# Patient Record
Sex: MALE | Race: WHITE | NOT HISPANIC OR LATINO | Employment: OTHER | URBAN - METROPOLITAN AREA
[De-identification: names, ages, dates, MRNs, and addresses within clinical notes are randomized per-mention and may not be internally consistent; named-entity substitution may affect disease eponyms.]

---

## 2019-07-23 ENCOUNTER — OFFICE VISIT (OUTPATIENT)
Dept: CARDIOLOGY | Facility: CLINIC | Age: 79
End: 2019-07-23

## 2019-07-23 VITALS
DIASTOLIC BLOOD PRESSURE: 76 MMHG | HEIGHT: 68 IN | WEIGHT: 164 LBS | HEART RATE: 64 BPM | SYSTOLIC BLOOD PRESSURE: 122 MMHG | BODY MASS INDEX: 24.86 KG/M2

## 2019-07-23 DIAGNOSIS — E11.9 TYPE 2 DIABETES MELLITUS WITHOUT COMPLICATION, WITHOUT LONG-TERM CURRENT USE OF INSULIN (HCC): ICD-10-CM

## 2019-07-23 DIAGNOSIS — R01.1 HEART MURMUR: Primary | ICD-10-CM

## 2019-07-23 DIAGNOSIS — I10 ESSENTIAL HYPERTENSION: ICD-10-CM

## 2019-07-23 PROCEDURE — 93000 ELECTROCARDIOGRAM COMPLETE: CPT | Performed by: INTERNAL MEDICINE

## 2019-07-23 PROCEDURE — 99204 OFFICE O/P NEW MOD 45 MIN: CPT | Performed by: INTERNAL MEDICINE

## 2019-07-23 RX ORDER — TAMSULOSIN HYDROCHLORIDE 0.4 MG/1
CAPSULE ORAL DAILY
COMMUNITY
Start: 2019-07-23 | End: 2019-12-23 | Stop reason: SDUPTHER

## 2019-07-23 RX ORDER — LOSARTAN POTASSIUM 100 MG/1
TABLET ORAL DAILY
COMMUNITY
Start: 2019-07-23 | End: 2019-12-23 | Stop reason: SDUPTHER

## 2019-07-23 RX ORDER — METFORMIN HYDROCHLORIDE 750 MG/1
TABLET, EXTENDED RELEASE ORAL DAILY
COMMUNITY
Start: 2019-06-11 | End: 2019-12-23 | Stop reason: SDUPTHER

## 2019-07-23 NOTE — PROGRESS NOTES
Date of Office Visit: 2019  Encounter Provider: Khurram Ball MD  Place of Service: [facility] CARDIOLOGY  Patient Name: Woo Dobbs  :1940    Chief Complaint   Patient presents with   • Heart Murmur   :     HPI: Woo Dobbs is a 78 y.o. male who presents today to at the request of [facility] regarding a murmur.  He reports being told he had a murmur sometime in the last 10-15 years but was told not to worry about it.  He doesn't have a history of rheumatic fever.  He denies chest pain, dyspnea, palpitations, syncope, orthopnea, or edema.  He has had a lot of tick bites and a rash and is very fatigued.  He has a cough and was given a cephalosporin for bronchitis.    Past Medical History:   Diagnosis Date   • Hypertension    • Type 2 diabetes mellitus (CMS/HCC)        Past Surgical History:   Procedure Laterality Date   • VASECTOMY         Social History     Socioeconomic History   • Marital status:      Spouse name: Not on file   • Number of children: 3   • Years of education: Not on file   • Highest education level: Not on file   Occupational History   • Occupation: retired      Comment:      Tobacco Use   • Smoking status: Never Smoker   • Smokeless tobacco: Never Used   • Tobacco comment: caffeine use: 1 -2 cups daily.    Substance and Sexual Activity   • Alcohol use: No     Frequency: Never   • Drug use: No       History reviewed. No pertinent family history.    ROS    Allergies   Allergen Reactions   • Loratadine Other (See Comments)     Emotional side effects.          Current Outpatient Medications:   •  losartan (COZAAR) 100 MG tablet, Daily., Disp: , Rfl:   •  metFORMIN ER (GLUCOPHAGE-XR) 750 MG 24 hr tablet, Daily., Disp: , Rfl:   •  tamsulosin (FLOMAX) 0.4 MG capsule 24 hr capsule, Daily., Disp: , Rfl:       Objective:     Vitals:    19 1201   BP: 122/76   BP Location: Left arm   Pulse: 64   Weight: 74.4 kg (164 lb)   Height: 172.7 cm  "(68\")     Body mass index is 24.94 kg/m².    Physical Exam   Constitutional: He is oriented to person, place, and time. He appears well-developed and well-nourished.   HENT:   Head: Normocephalic.   Nose: Nose normal.   Mouth/Throat: Oropharynx is clear and moist.   Eyes: Conjunctivae and EOM are normal. Pupils are equal, round, and reactive to light.   Neck: Normal range of motion. No JVD present.   Cardiovascular: Normal rate, regular rhythm and intact distal pulses.   Murmur heard.   Harsh midsystolic murmur is present with a grade of 2/6 at the upper right sternal border radiating to the neck.  Pulmonary/Chest: Effort normal and breath sounds normal.   Abdominal: Soft. There is no tenderness.   Musculoskeletal: Normal range of motion. He exhibits no edema.   Lymphadenopathy:     He has no cervical adenopathy.   Neurological: He is alert and oriented to person, place, and time. No cranial nerve deficit.   Skin: Skin is warm and dry. No rash noted.   Psychiatric: He has a normal mood and affect. His behavior is normal. Judgment and thought content normal.   Vitals reviewed.        ECG 12 Lead  Date/Time: 7/23/2019 12:09 PM  Performed by: Khurram Ball MD  Authorized by: Khurram Ball MD   Comparison: compared with previous ECG   Similar to previous ECG  Rhythm: sinus rhythm  Conduction: conduction normal  ST Segments: ST segments normal  T Waves: T waves normal  QRS axis: normal  Other: no other findings    Clinical impression: normal ECG              Assessment:       Diagnosis Plan   1. Heart murmur     2. Essential hypertension     3. Type 2 diabetes mellitus without complication, without long-term current use of insulin (CMS/[facility])            Plan:       His murmur is suggestive of mild aortic stenosis.  He has no cardiac symptoms.  His EKG is normal. I will check an echocardiogram.    His BP was 105/70 yesterday, so he held his losartan last night.  Today his BP is well controlled.  He likely only needs 50mg " daily.  I'll follow up with this when I get the echo back.    Sincerely,       Khurram Ball MD

## 2019-08-07 ENCOUNTER — HOSPITAL ENCOUNTER (OUTPATIENT)
Dept: CARDIOLOGY | Facility: HOSPITAL | Age: 79
Discharge: HOME | End: 2019-08-07
Admitting: INTERNAL MEDICINE

## 2019-08-07 VITALS
DIASTOLIC BLOOD PRESSURE: 73 MMHG | SYSTOLIC BLOOD PRESSURE: 134 MMHG | HEIGHT: 68 IN | WEIGHT: 164 LBS | BODY MASS INDEX: 24.86 KG/M2

## 2019-08-07 DIAGNOSIS — R01.1 HEART MURMUR: ICD-10-CM

## 2019-08-07 LAB
BH CV ECHO MEAS - ACS: 1.2 CM
BH CV ECHO MEAS - AO MAX PG (FULL): 18 MMHG
BH CV ECHO MEAS - AO MAX PG: 24.6 MMHG
BH CV ECHO MEAS - AO MEAN PG (FULL): 9.5 MMHG
BH CV ECHO MEAS - AO MEAN PG: 12.5 MMHG
BH CV ECHO MEAS - AO ROOT AREA (BSA CORRECTED): 1.5
BH CV ECHO MEAS - AO ROOT AREA: 6.6 CM^2
BH CV ECHO MEAS - AO ROOT DIAM: 2.9 CM
BH CV ECHO MEAS - AO V2 MAX: 248 CM/SEC
BH CV ECHO MEAS - AO V2 MEAN: 163 CM/SEC
BH CV ECHO MEAS - AO V2 VTI: 50.4 CM
BH CV ECHO MEAS - ASC AORTA: 3.4 CM
BH CV ECHO MEAS - AVA(I,A): 1.8 CM^2
BH CV ECHO MEAS - AVA(I,D): 1.8 CM^2
BH CV ECHO MEAS - AVA(V,A): 1.8 CM^2
BH CV ECHO MEAS - AVA(V,D): 1.8 CM^2
BH CV ECHO MEAS - BSA(HAYCOCK): 1.9 M^2
BH CV ECHO MEAS - BSA: 1.9 M^2
BH CV ECHO MEAS - BZI_BMI: 24.9 KILOGRAMS/M^2
BH CV ECHO MEAS - BZI_METRIC_HEIGHT: 172.7 CM
BH CV ECHO MEAS - BZI_METRIC_WEIGHT: 74.4 KG
BH CV ECHO MEAS - EDV(CUBED): 57.1 ML
BH CV ECHO MEAS - EDV(MOD-SP2): 63.4 ML
BH CV ECHO MEAS - EDV(MOD-SP4): 58.5 ML
BH CV ECHO MEAS - EDV(TEICH): 63.9 ML
BH CV ECHO MEAS - EF(CUBED): 92.1 %
BH CV ECHO MEAS - EF(MOD-BP): 62 %
BH CV ECHO MEAS - EF(MOD-SP2): 65.5 %
BH CV ECHO MEAS - EF(MOD-SP4): 65.3 %
BH CV ECHO MEAS - EF(TEICH): 87.9 %
BH CV ECHO MEAS - ESV(CUBED): 4.5 ML
BH CV ECHO MEAS - ESV(MOD-SP2): 21.9 ML
BH CV ECHO MEAS - ESV(MOD-SP4): 20.3 ML
BH CV ECHO MEAS - ESV(TEICH): 7.8 ML
BH CV ECHO MEAS - FS: 57.1 %
BH CV ECHO MEAS - IVS/LVPW: 1
BH CV ECHO MEAS - IVSD: 1.3 CM
BH CV ECHO MEAS - LAT PEAK E' VEL: 8 CM/SEC
BH CV ECHO MEAS - LV DIASTOLIC VOL/BSA (35-75): 31.1 ML/M^2
BH CV ECHO MEAS - LV MASS(C)D: 173.3 GRAMS
BH CV ECHO MEAS - LV MASS(C)DI: 92.2 GRAMS/M^2
BH CV ECHO MEAS - LV MAX PG: 6.6 MMHG
BH CV ECHO MEAS - LV MEAN PG: 3 MMHG
BH CV ECHO MEAS - LV SYSTOLIC VOL/BSA (12-30): 10.8 ML/M^2
BH CV ECHO MEAS - LV V1 MAX: 128.5 CM/SEC
BH CV ECHO MEAS - LV V1 MEAN: 84 CM/SEC
BH CV ECHO MEAS - LV V1 VTI: 26 CM
BH CV ECHO MEAS - LVIDD: 3.9 CM
BH CV ECHO MEAS - LVIDS: 1.7 CM
BH CV ECHO MEAS - LVLD AP2: 7.3 CM
BH CV ECHO MEAS - LVLD AP4: 7.3 CM
BH CV ECHO MEAS - LVLS AP2: 6.5 CM
BH CV ECHO MEAS - LVLS AP4: 5.4 CM
BH CV ECHO MEAS - LVOT AREA (M): 3.5 CM^2
BH CV ECHO MEAS - LVOT AREA: 3.5 CM^2
BH CV ECHO MEAS - LVOT DIAM: 2.1 CM
BH CV ECHO MEAS - LVPWD: 1.3 CM
BH CV ECHO MEAS - MED PEAK E' VEL: 5 CM/SEC
BH CV ECHO MEAS - MV A DUR: 0.14 SEC
BH CV ECHO MEAS - MV A MAX VEL: 98 CM/SEC
BH CV ECHO MEAS - MV DEC SLOPE: 409 CM/SEC^2
BH CV ECHO MEAS - MV DEC TIME: 259 SEC
BH CV ECHO MEAS - MV E MAX VEL: 71.8 CM/SEC
BH CV ECHO MEAS - MV E/A: 0.73
BH CV ECHO MEAS - MV MAX PG: 4.9 MMHG
BH CV ECHO MEAS - MV MEAN PG: 2 MMHG
BH CV ECHO MEAS - MV P1/2T MAX VEL: 97.7 CM/SEC
BH CV ECHO MEAS - MV P1/2T: 70 MSEC
BH CV ECHO MEAS - MV V2 MAX: 111 CM/SEC
BH CV ECHO MEAS - MV V2 MEAN: 55.4 CM/SEC
BH CV ECHO MEAS - MV V2 VTI: 29.6 CM
BH CV ECHO MEAS - MVA P1/2T LCG: 2.3 CM^2
BH CV ECHO MEAS - MVA(P1/2T): 3.1 CM^2
BH CV ECHO MEAS - MVA(VTI): 3 CM^2
BH CV ECHO MEAS - PA ACC TIME: 0.12 SEC
BH CV ECHO MEAS - PA MAX PG (FULL): 1 MMHG
BH CV ECHO MEAS - PA MAX PG: 3.1 MMHG
BH CV ECHO MEAS - PA PR(ACCEL): 26.8 MMHG
BH CV ECHO MEAS - PA V2 MAX: 88.6 CM/SEC
BH CV ECHO MEAS - PULM A REVS DUR: 0.13 SEC
BH CV ECHO MEAS - PULM A REVS VEL: 36.9 CM/SEC
BH CV ECHO MEAS - PULM DIAS VEL: 40.3 CM/SEC
BH CV ECHO MEAS - PULM S/D: 1.8
BH CV ECHO MEAS - PULM SYS VEL: 73.7 CM/SEC
BH CV ECHO MEAS - PVA(V,A): 2.3 CM^2
BH CV ECHO MEAS - PVA(V,D): 2.3 CM^2
BH CV ECHO MEAS - QP/QS: 0.44
BH CV ECHO MEAS - RAP SYSTOLE: 3 MMHG
BH CV ECHO MEAS - RV MAX PG: 2.1 MMHG
BH CV ECHO MEAS - RV MEAN PG: 1 MMHG
BH CV ECHO MEAS - RV V1 MAX: 72.4 CM/SEC
BH CV ECHO MEAS - RV V1 MEAN: 49.6 CM/SEC
BH CV ECHO MEAS - RV V1 VTI: 13.8 CM
BH CV ECHO MEAS - RVOT AREA: 2.8 CM^2
BH CV ECHO MEAS - RVOT DIAM: 1.9 CM
BH CV ECHO MEAS - RVSP: 23.6 MMHG
BH CV ECHO MEAS - SI(AO): 177 ML/M^2
BH CV ECHO MEAS - SI(CUBED): 28 ML/M^2
BH CV ECHO MEAS - SI(LVOT): 47.8 ML/M^2
BH CV ECHO MEAS - SI(MOD-SP2): 22.1 ML/M^2
BH CV ECHO MEAS - SI(MOD-SP4): 20.3 ML/M^2
BH CV ECHO MEAS - SI(TEICH): 29.9 ML/M^2
BH CV ECHO MEAS - SV(AO): 332.6 ML
BH CV ECHO MEAS - SV(CUBED): 52.6 ML
BH CV ECHO MEAS - SV(LVOT): 89.9 ML
BH CV ECHO MEAS - SV(MOD-SP2): 41.5 ML
BH CV ECHO MEAS - SV(MOD-SP4): 38.2 ML
BH CV ECHO MEAS - SV(RVOT): 39.1 ML
BH CV ECHO MEAS - SV(TEICH): 56.2 ML
BH CV ECHO MEAS - TAPSE (>1.6): 2 CM2
BH CV ECHO MEAS - TR MAX VEL: 227 CM/SEC
BH CV ECHO MEASUREMENTS AVERAGE E/E' RATIO: 11.05
BH CV VAS BP RIGHT ARM: NORMAL MMHG
BH CV XLRA - RV BASE: 2.9 CM
BH CV XLRA - TDI S': 11 CM/SEC
LEFT ATRIUM VOLUME INDEX: 16 ML/M2
MAXIMAL PREDICTED HEART RATE: 142 BPM
STRESS TARGET HR: 121 BPM

## 2019-08-07 PROCEDURE — 93306 TTE W/DOPPLER COMPLETE: CPT | Performed by: INTERNAL MEDICINE

## 2019-08-07 PROCEDURE — 93306 TTE W/DOPPLER COMPLETE: CPT

## 2019-12-23 ENCOUNTER — OFFICE VISIT (OUTPATIENT)
Dept: FAMILY MEDICINE CLINIC | Facility: CLINIC | Age: 79
End: 2019-12-23

## 2019-12-23 VITALS
TEMPERATURE: 98.3 F | WEIGHT: 160 LBS | DIASTOLIC BLOOD PRESSURE: 62 MMHG | OXYGEN SATURATION: 98 % | HEART RATE: 65 BPM | RESPIRATION RATE: 14 BRPM | HEIGHT: 68 IN | BODY MASS INDEX: 24.25 KG/M2 | SYSTOLIC BLOOD PRESSURE: 112 MMHG

## 2019-12-23 DIAGNOSIS — I10 ESSENTIAL HYPERTENSION: Primary | ICD-10-CM

## 2019-12-23 DIAGNOSIS — E11.59 TYPE 2 DIABETES MELLITUS WITH OTHER CIRCULATORY COMPLICATION, WITHOUT LONG-TERM CURRENT USE OF INSULIN (HCC): ICD-10-CM

## 2019-12-23 DIAGNOSIS — Z23 NEED FOR VACCINATION: ICD-10-CM

## 2019-12-23 DIAGNOSIS — Z12.5 SCREENING PSA (PROSTATE SPECIFIC ANTIGEN): ICD-10-CM

## 2019-12-23 DIAGNOSIS — N40.0 BENIGN PROSTATIC HYPERPLASIA WITHOUT LOWER URINARY TRACT SYMPTOMS: ICD-10-CM

## 2019-12-23 DIAGNOSIS — I51.89 GRADE I DIASTOLIC DYSFUNCTION: ICD-10-CM

## 2019-12-23 PROCEDURE — G0008 ADMIN INFLUENZA VIRUS VAC: HCPCS | Performed by: FAMILY MEDICINE

## 2019-12-23 PROCEDURE — 90653 IIV ADJUVANT VACCINE IM: CPT | Performed by: FAMILY MEDICINE

## 2019-12-23 PROCEDURE — 99202 OFFICE O/P NEW SF 15 MIN: CPT | Performed by: FAMILY MEDICINE

## 2019-12-23 RX ORDER — METFORMIN HYDROCHLORIDE 750 MG/1
750 TABLET, EXTENDED RELEASE ORAL
Qty: 90 TABLET | Refills: 1 | Status: SHIPPED | OUTPATIENT
Start: 2019-12-23 | End: 2020-10-15 | Stop reason: SDUPTHER

## 2019-12-23 RX ORDER — CHOLECALCIFEROL (VITAMIN D3) 50 MCG
2000 TABLET ORAL DAILY
Status: CANCELLED | OUTPATIENT
Start: 2019-12-23

## 2019-12-23 RX ORDER — TAMSULOSIN HYDROCHLORIDE 0.4 MG/1
1 CAPSULE ORAL DAILY
Qty: 90 CAPSULE | Refills: 1 | Status: SHIPPED | OUTPATIENT
Start: 2019-12-23 | End: 2020-10-15 | Stop reason: SDUPTHER

## 2019-12-23 RX ORDER — LOSARTAN POTASSIUM 100 MG/1
100 TABLET ORAL DAILY
Qty: 90 TABLET | Refills: 1 | Status: SHIPPED | OUTPATIENT
Start: 2019-12-23 | End: 2020-10-15 | Stop reason: SDUPTHER

## 2019-12-23 RX ORDER — CHOLECALCIFEROL (VITAMIN D3) 50 MCG
2000 TABLET ORAL DAILY
COMMUNITY
End: 2020-10-15

## 2019-12-23 NOTE — ASSESSMENT & PLAN NOTE
Controlled with losartan 100 mg daily.  No medication side effects.  Continue current treatment.

## 2019-12-23 NOTE — PROGRESS NOTES
Subjective   Woo Dobbs is a 79 y.o. male is here for   Chief Complaint   Patient presents with   • Establish Care   • Hypertension   • Diabetes       History of Present Illness     Mr. Dobbs has hypertension that is controlled losartan 100 mg.  No side effects.    He has diabetes. He takes ASA daily. He takes fish oil. He takes garlic.    He has a heart murmur. He states he had an echo that he states did not reveal any serious abnormalities.    The following portions of the patient's history were reviewed and updated as appropriate: allergies, current medications, past family history, past medical history, past social history, past surgical history and problem list.     reports that he has never smoked. He has never used smokeless tobacco. He reports that he does not drink alcohol or use drugs.    Review of Systems   Constitutional: Negative for activity change and unexpected weight change.   HENT: Negative for congestion.    Respiratory: Negative for shortness of breath and wheezing.    Cardiovascular: Negative for chest pain and palpitations.   Gastrointestinal: Negative for abdominal pain, blood in stool and constipation.   Genitourinary: Negative for difficulty urinating and hematuria.   Musculoskeletal: Negative for gait problem.   Skin: Negative for color change and rash.        PHQ-9 Depression Screening  Little interest or pleasure in doing things? 0   Feeling down, depressed, or hopeless? 0   Trouble falling or staying asleep, or sleeping too much?     Feeling tired or having little energy?     Poor appetite or overeating?     Feeling bad about yourself - or that you are a failure or have let yourself or your family down?     Trouble concentrating on things, such as reading the newspaper or watching television?     Moving or speaking so slowly that other people could have noticed? Or the opposite - being so fidgety or restless that you have been moving around a lot more than usual?     Thoughts that you  "would be better off dead, or of hurting yourself in some way?     PHQ-9 Total Score 0   If you checked off any problems, how difficult have these problems made it for you to do your work, take care of things at home, or get along with other people?           Objective   /62 (BP Location: Left arm, Patient Position: Sitting, Cuff Size: Adult)   Pulse 65   Temp 98.3 °F (36.8 °C) (Oral)   Resp 14   Ht 172.7 cm (68\")   Wt 72.6 kg (160 lb)   SpO2 98%   BMI 24.33 kg/m²   Physical Exam   Constitutional: He is oriented to person, place, and time. He appears well-developed and well-nourished. No distress.   HENT:   Head: Normocephalic and atraumatic.   Right Ear: External ear normal.   Left Ear: External ear normal.   Nose: Nose normal.   Mouth/Throat: Oropharynx is clear and moist. No oropharyngeal exudate.   Eyes: Lids are normal. Right eye exhibits no discharge. Left eye exhibits no discharge. No scleral icterus.   Neck: Trachea normal, normal range of motion and full passive range of motion without pain. Neck supple. No tracheal deviation and no edema present. No thyromegaly present.   Cardiovascular: Normal rate, regular rhythm and intact distal pulses. Exam reveals no gallop and no friction rub.   Murmur heard.  Pulmonary/Chest: Effort normal and breath sounds normal. No stridor. No tachypnea and no bradypnea. No respiratory distress. He has no decreased breath sounds. He has no wheezes. He has no rales. He exhibits no tenderness.   Abdominal: Normal appearance. There is no hepatosplenomegaly.   Musculoskeletal: He exhibits no edema.   Lymphadenopathy:        Head (right side): No submental, no submandibular, no tonsillar, no preauricular, no posterior auricular and no occipital adenopathy present.        Head (left side): No submental, no submandibular, no tonsillar, no preauricular, no posterior auricular and no occipital adenopathy present.     He has no cervical adenopathy.        Right cervical: No " superficial cervical, no deep cervical and no posterior cervical adenopathy present.       Left cervical: No superficial cervical, no deep cervical and no posterior cervical adenopathy present.   Neurological: He is alert and oriented to person, place, and time. He has normal strength and normal reflexes. He is not disoriented.   Skin: Skin is warm, dry and intact. Capillary refill takes less than 2 seconds. No rash noted. He is not diaphoretic. No cyanosis or erythema. No pallor. Nails show no clubbing.   Psychiatric: He has a normal mood and affect. His behavior is normal. Cognition and memory are normal.   Nursing note and vitals reviewed.      Procedures    Assessment/Plan   Diagnoses and all orders for this visit:    1. Essential hypertension (Primary)  Assessment & Plan:  Controlled with losartan 100 mg daily.  No medication side effects.  Continue current treatment.      Orders:  -     Comprehensive metabolic panel  -     Hemoglobin A1c  -     Lipid Panel w/ Chol/HDL Ratio  -     TSH  -     CBC w AUTO Differential  -     Microalbumin / Creatinine Urine Ratio - Urine, Clean Catch  -     losartan (COZAAR) 100 MG tablet; Take 1 tablet by mouth Daily.  Dispense: 90 tablet; Refill: 1    2. Type 2 diabetes mellitus with other circulatory complication, without long-term current use of insulin (CMS/[facility])  Assessment & Plan:  Controlled with metformin.  He takes ASA daily. He takes fish oil. He takes garlic.      Orders:  -     Comprehensive metabolic panel  -     Hemoglobin A1c  -     Lipid Panel w/ Chol/HDL Ratio  -     TSH  -     CBC w AUTO Differential  -     Microalbumin / Creatinine Urine Ratio - Urine, Clean Catch  -     metFORMIN ER (GLUCOPHAGE-XR) 750 MG 24 hr tablet; Take 1 tablet by mouth Daily With Breakfast.  Dispense: 90 tablet; Refill: 1    3. Grade I diastolic dysfunction    4. Benign prostatic hyperplasia without lower urinary tract symptoms  Assessment & Plan:  Controlled with tamsulosin.  No  medication side effects.  Continue current treatment.    Orders:  -     tamsulosin (FLOMAX) 0.4 MG capsule 24 hr capsule; Take 1 capsule by mouth Daily.  Dispense: 90 capsule; Refill: 1    Other orders  -     Cancel: Vitamin D 25 hydroxy  -     Cancel: Cholecalciferol (VITAMIN D) 50 MCG (2000 UT) tablet; Take 2,000 Units by mouth Daily.

## 2019-12-24 LAB
ALBUMIN SERPL-MCNC: 4.4 G/DL (ref 3.5–4.8)
ALBUMIN/CREAT UR: 9.9 MG/G CREAT (ref 0–30)
ALBUMIN/GLOB SERPL: 1.7 {RATIO} (ref 1.2–2.2)
ALP SERPL-CCNC: 70 IU/L (ref 39–117)
ALT SERPL-CCNC: 14 IU/L (ref 0–44)
AST SERPL-CCNC: 17 IU/L (ref 0–40)
BASOPHILS # BLD AUTO: 0.1 X10E3/UL (ref 0–0.2)
BASOPHILS NFR BLD AUTO: 1 %
BILIRUB SERPL-MCNC: 0.7 MG/DL (ref 0–1.2)
BUN SERPL-MCNC: 17 MG/DL (ref 8–27)
BUN/CREAT SERPL: 16 (ref 10–24)
CALCIUM SERPL-MCNC: 9.6 MG/DL (ref 8.6–10.2)
CHLORIDE SERPL-SCNC: 101 MMOL/L (ref 96–106)
CHOLEST SERPL-MCNC: 190 MG/DL (ref 100–199)
CHOLEST/HDLC SERPL: 5.3 RATIO (ref 0–5)
CO2 SERPL-SCNC: 24 MMOL/L (ref 20–29)
CREAT SERPL-MCNC: 1.05 MG/DL (ref 0.76–1.27)
CREAT UR-MCNC: 166.5 MG/DL
EOSINOPHIL # BLD AUTO: 0.1 X10E3/UL (ref 0–0.4)
EOSINOPHIL NFR BLD AUTO: 1 %
ERYTHROCYTE [DISTWIDTH] IN BLOOD BY AUTOMATED COUNT: 13.8 % (ref 12.3–15.4)
GLOBULIN SER CALC-MCNC: 2.6 G/DL (ref 1.5–4.5)
GLUCOSE SERPL-MCNC: 134 MG/DL (ref 65–99)
HBA1C MFR BLD: 6.6 % (ref 4.8–5.6)
HCT VFR BLD AUTO: 43.9 % (ref 37.5–51)
HDLC SERPL-MCNC: 36 MG/DL
HGB BLD-MCNC: 14.9 G/DL (ref 13–17.7)
IMM GRANULOCYTES # BLD AUTO: 0 X10E3/UL (ref 0–0.1)
IMM GRANULOCYTES NFR BLD AUTO: 0 %
LDLC SERPL CALC-MCNC: 121 MG/DL (ref 0–99)
LYMPHOCYTES # BLD AUTO: 2 X10E3/UL (ref 0.7–3.1)
LYMPHOCYTES NFR BLD AUTO: 19 %
MCH RBC QN AUTO: 31.3 PG (ref 26.6–33)
MCHC RBC AUTO-ENTMCNC: 33.9 G/DL (ref 31.5–35.7)
MCV RBC AUTO: 92 FL (ref 79–97)
MICROALBUMIN UR-MCNC: 16.5 UG/ML
MONOCYTES # BLD AUTO: 0.8 X10E3/UL (ref 0.1–0.9)
MONOCYTES NFR BLD AUTO: 7 %
NEUTROPHILS # BLD AUTO: 8 X10E3/UL (ref 1.4–7)
NEUTROPHILS NFR BLD AUTO: 72 %
PLATELET # BLD AUTO: 282 X10E3/UL (ref 150–450)
POTASSIUM SERPL-SCNC: 4.5 MMOL/L (ref 3.5–5.2)
PROT SERPL-MCNC: 7 G/DL (ref 6–8.5)
RBC # BLD AUTO: 4.76 X10E6/UL (ref 4.14–5.8)
SODIUM SERPL-SCNC: 137 MMOL/L (ref 134–144)
TRIGL SERPL-MCNC: 165 MG/DL (ref 0–149)
TSH SERPL DL<=0.005 MIU/L-ACNC: 3.03 UIU/ML (ref 0.45–4.5)
VLDLC SERPL CALC-MCNC: 33 MG/DL (ref 5–40)
WBC # BLD AUTO: 11 X10E3/UL (ref 3.4–10.8)

## 2019-12-24 NOTE — PROGRESS NOTES
Please call the patient regarding his result(s).  Please let him know that his hemoglobin A1c is 6.6 and his diabetes is well controlled.  His liver and kidney function are normal.

## 2020-03-22 ENCOUNTER — RESULTS ENCOUNTER (OUTPATIENT)
Dept: FAMILY MEDICINE CLINIC | Facility: CLINIC | Age: 80
End: 2020-03-22

## 2020-03-22 DIAGNOSIS — I10 ESSENTIAL HYPERTENSION: ICD-10-CM

## 2020-03-22 DIAGNOSIS — E11.59 TYPE 2 DIABETES MELLITUS WITH OTHER CIRCULATORY COMPLICATION, WITHOUT LONG-TERM CURRENT USE OF INSULIN (HCC): ICD-10-CM

## 2020-04-23 ENCOUNTER — TELEPHONE (OUTPATIENT)
Dept: FAMILY MEDICINE CLINIC | Facility: CLINIC | Age: 80
End: 2020-04-23

## 2020-04-23 NOTE — TELEPHONE ENCOUNTER
Please call this patient help him get established with [employer] for a video visit for his appointment with me this Wednesday, April 29th, and change the visit type to [employer] video visit.  He may be able to use his wife's email address if he does not have one of his own.  IT is okay for two people to use the samer email.  They will need different/seperate [employer] usernames.  Thank you.

## 2020-04-24 NOTE — TELEPHONE ENCOUNTER
I called patient and spoke with him and his wife.  They both said they could not do Video visits, only telephone visit. SHOSHANA Rodriguez CMA

## 2020-04-29 PROBLEM — R13.10 ODYNOPHAGIA: Status: ACTIVE | Noted: 2020-04-29

## 2020-05-05 DIAGNOSIS — E11.59 TYPE 2 DIABETES MELLITUS WITH OTHER CIRCULATORY COMPLICATION, WITHOUT LONG-TERM CURRENT USE OF INSULIN (HCC): Primary | ICD-10-CM

## 2020-05-05 RX ORDER — BLOOD-GLUCOSE METER
1 KIT MISCELLANEOUS AS NEEDED
Qty: 1 EACH | Refills: 0 | Status: SHIPPED | OUTPATIENT
Start: 2020-05-05 | End: 2020-10-15

## 2020-05-05 RX ORDER — LANCETS 30 GAUGE
1 EACH MISCELLANEOUS
Qty: 200 EACH | Refills: 5 | Status: SHIPPED | OUTPATIENT
Start: 2020-05-05

## 2020-05-06 ENCOUNTER — TELEMEDICINE (OUTPATIENT)
Dept: GASTROENTEROLOGY | Facility: CLINIC | Age: 80
End: 2020-05-06

## 2020-05-06 DIAGNOSIS — R10.13 DYSPEPSIA: Primary | ICD-10-CM

## 2020-05-06 DIAGNOSIS — K21.9 GASTROESOPHAGEAL REFLUX DISEASE, ESOPHAGITIS PRESENCE NOT SPECIFIED: ICD-10-CM

## 2020-05-06 PROCEDURE — 99203 OFFICE O/P NEW LOW 30 MIN: CPT | Performed by: INTERNAL MEDICINE

## 2020-05-06 RX ORDER — OMEPRAZOLE 40 MG/1
40 CAPSULE, DELAYED RELEASE ORAL DAILY
Qty: 90 CAPSULE | Refills: 3 | Status: SHIPPED | OUTPATIENT
Start: 2020-05-06 | End: 2020-10-15

## 2020-05-06 NOTE — PROGRESS NOTES
Video visit:  You have chosen to receive care through a telehealth visit.  Do you consent to use a video/audio connection for your medical care today? Yes    PATIENT INFORMATION  Woo Dobbs       - 1940    CHIEF COMPLAINT  Chief Complaint   Patient presents with   • Heartburn       HISTORY OF PRESENT ILLNESS  Sees [facility] and brought up retrosternal fullness but after the meal not during and worse with chili or spicy foods, montrell at night.     Has used OTC prilosec and doesn't use steroids recently but has had lung infections in the past for which he took it , no meat impaction nor early satiety nor meat impactions.                  REVIEW OF SYSTEMS  Review of Systems      ACTIVE PROBLEMS  Patient Active Problem List    Diagnosis   • Odynophagia [R13.10]   • Grade I diastolic dysfunction [I51.9]   • Benign prostatic hyperplasia without lower urinary tract symptoms [N40.0]   • Diabetes mellitus (CMS/HCC) [E11.9]   • Hypertension [I10]         PAST MEDICAL HISTORY  Past Medical History:   Diagnosis Date   • Colon polyp    • Hypertension    • Type 2 diabetes mellitus (CMS/HCC)          SURGICAL HISTORY  Past Surgical History:   Procedure Laterality Date   • COLONOSCOPY     • VASECTOMY           FAMILY HISTORY  Family History   Problem Relation Age of Onset   • Colon cancer Neg Hx    • Colon polyps Neg Hx          SOCIAL HISTORY  Social History     Occupational History   • Occupation: retired      Comment:      Tobacco Use   • Smoking status: Never Smoker   • Smokeless tobacco: Never Used   • Tobacco comment: caffeine use: 1 -2 cups daily.    Substance and Sexual Activity   • Alcohol use: No     Frequency: Never   • Drug use: No   • Sexual activity: Defer         CURRENT MEDICATIONS    Current Outpatient Medications:   •  Cholecalciferol (VITAMIN D) 50 MCG (2000 UT) tablet, Take 2,000 Units by mouth Daily., Disp: , Rfl:   •  glucose blood test strip, Use as instructed, Disp: 200 each, Rfl:  12  •  glucose blood test strip, Three times daily before meals, Disp: 200 each, Rfl: 12  •  glucose monitor monitoring kit, 1 each As Needed (Diabetes 2)., Disp: 1 each, Rfl: 0  •  glucose monitor monitoring kit, 1 each As Needed (Diabetes 2). Check blood sugar three times daily with meals, Disp: 1 each, Rfl: 0  •  Lancets misc, 1 each 2 (Two) Times a Day., Disp: 200 each, Rfl: 5  •  Lancets misc, 1 each 3 (Three) Times a Day Before Meals., Disp: 200 each, Rfl: 5  •  losartan (COZAAR) 100 MG tablet, Take 1 tablet by mouth Daily., Disp: 90 tablet, Rfl: 1  •  metFORMIN ER (GLUCOPHAGE-XR) 750 MG 24 hr tablet, Take 1 tablet by mouth Daily With Breakfast., Disp: 90 tablet, Rfl: 1  •  omeprazole (priLOSEC) 40 MG capsule, Take 1 capsule by mouth Daily., Disp: 90 capsule, Rfl: 3  •  tamsulosin (FLOMAX) 0.4 MG capsule 24 hr capsule, Take 1 capsule by mouth Daily., Disp: 90 capsule, Rfl: 1    ALLERGIES  Loratadine    VITALS  There were no vitals filed for this visit.    LAST RESULTS   Office Visit on 12/23/2019   Component Date Value Ref Range Status   • Glucose 12/23/2019 134* 65 - 99 mg/dL Final   • BUN 12/23/2019 17  8 - 27 mg/dL Final   • Creatinine 12/23/2019 1.05  0.76 - 1.27 mg/dL Final   • eGFR Non  Am 12/23/2019 67  >59 mL/min/1.73 Final   • eGFR African Am 12/23/2019 78  >59 mL/min/1.73 Final   • BUN/Creatinine Ratio 12/23/2019 16  10 - 24 Final   • Sodium 12/23/2019 137  134 - 144 mmol/L Final   • Potassium 12/23/2019 4.5  3.5 - 5.2 mmol/L Final   • Chloride 12/23/2019 101  96 - 106 mmol/L Final   • Total CO2 12/23/2019 24  20 - 29 mmol/L Final   • Calcium 12/23/2019 9.6  8.6 - 10.2 mg/dL Final   • Total Protein 12/23/2019 7.0  6.0 - 8.5 g/dL Final   • Albumin 12/23/2019 4.4  3.5 - 4.8 g/dL Final   • Globulin 12/23/2019 2.6  1.5 - 4.5 g/dL Final   • A/G Ratio 12/23/2019 1.7  1.2 - 2.2 Final   • Total Bilirubin 12/23/2019 0.7  0.0 - 1.2 mg/dL Final   • Alkaline Phosphatase 12/23/2019 70  39 - 117 IU/L Final    • AST (SGOT) 12/23/2019 17  0 - 40 IU/L Final   • ALT (SGPT) 12/23/2019 14  0 - 44 IU/L Final   • Hemoglobin A1C 12/23/2019 6.6* 4.8 - 5.6 % Final    Comment:          Prediabetes: 5.7 - 6.4           Diabetes: >6.4           Glycemic control for adults with diabetes: <7.0     • Total Cholesterol 12/23/2019 190  100 - 199 mg/dL Final   • Triglycerides 12/23/2019 165* 0 - 149 mg/dL Final   • HDL Cholesterol 12/23/2019 36* >39 mg/dL Final   • VLDL Cholesterol 12/23/2019 33  5 - 40 mg/dL Final   • LDL Cholesterol  12/23/2019 121* 0 - 99 mg/dL Final   • Chol/HDL Ratio 12/23/2019 5.3* 0.0 - 5.0 ratio Final    Comment:                                   T. Chol/HDL Ratio                                              Men  Women                                1/2 Avg.Risk  3.4    3.3                                    Avg.Risk  5.0    4.4                                 2X Avg.Risk  9.6    7.1                                 3X Avg.Risk 23.4   11.0     • TSH 12/23/2019 3.030  0.450 - 4.500 uIU/mL Final   • WBC 12/23/2019 11.0* 3.4 - 10.8 x10E3/uL Final   • RBC 12/23/2019 4.76  4.14 - 5.80 x10E6/uL Final   • Hemoglobin 12/23/2019 14.9  13.0 - 17.7 g/dL Final   • Hematocrit 12/23/2019 43.9  37.5 - 51.0 % Final   • MCV 12/23/2019 92  79 - 97 fL Final   • MCH 12/23/2019 31.3  26.6 - 33.0 pg Final   • MCHC 12/23/2019 33.9  31.5 - 35.7 g/dL Final   • RDW 12/23/2019 13.8  12.3 - 15.4 % Final    Comment: **Effective January 6, 2020, the RDW pediatric reference**    interval will be removed and the adult reference interval    will be changing to:                             Female 11.7 - 15.4                                                       Male 11.6 - 15.4     • Platelets 12/23/2019 282  150 - 450 x10E3/uL Final   • Neutrophil Rel % 12/23/2019 72  Not Estab. % Final   • Lymphocyte Rel % 12/23/2019 19  Not Estab. % Final   • Monocyte Rel % 12/23/2019 7  Not Estab. % Final   • Eosinophil Rel % 12/23/2019 1  Not Estab. % Final    • Basophil Rel % 12/23/2019 1  Not Estab. % Final   • Neutrophils Absolute 12/23/2019 8.0* 1.4 - 7.0 x10E3/uL Final   • Lymphocytes Absolute 12/23/2019 2.0  0.7 - 3.1 x10E3/uL Final   • Monocytes Absolute 12/23/2019 0.8  0.1 - 0.9 x10E3/uL Final   • Eosinophils Absolute 12/23/2019 0.1  0.0 - 0.4 x10E3/uL Final   • Basophils Absolute 12/23/2019 0.1  0.0 - 0.2 x10E3/uL Final   • Immature Granulocyte Rel % 12/23/2019 0  Not Estab. % Final   • Immature Grans Absolute 12/23/2019 0.0  0.0 - 0.1 x10E3/uL Final   • Creatinine, Urine 12/23/2019 166.5  Not Estab. mg/dL Final   • Microalbumin, Urine 12/23/2019 16.5  Not Estab. ug/mL Final   • Microalbumin/Creatinine Ratio 12/23/2019 9.9  0.0 - 30.0 mg/g creat Final    Comment:                      Normal:                0.0 -  30.0                       Albuminuria:          31.0 - 300.0                       Clinical albuminuria:       >300.0       No results found.    PHYSICAL EXAM  Debilities/Disabilities Identified: None  Emotional Behavior: Appropriate  Physical Exam    ASSESSMENT  Diagnoses and all orders for this visit:    Dyspepsia    Gastroesophageal reflux disease, esophagitis presence not specified    Other orders  -     omeprazole (priLOSEC) 40 MG capsule; Take 1 capsule by mouth Daily.          PLAN    Return in about 6 weeks (around 6/17/2020).    I have discussed the above plan with the patient.  They verbalize understanding and are in agreement with the plan.  They have been advised to contact the office for any questions, concerns, or changes related to their health.

## 2020-05-29 ENCOUNTER — RESULTS ENCOUNTER (OUTPATIENT)
Dept: FAMILY MEDICINE CLINIC | Facility: CLINIC | Age: 80
End: 2020-05-29

## 2020-05-29 DIAGNOSIS — E11.59 TYPE 2 DIABETES MELLITUS WITH OTHER CIRCULATORY COMPLICATION, WITHOUT LONG-TERM CURRENT USE OF INSULIN (HCC): ICD-10-CM

## 2020-05-29 DIAGNOSIS — Z79.899 HIGH RISK MEDICATION USE: ICD-10-CM

## 2020-05-29 DIAGNOSIS — Z12.5 SCREENING PSA (PROSTATE SPECIFIC ANTIGEN): ICD-10-CM

## 2020-10-09 DIAGNOSIS — E11.59 TYPE 2 DIABETES MELLITUS WITH OTHER CIRCULATORY COMPLICATION, WITHOUT LONG-TERM CURRENT USE OF INSULIN (HCC): ICD-10-CM

## 2020-10-09 DIAGNOSIS — I10 ESSENTIAL HYPERTENSION: ICD-10-CM

## 2020-10-09 RX ORDER — LOSARTAN POTASSIUM 100 MG/1
TABLET ORAL
Qty: 90 TABLET | Refills: 1 | OUTPATIENT
Start: 2020-10-09

## 2020-10-09 RX ORDER — METFORMIN HYDROCHLORIDE 750 MG/1
TABLET, EXTENDED RELEASE ORAL
Qty: 90 TABLET | Refills: 1 | OUTPATIENT
Start: 2020-10-09

## 2020-10-15 ENCOUNTER — OFFICE VISIT (OUTPATIENT)
Dept: FAMILY MEDICINE CLINIC | Facility: CLINIC | Age: 80
End: 2020-10-15

## 2020-10-15 VITALS
HEART RATE: 61 BPM | TEMPERATURE: 97.1 F | DIASTOLIC BLOOD PRESSURE: 66 MMHG | WEIGHT: 160 LBS | BODY MASS INDEX: 24.25 KG/M2 | HEIGHT: 68 IN | SYSTOLIC BLOOD PRESSURE: 103 MMHG | OXYGEN SATURATION: 98 %

## 2020-10-15 DIAGNOSIS — Z79.899 HIGH RISK MEDICATION USE: ICD-10-CM

## 2020-10-15 DIAGNOSIS — I10 ESSENTIAL HYPERTENSION: ICD-10-CM

## 2020-10-15 DIAGNOSIS — Z23 NEED FOR VACCINATION: Primary | ICD-10-CM

## 2020-10-15 DIAGNOSIS — S60.511A ABRASION OF RIGHT HAND, INITIAL ENCOUNTER: ICD-10-CM

## 2020-10-15 DIAGNOSIS — Z12.5 SCREENING PSA (PROSTATE SPECIFIC ANTIGEN): ICD-10-CM

## 2020-10-15 DIAGNOSIS — N40.0 BENIGN PROSTATIC HYPERPLASIA WITHOUT LOWER URINARY TRACT SYMPTOMS: ICD-10-CM

## 2020-10-15 DIAGNOSIS — E11.59 TYPE 2 DIABETES MELLITUS WITH OTHER CIRCULATORY COMPLICATION, WITHOUT LONG-TERM CURRENT USE OF INSULIN (HCC): ICD-10-CM

## 2020-10-15 DIAGNOSIS — R12 HEARTBURN: ICD-10-CM

## 2020-10-15 PROCEDURE — G0439 PPPS, SUBSEQ VISIT: HCPCS | Performed by: FAMILY MEDICINE

## 2020-10-15 PROCEDURE — 90715 TDAP VACCINE 7 YRS/> IM: CPT | Performed by: FAMILY MEDICINE

## 2020-10-15 PROCEDURE — 99214 OFFICE O/P EST MOD 30 MIN: CPT | Performed by: FAMILY MEDICINE

## 2020-10-15 PROCEDURE — 90694 VACC AIIV4 NO PRSRV 0.5ML IM: CPT | Performed by: FAMILY MEDICINE

## 2020-10-15 PROCEDURE — G0009 ADMIN PNEUMOCOCCAL VACCINE: HCPCS | Performed by: FAMILY MEDICINE

## 2020-10-15 PROCEDURE — 90472 IMMUNIZATION ADMIN EACH ADD: CPT | Performed by: FAMILY MEDICINE

## 2020-10-15 PROCEDURE — 90732 PPSV23 VACC 2 YRS+ SUBQ/IM: CPT | Performed by: FAMILY MEDICINE

## 2020-10-15 PROCEDURE — G0008 ADMIN INFLUENZA VIRUS VAC: HCPCS | Performed by: FAMILY MEDICINE

## 2020-10-15 RX ORDER — CHLORAL HYDRATE 500 MG
2000 CAPSULE ORAL
Status: ON HOLD | COMMUNITY
End: 2022-08-15 | Stop reason: SDUPTHER

## 2020-10-15 RX ORDER — OMEPRAZOLE 40 MG/1
40 CAPSULE, DELAYED RELEASE ORAL
Qty: 90 CAPSULE | Refills: 3 | Status: SHIPPED | OUTPATIENT
Start: 2020-10-15 | End: 2020-10-23 | Stop reason: SDUPTHER

## 2020-10-15 RX ORDER — TAMSULOSIN HYDROCHLORIDE 0.4 MG/1
1 CAPSULE ORAL DAILY
Qty: 90 CAPSULE | Refills: 1 | Status: SHIPPED | OUTPATIENT
Start: 2020-10-15 | End: 2021-03-03

## 2020-10-15 RX ORDER — OMEPRAZOLE 40 MG/1
40 CAPSULE, DELAYED RELEASE ORAL DAILY
Qty: 90 CAPSULE | Refills: 0 | Status: CANCELLED | OUTPATIENT
Start: 2020-10-15

## 2020-10-15 RX ORDER — LOSARTAN POTASSIUM 100 MG/1
100 TABLET ORAL DAILY
Qty: 90 TABLET | Refills: 1 | Status: SHIPPED | OUTPATIENT
Start: 2020-10-15 | End: 2021-03-17

## 2020-10-15 RX ORDER — LANCETS 30 GAUGE
1 EACH MISCELLANEOUS 2 TIMES DAILY
Qty: 200 EACH | Refills: 5 | Status: SHIPPED | OUTPATIENT
Start: 2020-10-15 | End: 2020-10-26 | Stop reason: SDUPTHER

## 2020-10-15 RX ORDER — METFORMIN HYDROCHLORIDE 750 MG/1
750 TABLET, EXTENDED RELEASE ORAL
Qty: 90 TABLET | Refills: 1 | Status: SHIPPED | OUTPATIENT
Start: 2020-10-15 | End: 2021-03-17

## 2020-10-15 NOTE — PROGRESS NOTES
Subsequent Medicare Wellness Visit   The ABC's of the Annual Wellness Visit    Chief Complaint   Patient presents with   • Medicare Wellness-subsequent   • Diabetes   • Hypertension   • Puncture Wound     reusty wire went into right hand on Tuesday       HPI:  Woo Dobbs YOB: 1940, is a 79 y.o. male who presents for a Subsequent Medicare Wellness Visit.    Recent Hospitalizations:  No hospitalization(s) within the last year..    Current Medical Providers:  Patient Care Team:  Abram Lindquist Jr., DO as PCP - General (Family Medicine)  Laura Rollins, DO as PCP - Claims Attributed    Health Habits and Functional and Cognitive Screening and Depression Screening:  Functional & Cognitive Status 2019   Do you have difficulty preparing food and eating? No   Do you have difficulty bathing yourself, getting dressed or grooming yourself? No   Do you have difficulty using the toilet? No   Do you have difficulty moving around from place to place? No   Do you have trouble with steps or getting out of a bed or a chair? No   Current Diet Well Balanced Diet   Dental Exam Not up to date   Eye Exam Up to date   Exercise (times per week) 7 times per week   Current Exercise Activities Include Yard Work   Do you need help using the phone?  No   Are you deaf or do you have serious difficulty hearing?  No   Do you need help with transportation? No   Do you need help shopping? No   Do you need help preparing meals?  No   Do you need help with housework?  No   Do you need help with laundry? No   Do you need help taking your medications? No   Do you need help managing money? No   Do you ever drive or ride in a car without wearing a seat belt? No   Have you felt unusual stress, anger or loneliness in the last month? No   Who do you live with? Spouse   If you need help, do you have trouble finding someone available to you? No   Have you been bothered in the last four weeks by sexual problems? No   Do you have difficulty  concentrating, remembering or making decisions? No       Compared to one year ago, the patient feels his physical health is the same and his mental health is the same.    Depression Screen:  PHQ-2/PHQ-9 Depression Screening 12/23/2019   Little interest or pleasure in doing things 0   Feeling down, depressed, or hopeless 0   Total Score 0         Past Medical/Family/Social History:  The following portions of the patient's history were reviewed and updated as appropriate: allergies, current medications, past family history, past medical history, past social history, past surgical history and problem list.    Allergies   Allergen Reactions   • Loratadine Other (See Comments)     Emotional side effects.          Current Outpatient Medications:   •  ASPIRIN 81 PO, Take  by mouth., Disp: , Rfl:   •  Garlic 1000 MG capsule, Take 6,000 mg by mouth Daily., Disp: , Rfl:   •  glucose blood test strip, Use as instructed, Disp: 200 each, Rfl: 12  •  glucose monitor monitoring kit, 1 each As Needed (Diabetes 2)., Disp: 1 each, Rfl: 0  •  Lancets misc, 1 each 2 (Two) Times a Day., Disp: 200 each, Rfl: 5  •  losartan (COZAAR) 100 MG tablet, Take 1 tablet by mouth Daily., Disp: 90 tablet, Rfl: 1  •  metFORMIN ER (GLUCOPHAGE-XR) 750 MG 24 hr tablet, Take 1 tablet by mouth Daily With Breakfast., Disp: 90 tablet, Rfl: 1  •  Omega-3 Fatty Acids (fish oil) 1000 MG capsule capsule, Take  by mouth Daily With Breakfast., Disp: , Rfl:   •  omeprazole (priLOSEC) 40 MG capsule, Take 1 capsule by mouth Daily., Disp: 90 capsule, Rfl: 3  •  tamsulosin (FLOMAX) 0.4 MG capsule 24 hr capsule, Take 1 capsule by mouth Daily., Disp: 90 capsule, Rfl: 1  •  Cholecalciferol (VITAMIN D) 50 MCG (2000 UT) tablet, Take 2,000 Units by mouth Daily., Disp: , Rfl:   •  glucose blood test strip, Three times daily before meals, Disp: 200 each, Rfl: 12  •  glucose monitor monitoring kit, 1 each As Needed (Diabetes 2). Check blood sugar three times daily with meals,  "Disp: 1 each, Rfl: 0  •  Lancets misc, 1 each 3 (Three) Times a Day Before Meals., Disp: 200 each, Rfl: 5    Aspirin use counseling: Does not need AS but is currently taking (discussed benefits vs risks and patient elects to stay on ASA)    Current medication list contains no high risk medications.  No harmful drug interactions have been identified.     Family History   Problem Relation Age of Onset   • Colon cancer Neg Hx    • Colon polyps Neg Hx        Social History     Tobacco Use   • Smoking status: Never Smoker   • Smokeless tobacco: Never Used   • Tobacco comment: caffeine use: 1 -2 cups daily.    Substance Use Topics   • Alcohol use: No     Frequency: Never       Past Surgical History:   Procedure Laterality Date   • COLONOSCOPY     • VASECTOMY         Patient Active Problem List   Diagnosis   • Diabetes mellitus (CMS/HCC)   • Hypertension   • Grade I diastolic dysfunction   • Benign prostatic hyperplasia without lower urinary tract symptoms   • Odynophagia       Review of Systems    Objective     Vitals:    10/15/20 0759   BP: 103/66   BP Location: Left arm   Patient Position: Sitting   Cuff Size: Adult   Pulse: 61   Temp: 97.1 °F (36.2 °C)   TempSrc: Tympanic   SpO2: 98%   Weight: 72.6 kg (160 lb)   Height: 172.7 cm (68\")   PainSc:   2       Patient's Body mass index is 24.33 kg/m². BMI is within normal parameters. No follow-up required..      No exam data present    The patient has no evidence of cognitve impairment.     Physical Exam    Recent Lab Results:  Lab Results   Component Value Date     (H) 12/23/2019     Lab Results   Component Value Date    TRIG 165 (H) 12/23/2019    HDL 36 (L) 12/23/2019    VLDL 33 12/23/2019       Assessment/Plan   Age-appropriate Screening Schedule:  Refer to the list below for future screening recommendations based on patient's age, sex and/or medical conditions.      Health Maintenance   Topic Date Due   • COLONOSCOPY  1940   • TDAP/TD VACCINES (1 - Tdap) " 11/10/1959   • ZOSTER VACCINE (1 of 2) 11/10/1990   • HEMOGLOBIN A1C  06/23/2020   • INFLUENZA VACCINE  08/01/2020   • DIABETIC EYE EXAM  12/03/2020   • URINE MICROALBUMIN  12/23/2020       Medicare Risks and Personalized Health Plan:  Hearing Problem      CMS-Preventive Services Quick Reference  Medicare Preventive Services Addressed:  Annual Wellness Visit (AWV)    Advance Care Planning:  ACP discussion was held with the patient during this visit. Patient does not have an advance directive, information provided.    Diagnoses and all orders for this visit:    1. Benign prostatic hyperplasia without lower urinary tract symptoms  -     tamsulosin (FLOMAX) 0.4 MG capsule 24 hr capsule; Take 1 capsule by mouth Daily.  Dispense: 90 capsule; Refill: 0    2. Type 2 diabetes mellitus with other circulatory complication, without long-term current use of insulin (CMS/[facility])  -     metFORMIN ER (GLUCOPHAGE-XR) 750 MG 24 hr tablet; Take 1 tablet by mouth Daily With Breakfast.  Dispense: 90 tablet; Refill: 0  -     glucose blood test strip; Use as instructed  Dispense: 200 each; Refill: 12  -     Lancets misc; 1 each 2 (Two) Times a Day.  Dispense: 200 each; Refill: 5    3. Essential hypertension  -     losartan (COZAAR) 100 MG tablet; Take 1 tablet by mouth Daily.  Dispense: 90 tablet; Refill: 0    4. High risk medication use  -     glucose blood test strip; Use as instructed  Dispense: 200 each; Refill: 12  -     Lancets misc; 1 each 2 (Two) Times a Day.  Dispense: 200 each; Refill: 5    Other orders  -     omeprazole (priLOSEC) 40 MG capsule; Take 1 capsule by mouth Daily.  Dispense: 90 capsule; Refill: 0  -     Fluad Quad 65+ yrs (0355-3076)        An After Visit Summary and PPPS with all of these plans were given to the patient.      Follow Up:  No follow-ups on file.

## 2020-10-15 NOTE — PATIENT INSTRUCTIONS
Medicare Wellness  Personal Prevention Plan of Service     Date of Office Visit:  10/15/2020  Encounter Provider:  Abram Lindquist Jr.,   Place of Service:  [facility]  Patient Name: Woo Dobbs  :  1940    As part of the Medicare Wellness portion of your visit today, we are providing you with this personalized preventive plan of services (PPPS). This plan is based upon recommendations of the [place] Preventive Services Task Force (USPSTF) and the Advisory Committee on Immunization Practices (ACIP).    This lists the preventive care services that should be considered, and provides dates of when you are due. Items listed as completed are up-to-date and do not require any further intervention.    Health Maintenance   Topic Date Due   • COLONOSCOPY  1940   • TDAP/TD VACCINES (1 - Tdap) 11/10/1959   • ZOSTER VACCINE (1 of 2) 11/10/1990   • Pneumococcal Vaccine 65+ (1 of 1 - PPSV23) 11/10/2005   • HEPATITIS C SCREENING  2019   • ANNUAL WELLNESS VISIT  2019   • HEMOGLOBIN A1C  2020   • INFLUENZA VACCINE  2020   • DIABETIC EYE EXAM  2020   • URINE MICROALBUMIN  2020       No orders of the defined types were placed in this encounter.      No follow-ups on file.          Advance Directive    Advance directives are legal documents that let you make choices ahead of time about your health care and medical treatment in case you become unable to communicate for yourself. Advance directives are a way for you to make known your wishes to family, friends, and health care providers. This can let others know about your end-of-life care if you become unable to communicate.  Discussing and writing advance directives should happen over time rather than all at once. Advance directives can be changed depending on your situation and what you want, even after you have signed the advance directives.  There are different types of advance  directives, such as:  · Medical power of .  · Living will.  · Do not resuscitate (DNR) or do not attempt resuscitation (DNAR) order.  Health care proxy and medical power of   A health care proxy is also called a health care agent. This is a person who is appointed to make medical decisions for you in cases where you are unable to make the decisions yourself. Generally, people choose someone they know well and trust to represent their preferences. Make sure to ask this person for an agreement to act as your proxy. A proxy may have to exercise judgment in the event of a medical decision for which your wishes are not known.  A medical power of  is a legal document that names your health care proxy. Depending on the laws in your state, after the document is written, it may also need to be:  · Signed.  · Notarized.  · Dated.  · Copied.  · Witnessed.  · Incorporated into your medical record.  You may also want to appoint someone to manage your money in a situation in which you are unable to do so. This is called a durable power of  for finances. It is a separate legal document from the durable power of  for health care. You may choose the same person or someone different from your health care proxy to act as your agent in money matters.  If you do not appoint a proxy, or if there is a concern that the proxy is not acting in your best interests, a court may appoint a guardian to act on your behalf.  Living will  A living will is a set of instructions that state your wishes about medical care when you cannot express them yourself. Health care providers should keep a copy of your living will in your medical record. You may want to give a copy to family members or friends. To alert caregivers in case of an emergency, you can place a card in your wallet to let them know that you have a living will and where they can find it. A living will is used if you become:  · Terminally  ill.  · Disabled.  · Unable to communicate or make decisions.  Items to consider in your living will include:  · To use or not to use life-support equipment, such as dialysis machines and breathing machines (ventilators).  · A DNR or DNAR order. This tells health care providers not to use cardiopulmonary resuscitation (CPR) if breathing or heartbeat stops.  · To use or not to use tube feeding.  · To be given or not to be given food and fluids.  · Comfort (palliative) care when the goal becomes comfort rather than a cure.  · Donation of organs and tissues.  A living will does not give instructions for distributing your money and property if you should pass away.  DNR or DNAR  A DNR or DNAR order is a request not to have CPR in the event that your heart stops beating or you stop breathing. If a DNR or DNAR order has not been made and shared, a health care provider will try to help any patient whose heart has stopped or who has stopped breathing. If you plan to have surgery, talk with your health care provider about how your DNR or DNAR order will be followed if problems occur.  What if I do not have an advance directive?  If you do not have an advance directive, some states assign family decision makers to act on your behalf based on how closely you are related to them. Each state has its own laws about advance directives. You may want to check with your health care provider, , or state representative about the laws in your state.  Summary  · Advance directives are the legal documents that allow you to make choices ahead of time about your health care and medical treatment in case you become unable to tell others about your care.  · The process of discussing and writing advance directives should happen over time. You can change the advance directives, even after you have signed them.  · Advance directives include DNR or DNAR orders, living lopez, and designating an agent as your medical power of  .  This information is not intended to replace advice given to you by your health care provider. Make sure you discuss any questions you have with your health care provider.  Document Released: 03/26/2009 Document Revised: 07/16/2020 Document Reviewed: 07/16/2020  Elsevier Patient Education © 2020 [employer]

## 2020-10-15 NOTE — PROGRESS NOTES
Subjective   Woo Dobbs is a 79 y.o. male is here for   Chief Complaint   Patient presents with   • Medicare Wellness-subsequent   • Diabetes   • Hypertension   • Puncture Wound     reusty wire went into right hand on Tuesday       History of Present Illness     Patient states that 2 days ago he cut his right hand between his thumb and index finger on a barbed wire fence.  His last tetanus vaccine was in 1996.    He states he had a pneumonia vaccine somewhere between 7 to 15 years ago.    He has not had his flu shot for this year yet.    He states he has heartburn and has been using omeprazole every day.  He states he does not need to take it every day.    Pt has DM that is stable.    He states he had a colonoscopy 5 years ago.  He states he has diverticulosis.  He did not have any polyps.  No family history of colon cancer.  He states he had his colonoscopy at [facility].    Health Maintenance Due   Topic Date Due   • COLONOSCOPY  1940   • TDAP/TD VACCINES (1 - Tdap) 11/10/1959   • ZOSTER VACCINE (1 of 2) 11/10/1990   • Pneumococcal Vaccine 65+ (1 of 1 - PPSV23) 11/10/2005   • HEPATITIS C SCREENING  07/23/2019   • ANNUAL WELLNESS VISIT  07/23/2019   • HEMOGLOBIN A1C  06/23/2020   • INFLUENZA VACCINE  08/01/2020        reports that he has never smoked. He has never used smokeless tobacco. He reports that he does not drink alcohol or use drugs.    Review of Systems   Constitutional: Negative for activity change and unexpected weight change.   HENT: Negative for congestion.    Respiratory: Negative for shortness of breath and wheezing.    Cardiovascular: Negative for palpitations.   Gastrointestinal: Negative for abdominal pain, blood in stool and constipation.   Genitourinary: Positive for difficulty urinating. Negative for hematuria.   Musculoskeletal: Negative for gait problem.   Skin: Negative for color change and rash.        PHQ-9 Depression Screening  Little interest or pleasure in  "doing things? 0   Feeling down, depressed, or hopeless? 0   Trouble falling or staying asleep, or sleeping too much? 0   Feeling tired or having little energy? 0   Poor appetite or overeating? 0   Feeling bad about yourself - or that you are a failure or have let yourself or your family down? 0   Trouble concentrating on things, such as reading the newspaper or watching television? 0   Moving or speaking so slowly that other people could have noticed? Or the opposite - being so fidgety or restless that you have been moving around a lot more than usual? 0   Thoughts that you would be better off dead, or of hurting yourself in some way? 0   PHQ-9 Total Score 0   If you checked off any problems, how difficult have these problems made it for you to do your work, take care of things at home, or get along with other people? Not difficult at all     BP Readings from Last 12 Encounters:   10/15/20 103/66   12/23/19 112/62   08/07/19 134/73   07/23/19 122/76       Wt Readings from Last 12 Encounters:   10/15/20 72.6 kg (160 lb)   12/23/19 72.6 kg (160 lb)   08/07/19 74.4 kg (164 lb)   07/23/19 74.4 kg (164 lb)        Objective   /66 (BP Location: Left arm, Patient Position: Sitting, Cuff Size: Adult)   Pulse 61   Temp 97.1 °F (36.2 °C) (Tympanic)   Ht 172.7 cm (68\")   Wt 72.6 kg (160 lb)   SpO2 98%   BMI 24.33 kg/m²   Physical Exam  Vitals signs and nursing note reviewed.   Constitutional:       General: He is not in acute distress.     Appearance: Normal appearance. He is well-developed. He is not diaphoretic.   HENT:      Head: Normocephalic and atraumatic.      Right Ear: External ear normal.      Left Ear: External ear normal.      Nose: Nose normal.      Mouth/Throat:      Pharynx: No oropharyngeal exudate.   Eyes:      General: Lids are normal. No scleral icterus.        Right eye: No discharge.         Left eye: No discharge.   Neck:      Musculoskeletal: Full passive range of motion without pain, normal " range of motion and neck supple. No edema.      Thyroid: No thyromegaly.      Trachea: Trachea normal. No tracheal deviation.   Cardiovascular:      Rate and Rhythm: Normal rate and regular rhythm.      Heart sounds: Normal heart sounds. No murmur. No friction rub. No gallop.    Pulmonary:      Effort: Pulmonary effort is normal. No tachypnea, bradypnea or respiratory distress.      Breath sounds: Normal breath sounds. No stridor. No decreased breath sounds, wheezing or rales.   Chest:      Chest wall: No tenderness.   Lymphadenopathy:      Head:      Right side of head: No submental, submandibular, tonsillar, preauricular, posterior auricular or occipital adenopathy.      Left side of head: No submental, submandibular, tonsillar, preauricular, posterior auricular or occipital adenopathy.      Cervical: No cervical adenopathy.      Right cervical: No superficial, deep or posterior cervical adenopathy.     Left cervical: No superficial, deep or posterior cervical adenopathy.   Skin:     General: Skin is warm and dry.      Capillary Refill: Capillary refill takes less than 2 seconds.      Coloration: Skin is not pale.      Findings: No erythema or rash.      Nails: There is no clubbing.        Comments: Open wound in web of right hand between index finger and thumb   Neurological:      Mental Status: He is alert and oriented to person, place, and time. He is not disoriented.      Deep Tendon Reflexes: Reflexes are normal and symmetric.   Psychiatric:         Behavior: Behavior normal.         Procedures    Assessment/Plan   Diagnoses and all orders for this visit:    1. Need for vaccination (Primary)  -     Fluad Quad 65+ yrs (3794-3599)    2. Benign prostatic hyperplasia without lower urinary tract symptoms  -     tamsulosin (FLOMAX) 0.4 MG capsule 24 hr capsule; Take 1 capsule by mouth Daily.  Dispense: 90 capsule; Refill: 0    3. Type 2 diabetes mellitus with other circulatory complication, without long-term  current use of insulin (CMS/[facility])  -     metFORMIN ER (GLUCOPHAGE-XR) 750 MG 24 hr tablet; Take 1 tablet by mouth Daily With Breakfast.  Dispense: 90 tablet; Refill: 0  -     glucose blood test strip; Use as instructed  Dispense: 200 each; Refill: 12  -     Lancets misc; 1 each 2 (Two) Times a Day.  Dispense: 200 each; Refill: 5    4. Essential hypertension  -     losartan (COZAAR) 100 MG tablet; Take 1 tablet by mouth Daily.  Dispense: 90 tablet; Refill: 0    5. High risk medication use  -     glucose blood test strip; Use as instructed  Dispense: 200 each; Refill: 12  -     Lancets misc; 1 each 2 (Two) Times a Day.  Dispense: 200 each; Refill: 5    Other orders  -     omeprazole (priLOSEC) 40 MG capsule; Take 1 capsule by mouth Daily.  Dispense: 90 capsule; Refill: 0             Return for Schedule Medicare wellness visit in 1 year.

## 2020-10-15 NOTE — ASSESSMENT & PLAN NOTE
Controlled with omeprazole 40 mg twice weekly as needed.  No medication side effects.  Continue current treatment.

## 2020-10-16 PROBLEM — E78.2 MIXED HYPERLIPIDEMIA: Status: ACTIVE | Noted: 2020-10-16

## 2020-10-16 LAB
ALBUMIN SERPL-MCNC: 4.2 G/DL (ref 3.5–5.2)
ALBUMIN/CREAT UR: 6 MG/G CREAT (ref 0–29)
ALBUMIN/GLOB SERPL: 1.7 G/DL
ALP SERPL-CCNC: 67 U/L (ref 39–117)
ALT SERPL-CCNC: 17 U/L (ref 1–41)
AST SERPL-CCNC: 19 U/L (ref 1–40)
BASOPHILS # BLD AUTO: 0.08 10*3/MM3 (ref 0–0.2)
BASOPHILS NFR BLD AUTO: 1.2 % (ref 0–1.5)
BILIRUB SERPL-MCNC: 0.9 MG/DL (ref 0–1.2)
BUN SERPL-MCNC: 16 MG/DL (ref 8–23)
BUN/CREAT SERPL: 15.4 (ref 7–25)
CALCIUM SERPL-MCNC: 9.1 MG/DL (ref 8.6–10.5)
CHLORIDE SERPL-SCNC: 102 MMOL/L (ref 98–107)
CHOLEST SERPL-MCNC: 200 MG/DL (ref 0–200)
CHOLEST/HDLC SERPL: 4.76 {RATIO}
CO2 SERPL-SCNC: 28.3 MMOL/L (ref 22–29)
CREAT SERPL-MCNC: 1.04 MG/DL (ref 0.76–1.27)
CREAT UR-MCNC: 110.3 MG/DL
EOSINOPHIL # BLD AUTO: 0.19 10*3/MM3 (ref 0–0.4)
EOSINOPHIL NFR BLD AUTO: 3 % (ref 0.3–6.2)
ERYTHROCYTE [DISTWIDTH] IN BLOOD BY AUTOMATED COUNT: 12.9 % (ref 12.3–15.4)
GLOBULIN SER CALC-MCNC: 2.5 GM/DL
GLUCOSE SERPL-MCNC: 150 MG/DL (ref 65–99)
HBA1C MFR BLD: 6.6 % (ref 4.8–5.6)
HCT VFR BLD AUTO: 41.7 % (ref 37.5–51)
HDLC SERPL-MCNC: 42 MG/DL (ref 40–60)
HGB BLD-MCNC: 14.5 G/DL (ref 13–17.7)
IMM GRANULOCYTES # BLD AUTO: 0.03 10*3/MM3 (ref 0–0.05)
IMM GRANULOCYTES NFR BLD AUTO: 0.5 % (ref 0–0.5)
LDLC SERPL CALC-MCNC: 141 MG/DL (ref 0–100)
LYMPHOCYTES # BLD AUTO: 1.66 10*3/MM3 (ref 0.7–3.1)
LYMPHOCYTES NFR BLD AUTO: 25.9 % (ref 19.6–45.3)
MCH RBC QN AUTO: 31.7 PG (ref 26.6–33)
MCHC RBC AUTO-ENTMCNC: 34.8 G/DL (ref 31.5–35.7)
MCV RBC AUTO: 91.2 FL (ref 79–97)
MICROALBUMIN UR-MCNC: 7.1 UG/ML
MONOCYTES # BLD AUTO: 0.62 10*3/MM3 (ref 0.1–0.9)
MONOCYTES NFR BLD AUTO: 9.7 % (ref 5–12)
NEUTROPHILS # BLD AUTO: 3.84 10*3/MM3 (ref 1.7–7)
NEUTROPHILS NFR BLD AUTO: 59.7 % (ref 42.7–76)
NRBC BLD AUTO-RTO: 0 /100 WBC (ref 0–0.2)
PLATELET # BLD AUTO: 244 10*3/MM3 (ref 140–450)
POTASSIUM SERPL-SCNC: 5.2 MMOL/L (ref 3.5–5.2)
PROT SERPL-MCNC: 6.7 G/DL (ref 6–8.5)
PSA SERPL-MCNC: 1.19 NG/ML (ref 0–4)
RBC # BLD AUTO: 4.57 10*6/MM3 (ref 4.14–5.8)
SODIUM SERPL-SCNC: 139 MMOL/L (ref 136–145)
TRIGL SERPL-MCNC: 96 MG/DL (ref 0–150)
VLDLC SERPL CALC-MCNC: 17 MG/DL (ref 5–40)
WBC # BLD AUTO: 6.42 10*3/MM3 (ref 3.4–10.8)

## 2020-10-16 NOTE — PROGRESS NOTES
Please call the patient regarding his result(s).  Please let him know that his blood count was normal, his hemoglobin A1c was close to goal at 6.6, and his cholesterol is high.  Please schedule an appointment within the next 2 weeks for uncontrolled hyperlipidemia.

## 2020-10-23 DIAGNOSIS — R12 HEARTBURN: ICD-10-CM

## 2020-10-23 RX ORDER — OMEPRAZOLE 40 MG/1
40 CAPSULE, DELAYED RELEASE ORAL
Qty: 90 CAPSULE | Refills: 3 | Status: SHIPPED | OUTPATIENT
Start: 2020-10-23

## 2020-10-26 DIAGNOSIS — Z79.899 HIGH RISK MEDICATION USE: ICD-10-CM

## 2020-10-26 DIAGNOSIS — E11.59 TYPE 2 DIABETES MELLITUS WITH OTHER CIRCULATORY COMPLICATION, WITHOUT LONG-TERM CURRENT USE OF INSULIN (HCC): ICD-10-CM

## 2020-10-26 RX ORDER — LANCETS 30 GAUGE
EACH MISCELLANEOUS
Qty: 200 EACH | Refills: 12 | Status: SHIPPED | OUTPATIENT
Start: 2020-10-26 | End: 2022-08-05

## 2021-03-03 DIAGNOSIS — N40.0 BENIGN PROSTATIC HYPERPLASIA WITHOUT LOWER URINARY TRACT SYMPTOMS: ICD-10-CM

## 2021-03-03 RX ORDER — TAMSULOSIN HYDROCHLORIDE 0.4 MG/1
CAPSULE ORAL
Qty: 90 CAPSULE | Refills: 0 | Status: SHIPPED | OUTPATIENT
Start: 2021-03-03 | End: 2021-11-09 | Stop reason: SDUPTHER

## 2021-03-17 DIAGNOSIS — I10 ESSENTIAL HYPERTENSION: ICD-10-CM

## 2021-03-17 DIAGNOSIS — E11.59 TYPE 2 DIABETES MELLITUS WITH OTHER CIRCULATORY COMPLICATION, WITHOUT LONG-TERM CURRENT USE OF INSULIN (HCC): ICD-10-CM

## 2021-03-17 RX ORDER — LOSARTAN POTASSIUM 100 MG/1
TABLET ORAL
Qty: 30 TABLET | Refills: 1 | Status: SHIPPED | OUTPATIENT
Start: 2021-03-17 | End: 2021-05-07

## 2021-03-17 RX ORDER — METFORMIN HYDROCHLORIDE 750 MG/1
TABLET, EXTENDED RELEASE ORAL
Qty: 30 TABLET | Refills: 1 | Status: SHIPPED | OUTPATIENT
Start: 2021-03-17 | End: 2021-05-07

## 2021-04-09 DIAGNOSIS — E11.59 TYPE 2 DIABETES MELLITUS WITH OTHER CIRCULATORY COMPLICATION, WITHOUT LONG-TERM CURRENT USE OF INSULIN (HCC): ICD-10-CM

## 2021-04-09 DIAGNOSIS — Z79.899 HIGH RISK MEDICATION USE: ICD-10-CM

## 2021-04-19 ENCOUNTER — TELEPHONE (OUTPATIENT)
Dept: FAMILY MEDICINE CLINIC | Facility: CLINIC | Age: 81
End: 2021-04-19

## 2021-04-19 NOTE — TELEPHONE ENCOUNTER
Patient did not get labs in April and cancelled OV.  Attempted to call patient to get these appts rescheduled.  He did not answer and I could not leave a VM

## 2021-05-06 DIAGNOSIS — I10 ESSENTIAL HYPERTENSION: ICD-10-CM

## 2021-05-06 DIAGNOSIS — E11.59 TYPE 2 DIABETES MELLITUS WITH OTHER CIRCULATORY COMPLICATION, WITHOUT LONG-TERM CURRENT USE OF INSULIN (HCC): ICD-10-CM

## 2021-05-07 RX ORDER — LOSARTAN POTASSIUM 100 MG/1
TABLET ORAL
Qty: 60 TABLET | Refills: 5 | Status: SHIPPED | OUTPATIENT
Start: 2021-05-07 | End: 2022-08-15 | Stop reason: HOSPADM

## 2021-05-07 RX ORDER — METFORMIN HYDROCHLORIDE 750 MG/1
TABLET, EXTENDED RELEASE ORAL
Qty: 60 TABLET | Refills: 5 | Status: SHIPPED | OUTPATIENT
Start: 2021-05-07 | End: 2022-06-27 | Stop reason: ALTCHOICE

## 2021-09-13 ENCOUNTER — OFFICE VISIT (OUTPATIENT)
Dept: FAMILY MEDICINE CLINIC | Facility: CLINIC | Age: 81
End: 2021-09-13

## 2021-09-13 VITALS
SYSTOLIC BLOOD PRESSURE: 128 MMHG | HEART RATE: 74 BPM | DIASTOLIC BLOOD PRESSURE: 68 MMHG | TEMPERATURE: 96.9 F | WEIGHT: 159.4 LBS | OXYGEN SATURATION: 95 % | BODY MASS INDEX: 24.16 KG/M2 | HEIGHT: 68 IN

## 2021-09-13 DIAGNOSIS — S86.111A RUPTURE OF RIGHT GASTROCNEMIUS TENDON, INITIAL ENCOUNTER: Primary | ICD-10-CM

## 2021-09-13 PROCEDURE — 99214 OFFICE O/P EST MOD 30 MIN: CPT | Performed by: FAMILY MEDICINE

## 2021-09-13 NOTE — PROGRESS NOTES
Chief Complaint  Leg Pain and Foot Swelling    Subjective          Woo Dobbs presents to [facility] for     History of Present Illness    Patient states that on September 2, 2021 he was pushing a car and felt a pop in his right calf muscle.  He had pain and swelling of followed.  His pain is swelling improved a little bit but he does still have pain with walking.     Health Maintenance Due   Topic Date Due   • ZOSTER VACCINE (1 of 2) Never done   • HEMOGLOBIN A1C  04/15/2021        reports that he has never smoked. He has never used smokeless tobacco. He reports that he does not drink alcohol and does not use drugs.    PHQ-9 Depression Screening  Little interest or pleasure in doing things? 0   Feeling down, depressed, or hopeless? 0   Trouble falling or staying asleep, or sleeping too much?     Feeling tired or having little energy?     Poor appetite or overeating?     Feeling bad about yourself - or that you are a failure or have let yourself or your family down?     Trouble concentrating on things, such as reading the newspaper or watching television?     Moving or speaking so slowly that other people could have noticed? Or the opposite - being so fidgety or restless that you have been moving around a lot more than usual?     Thoughts that you would be better off dead, or of hurting yourself in some way?     PHQ-9 Total Score 0   If you checked off any problems, how difficult have these problems made it for you to do your work, take care of things at home, or get along with other people?       Lab Results   Component Value Date    WBC 6.42 10/15/2020    HGB 14.5 10/15/2020    HCT 41.7 10/15/2020    MCV 91.2 10/15/2020     10/15/2020     Lab Results   Component Value Date    BUN 16 10/15/2020    CREATININE 1.04 10/15/2020    EGFRIFNONA 69 10/15/2020    EGFRIFAFRI 83 10/15/2020    BCR 15.4 10/15/2020    K 5.2 10/15/2020    CO2 28.3 10/15/2020    CALCIUM 9.1 10/15/2020     "PROTENTOTREF 6.7 10/15/2020    ALBUMIN 4.20 10/15/2020    LABIL2 1.7 10/15/2020    AST 19 10/15/2020    ALT 17 10/15/2020     Lab Results   Component Value Date    TSH 3.030 12/23/2019     Lab Results   Component Value Date    HGBA1C 6.60 (H) 10/15/2020     Brief Urine Lab Results  (Last result in the past 365 days)      Color   Clarity   Blood   Leuk Est   Nitrite   Protein   CREAT   Urine HCG        10/15/20 0931             110.3             BP Readings from Last 12 Encounters:   09/13/21 128/68   10/15/20 103/66   12/23/19 112/62   08/07/19 134/73   07/23/19 122/76       Wt Readings from Last 12 Encounters:   09/13/21 72.3 kg (159 lb 6.4 oz)   10/15/20 72.6 kg (160 lb)   12/23/19 72.6 kg (160 lb)   08/07/19 74.4 kg (164 lb)   07/23/19 74.4 kg (164 lb)       Objective   Vital Signs:   /68 (BP Location: Left arm, Patient Position: Sitting, Cuff Size: Adult)   Pulse 74   Temp 96.9 °F (36.1 °C) (Temporal)   Ht 172.7 cm (68\")   Wt 72.3 kg (159 lb 6.4 oz)   SpO2 95%   BMI 24.24 kg/m²     Physical Exam  Vitals and nursing note reviewed.   Constitutional:       General: He is not in acute distress.     Appearance: He is well-developed. He is not diaphoretic.   HENT:      Head: Normocephalic and atraumatic.   Pulmonary:      Effort: Pulmonary effort is normal.   Musculoskeletal:      Comments: Pain in the right calf muscle with 50% flexion and 70% extension of the right foot. Pain with palpation of the right calf muscle.   Skin:     General: Skin is warm and dry.   Neurological:      Mental Status: He is alert and oriented to person, place, and time.   Psychiatric:         Behavior: Behavior normal.         Thought Content: Thought content normal.         Judgment: Judgment normal.        Result Review :                 Assessment and Plan    Diagnoses and all orders for this visit:    1. Rupture of right gastrocnemius tendon, initial encounter (Primary)  -     MRI Tibia Fibula Right Without Contrast     "     Follow Up   No follow-ups on file.  Patient was given instructions and counseling regarding his condition or for health maintenance advice. Please see specific information pulled into the AVS if appropriate.

## 2021-09-14 ENCOUNTER — HOSPITAL ENCOUNTER (OUTPATIENT)
Dept: MRI IMAGING | Facility: HOSPITAL | Age: 81
Discharge: HOME | End: 2021-09-14
Admitting: FAMILY MEDICINE

## 2021-09-14 PROCEDURE — 73718 MRI LOWER EXTREMITY W/O DYE: CPT

## 2021-09-15 DIAGNOSIS — S86.111A RUPTURE OF RIGHT GASTROCNEMIUS TENDON, INITIAL ENCOUNTER: Primary | ICD-10-CM

## 2021-09-15 NOTE — PROGRESS NOTES
I called the patient and reviewed his MRI results with him and informed him that he has a torn calf muscle.  I let him know that I placed a referral to orthopedics and he should be getting a call to schedule an appointment.  He understands and agrees.

## 2021-09-22 ENCOUNTER — TELEMEDICINE (OUTPATIENT)
Dept: ORTHOPEDIC SURGERY | Facility: CLINIC | Age: 81
End: 2021-09-22

## 2021-09-23 ENCOUNTER — OFFICE VISIT (OUTPATIENT)
Dept: ORTHOPEDIC SURGERY | Facility: CLINIC | Age: 81
End: 2021-09-23

## 2021-09-23 VITALS
HEART RATE: 68 BPM | WEIGHT: 158 LBS | HEIGHT: 68 IN | BODY MASS INDEX: 23.95 KG/M2 | DIASTOLIC BLOOD PRESSURE: 74 MMHG | SYSTOLIC BLOOD PRESSURE: 154 MMHG

## 2021-09-23 DIAGNOSIS — S86.111A GASTROCNEMIUS MUSCLE STRAIN, RIGHT, INITIAL ENCOUNTER: ICD-10-CM

## 2021-09-23 DIAGNOSIS — M94.261 CHONDROMALACIA OF KNEE, RIGHT: ICD-10-CM

## 2021-09-23 DIAGNOSIS — R52 PAIN: Primary | ICD-10-CM

## 2021-09-23 PROCEDURE — 73562 X-RAY EXAM OF KNEE 3: CPT | Performed by: ORTHOPAEDIC SURGERY

## 2021-09-23 PROCEDURE — 73590 X-RAY EXAM OF LOWER LEG: CPT | Performed by: ORTHOPAEDIC SURGERY

## 2021-09-23 PROCEDURE — 99203 OFFICE O/P NEW LOW 30 MIN: CPT | Performed by: ORTHOPAEDIC SURGERY

## 2021-09-23 RX ORDER — METHYLPREDNISOLONE 4 MG/1
TABLET ORAL
Qty: 1 EACH | Refills: 0 | Status: SHIPPED | OUTPATIENT
Start: 2021-09-23 | End: 2022-06-27 | Stop reason: ALTCHOICE

## 2021-09-23 NOTE — PROGRESS NOTES
"Subjective:     Patient ID: Woo Dobbs is a 80 y.o. male.    Chief Complaint:  Right leg pain, new patient  History of Present Illness  Woo Dobbs presents to clinic today for evaluation of right lower leg pain.    The patient rented a truck and trailer on 09/04/2021 to help move his daughter, and he had to push the trailer and felt something tear in his calf. He denies any previous pain or injuries to his calf. He rates his pain as a 1 out of 10 currently. His pain was much more intense at the time of the injury and he could hardly walk on it. The patient is still experiencing increased pain with pressure. He denies any numbness or tingling down into his leg. He has taken ibuprofen and Tylenol but saw no pain relief. He does not currently have pain with walking.     The patient is pre-diabetic. He is not on any blood thinners.     Social History     Occupational History   • Occupation: retired      Comment:      Tobacco Use   • Smoking status: Never Smoker   • Smokeless tobacco: Never Used   • Tobacco comment: caffeine use: 1 -2 cups daily.    Vaping Use   • Vaping Use: Never used   Substance and Sexual Activity   • Alcohol use: No   • Drug use: No   • Sexual activity: Defer      Past Medical History:   Diagnosis Date   • Colon polyp    • Hypertension    • Type 2 diabetes mellitus (CMS/HCC)      Past Surgical History:   Procedure Laterality Date   • COLONOSCOPY     • VASECTOMY         Family History   Problem Relation Age of Onset   • Colon cancer Neg Hx    • Colon polyps Neg Hx          Review of Systems        Objective:  Vitals:    09/23/21 0819   BP: 154/74   BP Location: Left arm   Pulse: 68   Weight: 71.7 kg (158 lb)   Height: 172.7 cm (68\")         09/23/21  0819   Weight: 71.7 kg (158 lb)     Body mass index is 24.02 kg/m².  Physical Exam    Vital signs reviewed.   General: No acute distress, alert and oriented  Eyes: conjunctiva clear; pupils equally round and reactive  ENT: external ears " and nose atraumatic; oropharynx clear  CV: no peripheral edema  Resp: normal respiratory effort  Skin: no rashes or wounds; normal turgor  Psych: mood and affect appropriate; recent and remote memory intact        Ortho Exam       Right Leg    Maximum tenderness to palpation is in the medial proximal head of the gastrocnemius with moderate soft tissue swelling noted in the region.  No overlying ecchymosis noted.    Right Knee-     ROM 0 to 135 degrees  4+/5 on flexion  4+/5 on extension    RIGHT Ankle-  Dorsiflexion is neutral  Plantarflexion 40 degrees, 4+/5 strength  Flex and extend toes with 4+/5 strength  Mild increased pain on maximal dorsiflexion stretch.    Achilles tendon palpated to be in continuity  Positive sensation light touch all distributions symmetric to contralateral side  Brisk cap refill all digits  1+ dorsalis pedis pulse    Imaging:  XR Knee 3 View Right    Result Date: 9/23/2021  Impression: Ordering physician's impression is located in the Encounter Note dated 09/23/21.     XR Tibia Fibula 2 View Right    Result Date: 9/23/2021  Impression: Ordering physician's impression is located in the Encounter Note dated 09/23/21.     MRI Tibia Fibula Right Without Contrast    Result Date: 9/14/2021  Impression: 1. Moderate to high-grade strain/partial tear along the musculotendinous complex of the medial head gastrocnemius. This is associated with an intramuscular hematoma measuring 3 x 1.5 x 9 cm. 2. No acute osseous injury. 3. Suspected tear involving the posterior horn and body segment of the medial meniscus. This can be further characterized with a noncontrast MRI of the right knee is clinically warranted. Signer Name: Nicola Sanchez MD  Signed: 9/14/2021 1:58 PM  Workstation Name: [id number]  [facility]    Reviewed outside MRI right hip to include review images as well as radiology report indicates muscular tenderness strain of the medial head of the gastroc with intramuscular  hematoma associated.  There is concern for possible tear the posterior horn posterior body medial meniscus though not well characterized on the tib-fib imaging sequence.    Right Tib-Fib X-Ray  Indication: Pain  AP and Lateral views    Findings:  No fracture  No bony lesion  Normal soft tissues  Normal joint spaces    No prior studies were available for comparison.    Right Knee X-Ray  Indication: Pain    AP, Lateral, and [place] views    Findings:  No fracture  No bony lesion  Normal soft tissues  Moderate medial compartment joint space narrowing with no osteophyte formation    No prior studies were available for comparison.      Assessment:        1. Pain    2. Chondromalacia of knee, right    3. Gastrocnemius muscle strain, right, initial encounter           Plan:          1. Discussed treatment options at length with patient at today's visit.  2. I do not believe that the patient need surgery at this time as this should heal on its own.   3. If he was having trouble walking, I was going to recommend a boot but he does not seem to be having issues ambulating.   4. I will send in a prescription for oral steroid to decrease his inflammation.   5. Follow up: 6 weeks.      Woo Dobbs was in agreement with plan and had all questions answered.     Orders:  Orders Placed This Encounter   Procedures   • XR Knee 3 View Right   • XR Tibia Fibula 2 View Right       Medications:  New Medications Ordered This Visit   Medications   • methylPREDNISolone (MEDROL) 4 MG dose pack     Sig: Take as directed on package instructions.     Dispense:  1 each     Refill:  0       Followup:  Return in about 6 weeks (around 11/4/2021).    Diagnoses and all orders for this visit:    1. Pain (Primary)  -     XR Knee 3 View Right  -     XR Tibia Fibula 2 View Right    2. Chondromalacia of knee, right    3. Gastrocnemius muscle strain, right, initial encounter    Other orders  -     methylPREDNISolone (MEDROL) 4 MG dose pack; Take as directed  on package instructions.  Dispense: 1 each; Refill: 0          Dictated utilizing Dragon dictation     Transcribed from ambient dictation for Radames Gunter MD by Calvin Gaitan.  09/23/21   11:37 EDT    I have personally performed the services described in this document as transcribed by the above individual, and it is both accurate and complete.  Radames Gunter MD  9/28/2021  13:04 EDT

## 2021-10-12 ENCOUNTER — CLINICAL SUPPORT (OUTPATIENT)
Dept: FAMILY MEDICINE CLINIC | Facility: CLINIC | Age: 81
End: 2021-10-12

## 2021-10-12 DIAGNOSIS — Z23 NEED FOR INFLUENZA VACCINATION: Primary | ICD-10-CM

## 2021-10-12 PROCEDURE — 90662 IIV NO PRSV INCREASED AG IM: CPT | Performed by: FAMILY MEDICINE

## 2021-10-12 PROCEDURE — G0008 ADMIN INFLUENZA VIRUS VAC: HCPCS | Performed by: FAMILY MEDICINE

## 2021-10-21 DIAGNOSIS — N40.0 BENIGN PROSTATIC HYPERPLASIA WITHOUT LOWER URINARY TRACT SYMPTOMS: ICD-10-CM

## 2021-10-21 RX ORDER — TAMSULOSIN HYDROCHLORIDE 0.4 MG/1
CAPSULE ORAL
Qty: 90 CAPSULE | Refills: 0 | OUTPATIENT
Start: 2021-10-21

## 2021-10-28 ENCOUNTER — HOSPITAL ENCOUNTER (EMERGENCY)
Facility: HOSPITAL | Age: 81
Discharge: HOME | End: 2021-10-29
Attending: EMERGENCY MEDICINE

## 2021-10-28 DIAGNOSIS — S61.411A SKIN TEAR OF RIGHT HAND WITHOUT COMPLICATION, INITIAL ENCOUNTER: ICD-10-CM
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
## 2021-10-28 DIAGNOSIS — S01.01XA LACERATION OF SCALP, INITIAL ENCOUNTER: ICD-10-CM

## 2021-10-28 DIAGNOSIS — S32.10XA CLOSED FRACTURE OF SACRUM, UNSPECIFIED FRACTURE MORPHOLOGY, INITIAL ENCOUNTER (HCC): Primary | ICD-10-CM

## 2021-10-28 DIAGNOSIS — S50.02XA CONTUSION OF LEFT ELBOW, INITIAL ENCOUNTER: ICD-10-CM

## 2021-10-28 PROCEDURE — 99283 EMERGENCY DEPT VISIT LOW MDM: CPT | Performed by: EMERGENCY MEDICINE

## 2021-10-28 PROCEDURE — 99283 EMERGENCY DEPT VISIT LOW MDM: CPT

## 2021-10-28 PROCEDURE — 12002 RPR S/N/AX/GEN/TRNK2.6-7.5CM: CPT | Performed by: EMERGENCY MEDICINE

## 2021-10-28 RX ORDER — ZINC SULFATE 50(220)MG
220 CAPSULE ORAL DAILY
Status: ON HOLD | COMMUNITY
End: 2022-07-14

## 2021-10-29 ENCOUNTER — APPOINTMENT (OUTPATIENT)
Dept: GENERAL RADIOLOGY | Facility: HOSPITAL | Age: 81
End: 2021-10-29

## 2021-10-29 ENCOUNTER — APPOINTMENT (OUTPATIENT)
Dept: CT IMAGING | Facility: HOSPITAL | Age: 81
End: 2021-10-29

## 2021-10-29 ENCOUNTER — TELEPHONE (OUTPATIENT)
Dept: FAMILY MEDICINE CLINIC | Facility: CLINIC | Age: 81
End: 2021-10-29

## 2021-10-29 VITALS
SYSTOLIC BLOOD PRESSURE: 147 MMHG | TEMPERATURE: 98.4 F | WEIGHT: 158 LBS | BODY MASS INDEX: 23.95 KG/M2 | DIASTOLIC BLOOD PRESSURE: 82 MMHG | RESPIRATION RATE: 18 BRPM | HEIGHT: 68 IN | OXYGEN SATURATION: 94 % | HEART RATE: 74 BPM

## 2021-10-29 PROCEDURE — 70450 CT HEAD/BRAIN W/O DYE: CPT

## 2021-10-29 PROCEDURE — 73502 X-RAY EXAM HIP UNI 2-3 VIEWS: CPT

## 2021-10-29 PROCEDURE — 73130 X-RAY EXAM OF HAND: CPT

## 2021-10-29 PROCEDURE — 73080 X-RAY EXAM OF ELBOW: CPT

## 2021-10-29 PROCEDURE — 72220 X-RAY EXAM SACRUM TAILBONE: CPT

## 2021-10-29 PROCEDURE — 72100 X-RAY EXAM L-S SPINE 2/3 VWS: CPT

## 2021-10-29 RX ORDER — CEPHALEXIN 500 MG/1
500 CAPSULE ORAL ONCE
Status: COMPLETED | OUTPATIENT
Start: 2021-10-29 | End: 2021-10-29

## 2021-10-29 RX ORDER — CEPHALEXIN 500 MG/1
500 CAPSULE ORAL 3 TIMES DAILY
Qty: 15 CAPSULE | Refills: 0 | Status: SHIPPED | OUTPATIENT
Start: 2021-10-29 | End: 2022-06-27 | Stop reason: ALTCHOICE

## 2021-10-29 RX ORDER — LIDOCAINE HYDROCHLORIDE AND EPINEPHRINE BITARTRATE 20; .01 MG/ML; MG/ML
10 INJECTION, SOLUTION SUBCUTANEOUS ONCE
Status: COMPLETED | OUTPATIENT
Start: 2021-10-29 | End: 2021-10-29

## 2021-10-29 RX ORDER — HYDROCODONE BITARTRATE AND ACETAMINOPHEN 5; 325 MG/1; MG/1
1 TABLET ORAL EVERY 6 HOURS PRN
Qty: 30 TABLET | Refills: 0 | Status: SHIPPED | OUTPATIENT
Start: 2021-10-29 | End: 2022-06-27 | Stop reason: ALTCHOICE

## 2021-10-29 RX ORDER — BUPIVACAINE HYDROCHLORIDE AND EPINEPHRINE 5; 5 MG/ML; UG/ML
10 INJECTION, SOLUTION EPIDURAL; INTRACAUDAL; PERINEURAL ONCE
Status: DISCONTINUED | OUTPATIENT
Start: 2021-10-29 | End: 2021-10-29

## 2021-10-29 RX ORDER — HYDROCODONE BITARTRATE AND ACETAMINOPHEN 5; 325 MG/1; MG/1
1 TABLET ORAL ONCE
Status: COMPLETED | OUTPATIENT
Start: 2021-10-29 | End: 2021-10-29

## 2021-10-29 RX ADMIN — LIDOCAINE HYDROCHLORIDE,EPINEPHRINE BITARTRATE 10 ML: 20; .01 INJECTION, SOLUTION INFILTRATION; PERINEURAL at 01:11

## 2021-10-29 RX ADMIN — BUPIVACAINE HYDROCHLORIDE AND EPINEPHRINE BITARTRATE 10 ML: 5; .005 INJECTION, SOLUTION EPIDURAL; INTRACAUDAL; PERINEURAL at 01:44

## 2021-10-29 RX ADMIN — HYDROCODONE BITARTRATE AND ACETAMINOPHEN 1 TABLET: 5; 325 TABLET ORAL at 01:44

## 2021-10-29 RX ADMIN — CEPHALEXIN 500 MG: 500 CAPSULE ORAL at 01:58

## 2021-10-29 NOTE — TELEPHONE ENCOUNTER
Spoke with both patient and pharmacy. Pharmacy stated they were able to get it approved by insurance by having the ER doctor re-write the script. Due to the supply that was sent in, insurance wouldn't cover bc pt had never been prescribed a controlled and it was for 30 days vs 6 days.

## 2021-10-29 NOTE — TELEPHONE ENCOUNTER
Caller: JENNA AMES    Relationship: Emergency Contact    Best call back number: [phone]    Which medication are you concerned about: HYDROcodone-acetaminophen (NORCO) 5-325 MG per tablet    Who prescribed you this medication: [facility] EMERGENCY ROOM    What are your concerns: PATIENT'S WIFE STATED THAT SHE WAS TOLD THE PATIENT'S PRIMARY CARE DOCTOR NEEDS TO CALL AND APPROVE THIS PRESCRIPTION FOR PAIN MEDICATION.     PLEASE CALL AND ADVISE

## 2021-11-09 DIAGNOSIS — N40.0 BENIGN PROSTATIC HYPERPLASIA WITHOUT LOWER URINARY TRACT SYMPTOMS: ICD-10-CM

## 2021-11-09 NOTE — TELEPHONE ENCOUNTER
Caller: [facility] - [place], [place] - [address] - [phone] [facility] [phone] FX    Relationship: Pharmacy    Best call back number: [phone]    Requested Prescriptions: tamsulosin (FLOMAX) 0.4 MG capsule 24 hr capsule       Pharmacy where request should be sent:  [facility] - [place], [place] - [address] - [phone] [facility] [address] [phone] FX        Additional details provided by patient: CALLED TO REQUEST A MEDICATION REFILL ON PATIENT'S MEDICATION. PATIENT HAS BEEN OUT OF MEDICATION FOR 2 WEEKS NOW.    Does the patient have less than a 3 day supply:  [x] Yes  [] No    Jarek Gramajo Rep   11/09/21 13:35 EST         THANKS

## 2021-11-11 RX ORDER — TAMSULOSIN HYDROCHLORIDE 0.4 MG/1
1 CAPSULE ORAL DAILY
Qty: 30 CAPSULE | Refills: 0 | Status: SHIPPED | OUTPATIENT
Start: 2021-11-11

## 2021-12-31 NOTE — TELEPHONE ENCOUNTER
Please call patient and schedule an appointment within the next two weeks. Once he schedules, we can call in a 30 day supply of his medicine to last until his appointment.   Admission Reconciliation is Completed  Discharge Reconciliation is Completed

## 2022-06-27 ENCOUNTER — OFFICE VISIT (OUTPATIENT)
Dept: CARDIOLOGY | Facility: CLINIC | Age: 82
End: 2022-06-27

## 2022-06-27 VITALS
OXYGEN SATURATION: 97 % | HEIGHT: 68 IN | RESPIRATION RATE: 16 BRPM | HEART RATE: 66 BPM | SYSTOLIC BLOOD PRESSURE: 160 MMHG | DIASTOLIC BLOOD PRESSURE: 80 MMHG | BODY MASS INDEX: 23.79 KG/M2 | WEIGHT: 157 LBS

## 2022-06-27 DIAGNOSIS — I51.89 GRADE I DIASTOLIC DYSFUNCTION: Primary | ICD-10-CM

## 2022-06-27 DIAGNOSIS — E78.2 MIXED HYPERLIPIDEMIA: ICD-10-CM

## 2022-06-27 DIAGNOSIS — I10 PRIMARY HYPERTENSION: ICD-10-CM

## 2022-06-27 DIAGNOSIS — R07.2 PRECORDIAL PAIN: ICD-10-CM

## 2022-06-27 PROCEDURE — 93000 ELECTROCARDIOGRAM COMPLETE: CPT | Performed by: NURSE PRACTITIONER

## 2022-06-27 PROCEDURE — 99214 OFFICE O/P EST MOD 30 MIN: CPT | Performed by: NURSE PRACTITIONER

## 2022-06-27 RX ORDER — ATORVASTATIN CALCIUM 10 MG/1
10 TABLET, FILM COATED ORAL DAILY
COMMUNITY
Start: 2022-05-28 | End: 2022-08-15 | Stop reason: HOSPADM

## 2022-06-27 NOTE — PROGRESS NOTES
Date of Office Visit: 2022  Encounter Provider: DEV Mars  Place of Service: [facility]ARD[facility]  Patient Name: Woo Dobbs  :1940  Primary Cardiologist: Dr. Ball    Annual cardiac evaluation    Dear Dr. Ivan    HPI: Woo Dobbs is a pleasant 81 y.o. male who presents 2022 for cardiac follow up.  He is a new patient to me and I reviewed his past medical records.  He was referred to Dr. Ball on 2019 at the request of [facility] regarding a murmur.  He has not been seen in our office since.    When he saw Dr. Ball in 2019, he reported that he had been told that he had a murmur somewhere in the last 10 to 15 years but was told not to worry about it.  He does not have a history of rheumatic fever.  He was not having any symptoms of chest pain dyspnea or palpitations.  He did report having had a lot of tick bites and a rash and was very fatigued.  He had had a cough and was given a cephalosporin for bronchitis during that same time period.  Dr. Ball ordered an echo as follows:     2019 Interpretation Summary  · Left ventricular systolic function is normal. Calculated EF = 62.0%. Estimated EF was in agreement with the calculated EF. Normal left ventricular cavity size noted. Left ventricular wall thickness is consistent with moderate concentric hypertrophy. Left ventricular diastolic dysfunction is noted (grade I) consistent with impaired relaxation.  · Systolic anterior motion of the chordal apparatus is present. A gradient is not present.  · The aortic valve is abnormal in structure. There is moderate calcification of the aortic valve.No significant aortic valve regurgitation is present. Mild aortic valve stenosis is present.     Dr. Ball spoke to the patient with the results of his echo.  No changes were made.  He was to follow-up in 1 year.     Patient returns today at your request as he stated you saw something different on his EKG.  He does now have  borderline right axis deviation.  He has some concerning complaints.  He states he has a chest discomfort with exertion.  For instance he states if he had to pull or lift something he gets this discomfort in his chest that does cause shortness of breath.  He states if he sits down and rests it is better.  He denies any palpitations, shortness of breath at rest.  He has not had any edema.  He states that he had some dizziness and had a fall in the shower last fall.  He does have some fatigue.  He states his blood pressures at home are normally 130s/70s.  On recheck I did get 130/90.  He is taking his losartan 100 mg daily.  He does state that his mother passed away at 104-1/2 in April 2022.        Past Medical History:   Diagnosis Date   • Colon polyp    • Hypertension    • Type 2 diabetes mellitus (HCC)        Past Surgical History:   Procedure Laterality Date   • COLONOSCOPY     • VASECTOMY         Social History     Socioeconomic History   • Marital status:    • Number of children: 3   Tobacco Use   • Smoking status: Never Smoker   • Smokeless tobacco: Never Used   • Tobacco comment: caffeine use: 1 -2 cups daily.    Vaping Use   • Vaping Use: Never used   Substance and Sexual Activity   • Alcohol use: No   • Drug use: No   • Sexual activity: Defer       Family History   Problem Relation Age of Onset   • Colon cancer Neg Hx    • Colon polyps Neg Hx        The following portion of the patient's history were reviewed and updated as appropriate: past medical history, past surgical history, past social history, past family history, allergies, current medications, and problem list.    Review of Systems   Constitutional: Positive for malaise/fatigue. Negative for diaphoresis and fever.   HENT: Negative for congestion, hearing loss, hoarse voice, nosebleeds and sore throat.    Eyes: Negative for photophobia, vision loss in left eye, vision loss in right eye and visual disturbance.   Cardiovascular: Positive for  chest pain (with exertion). Negative for dyspnea on exertion, irregular heartbeat, leg swelling, near-syncope, orthopnea, palpitations, paroxysmal nocturnal dyspnea and syncope.   Respiratory: Positive for shortness of breath (with exertion). Negative for cough, hemoptysis, sleep disturbances due to breathing, snoring, sputum production and wheezing.    Endocrine: Negative for cold intolerance, heat intolerance, polydipsia, polyphagia and polyuria.   Hematologic/Lymphatic: Negative for bleeding problem. Does not bruise/bleed easily.   Skin: Negative for color change, dry skin, poor wound healing, rash and suspicious lesions.   Musculoskeletal: Positive for falls. Negative for arthritis, back pain, gout, joint pain, joint swelling, muscle cramps, muscle weakness and myalgias.   Gastrointestinal: Negative for bloating, abdominal pain, constipation, diarrhea, dysphagia, melena, nausea and vomiting.   Neurological: Positive for dizziness. Negative for excessive daytime sleepiness, headaches, light-headedness, loss of balance, numbness, paresthesias, seizures, vertigo and weakness.   Psychiatric/Behavioral: Negative for depression, memory loss and substance abuse. The patient is not nervous/anxious.        Allergies   Allergen Reactions   • Loratadine Other (See Comments)     Emotional side effects.          Current Outpatient Medications:   •  atorvastatin (LIPITOR) 10 MG tablet, , Disp: , Rfl:   •  Garlic 1000 MG capsule, Take 6,000 mg by mouth Daily., Disp: , Rfl:   •  glucose blood test strip, Test blood sugar Twice daily, Disp: 200 each, Rfl: 12  •  glucose monitor monitoring kit, 1 each As Needed (Diabetes 2)., Disp: 1 each, Rfl: 0  •  Lancets misc, 1 each 3 (Three) Times a Day Before Meals., Disp: 200 each, Rfl: 5  •  Lancets misc, Test blood sugar Twice daily, Disp: 200 each, Rfl: 12  •  losartan (COZAAR) 100 MG tablet, TAKE 1 TABLET EVERY DAY, Disp: 60 tablet, Rfl: 5  •  metFORMIN (GLUCOPHAGE) 1000 MG tablet, ,  "Disp: , Rfl:   •  Omega-3 Fatty Acids (fish oil) 1000 MG capsule capsule, Take  by mouth Daily With Breakfast., Disp: , Rfl:   •  omeprazole (priLOSEC) 40 MG capsule, Take 1 capsule by mouth 3 (Three) Times a Week if Needed (Heartburn)., Disp: 90 capsule, Rfl: 3  •  tamsulosin (FLOMAX) 0.4 MG capsule 24 hr capsule, Take 1 capsule by mouth Daily., Disp: 30 capsule, Rfl: 0  •  zinc sulfate (ZINCATE) 220 (50 Zn) MG capsule, Take 220 mg by mouth Daily., Disp: , Rfl:         Objective:     Vitals:    06/27/22 1408   BP: 160/80   Pulse: 66   Resp: 16   SpO2: 97%   Weight: 71.2 kg (157 lb)   Height: 172.7 cm (68\")     Body mass index is 23.87 kg/m².      Vitals reviewed.   Constitutional:       General: Not in acute distress.     Appearance: Normal and healthy appearance. Well-developed.   Eyes:      General:         Right eye: No discharge.         Left eye: No discharge.      Conjunctiva/sclera: Conjunctivae normal.   HENT:      Head: Normocephalic and atraumatic.      Right Ear: External ear normal.      Left Ear: External ear normal.      Nose: Nose normal.   Neck:      Thyroid: No thyromegaly.      Vascular: No JVD.      Trachea: No tracheal deviation.      Lymphadenopathy: No cervical adenopathy.   Pulmonary:      Effort: Pulmonary effort is normal. No respiratory distress.      Breath sounds: Normal breath sounds. No wheezing. No rales.   Chest:      Chest wall: Not tender to palpatation.   Cardiovascular:      Normal rate. Regular rhythm.      No gallop.   Pulses:     Intact distal pulses.   Edema:     Peripheral edema absent.   Abdominal:      General: There is no distension.      Palpations: Abdomen is soft.      Tenderness: There is no abdominal tenderness.   Musculoskeletal: Normal range of motion.         General: No tenderness or deformity.      Cervical back: Normal range of motion and neck supple. Skin:     General: Skin is warm and dry.      Findings: No erythema or rash.   Neurological:      Mental " Status: Alert and oriented to person, place, and time.      Coordination: Coordination normal.   Psychiatric:         Attention and Perception: Attention and perception normal.         Mood and Affect: Mood normal.         Speech: Speech normal.         Behavior: Behavior normal. Behavior is cooperative.         Thought Content: Thought content normal.         Cognition and Memory: Cognition normal.         Judgment: Judgment normal.               ECG 12 Lead    Date/Time: 6/27/2022 2:18 PM  Performed by: Avis Metz APRN  Authorized by: Avis Metz APRN   Comparison: compared with previous ECG from 7/23/2019  Similar to previous ECG  Rhythm: sinus rhythm  Rate: normal  Conduction: conduction normal  ST Segments: ST segments normal  T Waves: T waves normal  QRS axis: right    Clinical impression: non-specific ECG              Assessment:       Diagnosis Plan   1. Grade I diastolic dysfunction  Adult Transthoracic Echo Complete W/ Cont if Necessary Per Protocol    Stress Test With Myocardial Perfusion One Day   2. Primary hypertension     3. Mixed hyperlipidemia     4. Precordial pain  Adult Transthoracic Echo Complete W/ Cont if Necessary Per Protocol    Stress Test With Myocardial Perfusion One Day          Plan:       1. HTN - elevated at visit.  Recheck 130/90.  He states at home it usually runs 130/70    2. Hyperlipidemia -continue on lipid-lowering therapy.  He is currently on atorvastatin 10 mg.    3.  Chest pain- concerning.  Check a stress and an echo.    Check stress and echo      As always, it has been a pleasure to participate in your patient's care. Thank you.       Sincerely,       DEV Mars      Current Outpatient Medications:   •  atorvastatin (LIPITOR) 10 MG tablet, , Disp: , Rfl:   •  Garlic 1000 MG capsule, Take 6,000 mg by mouth Daily., Disp: , Rfl:   •  glucose blood test strip, Test blood sugar Twice daily, Disp: 200 each, Rfl: 12  •  glucose monitor monitoring kit, 1 each As Needed  (Diabetes 2)., Disp: 1 each, Rfl: 0  •  Lancets misc, 1 each 3 (Three) Times a Day Before Meals., Disp: 200 each, Rfl: 5  •  Lancets misc, Test blood sugar Twice daily, Disp: 200 each, Rfl: 12  •  losartan (COZAAR) 100 MG tablet, TAKE 1 TABLET EVERY DAY, Disp: 60 tablet, Rfl: 5  •  metFORMIN (GLUCOPHAGE) 1000 MG tablet, , Disp: , Rfl:   •  Omega-3 Fatty Acids (fish oil) 1000 MG capsule capsule, Take  by mouth Daily With Breakfast., Disp: , Rfl:   •  omeprazole (priLOSEC) 40 MG capsule, Take 1 capsule by mouth 3 (Three) Times a Week if Needed (Heartburn)., Disp: 90 capsule, Rfl: 3  •  tamsulosin (FLOMAX) 0.4 MG capsule 24 hr capsule, Take 1 capsule by mouth Daily., Disp: 30 capsule, Rfl: 0  •  zinc sulfate (ZINCATE) 220 (50 Zn) MG capsule, Take 220 mg by mouth Daily., Disp: , Rfl:       Dictated utilizing Dragon dictation

## 2022-06-30 ENCOUNTER — TRANSCRIBE ORDERS (OUTPATIENT)
Dept: ADMINISTRATIVE | Facility: HOSPITAL | Age: 82
End: 2022-06-30

## 2022-06-30 DIAGNOSIS — R31.9 HEMATURIA, UNSPECIFIED TYPE: Primary | ICD-10-CM

## 2022-07-07 ENCOUNTER — HOSPITAL ENCOUNTER (OUTPATIENT)
Dept: CT IMAGING | Facility: HOSPITAL | Age: 82
Discharge: HOME | End: 2022-07-07
Admitting: UROLOGY

## 2022-07-07 DIAGNOSIS — R31.9 HEMATURIA, UNSPECIFIED TYPE: ICD-10-CM

## 2022-07-07 LAB — CREAT BLDA-MCNC: 1.1 MG/DL (ref 0.6–1.3)

## 2022-07-07 PROCEDURE — 82565 ASSAY OF CREATININE: CPT

## 2022-07-07 PROCEDURE — 0 IOPAMIDOL PER 1 ML: Performed by: UROLOGY

## 2022-07-07 PROCEDURE — 74178 CT ABD&PLV WO CNTR FLWD CNTR: CPT

## 2022-07-07 RX ADMIN — IOPAMIDOL 100 ML: 755 INJECTION, SOLUTION INTRAVENOUS at 08:14

## 2022-07-12 ENCOUNTER — TRANSCRIBE ORDERS (OUTPATIENT)
Dept: CARDIOLOGY | Facility: CLINIC | Age: 82
End: 2022-07-12

## 2022-07-12 ENCOUNTER — HOSPITAL ENCOUNTER (OUTPATIENT)
Dept: NUCLEAR MEDICINE | Facility: HOSPITAL | Age: 82
Discharge: HOME | End: 2022-07-12

## 2022-07-12 ENCOUNTER — HOSPITAL ENCOUNTER (OUTPATIENT)
Dept: CARDIOLOGY | Facility: HOSPITAL | Age: 82
Discharge: HOME | End: 2022-07-12

## 2022-07-12 ENCOUNTER — OFFICE VISIT (OUTPATIENT)
Dept: CARDIOLOGY | Facility: CLINIC | Age: 82
End: 2022-07-12

## 2022-07-12 ENCOUNTER — LAB (OUTPATIENT)
Dept: LAB | Facility: HOSPITAL | Age: 82
End: 2022-07-12

## 2022-07-12 VITALS — HEART RATE: 68 BPM | DIASTOLIC BLOOD PRESSURE: 78 MMHG | SYSTOLIC BLOOD PRESSURE: 139 MMHG

## 2022-07-12 VITALS
DIASTOLIC BLOOD PRESSURE: 65 MMHG | WEIGHT: 157 LBS | SYSTOLIC BLOOD PRESSURE: 149 MMHG | BODY MASS INDEX: 23.79 KG/M2 | HEIGHT: 68 IN

## 2022-07-12 DIAGNOSIS — I20.8 STABLE ANGINA: ICD-10-CM

## 2022-07-12 DIAGNOSIS — I20.8 STABLE ANGINA: Primary | ICD-10-CM

## 2022-07-12 DIAGNOSIS — R94.39 ABNORMAL STRESS TEST: ICD-10-CM

## 2022-07-12 DIAGNOSIS — R31.29 MICROSCOPIC HEMATURIA: ICD-10-CM

## 2022-07-12 DIAGNOSIS — I10 PRIMARY HYPERTENSION: ICD-10-CM

## 2022-07-12 DIAGNOSIS — I51.89 GRADE I DIASTOLIC DYSFUNCTION: ICD-10-CM

## 2022-07-12 DIAGNOSIS — R07.2 PRECORDIAL PAIN: ICD-10-CM

## 2022-07-12 DIAGNOSIS — E11.59 TYPE 2 DIABETES MELLITUS WITH OTHER CIRCULATORY COMPLICATION, WITHOUT LONG-TERM CURRENT USE OF INSULIN: ICD-10-CM

## 2022-07-12 DIAGNOSIS — E78.2 MIXED HYPERLIPIDEMIA: ICD-10-CM

## 2022-07-12 DIAGNOSIS — Z01.818 OTHER SPECIFIED PRE-OPERATIVE EXAMINATION: Primary | ICD-10-CM

## 2022-07-12 DIAGNOSIS — I35.0 NONRHEUMATIC AORTIC VALVE STENOSIS: ICD-10-CM

## 2022-07-12 PROBLEM — I20.89 STABLE ANGINA: Status: ACTIVE | Noted: 2022-07-12

## 2022-07-12 LAB
ALBUMIN SERPL-MCNC: 4.4 G/DL (ref 3.5–5.2)
ALBUMIN/GLOB SERPL: 1.6 G/DL
ALP SERPL-CCNC: 59 U/L (ref 39–117)
ALT SERPL W P-5'-P-CCNC: 15 U/L (ref 1–41)
ANION GAP SERPL CALCULATED.3IONS-SCNC: 12.3 MMOL/L (ref 5–15)
AORTIC DIMENSIONLESS INDEX: 0.4 (DI)
AST SERPL-CCNC: 16 U/L (ref 1–40)
BASOPHILS # BLD AUTO: 0.07 10*3/MM3 (ref 0–0.2)
BASOPHILS NFR BLD AUTO: 0.9 % (ref 0–1.5)
BH CV ECHO MEAS - ACS: 1.13 CM
BH CV ECHO MEAS - AI P1/2T: 492.3 MSEC
BH CV ECHO MEAS - AO MAX PG: 34 MMHG
BH CV ECHO MEAS - AO MEAN PG: 19.7 MMHG
BH CV ECHO MEAS - AO ROOT DIAM: 3.3 CM
BH CV ECHO MEAS - AO V2 MAX: 291.4 CM/SEC
BH CV ECHO MEAS - AO V2 VTI: 69.5 CM
BH CV ECHO MEAS - EDV(CUBED): 97.3 ML
BH CV ECHO MEAS - EDV(MOD-SP2): 72 ML
BH CV ECHO MEAS - EDV(MOD-SP4): 95 ML
BH CV ECHO MEAS - EF(MOD-BP): 60.7 %
BH CV ECHO MEAS - EF(MOD-SP2): 48.6 %
BH CV ECHO MEAS - EF(MOD-SP4): 68.4 %
BH CV ECHO MEAS - ESV(CUBED): 27.9 ML
BH CV ECHO MEAS - ESV(MOD-SP2): 37 ML
BH CV ECHO MEAS - ESV(MOD-SP4): 30 ML
BH CV ECHO MEAS - FS: 34.1 %
BH CV ECHO MEAS - IVS/LVPW: 1.18 CM
BH CV ECHO MEAS - IVSD: 1.3 CM
BH CV ECHO MEAS - LAT PEAK E' VEL: 7.1 CM/SEC
BH CV ECHO MEAS - LV MASS(C)D: 205 GRAMS
BH CV ECHO MEAS - LV MAX PG: 6.6 MMHG
BH CV ECHO MEAS - LV MEAN PG: 3 MMHG
BH CV ECHO MEAS - LV V1 MAX: 128 CM/SEC
BH CV ECHO MEAS - LV V1 VTI: 29.3 CM
BH CV ECHO MEAS - LVIDD: 4.6 CM
BH CV ECHO MEAS - LVIDS: 3 CM
BH CV ECHO MEAS - LVOT AREA: 3.1 CM2
BH CV ECHO MEAS - LVOT DIAM: 1.97 CM
BH CV ECHO MEAS - LVPWD: 1.1 CM
BH CV ECHO MEAS - MED PEAK E' VEL: 5.8 CM/SEC
BH CV ECHO MEAS - MV DEC SLOPE: 390.1 CM/SEC2
BH CV ECHO MEAS - MV MAX PG: 6.9 MMHG
BH CV ECHO MEAS - MV MEAN PG: 2.7 MMHG
BH CV ECHO MEAS - MV P1/2T: 83.1 MSEC
BH CV ECHO MEAS - MV V2 VTI: 31.5 CM
BH CV ECHO MEAS - MVA(P1/2T): 2.6 CM2
BH CV ECHO MEAS - MVA(VTI): 2.8 CM2
BH CV ECHO MEAS - QP/QS: 0.55
BH CV ECHO MEAS - RAP SYSTOLE: 3 MMHG
BH CV ECHO MEAS - RV MAX PG: 6 MMHG
BH CV ECHO MEAS - RV V1 MAX: 122.1 CM/SEC
BH CV ECHO MEAS - RV V1 VTI: 20.3 CM
BH CV ECHO MEAS - RVOT DIAM: 1.75 CM
BH CV ECHO MEAS - RVSP: 21 MMHG
BH CV ECHO MEAS - SV(LVOT): 89.5 ML
BH CV ECHO MEAS - SV(MOD-SP2): 35 ML
BH CV ECHO MEAS - SV(MOD-SP4): 65 ML
BH CV ECHO MEAS - SV(RVOT): 49 ML
BH CV ECHO MEAS - TR MAX PG: 18.2 MMHG
BH CV ECHO MEAS - TR MAX VEL: 213.4 CM/SEC
BH CV REST NUCLEAR ISOTOPE DOSE: 11.5 MCI
BH CV STRESS BP STAGE 1: NORMAL
BH CV STRESS BP STAGE 2: NORMAL
BH CV STRESS DURATION MIN STAGE 1: 3
BH CV STRESS DURATION MIN STAGE 2: 1
BH CV STRESS DURATION SEC STAGE 1: 0
BH CV STRESS DURATION SEC STAGE 2: 21
BH CV STRESS GRADE STAGE 1: 10
BH CV STRESS GRADE STAGE 2: 12
BH CV STRESS HR STAGE 1: 114
BH CV STRESS HR STAGE 2: 125
BH CV STRESS METS STAGE 1: 4.6
BH CV STRESS METS STAGE 2: 6.3
BH CV STRESS NUCLEAR ISOTOPE DOSE: 35.2 MCI
BH CV STRESS PROTOCOL 1: NORMAL
BH CV STRESS RECOVERY BP: NORMAL MMHG
BH CV STRESS RECOVERY HR: 75 BPM
BH CV STRESS SPEED STAGE 1: 1.7
BH CV STRESS SPEED STAGE 2: 2.5
BH CV STRESS STAGE 1: 1
BH CV STRESS STAGE 2: 2
BH CV XLRA - RV BASE: 3.3 CM
BH CV XLRA - RV MID: 2.9 CM
BH CV XLRA - TDI S': 12.6 CM/SEC
BILIRUB SERPL-MCNC: 0.8 MG/DL (ref 0–1.2)
BUN SERPL-MCNC: 20 MG/DL (ref 8–23)
BUN/CREAT SERPL: 20.2 (ref 7–25)
CALCIUM SPEC-SCNC: 9.3 MG/DL (ref 8.6–10.5)
CHLORIDE SERPL-SCNC: 102 MMOL/L (ref 98–107)
CHOLEST SERPL-MCNC: 117 MG/DL (ref 0–200)
CO2 SERPL-SCNC: 24.7 MMOL/L (ref 22–29)
CREAT SERPL-MCNC: 0.99 MG/DL (ref 0.76–1.27)
DEPRECATED RDW RBC AUTO: 42.8 FL (ref 37–54)
EGFRCR SERPLBLD CKD-EPI 2021: 76.5 ML/MIN/1.73
EOSINOPHIL # BLD AUTO: 0.31 10*3/MM3 (ref 0–0.4)
EOSINOPHIL NFR BLD AUTO: 4 % (ref 0.3–6.2)
ERYTHROCYTE [DISTWIDTH] IN BLOOD BY AUTOMATED COUNT: 12.8 % (ref 12.3–15.4)
GLOBULIN UR ELPH-MCNC: 2.7 GM/DL
GLUCOSE SERPL-MCNC: 143 MG/DL (ref 65–99)
HBA1C MFR BLD: 6.6 % (ref 4.8–5.6)
HCT VFR BLD AUTO: 42.1 % (ref 37.5–51)
HDLC SERPL-MCNC: 40 MG/DL (ref 40–60)
HGB BLD-MCNC: 14.3 G/DL (ref 13–17.7)
IMM GRANULOCYTES # BLD AUTO: 0.03 10*3/MM3 (ref 0–0.05)
IMM GRANULOCYTES NFR BLD AUTO: 0.4 % (ref 0–0.5)
LDLC SERPL CALC-MCNC: 62 MG/DL (ref 0–100)
LDLC/HDLC SERPL: 1.55 {RATIO}
LEFT ATRIUM VOLUME INDEX: 22.4 ML/M2
LV EF NUC BP: 59 %
LYMPHOCYTES # BLD AUTO: 1.64 10*3/MM3 (ref 0.7–3.1)
LYMPHOCYTES NFR BLD AUTO: 21 % (ref 19.6–45.3)
MAXIMAL PREDICTED HEART RATE: 139 BPM
MAXIMAL PREDICTED HEART RATE: 139 BPM
MCH RBC QN AUTO: 31.5 PG (ref 26.6–33)
MCHC RBC AUTO-ENTMCNC: 34 G/DL (ref 31.5–35.7)
MCV RBC AUTO: 92.7 FL (ref 79–97)
MONOCYTES # BLD AUTO: 0.58 10*3/MM3 (ref 0.1–0.9)
MONOCYTES NFR BLD AUTO: 7.4 % (ref 5–12)
NEUTROPHILS NFR BLD AUTO: 5.17 10*3/MM3 (ref 1.7–7)
NEUTROPHILS NFR BLD AUTO: 66.3 % (ref 42.7–76)
NRBC BLD AUTO-RTO: 0 /100 WBC (ref 0–0.2)
PERCENT MAX PREDICTED HR: 104.32 %
PLATELET # BLD AUTO: 229 10*3/MM3 (ref 140–450)
PMV BLD AUTO: 10.2 FL (ref 6–12)
POTASSIUM SERPL-SCNC: 4.7 MMOL/L (ref 3.5–5.2)
PROT SERPL-MCNC: 7.1 G/DL (ref 6–8.5)
RBC # BLD AUTO: 4.54 10*6/MM3 (ref 4.14–5.8)
SINUS: 3.5 CM
SODIUM SERPL-SCNC: 139 MMOL/L (ref 136–145)
STJ: 2.7 CM
STRESS BASELINE BP: NORMAL MMHG
STRESS BASELINE HR: 68 BPM
STRESS O2 SAT REST: 96 %
STRESS PERCENT HR: 123 %
STRESS POST ESTIMATED WORKLOAD: 6.3 METS
STRESS POST EXERCISE DUR MIN: 4 MIN
STRESS POST EXERCISE DUR SEC: 21 SEC
STRESS POST PEAK BP: NORMAL MMHG
STRESS POST PEAK HR: 145 BPM
STRESS TARGET HR: 118 BPM
STRESS TARGET HR: 118 BPM
TRIGL SERPL-MCNC: 76 MG/DL (ref 0–150)
VLDLC SERPL-MCNC: 15 MG/DL (ref 5–40)
WBC NRBC COR # BLD: 7.8 10*3/MM3 (ref 3.4–10.8)

## 2022-07-12 PROCEDURE — 93018 CV STRESS TEST I&R ONLY: CPT | Performed by: INTERNAL MEDICINE

## 2022-07-12 PROCEDURE — 85025 COMPLETE CBC W/AUTO DIFF WBC: CPT

## 2022-07-12 PROCEDURE — 0 TECHNETIUM SESTAMIBI: Performed by: NURSE PRACTITIONER

## 2022-07-12 PROCEDURE — 93306 TTE W/DOPPLER COMPLETE: CPT | Performed by: INTERNAL MEDICINE

## 2022-07-12 PROCEDURE — 80053 COMPREHEN METABOLIC PANEL: CPT

## 2022-07-12 PROCEDURE — 99214 OFFICE O/P EST MOD 30 MIN: CPT | Performed by: INTERNAL MEDICINE

## 2022-07-12 PROCEDURE — 80061 LIPID PANEL: CPT | Performed by: INTERNAL MEDICINE

## 2022-07-12 PROCEDURE — 93017 CV STRESS TEST TRACING ONLY: CPT

## 2022-07-12 PROCEDURE — A9500 TC99M SESTAMIBI: HCPCS | Performed by: NURSE PRACTITIONER

## 2022-07-12 PROCEDURE — 93306 TTE W/DOPPLER COMPLETE: CPT

## 2022-07-12 PROCEDURE — 78452 HT MUSCLE IMAGE SPECT MULT: CPT

## 2022-07-12 PROCEDURE — 36415 COLL VENOUS BLD VENIPUNCTURE: CPT

## 2022-07-12 PROCEDURE — 78452 HT MUSCLE IMAGE SPECT MULT: CPT | Performed by: INTERNAL MEDICINE

## 2022-07-12 PROCEDURE — 83036 HEMOGLOBIN GLYCOSYLATED A1C: CPT

## 2022-07-12 PROCEDURE — 93016 CV STRESS TEST SUPVJ ONLY: CPT | Performed by: INTERNAL MEDICINE

## 2022-07-12 RX ADMIN — TECHNETIUM TC 99M SESTAMIBI 1 DOSE: 1 INJECTION INTRAVENOUS at 09:10

## 2022-07-12 RX ADMIN — TECHNETIUM TC 99M SESTAMIBI 1 DOSE: 1 INJECTION INTRAVENOUS at 07:03

## 2022-07-12 NOTE — H&P (VIEW-ONLY)
Date of Office Visit: 22  Encounter Provider: Khurram Ball MD  Place of Service: [facility]  Patient Name: Woo Dobbs  :1940    Chief Complaint   Patient presents with   • Chest Pain   :     HPI:     Mr. Dobbs is 81 y.o. and presents today as an urgent add on.    He was seen in 2019; his PCP had noted a murmur. An echo showed aortic valve calcification without stenosis.    He was recently referred to the office because he reported episodes of exertional chest discomfort of breath that it been progressive over the last year.  They are predictable.  He gets chest discomfort and dyspnea with medium levels of exertion, then rests and it resolves.  He has not had any syncope or palpitations.  He has not had chest pain or dyspnea at rest.  He denies leg swelling.    He was referred for an echo and a stress test, which were performed this morning.  The echo showed normal left ventricular systolic function.  By Doppler, the aortic valve stenosis appears moderate, but visually appears moderate-severe.  The perfusion stress test shows evidence of LAD ischemia, and the EKG shows diffuse ST depression.  He states that he did develop his predictable angina during the stress test, and it resolved with rest.    He denies any abdominal surgeries.  He denies overt bleeding but states that a recent urine dipstick showed blood so he has been referred to Dr. Arvizu.  He sees him in 5 days.    Past Medical History:   Diagnosis Date   • Colon polyp    • Hypertension    • Type 2 diabetes mellitus (HCC)        Past Surgical History:   Procedure Laterality Date   • COLONOSCOPY     • VASECTOMY         Social History     Socioeconomic History   • Marital status:    • Number of children: 3   Tobacco Use   • Smoking status: Never Smoker   • Smokeless tobacco: Never Used   • Tobacco comment: caffeine use: 1 -2 cups daily.    Vaping Use   • Vaping Use: Never used   Substance and  Sexual Activity   • Alcohol use: No   • Drug use: No   • Sexual activity: Defer       Family History   Problem Relation Age of Onset   • Colon cancer Neg Hx    • Colon polyps Neg Hx        Review of Systems   Constitutional: Positive for malaise/fatigue.   Cardiovascular: Positive for chest pain and dyspnea on exertion.   All other systems reviewed and are negative.      Allergies   Allergen Reactions   • Loratadine Other (See Comments)     Emotional side effects.          Current Outpatient Medications:   •  atorvastatin (LIPITOR) 10 MG tablet, , Disp: , Rfl:   •  glucose blood test strip, Test blood sugar Twice daily, Disp: 200 each, Rfl: 12  •  glucose monitor monitoring kit, 1 each As Needed (Diabetes 2)., Disp: 1 each, Rfl: 0  •  Lancets misc, 1 each 3 (Three) Times a Day Before Meals., Disp: 200 each, Rfl: 5  •  Lancets misc, Test blood sugar Twice daily, Disp: 200 each, Rfl: 12  •  losartan (COZAAR) 100 MG tablet, TAKE 1 TABLET EVERY DAY, Disp: 60 tablet, Rfl: 5  •  metFORMIN (GLUCOPHAGE) 1000 MG tablet, , Disp: , Rfl:   •  Omega-3 Fatty Acids (fish oil) 1000 MG capsule capsule, Take  by mouth Daily With Breakfast., Disp: , Rfl:   •  omeprazole (priLOSEC) 40 MG capsule, Take 1 capsule by mouth 3 (Three) Times a Week if Needed (Heartburn)., Disp: 90 capsule, Rfl: 3  •  tamsulosin (FLOMAX) 0.4 MG capsule 24 hr capsule, Take 1 capsule by mouth Daily., Disp: 30 capsule, Rfl: 0  •  Garlic 1000 MG capsule, Take 6,000 mg by mouth Daily., Disp: , Rfl:   •  zinc sulfate (ZINCATE) 220 (50 Zn) MG capsule, Take 220 mg by mouth Daily., Disp: , Rfl:   No current facility-administered medications for this visit.      Objective:     Vitals:    07/12/22 1201   BP: 139/78   Pulse: 68     There is no height or weight on file to calculate BMI.    Vitals reviewed.   Constitutional:       Appearance: Healthy appearance. Well-developed and not in distress.   Eyes:      Conjunctiva/sclera: Conjunctivae normal.   HENT:      Head:  Normocephalic.      Nose: Nose normal.         Comments: masked  Neck:      Vascular: No JVD. JVD normal.      Lymphadenopathy: No cervical adenopathy.   Pulmonary:      Effort: Pulmonary effort is normal.      Breath sounds: Normal breath sounds.   Cardiovascular:      Normal rate. Regular rhythm.      Murmurs: There is a grade 2/6 systolic murmur.   Pulses:     Intact distal pulses.   Edema:     Peripheral edema absent.   Abdominal:      Palpations: Abdomen is soft.      Tenderness: There is no abdominal tenderness.   Musculoskeletal: Normal range of motion.      Cervical back: Normal range of motion. Skin:     General: Skin is warm and dry.      Findings: No rash.   Neurological:      General: No focal deficit present.      Mental Status: Alert, oriented to person, place, and time and oriented to person, place and time.      Cranial Nerves: No cranial nerve deficit.   Psychiatric:         Behavior: Behavior normal.         Thought Content: Thought content normal.         Judgment: Judgment normal.         Procedures      Assessment:       Diagnosis Plan   1. Stable angina (HCC)  Case Request Cath Lab: Coronary angiography, Left heart cath with simultaneous LV/Ao pressures, Left ventriculography    Comprehensive Metabolic Panel    Lipid Panel    CBC & Differential    Hemoglobin A1c   2. Abnormal stress test  Case Request Cath Lab: Coronary angiography, Left heart cath with simultaneous LV/Ao pressures, Left ventriculography    Comprehensive Metabolic Panel    Lipid Panel    CBC & Differential    Hemoglobin A1c   3. Nonrheumatic aortic valve stenosis  Case Request Cath Lab: Coronary angiography, Left heart cath with simultaneous LV/Ao pressures, Left ventriculography   4. Primary hypertension  Comprehensive Metabolic Panel    Lipid Panel    CBC & Differential    Hemoglobin A1c   5. Mixed hyperlipidemia  Comprehensive Metabolic Panel    Lipid Panel    CBC & Differential    Hemoglobin A1c   6. Type 2 diabetes  mellitus with other circulatory complication, without long-term current use of insulin (HCC)  Comprehensive Metabolic Panel    Lipid Panel    CBC & Differential    Hemoglobin A1c   7. Microscopic hematuria            Plan:       Mr Dobbs has stable angina, but a very abnormal stress test concerning for ischemia involving the LAD territory.  He also has at least moderate aortic stenosis, although it appears more significant by qualitative analysis.    Today I started aspirin, 81 mg daily, and atenolol 25 mg daily.  He is to hold his metformin so that we can get him set up for a cath at the end of this week.  I am a little bit worried about the microscopic hematuria, but we will just have to await further evaluation as I do feel that his heart issues are urgent and present.  If he develops any breast symptoms, he is to seek care immediately.    We will get simultaneous pressures during the cath to truly assess the severity of his aortic stenosis as well.    Sincerely,       Khurram Ball MD

## 2022-07-12 NOTE — PROGRESS NOTES
Date of Office Visit: 22  Encounter Provider: Khurram Ball MD  Place of Service: [facility] CARDIOLOGY  Patient Name: Woo Dobbs  :1940    Chief Complaint   Patient presents with   • Chest Pain   :     HPI:     Mr. Dobbs is 81 y.o. and presents today as an urgent add on.    He was seen in 2019; his PCP had noted a murmur. An echo showed aortic valve calcification without stenosis.    He was recently referred to the office because he reported episodes of exertional chest discomfort of breath that it been progressive over the last year.  They are predictable.  He gets chest discomfort and dyspnea with medium levels of exertion, then rests and it resolves.  He has not had any syncope or palpitations.  He has not had chest pain or dyspnea at rest.  He denies leg swelling.    He was referred for an echo and a stress test, which were performed this morning.  The echo showed normal left ventricular systolic function.  By Doppler, the aortic valve stenosis appears moderate, but visually appears moderate-severe.  The perfusion stress test shows evidence of LAD ischemia, and the EKG shows diffuse ST depression.  He states that he did develop his predictable angina during the stress test, and it resolved with rest.    He denies any abdominal surgeries.  He denies overt bleeding but states that a recent urine dipstick showed blood so he has been referred to Dr. Arvizu.  He sees him in 5 days.    Past Medical History:   Diagnosis Date   • Colon polyp    • Hypertension    • Type 2 diabetes mellitus (HCC)        Past Surgical History:   Procedure Laterality Date   • COLONOSCOPY     • VASECTOMY         Social History     Socioeconomic History   • Marital status:    • Number of children: 3   Tobacco Use   • Smoking status: Never Smoker   • Smokeless tobacco: Never Used   • Tobacco comment: caffeine use: 1 -2 cups daily.    Vaping Use   • Vaping Use: Never used   Substance and  Sexual Activity   • Alcohol use: No   • Drug use: No   • Sexual activity: Defer       Family History   Problem Relation Age of Onset   • Colon cancer Neg Hx    • Colon polyps Neg Hx        Review of Systems   Constitutional: Positive for malaise/fatigue.   Cardiovascular: Positive for chest pain and dyspnea on exertion.   All other systems reviewed and are negative.      Allergies   Allergen Reactions   • Loratadine Other (See Comments)     Emotional side effects.          Current Outpatient Medications:   •  atorvastatin (LIPITOR) 10 MG tablet, , Disp: , Rfl:   •  glucose blood test strip, Test blood sugar Twice daily, Disp: 200 each, Rfl: 12  •  glucose monitor monitoring kit, 1 each As Needed (Diabetes 2)., Disp: 1 each, Rfl: 0  •  Lancets misc, 1 each 3 (Three) Times a Day Before Meals., Disp: 200 each, Rfl: 5  •  Lancets misc, Test blood sugar Twice daily, Disp: 200 each, Rfl: 12  •  losartan (COZAAR) 100 MG tablet, TAKE 1 TABLET EVERY DAY, Disp: 60 tablet, Rfl: 5  •  metFORMIN (GLUCOPHAGE) 1000 MG tablet, , Disp: , Rfl:   •  Omega-3 Fatty Acids (fish oil) 1000 MG capsule capsule, Take  by mouth Daily With Breakfast., Disp: , Rfl:   •  omeprazole (priLOSEC) 40 MG capsule, Take 1 capsule by mouth 3 (Three) Times a Week if Needed (Heartburn)., Disp: 90 capsule, Rfl: 3  •  tamsulosin (FLOMAX) 0.4 MG capsule 24 hr capsule, Take 1 capsule by mouth Daily., Disp: 30 capsule, Rfl: 0  •  Garlic 1000 MG capsule, Take 6,000 mg by mouth Daily., Disp: , Rfl:   •  zinc sulfate (ZINCATE) 220 (50 Zn) MG capsule, Take 220 mg by mouth Daily., Disp: , Rfl:   No current facility-administered medications for this visit.      Objective:     Vitals:    07/12/22 1201   BP: 139/78   Pulse: 68     There is no height or weight on file to calculate BMI.    Vitals reviewed.   Constitutional:       Appearance: Healthy appearance. Well-developed and not in distress.   Eyes:      Conjunctiva/sclera: Conjunctivae normal.   HENT:      Head:  Normocephalic.      Nose: Nose normal.         Comments: masked  Neck:      Vascular: No JVD. JVD normal.      Lymphadenopathy: No cervical adenopathy.   Pulmonary:      Effort: Pulmonary effort is normal.      Breath sounds: Normal breath sounds.   Cardiovascular:      Normal rate. Regular rhythm.      Murmurs: There is a grade 2/6 systolic murmur.   Pulses:     Intact distal pulses.   Edema:     Peripheral edema absent.   Abdominal:      Palpations: Abdomen is soft.      Tenderness: There is no abdominal tenderness.   Musculoskeletal: Normal range of motion.      Cervical back: Normal range of motion. Skin:     General: Skin is warm and dry.      Findings: No rash.   Neurological:      General: No focal deficit present.      Mental Status: Alert, oriented to person, place, and time and oriented to person, place and time.      Cranial Nerves: No cranial nerve deficit.   Psychiatric:         Behavior: Behavior normal.         Thought Content: Thought content normal.         Judgment: Judgment normal.         Procedures      Assessment:       Diagnosis Plan   1. Stable angina (HCC)  Case Request Cath Lab: Coronary angiography, Left heart cath with simultaneous LV/Ao pressures, Left ventriculography    Comprehensive Metabolic Panel    Lipid Panel    CBC & Differential    Hemoglobin A1c   2. Abnormal stress test  Case Request Cath Lab: Coronary angiography, Left heart cath with simultaneous LV/Ao pressures, Left ventriculography    Comprehensive Metabolic Panel    Lipid Panel    CBC & Differential    Hemoglobin A1c   3. Nonrheumatic aortic valve stenosis  Case Request Cath Lab: Coronary angiography, Left heart cath with simultaneous LV/Ao pressures, Left ventriculography   4. Primary hypertension  Comprehensive Metabolic Panel    Lipid Panel    CBC & Differential    Hemoglobin A1c   5. Mixed hyperlipidemia  Comprehensive Metabolic Panel    Lipid Panel    CBC & Differential    Hemoglobin A1c   6. Type 2 diabetes  mellitus with other circulatory complication, without long-term current use of insulin (HCC)  Comprehensive Metabolic Panel    Lipid Panel    CBC & Differential    Hemoglobin A1c   7. Microscopic hematuria            Plan:       Mr Dobbs has stable angina, but a very abnormal stress test concerning for ischemia involving the LAD territory.  He also has at least moderate aortic stenosis, although it appears more significant by qualitative analysis.    Today I started aspirin, 81 mg daily, and atenolol 25 mg daily.  He is to hold his metformin so that we can get him set up for a cath at the end of this week.  I am a little bit worried about the microscopic hematuria, but we will just have to await further evaluation as I do feel that his heart issues are urgent and present.  If he develops any breast symptoms, he is to seek care immediately.    We will get simultaneous pressures during the cath to truly assess the severity of his aortic stenosis as well.    Sincerely,       Khurram Ball MD

## 2022-07-13 ENCOUNTER — LAB (OUTPATIENT)
Dept: LAB | Facility: HOSPITAL | Age: 82
End: 2022-07-13

## 2022-07-13 DIAGNOSIS — Z01.818 OTHER SPECIFIED PRE-OPERATIVE EXAMINATION: ICD-10-CM

## 2022-07-13 LAB — SARS-COV-2 RNA PNL SPEC NAA+PROBE: NOT DETECTED

## 2022-07-13 PROCEDURE — C9803 HOPD COVID-19 SPEC COLLECT: HCPCS | Performed by: INTERNAL MEDICINE

## 2022-07-13 PROCEDURE — 87635 SARS-COV-2 COVID-19 AMP PRB: CPT | Performed by: INTERNAL MEDICINE

## 2022-07-14 ENCOUNTER — HOSPITAL ENCOUNTER (OUTPATIENT)
Facility: HOSPITAL | Age: 82
Setting detail: HOSPITAL OUTPATIENT SURGERY
Discharge: HOME | End: 2022-07-14
Attending: INTERNAL MEDICINE | Admitting: INTERNAL MEDICINE

## 2022-07-14 ENCOUNTER — PREP FOR SURGERY (OUTPATIENT)
Dept: OTHER | Facility: HOSPITAL | Age: 82
End: 2022-07-14

## 2022-07-14 VITALS
BODY MASS INDEX: 24.64 KG/M2 | TEMPERATURE: 97.8 F | HEART RATE: 57 BPM | DIASTOLIC BLOOD PRESSURE: 66 MMHG | WEIGHT: 157 LBS | OXYGEN SATURATION: 95 % | HEIGHT: 67 IN | SYSTOLIC BLOOD PRESSURE: 123 MMHG | RESPIRATION RATE: 16 BRPM

## 2022-07-14 DIAGNOSIS — R94.39 ABNORMAL STRESS TEST: ICD-10-CM

## 2022-07-14 DIAGNOSIS — I35.0 NONRHEUMATIC AORTIC VALVE STENOSIS: ICD-10-CM

## 2022-07-14 DIAGNOSIS — I20.8 STABLE ANGINA: ICD-10-CM

## 2022-07-14 LAB
GLUCOSE BLDC GLUCOMTR-MCNC: 149 MG/DL (ref 70–130)
HCT VFR BLDA CALC: 36 % (ref 38–51)
HCT VFR BLDA CALC: 37 % (ref 38–51)
HGB BLDA-MCNC: 12.2 G/DL (ref 12–17)
HGB BLDA-MCNC: 12.6 G/DL (ref 12–17)
SAO2 % BLDA: 65 % (ref 95–98)
SAO2 % BLDA: 90 % (ref 95–98)

## 2022-07-14 PROCEDURE — 93460 R&L HRT ART/VENTRICLE ANGIO: CPT | Performed by: INTERNAL MEDICINE

## 2022-07-14 PROCEDURE — 99152 MOD SED SAME PHYS/QHP 5/>YRS: CPT | Performed by: INTERNAL MEDICINE

## 2022-07-14 PROCEDURE — C1769 GUIDE WIRE: HCPCS | Performed by: INTERNAL MEDICINE

## 2022-07-14 PROCEDURE — 99153 MOD SED SAME PHYS/QHP EA: CPT | Performed by: INTERNAL MEDICINE

## 2022-07-14 PROCEDURE — 25010000002 MIDAZOLAM PER 1 MG: Performed by: INTERNAL MEDICINE

## 2022-07-14 PROCEDURE — 82962 GLUCOSE BLOOD TEST: CPT

## 2022-07-14 PROCEDURE — 85018 HEMOGLOBIN: CPT

## 2022-07-14 PROCEDURE — 25010000002 FENTANYL CITRATE (PF) 50 MCG/ML SOLUTION: Performed by: INTERNAL MEDICINE

## 2022-07-14 PROCEDURE — C1894 INTRO/SHEATH, NON-LASER: HCPCS | Performed by: INTERNAL MEDICINE

## 2022-07-14 PROCEDURE — 25010000002 HEPARIN (PORCINE) PER 1000 UNITS: Performed by: INTERNAL MEDICINE

## 2022-07-14 PROCEDURE — C1751 CATH, INF, PER/CENT/MIDLINE: HCPCS | Performed by: INTERNAL MEDICINE

## 2022-07-14 PROCEDURE — 0 IOPAMIDOL PER 1 ML: Performed by: INTERNAL MEDICINE

## 2022-07-14 PROCEDURE — 82810 BLOOD GASES O2 SAT ONLY: CPT

## 2022-07-14 PROCEDURE — 85014 HEMATOCRIT: CPT

## 2022-07-14 RX ORDER — SODIUM CHLORIDE 9 MG/ML
75 INJECTION, SOLUTION INTRAVENOUS CONTINUOUS
Status: DISCONTINUED | OUTPATIENT
Start: 2022-07-14 | End: 2022-07-14 | Stop reason: HOSPADM

## 2022-07-14 RX ORDER — SODIUM CHLORIDE 0.9 % (FLUSH) 0.9 %
10 SYRINGE (ML) INJECTION EVERY 12 HOURS SCHEDULED
Status: DISCONTINUED | OUTPATIENT
Start: 2022-07-14 | End: 2022-07-14 | Stop reason: HOSPADM

## 2022-07-14 RX ORDER — FENTANYL CITRATE 50 UG/ML
INJECTION, SOLUTION INTRAMUSCULAR; INTRAVENOUS AS NEEDED
Status: DISCONTINUED | OUTPATIENT
Start: 2022-07-14 | End: 2022-07-14 | Stop reason: HOSPADM

## 2022-07-14 RX ORDER — ACETAMINOPHEN 325 MG/1
650 TABLET ORAL EVERY 4 HOURS PRN
Status: DISCONTINUED | OUTPATIENT
Start: 2022-07-14 | End: 2022-07-14 | Stop reason: HOSPADM

## 2022-07-14 RX ORDER — LIDOCAINE HYDROCHLORIDE 20 MG/ML
INJECTION, SOLUTION INFILTRATION; PERINEURAL AS NEEDED
Status: DISCONTINUED | OUTPATIENT
Start: 2022-07-14 | End: 2022-07-14 | Stop reason: HOSPADM

## 2022-07-14 RX ORDER — SODIUM CHLORIDE 0.9 % (FLUSH) 0.9 %
10 SYRINGE (ML) INJECTION AS NEEDED
Status: DISCONTINUED | OUTPATIENT
Start: 2022-07-14 | End: 2022-07-14 | Stop reason: HOSPADM

## 2022-07-14 RX ORDER — MIDAZOLAM HYDROCHLORIDE 1 MG/ML
INJECTION INTRAMUSCULAR; INTRAVENOUS AS NEEDED
Status: DISCONTINUED | OUTPATIENT
Start: 2022-07-14 | End: 2022-07-14 | Stop reason: HOSPADM

## 2022-07-14 RX ADMIN — SODIUM CHLORIDE 75 ML/HR: 9 INJECTION, SOLUTION INTRAVENOUS at 07:27

## 2022-07-14 NOTE — DISCHARGE INSTRUCTIONS
[facility]  [address] [place] [address]    Coronary Angiogram (Radial/Ulnar Approach) After Care    Refer to this sheet in the next few weeks. These instructions provide you with information on caring for yourself after your procedure. Your caregiver may also give you more specific instructions. Your treatment has been planned according to current medical practices, but problems sometimes occur. Call your caregiver if you have any problems or questions after your procedure.    Home Care Instructions:  You may shower the day after the procedure. Remove the bandage (dressing) and gently wash the site with plain soap and water. Gently pat the site dry. You may apply a band aid daily for 2 days if desired.    Do not apply powder or lotion to the site.  Do not submerge the affected site in water for 3 to 5 days or until the site is completely healed.   Do not lift, push or pull anything over 5 pounds for 5 days after your procedure or as directed by your physician.  As a reference, a gallon of milk weighs 8 pounds.   Inspect the site at least twice daily. You may notice some bruising at the site and it may be tender for 1 to 2 weeks.     Increase your fluid intake for the next 2 days.    Keep arm elevated for 24 hours. For the remainder of the day, keep your arm in “Pledge of Allegiance” position when up and about.     You may drive 24 hours after the procedure unless otherwise instructed by your caregiver.  Do not operate machinery or power tools for 24 hours.  A responsible adult should be with you for the first 24 hours after you arrive home. Do not make any important legal decisions or sign legal papers for 24 hours.  Do not drink alcohol for 24 hours.    Metformin or any medications containing Metformin should not be taken for 48 hours after your procedure.      Call Your Doctor if:   You have unusual pain at the radial/ulnar (wrist) site.  You have redness, warmth, swelling, or pain at the  radial/ulnar (wrist) site.  You have drainage (other than a small amount of blood on the dressing).  `You have chills or a fever > 101.  Your arm becomes pale or dark, cool, tingly, or numb.  You develop chest pain, shortness of breath, feel faint or pass out.    You have heavy bleeding from the site, hold pressure on the site for 20 minutes.  If the bleeding stops, apply a fresh bandage and call your cardiologist.  However, if you        continue to have bleeding, call 911 and continue to apply pressure to the site.   You have any symptoms of a stroke.  Remember BE FAST  B-balance. Sudden trouble walking or loss of balance.  E-eyes.  Sudden changes in how you see or a sudden onset of a very bad headache.   F-face. Sudden weakness or loss of feeling of the face or facial droop on one side.   A-arms Sudden weakness or numbness in one arm.  One arm drifts down if they are both held out in front of you. This happens suddenly and usually on one side of the body.   S-speech.  Sudden trouble speaking, slurred speech or trouble understanding what are saying.   T-time  Time to call emergency services.  Write down the symptoms and the time they started.

## 2022-07-14 NOTE — CONSULTS
Patient Care Team:  Agustín Ivan MD as PCP - General (Family Medicine)    Chief complaint: Dyspnea with exertion    Subjective     History of Present Illness  Patient is a 81 y.o. male with a past medical history including BPH, hypertension, hyperlipidemia, and DM II. He denies ETOH, tobacco, or illicit drug use. He reports progressive shortness of breath with exertion over the course of the last 6 months to a year. He does get occasional chest tightness with these episodes. The symptoms are relieved with rest. He was seen by cardiology earlier this week for these symptoms. Echocardiogram from 7/12 showed normal EF, trace aortic regurgitation, and moderate-severe aortic stenosis. Stress test at that time showed evidence of LAD ischemia with ST depression noted during the EKG portion. He did have anginal symptoms during the stress test. He presented today for left heart catheterization which revealed severe multi-vessel CAD involving proximal LAD and bifurcation of the first diagonal. Dr. Courtney was consulted for cardiac surgery evaluation. Of note he has had recent hematuria, and is scheduled to see urology next week. [place] and reports being very active at baseline.      Review of Systems   Constitutional: Positive for activity change and fatigue.   HENT: Negative.    Eyes: Negative.    Respiratory: Positive for chest tightness and shortness of breath.    Cardiovascular: Positive for chest pain. Negative for palpitations and leg swelling.   Gastrointestinal: Negative for diarrhea, nausea and vomiting.   Endocrine: Negative.    Genitourinary: Positive for hematuria. Negative for difficulty urinating and urgency.   Musculoskeletal: Negative.    Skin: Negative for rash and wound.   Allergic/Immunologic: Negative.    Neurological: Negative.  Negative for dizziness, seizures, syncope and numbness.   Hematological: Negative.    Psychiatric/Behavioral: Negative.         Past Medical History:   Diagnosis  Date   • BPH (benign prostatic hyperplasia)    • Colon polyp    • Hyperlipidemia    • Hypertension    • Type 2 diabetes mellitus (HCC)      Past Surgical History:   Procedure Laterality Date   • COLONOSCOPY     • VASECTOMY       Family History   Problem Relation Age of Onset   • Colon cancer Neg Hx    • Colon polyps Neg Hx      Social History     Tobacco Use   • Smoking status: Never Smoker   • Smokeless tobacco: Never Used   • Tobacco comment: caffeine use: 1 -2 cups daily.    Vaping Use   • Vaping Use: Never used   Substance Use Topics   • Alcohol use: No   • Drug use: No     Medications Prior to Admission   Medication Sig Dispense Refill Last Dose   • atorvastatin (LIPITOR) 10 MG tablet Take 10 mg by mouth Daily.   7/13/2022 at Unknown time   • losartan (COZAAR) 100 MG tablet TAKE 1 TABLET EVERY DAY 60 tablet 5 7/13/2022 at Unknown time   • metFORMIN (GLUCOPHAGE) 1000 MG tablet Take 1,000 mg by mouth Daily With Breakfast.   7/13/2022 at Unknown time   • Omega-3 Fatty Acids (fish oil) 1000 MG capsule capsule Take 2,000 mg by mouth Daily With Breakfast.   7/13/2022 at Unknown time   • omeprazole (priLOSEC) 40 MG capsule Take 1 capsule by mouth 3 (Three) Times a Week if Needed (Heartburn). (Patient taking differently: Take 40 mg by mouth Daily.) 90 capsule 3 7/13/2022 at Unknown time   • tamsulosin (FLOMAX) 0.4 MG capsule 24 hr capsule Take 1 capsule by mouth Daily. 30 capsule 0 7/13/2022 at Unknown time   • Ergocalciferol (VITAMIN D2 PO) Take 50,000 Int'l Units by mouth 1 (One) Time Per Week. fridays 7/8/2022   • glucose blood test strip Test blood sugar Twice daily 200 each 12    • glucose monitor monitoring kit 1 each As Needed (Diabetes 2). 1 each 0    • Lancets misc 1 each 3 (Three) Times a Day Before Meals. 200 each 5    • Lancets misc Test blood sugar Twice daily 200 each 12      sodium chloride, 10 mL, Intravenous, Q12H      Allergies:  Loratadine    Objective      Vital Signs  Temp:  [97.8 °F (36.6 °C)]  "97.8 °F (36.6 °C)  Heart Rate:  [60-61] 60  Resp:  [8-16] 16  BP: (106-167)/(66-79) 106/66    Flowsheet Rows    Flowsheet Row First Filed Value   Admission Height 170.2 cm (67\") Documented at 07/14/2022 0722   Admission Weight 71.2 kg (157 lb) Documented at 07/14/2022 0722        170.2 cm (67\")    Physical Exam  Vitals reviewed.   Constitutional:       Appearance: Normal appearance.   HENT:      Head: Normocephalic.      Nose: Nose normal.      Mouth/Throat:      Mouth: Mucous membranes are moist.   Eyes:      Pupils: Pupils are equal, round, and reactive to light.   Cardiovascular:      Rate and Rhythm: Regular rhythm. Bradycardia present.      Heart sounds: Murmur heard.   Pulmonary:      Effort: Pulmonary effort is normal.      Breath sounds: Normal breath sounds.   Abdominal:      General: Bowel sounds are normal.      Palpations: Abdomen is soft.   Musculoskeletal:         General: Normal range of motion.      Cervical back: Normal range of motion and neck supple.      Right lower leg: No edema.      Left lower leg: No edema.   Skin:     General: Skin is warm and dry.      Capillary Refill: Capillary refill takes less than 2 seconds.   Neurological:      General: No focal deficit present.      Mental Status: He is alert and oriented to person, place, and time. Mental status is at baseline.   Psychiatric:         Mood and Affect: Mood normal.         Behavior: Behavior normal.         Thought Content: Thought content normal.         Judgment: Judgment normal.         Results Review:   Lab Results (last 24 hours)     Procedure Component Value Units Date/Time    POC Glucose Once [[id number]]  (Abnormal) Collected: 07/14/22 0727    Specimen: Blood Updated: 07/14/22 0728     Glucose 149 mg/dL      Comment: Meter: [id number] : [id number] [facility] Laura T RN           Assessment & Plan  - multi-vessel CAD  - moderate-severe aortic stenosis  - hyperlipidemia--statin threapy  - hypertension  - DM II--A1c 6.6  - " GERD--on PPI  - BPH--Flomax  - hematuria--f/u urology next week    Dr. Courtney has reviewed the films and recommends AVR/CABG  The patient has been having hematuria and has appointment with urology next week. Will hold on surgery until he is evaluated by Dr. Arvizu. Patient to reach out to our office once this has been completed  Questions answered with verbalized understanding    Thank you for allowing us to participate in the care of this patient.      DEV Velásquez  07/14/22  09:27 EDT

## 2022-07-15 RX ORDER — NITROGLYCERIN 0.4 MG/1
0.4 TABLET SUBLINGUAL
Qty: 30 TABLET | Refills: 1 | Status: SHIPPED | OUTPATIENT
Start: 2022-07-15

## 2022-07-15 RX ORDER — ATENOLOL 25 MG/1
25 TABLET ORAL DAILY
Qty: 30 TABLET | Refills: 11 | Status: SHIPPED | OUTPATIENT
Start: 2022-07-15 | End: 2022-08-05

## 2022-07-15 NOTE — PROGRESS NOTES
I called -- awaiting visit with  next week to see what's going on with the microscopic hematuria. Hopefully will be able to get workup done quickly so we can proceed with CABG.    I forgot to call in atenolol the other day, so I sent that in, along with NTG.

## 2022-07-21 ENCOUNTER — TELEPHONE (OUTPATIENT)
Dept: CARDIOLOGY | Facility: CLINIC | Age: 82
End: 2022-07-21

## 2022-07-21 NOTE — TELEPHONE ENCOUNTER
Janine with [facility] called for Clearance.    Pt is schedule on 7/25/22 with  for CYSTOSCOPY WITH BLADDER BIOPSY    Pt was last seen on 7/14/22     Is pt clear?    #: [phone]  FAX#: [phone]

## 2022-07-22 ENCOUNTER — PRE-ADMISSION TESTING (OUTPATIENT)
Dept: PREADMISSION TESTING | Facility: HOSPITAL | Age: 82
End: 2022-07-22

## 2022-07-22 VITALS
RESPIRATION RATE: 16 BRPM | SYSTOLIC BLOOD PRESSURE: 130 MMHG | OXYGEN SATURATION: 97 % | DIASTOLIC BLOOD PRESSURE: 64 MMHG | HEART RATE: 57 BPM

## 2022-07-22 LAB — SARS-COV-2 RNA PNL SPEC NAA+PROBE: NOT DETECTED

## 2022-07-22 PROCEDURE — C9803 HOPD COVID-19 SPEC COLLECT: HCPCS

## 2022-07-22 PROCEDURE — 87635 SARS-COV-2 COVID-19 AMP PRB: CPT | Performed by: UROLOGY

## 2022-07-22 RX ORDER — ASPIRIN 81 MG/1
81 TABLET, CHEWABLE ORAL DAILY
COMMUNITY

## 2022-07-22 RX ORDER — DIPHENOXYLATE HYDROCHLORIDE AND ATROPINE SULFATE 2.5; .025 MG/1; MG/1
1 TABLET ORAL DAILY
Status: ON HOLD | COMMUNITY
End: 2022-08-15 | Stop reason: SDUPTHER

## 2022-07-22 NOTE — DISCHARGE INSTRUCTIONS
PRE-ADMISSION TESTING INSTRUCTIONS FOR ADULTS    Take these medications the morning of surgery with a small sip of water: atenolol, omeprazole      Do not take any insulin or diabetes medications the morning of surgery.      No aspirin, advil, aleve, ibuprofen, naproxen, diet pills, decongestants, or herbal/vitamins for a week prior to surgery.    General Instructions:    DO NOT EAT SOLID FOOD AFTER MIDNIGHT THE NIGHT BEFORE SURGERY. No gum, mints, or hard candy after midnight the night before surgery.  You may drink clear liquids the day of surgery up until 2 hours before your arrival time.  Clear liquids are liquids you can see through. Nothing RED in color.    Plain water    Sports drinks  Sodas     Gelatin (Jell-O)  Fruit juices without pulp such as white grape juice and apple juice  Popsicles that contain no fruit or yogurt  Tea or coffee (no cream or milk added)    It is beneficial for you to have a clear drink that contains carbohydrates just before you leave your house and before your fasting time begins.  We suggest a 20 ounce bottle of Gatorade or Powerade for non-diabetic patients or a 20 ounce bottle of G2 or Powerade Zero for diabetic patients.     Patients who avoid smoking, chewing tobacco and alcohol for 4 weeks prior to surgery have a reduced risk of post-operative complications.  If at all possible, quit smoking as many days before surgery as you can.    Do not smoke, use chewing tobacco or drink alcohol the day of surgery    Bring your C-PAP/ BI-PAP machine if you use one.  Wear clean comfortable clothes and socks.  Do not wear contact lenses, lotion, deodorant, or make-up.  Bring a case for your glasses if applicable. You may brush your teeth the morning of surgery.  You may wear dentures/partials, do not put adhesive/glue on them.  Leave all other jewelry and valuables at home.      Preventing a Surgical Site Infection:    Shower the night before and on the morning of surgery using the  chlorhexidine soap you were given.  Use a clean washcloth with the soap.  Place clean sheets on your bed after showering the night before surgery. Do not use the CHG soap on your hair, face, or private areas. Wash your body gently for five (5) minutes. Do not scrub your skin.  Dry with a clean towel and dress in clean clothing.  Do not shave the surgical area for 10 days-2 weeks prior to surgery  because the razor can irritate skin and make it easier to develop an infection.  Make sure you, your family, and all healthcare providers clean their hands with soap and water or an alcohol based hand  before caring for you or your wound.      Day of surgery:    Your surgeon’s office will advise you of your arrival time for the day of surgery.    Upon arrival, a Pre-op nurse and Anesthesia provider will review your health history, obtain vital signs, and answer questions you may have.  The only belongings needed at this time will be your home medications and if applicable your C-PAP/BI-PAP machine.  If you are staying overnight your family can leave the rest of your belongings in the car and bring them to your room later.  A Pre-op nurse will start an IV and you may receive medication in preparation for surgery, including something to help you relax.  Your family will be able to see you in the Pre-op area.  While you are in surgery your family should notify the waiting room  if they leave the waiting room area and provide a contact phone number.    IF you have any questions, you can call the Pre-Admission Department at [phone] or your surgeon's office.  Notify your surgeon if  you become sick, have a fever, productive cough, or cannot be here the day of surgery    Please be aware that surgery does come with discomfort.  We want to make every effort to control your discomfort so please discuss any uncontrolled symptoms with your nurse.   Your doctor will most likely have prescribed pain  medications.      If you are going home after surgery, you will receive individualized written care instructions before being discharged.  A responsible adult (over the age of 18) must drive you to and from the hospital on the day of your surgery and stay with you for 24 hours after anesthesia.    If you are staying overnight following surgery, you will be transported to your hospital room following the recovery period.  [facility] has all private rooms.    You may receive a survey regarding the care you received. Your feedback is very important and will be used to collect the necessary data to help us to continue to provide excellent care.     Deductibles and co-payments are collected on the day of service. Please be prepared to pay the required co-pay, deductible or deposit on the day of service as defined by your plan.

## 2022-07-22 NOTE — TELEPHONE ENCOUNTER
Mr Dobbs needs CABG/SAVR. 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 He will be at risk of bleeding perioperatively but it will be manageable. He should have heart surgery before having any other type of surgery. We just wanted to make sure he didn't have any large or aggressive  tumors before proceeding.    If all are in agreement, can we get him scheduled for his surgery Dr Courtney?    Thx  JDK

## 2022-07-28 ENCOUNTER — TELEPHONE (OUTPATIENT)
Dept: CARDIAC SURGERY | Facility: CLINIC | Age: 82
End: 2022-07-28

## 2022-07-28 ENCOUNTER — PREP FOR SURGERY (OUTPATIENT)
Dept: OTHER | Facility: HOSPITAL | Age: 82
End: 2022-07-28

## 2022-07-28 DIAGNOSIS — I79.8 OTHER DISORDERS OF ARTERIES, ARTERIOLES AND CAPILLARIES IN DISEASES CLASSIFIED ELSEWHERE: ICD-10-CM

## 2022-07-28 DIAGNOSIS — I35.0 NONRHEUMATIC AORTIC VALVE STENOSIS: Primary | ICD-10-CM

## 2022-07-28 DIAGNOSIS — R79.9 ABNORMAL FINDING OF BLOOD CHEMISTRY, UNSPECIFIED: ICD-10-CM

## 2022-07-28 DIAGNOSIS — I25.84 CORONARY ATHEROSCLEROSIS DUE TO CALCIFIED CORONARY LESION (CODE): ICD-10-CM

## 2022-07-28 DIAGNOSIS — I50.32 CHRONIC DIASTOLIC (CONGESTIVE) HEART FAILURE: ICD-10-CM

## 2022-07-28 DIAGNOSIS — R79.1 ABNORMAL COAGULATION PROFILE: ICD-10-CM

## 2022-07-28 DIAGNOSIS — R93.3 ABNORMAL FINDINGS ON DIAGNOSTIC IMAGING OF OTHER PARTS OF DIGESTIVE TRACT: ICD-10-CM

## 2022-07-28 RX ORDER — CHLORHEXIDINE GLUCONATE 500 MG/1
1 CLOTH TOPICAL EVERY 12 HOURS PRN
Status: CANCELLED | OUTPATIENT
Start: 2022-07-28

## 2022-07-28 RX ORDER — CHLORHEXIDINE GLUCONATE 0.12 MG/ML
15 RINSE ORAL ONCE
Status: CANCELLED | OUTPATIENT
Start: 2022-07-28 | End: 2022-07-28

## 2022-07-28 RX ORDER — CEFAZOLIN SODIUM 2 G/100ML
2 INJECTION, SOLUTION INTRAVENOUS
Status: CANCELLED | OUTPATIENT
Start: 2022-07-29 | End: 2022-07-30

## 2022-07-28 RX ORDER — CHLORHEXIDINE GLUCONATE 0.12 MG/ML
15 RINSE ORAL EVERY 12 HOURS
Status: CANCELLED | OUTPATIENT
Start: 2022-07-28 | End: 2022-07-29

## 2022-07-28 NOTE — TELEPHONE ENCOUNTER
Called pt and spoke to spouse about scheduling surgery with Dr. Courtney. Pt is agreeable to proceed with surgery on 8/8/22. Message sent to scheduling.

## 2022-07-29 ENCOUNTER — TELEPHONE (OUTPATIENT)
Dept: CARDIAC SURGERY | Facility: CLINIC | Age: 82
End: 2022-07-29

## 2022-07-29 NOTE — TELEPHONE ENCOUNTER
Spoke with pt and wife advised that surgery was scheduled for 08/08/2022 @ 5am arrival and surgery @ 730a start time and pat on 08/05/2022 @ 630a and carotid and vein mapping to follow at 830a they would be here several hrs also advised last dose of fish oil would be 07/29/2022. Both understood.

## 2022-08-05 ENCOUNTER — HOSPITAL ENCOUNTER (OUTPATIENT)
Dept: CARDIOLOGY | Facility: HOSPITAL | Age: 82
Discharge: HOME | End: 2022-08-05

## 2022-08-05 ENCOUNTER — PRE-ADMISSION TESTING (OUTPATIENT)
Dept: PREADMISSION TESTING | Facility: HOSPITAL | Age: 82
End: 2022-08-05

## 2022-08-05 ENCOUNTER — ANESTHESIA EVENT (OUTPATIENT)
Dept: PERIOP | Facility: HOSPITAL | Age: 82
End: 2022-08-05

## 2022-08-05 ENCOUNTER — HOSPITAL ENCOUNTER (OUTPATIENT)
Dept: GENERAL RADIOLOGY | Facility: HOSPITAL | Age: 82
Discharge: HOME | End: 2022-08-05

## 2022-08-05 VITALS
SYSTOLIC BLOOD PRESSURE: 151 MMHG | TEMPERATURE: 98.2 F | HEIGHT: 67 IN | WEIGHT: 158.5 LBS | DIASTOLIC BLOOD PRESSURE: 73 MMHG | BODY MASS INDEX: 24.88 KG/M2 | OXYGEN SATURATION: 96 % | HEART RATE: 58 BPM | RESPIRATION RATE: 16 BRPM

## 2022-08-05 DIAGNOSIS — I35.0 NONRHEUMATIC AORTIC VALVE STENOSIS: ICD-10-CM

## 2022-08-05 LAB
ABO GROUP BLD: NORMAL
ALBUMIN SERPL-MCNC: 4 G/DL (ref 3.5–5.2)
ALBUMIN/GLOB SERPL: 1.4 G/DL
ALP SERPL-CCNC: 55 U/L (ref 39–117)
ALT SERPL W P-5'-P-CCNC: 15 U/L (ref 1–41)
ANION GAP SERPL CALCULATED.3IONS-SCNC: 13.5 MMOL/L (ref 5–15)
APTT PPP: 30.5 SECONDS (ref 22.7–35.4)
ARTERIAL PATENCY WRIST A: POSITIVE
AST SERPL-CCNC: 16 U/L (ref 1–40)
ATMOSPHERIC PRESS: 755.3 MMHG
BACTERIA UR QL AUTO: NORMAL /HPF
BASE EXCESS BLDA CALC-SCNC: -2.2 MMOL/L (ref 0–2)
BASOPHILS # BLD AUTO: 0.1 10*3/MM3 (ref 0–0.2)
BASOPHILS NFR BLD AUTO: 1.2 % (ref 0–1.5)
BDY SITE: ABNORMAL
BH CV XLRA MEAS - DIST GSV CALF DIST LEFT: 0.21 CM
BH CV XLRA MEAS - DIST GSV CALF DIST RIGHT: 0.14 CM
BH CV XLRA MEAS - DIST GSV THIGH DIST LEFT: 0.13 CM
BH CV XLRA MEAS - DIST GSV THIGH DIST RIGHT: 0.14 CM
BH CV XLRA MEAS - DIST LSV CALF DIST LEFT: 0.09 CM
BH CV XLRA MEAS - DIST LSV CALF DIST RIGHT: 0.1 CM
BH CV XLRA MEAS - GSV ANKLE DIST LEFT: 0.17 CM
BH CV XLRA MEAS - GSV ANKLE DIST RIGHT: 0.15 CM
BH CV XLRA MEAS - GSV KNEE DIST LEFT: 0.17 CM
BH CV XLRA MEAS - GSV KNEE DIST RIGHT: 0.14 CM
BH CV XLRA MEAS - GSV ORIGIN DIST LEFT: 0.67 CM
BH CV XLRA MEAS - GSV ORIGIN DIST RIGHT: 0.39 CM
BH CV XLRA MEAS - MID GSV CALF LEFT: 0.16 CM
BH CV XLRA MEAS - MID GSV CALF RIGHT: 0.08 CM
BH CV XLRA MEAS - MID GSV THIGH  LEFT: 0.17 CM
BH CV XLRA MEAS - MID GSV THIGH  RIGHT: 0.16 CM
BH CV XLRA MEAS - MID LSV CALF DIST LEFT: 0.14 CM
BH CV XLRA MEAS - MID LSV CALF DIST RIGHT: 0.09 CM
BH CV XLRA MEAS - PROX GSV CALF DIST LEFT: 0.16 CM
BH CV XLRA MEAS - PROX GSV CALF DIST RIGHT: 0.12 CM
BH CV XLRA MEAS - PROX GSV THIGH  LEFT: 0.23 CM
BH CV XLRA MEAS - PROX GSV THIGH  RIGHT: 0.15 CM
BH CV XLRA MEAS - PROX LSV CALF DIST LEFT: 0.14 CM
BH CV XLRA MEAS - PROX LSV CALF DIST RIGHT: 0.21 CM
BH CV XLRA MEAS LEFT DIST CCA EDV: 18.8 CM/SEC
BH CV XLRA MEAS LEFT DIST CCA PSV: 85 CM/SEC
BH CV XLRA MEAS LEFT DIST ICA EDV: -32.2 CM/SEC
BH CV XLRA MEAS LEFT DIST ICA PSV: -83.3 CM/SEC
BH CV XLRA MEAS LEFT ICA/CCA RATIO: 0.98
BH CV XLRA MEAS LEFT MID ICA EDV: -24.9 CM/SEC
BH CV XLRA MEAS LEFT MID ICA PSV: -84.8 CM/SEC
BH CV XLRA MEAS LEFT PROX CCA EDV: 14.7 CM/SEC
BH CV XLRA MEAS LEFT PROX CCA PSV: 73.3 CM/SEC
BH CV XLRA MEAS LEFT PROX ECA EDV: -7.9 CM/SEC
BH CV XLRA MEAS LEFT PROX ECA PSV: -64.4 CM/SEC
BH CV XLRA MEAS LEFT PROX ICA EDV: -26.2 CM/SEC
BH CV XLRA MEAS LEFT PROX ICA PSV: -67.1 CM/SEC
BH CV XLRA MEAS LEFT PROX SCLA PSV: 76.8 CM/SEC
BH CV XLRA MEAS LEFT VERTEBRAL A EDV: 10 CM/SEC
BH CV XLRA MEAS LEFT VERTEBRAL A PSV: 43.7 CM/SEC
BH CV XLRA MEAS RIGHT DIST CCA EDV: 9.8 CM/SEC
BH CV XLRA MEAS RIGHT DIST CCA PSV: 58.5 CM/SEC
BH CV XLRA MEAS RIGHT DIST ICA EDV: -17.8 CM/SEC
BH CV XLRA MEAS RIGHT DIST ICA PSV: -56.6 CM/SEC
BH CV XLRA MEAS RIGHT ICA/CCA RATIO: 1.06
BH CV XLRA MEAS RIGHT MID ICA EDV: -18.2 CM/SEC
BH CV XLRA MEAS RIGHT MID ICA PSV: -62 CM/SEC
BH CV XLRA MEAS RIGHT PROX CCA EDV: 10.6 CM/SEC
BH CV XLRA MEAS RIGHT PROX CCA PSV: 69.8 CM/SEC
BH CV XLRA MEAS RIGHT PROX ECA EDV: -6.3 CM/SEC
BH CV XLRA MEAS RIGHT PROX ECA PSV: -48.3 CM/SEC
BH CV XLRA MEAS RIGHT PROX ICA EDV: -20 CM/SEC
BH CV XLRA MEAS RIGHT PROX ICA PSV: -56.2 CM/SEC
BH CV XLRA MEAS RIGHT PROX SCLA PSV: 85.3 CM/SEC
BH CV XLRA MEAS RIGHT VERTEBRAL A EDV: 11.4 CM/SEC
BH CV XLRA MEAS RIGHT VERTEBRAL A PSV: 59.7 CM/SEC
BILIRUB SERPL-MCNC: 0.5 MG/DL (ref 0–1.2)
BILIRUB UR QL STRIP: NEGATIVE
BLD GP AB SCN SERPL QL: NEGATIVE
BUN SERPL-MCNC: 25 MG/DL (ref 8–23)
BUN/CREAT SERPL: 24.5 (ref 7–25)
CALCIUM SPEC-SCNC: 9.1 MG/DL (ref 8.6–10.5)
CHLORIDE SERPL-SCNC: 101 MMOL/L (ref 98–107)
CHOLEST SERPL-MCNC: 124 MG/DL (ref 0–200)
CLARITY UR: CLEAR
CLOSE TME COLL+ADP + EPINEP PNL BLD: 99 % (ref 86–100)
CO2 SERPL-SCNC: 20.5 MMOL/L (ref 22–29)
COLOR UR: YELLOW
CREAT SERPL-MCNC: 1.02 MG/DL (ref 0.76–1.27)
DEPRECATED RDW RBC AUTO: 43.5 FL (ref 37–54)
EGFRCR SERPLBLD CKD-EPI 2021: 73.8 ML/MIN/1.73
EOSINOPHIL # BLD AUTO: 0.37 10*3/MM3 (ref 0–0.4)
EOSINOPHIL NFR BLD AUTO: 4.4 % (ref 0.3–6.2)
ERYTHROCYTE [DISTWIDTH] IN BLOOD BY AUTOMATED COUNT: 12.6 % (ref 12.3–15.4)
GLOBULIN UR ELPH-MCNC: 2.9 GM/DL
GLUCOSE SERPL-MCNC: 177 MG/DL (ref 65–99)
GLUCOSE UR STRIP-MCNC: NEGATIVE MG/DL
HBA1C MFR BLD: 6.7 % (ref 4.8–5.6)
HCO3 BLDA-SCNC: 22.8 MMOL/L (ref 22–28)
HCT VFR BLD AUTO: 41.3 % (ref 37.5–51)
HDLC SERPL-MCNC: 34 MG/DL (ref 40–60)
HGB BLD-MCNC: 13.5 G/DL (ref 13–17.7)
HGB UR QL STRIP.AUTO: ABNORMAL
HYALINE CASTS UR QL AUTO: NORMAL /LPF
IMM GRANULOCYTES # BLD AUTO: 0.03 10*3/MM3 (ref 0–0.05)
IMM GRANULOCYTES NFR BLD AUTO: 0.4 % (ref 0–0.5)
INR PPP: 1.12 (ref 0.9–1.1)
KETONES UR QL STRIP: NEGATIVE
LDLC SERPL CALC-MCNC: 68 MG/DL (ref 0–100)
LDLC/HDLC SERPL: 1.92 {RATIO}
LEFT ARM BP: NORMAL MMHG
LEUKOCYTE ESTERASE UR QL STRIP.AUTO: NEGATIVE
LYMPHOCYTES # BLD AUTO: 1.98 10*3/MM3 (ref 0.7–3.1)
LYMPHOCYTES NFR BLD AUTO: 23.4 % (ref 19.6–45.3)
MAGNESIUM SERPL-MCNC: 1.7 MG/DL (ref 1.6–2.4)
MAXIMAL PREDICTED HEART RATE: 139 BPM
MAXIMAL PREDICTED HEART RATE: 139 BPM
MCH RBC QN AUTO: 31 PG (ref 26.6–33)
MCHC RBC AUTO-ENTMCNC: 32.7 G/DL (ref 31.5–35.7)
MCV RBC AUTO: 94.7 FL (ref 79–97)
MODALITY: ABNORMAL
MONOCYTES # BLD AUTO: 0.58 10*3/MM3 (ref 0.1–0.9)
MONOCYTES NFR BLD AUTO: 6.9 % (ref 5–12)
NEUTROPHILS NFR BLD AUTO: 5.39 10*3/MM3 (ref 1.7–7)
NEUTROPHILS NFR BLD AUTO: 63.7 % (ref 42.7–76)
NITRITE UR QL STRIP: NEGATIVE
NRBC BLD AUTO-RTO: 0 /100 WBC (ref 0–0.2)
NT-PROBNP SERPL-MCNC: 234 PG/ML (ref 0–1800)
PCO2 BLDA: 39.1 MM HG (ref 35–45)
PH BLDA: 7.37 PH UNITS (ref 7.35–7.45)
PH UR STRIP.AUTO: <=5 [PH] (ref 5–8)
PLATELET # BLD AUTO: 224 10*3/MM3 (ref 140–450)
PMV BLD AUTO: 9.7 FL (ref 6–12)
PO2 BLDA: 93.6 MM HG (ref 80–100)
POTASSIUM SERPL-SCNC: 4.5 MMOL/L (ref 3.5–5.2)
PROT SERPL-MCNC: 6.9 G/DL (ref 6–8.5)
PROT UR QL STRIP: NEGATIVE
PROTHROMBIN TIME: 14.2 SECONDS (ref 11.7–14.2)
QT INTERVAL: 405 MS
RBC # BLD AUTO: 4.36 10*6/MM3 (ref 4.14–5.8)
RBC # UR STRIP: NORMAL /HPF
REF LAB TEST METHOD: NORMAL
RH BLD: POSITIVE
RIGHT ARM BP: NORMAL MMHG
SAO2 % BLDCOA: 97.1 % (ref 92–99)
SARS-COV-2 ORF1AB RESP QL NAA+PROBE: NOT DETECTED
SODIUM SERPL-SCNC: 135 MMOL/L (ref 136–145)
SP GR UR STRIP: 1.02 (ref 1–1.03)
SQUAMOUS #/AREA URNS HPF: NORMAL /HPF
STRESS TARGET HR: 118 BPM
STRESS TARGET HR: 118 BPM
T&S EXPIRATION DATE: NORMAL
TOTAL RATE: 20 BREATHS/MINUTE
TRIGL SERPL-MCNC: 124 MG/DL (ref 0–150)
UROBILINOGEN UR QL STRIP: ABNORMAL
VLDLC SERPL-MCNC: 22 MG/DL (ref 5–40)
WBC # UR STRIP: NORMAL /HPF
WBC NRBC COR # BLD: 8.45 10*3/MM3 (ref 3.4–10.8)

## 2022-08-05 PROCEDURE — 93970 EXTREMITY STUDY: CPT

## 2022-08-05 PROCEDURE — 83735 ASSAY OF MAGNESIUM: CPT

## 2022-08-05 PROCEDURE — 86901 BLOOD TYPING SEROLOGIC RH(D): CPT

## 2022-08-05 PROCEDURE — U0004 COV-19 TEST NON-CDC HGH THRU: HCPCS

## 2022-08-05 PROCEDURE — 36600 WITHDRAWAL OF ARTERIAL BLOOD: CPT | Performed by: FAMILY MEDICINE

## 2022-08-05 PROCEDURE — 80061 LIPID PANEL: CPT

## 2022-08-05 PROCEDURE — 93010 ELECTROCARDIOGRAM REPORT: CPT | Performed by: INTERNAL MEDICINE

## 2022-08-05 PROCEDURE — 86850 RBC ANTIBODY SCREEN: CPT

## 2022-08-05 PROCEDURE — 36415 COLL VENOUS BLD VENIPUNCTURE: CPT

## 2022-08-05 PROCEDURE — 83036 HEMOGLOBIN GLYCOSYLATED A1C: CPT

## 2022-08-05 PROCEDURE — A9270 NON-COVERED ITEM OR SERVICE: HCPCS | Performed by: NURSE PRACTITIONER

## 2022-08-05 PROCEDURE — 82803 BLOOD GASES ANY COMBINATION: CPT | Performed by: FAMILY MEDICINE

## 2022-08-05 PROCEDURE — 80053 COMPREHEN METABOLIC PANEL: CPT

## 2022-08-05 PROCEDURE — 85730 THROMBOPLASTIN TIME PARTIAL: CPT

## 2022-08-05 PROCEDURE — 93005 ELECTROCARDIOGRAM TRACING: CPT

## 2022-08-05 PROCEDURE — 85610 PROTHROMBIN TIME: CPT

## 2022-08-05 PROCEDURE — 86920 COMPATIBILITY TEST SPIN: CPT

## 2022-08-05 PROCEDURE — 63710000001 CHLORHEXIDINE 0.12 % SOLUTION: Performed by: NURSE PRACTITIONER

## 2022-08-05 PROCEDURE — 81001 URINALYSIS AUTO W/SCOPE: CPT

## 2022-08-05 PROCEDURE — 83880 ASSAY OF NATRIURETIC PEPTIDE: CPT

## 2022-08-05 PROCEDURE — 93880 EXTRACRANIAL BILAT STUDY: CPT

## 2022-08-05 PROCEDURE — U0005 INFEC AGEN DETEC AMPLI PROBE: HCPCS

## 2022-08-05 PROCEDURE — C9803 HOPD COVID-19 SPEC COLLECT: HCPCS

## 2022-08-05 PROCEDURE — 85576 BLOOD PLATELET AGGREGATION: CPT

## 2022-08-05 PROCEDURE — 85025 COMPLETE CBC W/AUTO DIFF WBC: CPT

## 2022-08-05 PROCEDURE — 71046 X-RAY EXAM CHEST 2 VIEWS: CPT

## 2022-08-05 PROCEDURE — 86900 BLOOD TYPING SEROLOGIC ABO: CPT

## 2022-08-05 PROCEDURE — 63710000001 MUPIROCIN 2 % OINTMENT: Performed by: NURSE PRACTITIONER

## 2022-08-05 RX ORDER — CHLORHEXIDINE GLUCONATE 0.12 MG/ML
15 RINSE ORAL EVERY 12 HOURS
Status: DISPENSED | OUTPATIENT
Start: 2022-08-05 | End: 2022-08-06

## 2022-08-05 RX ORDER — CHLORHEXIDINE GLUCONATE 500 MG/1
1 CLOTH TOPICAL EVERY 12 HOURS PRN
Status: DISCONTINUED | OUTPATIENT
Start: 2022-08-05 | End: 2022-09-14

## 2022-08-05 NOTE — ANESTHESIA PREPROCEDURE EVALUATION
Anesthesia Evaluation     Patient summary reviewed and Nursing notes reviewed   no history of anesthetic complications:  NPO Solid Status: > 8 hours  NPO Liquid Status: > 2 hours           Airway   Mallampati: II  TM distance: >3 FB  Neck ROM: limited  Dental - normal exam     Pulmonary    (+) asthma,shortness of breath,   Cardiovascular   Exercise tolerance: good (4-7 METS)    ECG reviewed  PT is on anticoagulation therapy    (+) hypertension, valvular problems/murmurs, angina, hyperlipidemia,     ROS comment: TTE:  · Calculated left ventricular EF = 60.7% Estimated left ventricular EF was in agreement with the calculated left ventricular EF. Left ventricular systolic function is normal. Normal left ventricular cavity size noted. Left ventricular wall thickness is consistent with mild to moderate concentric hypertrophy. All left ventricular wall segments contract normally. Left ventricular diastolic function is consistent with (grade I) impaired relaxation.  · The aortic valve is abnormal in structure. There is severe thickening of the aortic valve. The aortic valve appears trileaflet. Trace aortic valve regurgitation is present. While Doppler assessment of the valve suggests mild-moderate aortic stenosis, is qualitatively seems more significant (moderate-severe).        Neuro/Psych- negative ROS  GI/Hepatic/Renal/Endo    (+)  GERD,  diabetes mellitus,     Musculoskeletal     Abdominal    Substance History      OB/GYN          Other   arthritis,                      Anesthesia Plan    ASA 4     general, [place], CVL and PAC     (I have reviewed the patient's history with the patient and the chart, including all pertinent laboratory results and imaging. I have explained the risks of anesthesia including but not limited to dental damage, corneal abrasion, nerve injury, MI, stroke, and death. Questions asked and answered. Anesthetic plan discussed with patient and team as indicated. Patient expressed understanding of  the above.  )  Postoperative Plan: Expected vent after surgery  Anesthetic plan, risks, benefits, and alternatives have been provided, discussed and informed consent has been obtained with: patient.  Pre-procedure education provided  Use of blood products discussed with consented to blood products.       CODE STATUS:

## 2022-08-08 ENCOUNTER — APPOINTMENT (OUTPATIENT)
Dept: GENERAL RADIOLOGY | Facility: HOSPITAL | Age: 82
End: 2022-08-08

## 2022-08-08 ENCOUNTER — ANCILLARY PROCEDURE (OUTPATIENT)
Dept: PERIOP | Facility: HOSPITAL | Age: 82
End: 2022-08-08

## 2022-08-08 ENCOUNTER — HOSPITAL ENCOUNTER (INPATIENT)
Facility: HOSPITAL | Age: 82
LOS: 7 days | Discharge: HOME HEALTH | End: 2022-08-15
Attending: THORACIC SURGERY (CARDIOTHORACIC VASCULAR SURGERY) | Admitting: THORACIC SURGERY (CARDIOTHORACIC VASCULAR SURGERY)

## 2022-08-08 ENCOUNTER — ANESTHESIA (OUTPATIENT)
Dept: PERIOP | Facility: HOSPITAL | Age: 82
End: 2022-08-08

## 2022-08-08 DIAGNOSIS — I35.0 NONRHEUMATIC AORTIC VALVE STENOSIS: ICD-10-CM

## 2022-08-08 DIAGNOSIS — Z95.1 S/P CABG (CORONARY ARTERY BYPASS GRAFT): Primary | ICD-10-CM

## 2022-08-08 DIAGNOSIS — J96.01 ACUTE RESPIRATORY FAILURE WITH HYPOXIA: ICD-10-CM

## 2022-08-08 DIAGNOSIS — I48.0 AF (PAROXYSMAL ATRIAL FIBRILLATION): ICD-10-CM

## 2022-08-08 DIAGNOSIS — Z95.2 S/P AVR (AORTIC VALVE REPLACEMENT): ICD-10-CM

## 2022-08-08 LAB
ABO GROUP BLD: NORMAL
ACT BLD: 115 SECONDS (ref 82–152)
ACT BLD: 132 SECONDS (ref 82–152)
ACT BLD: 428 SECONDS (ref 82–152)
ACT BLD: 486 SECONDS (ref 82–152)
ACT BLD: 509 SECONDS (ref 82–152)
ACT BLD: 596 SECONDS (ref 82–152)
ALBUMIN SERPL-MCNC: 4.2 G/DL (ref 3.5–5.2)
ALBUMIN SERPL-MCNC: 4.8 G/DL (ref 3.5–5.2)
ALBUMIN SERPL-MCNC: 4.9 G/DL (ref 3.5–5.2)
ANION GAP SERPL CALCULATED.3IONS-SCNC: 11.9 MMOL/L (ref 5–15)
ANION GAP SERPL CALCULATED.3IONS-SCNC: 12.7 MMOL/L (ref 5–15)
ANION GAP SERPL CALCULATED.3IONS-SCNC: 16.5 MMOL/L (ref 5–15)
APTT PPP: 36.4 SECONDS (ref 22.7–35.4)
ARTERIAL PATENCY WRIST A: ABNORMAL
ARTERIAL PATENCY WRIST A: ABNORMAL
ATMOSPHERIC PRESS: 752.7 MMHG
ATMOSPHERIC PRESS: 754.6 MMHG
BASE EXCESS BLDA CALC-SCNC: -1 MMOL/L (ref -5–5)
BASE EXCESS BLDA CALC-SCNC: -2 MMOL/L (ref 0–2)
BASE EXCESS BLDA CALC-SCNC: -4 MMOL/L (ref -5–5)
BASE EXCESS BLDA CALC-SCNC: 0 MMOL/L (ref -5–5)
BASE EXCESS BLDA CALC-SCNC: 0.2 MMOL/L (ref 0–2)
BASE EXCESS BLDA CALC-SCNC: 3 MMOL/L (ref -5–5)
BASE EXCESS BLDA CALC-SCNC: 4 MMOL/L (ref -5–5)
BASE EXCESS BLDA CALC-SCNC: 5 MMOL/L (ref -5–5)
BASOPHILS # BLD AUTO: 0.07 10*3/MM3 (ref 0–0.2)
BASOPHILS NFR BLD AUTO: 0.5 % (ref 0–1.5)
BDY SITE: ABNORMAL
BDY SITE: ABNORMAL
BUN SERPL-MCNC: 21 MG/DL (ref 8–23)
BUN SERPL-MCNC: 22 MG/DL (ref 8–23)
BUN SERPL-MCNC: 23 MG/DL (ref 8–23)
BUN/CREAT SERPL: 17.8 (ref 7–25)
BUN/CREAT SERPL: 18.2 (ref 7–25)
BUN/CREAT SERPL: 18.9 (ref 7–25)
CA-I BLD-MCNC: 5.1 MG/DL (ref 4.6–5.4)
CA-I BLDA-SCNC: ABNORMAL MMOL/L
CA-I SERPL ISE-MCNC: 1.27 MMOL/L (ref 1.15–1.35)
CALCIUM SPEC-SCNC: 8.6 MG/DL (ref 8.6–10.5)
CALCIUM SPEC-SCNC: 8.7 MG/DL (ref 8.6–10.5)
CALCIUM SPEC-SCNC: 9 MG/DL (ref 8.6–10.5)
CHLORIDE SERPL-SCNC: 104 MMOL/L (ref 98–107)
CHLORIDE SERPL-SCNC: 104 MMOL/L (ref 98–107)
CHLORIDE SERPL-SCNC: 106 MMOL/L (ref 98–107)
CO2 BLDA-SCNC: 23 MMOL/L (ref 24–29)
CO2 BLDA-SCNC: 27 MMOL/L (ref 24–29)
CO2 BLDA-SCNC: 28 MMOL/L (ref 24–29)
CO2 BLDA-SCNC: 30 MMOL/L (ref 24–29)
CO2 BLDA-SCNC: 31 MMOL/L (ref 24–29)
CO2 BLDA-SCNC: 32 MMOL/L (ref 24–29)
CO2 SERPL-SCNC: 18.5 MMOL/L (ref 22–29)
CO2 SERPL-SCNC: 21.1 MMOL/L (ref 22–29)
CO2 SERPL-SCNC: 21.3 MMOL/L (ref 22–29)
CREAT SERPL-MCNC: 1.18 MG/DL (ref 0.76–1.27)
CREAT SERPL-MCNC: 1.21 MG/DL (ref 0.76–1.27)
CREAT SERPL-MCNC: 1.22 MG/DL (ref 0.76–1.27)
DEPRECATED RDW RBC AUTO: 43.6 FL (ref 37–54)
DEPRECATED RDW RBC AUTO: 45.3 FL (ref 37–54)
DEPRECATED RDW RBC AUTO: 45.4 FL (ref 37–54)
EGFRCR SERPLBLD CKD-EPI 2021: 59.6 ML/MIN/1.73
EGFRCR SERPLBLD CKD-EPI 2021: 60.2 ML/MIN/1.73
EGFRCR SERPLBLD CKD-EPI 2021: 62 ML/MIN/1.73
EOSINOPHIL # BLD AUTO: 0.15 10*3/MM3 (ref 0–0.4)
EOSINOPHIL NFR BLD AUTO: 1.2 % (ref 0.3–6.2)
ERYTHROCYTE [DISTWIDTH] IN BLOOD BY AUTOMATED COUNT: 13 % (ref 12.3–15.4)
FIBRINOGEN PPP-MCNC: 242 MG/DL (ref 219–464)
GLUCOSE BLDC GLUCOMTR-MCNC: 104 MG/DL (ref 70–130)
GLUCOSE BLDC GLUCOMTR-MCNC: 114 MG/DL (ref 70–130)
GLUCOSE BLDC GLUCOMTR-MCNC: 116 MG/DL (ref 70–130)
GLUCOSE BLDC GLUCOMTR-MCNC: 123 MG/DL (ref 70–130)
GLUCOSE BLDC GLUCOMTR-MCNC: 129 MG/DL (ref 70–130)
GLUCOSE BLDC GLUCOMTR-MCNC: 134 MG/DL (ref 70–130)
GLUCOSE BLDC GLUCOMTR-MCNC: 136 MG/DL (ref 70–130)
GLUCOSE BLDC GLUCOMTR-MCNC: 149 MG/DL (ref 70–130)
GLUCOSE BLDC GLUCOMTR-MCNC: 151 MG/DL (ref 70–130)
GLUCOSE BLDC GLUCOMTR-MCNC: 153 MG/DL (ref 70–130)
GLUCOSE BLDC GLUCOMTR-MCNC: 163 MG/DL (ref 70–130)
GLUCOSE BLDC GLUCOMTR-MCNC: 166 MG/DL (ref 70–130)
GLUCOSE BLDC GLUCOMTR-MCNC: 174 MG/DL (ref 70–130)
GLUCOSE BLDC GLUCOMTR-MCNC: 175 MG/DL (ref 70–130)
GLUCOSE BLDC GLUCOMTR-MCNC: 175 MG/DL (ref 70–130)
GLUCOSE BLDC GLUCOMTR-MCNC: 180 MG/DL (ref 70–130)
GLUCOSE BLDC GLUCOMTR-MCNC: 181 MG/DL (ref 70–130)
GLUCOSE BLDC GLUCOMTR-MCNC: 187 MG/DL (ref 70–130)
GLUCOSE BLDC GLUCOMTR-MCNC: 194 MG/DL (ref 70–130)
GLUCOSE BLDC GLUCOMTR-MCNC: 198 MG/DL (ref 70–130)
GLUCOSE BLDC GLUCOMTR-MCNC: 198 MG/DL (ref 70–130)
GLUCOSE SERPL-MCNC: 117 MG/DL (ref 65–99)
GLUCOSE SERPL-MCNC: 123 MG/DL (ref 65–99)
GLUCOSE SERPL-MCNC: 174 MG/DL (ref 65–99)
HCO3 BLDA-SCNC: 22.1 MMOL/L (ref 22–26)
HCO3 BLDA-SCNC: 22.2 MMOL/L (ref 22–28)
HCO3 BLDA-SCNC: 24.3 MMOL/L (ref 22–28)
HCO3 BLDA-SCNC: 25.7 MMOL/L (ref 22–26)
HCO3 BLDA-SCNC: 25.8 MMOL/L (ref 22–26)
HCO3 BLDA-SCNC: 26.1 MMOL/L (ref 22–26)
HCO3 BLDA-SCNC: 26.5 MMOL/L (ref 22–26)
HCO3 BLDA-SCNC: 28.7 MMOL/L (ref 22–26)
HCO3 BLDA-SCNC: 29.6 MMOL/L (ref 22–26)
HCO3 BLDA-SCNC: 30.1 MMOL/L (ref 22–26)
HCT VFR BLD AUTO: 30.9 % (ref 37.5–51)
HCT VFR BLD AUTO: 34.6 % (ref 37.5–51)
HCT VFR BLD AUTO: 34.9 % (ref 37.5–51)
HCT VFR BLDA CALC: 26 % (ref 38–51)
HCT VFR BLDA CALC: 26 % (ref 38–51)
HCT VFR BLDA CALC: 27 % (ref 38–51)
HCT VFR BLDA CALC: 29 % (ref 38–51)
HCT VFR BLDA CALC: 31 % (ref 38–51)
HGB BLD-MCNC: 10.5 G/DL (ref 13–17.7)
HGB BLD-MCNC: 11.5 G/DL (ref 13–17.7)
HGB BLD-MCNC: 11.5 G/DL (ref 13–17.7)
HGB BLDA-MCNC: 10.5 G/DL (ref 12–17)
HGB BLDA-MCNC: 8.8 G/DL (ref 12–17)
HGB BLDA-MCNC: 8.8 G/DL (ref 12–17)
HGB BLDA-MCNC: 9.2 G/DL (ref 12–17)
HGB BLDA-MCNC: 9.9 G/DL (ref 12–17)
IMM GRANULOCYTES # BLD AUTO: 0.06 10*3/MM3 (ref 0–0.05)
IMM GRANULOCYTES NFR BLD AUTO: 0.5 % (ref 0–0.5)
INHALED O2 CONCENTRATION: 100 %
INHALED O2 CONCENTRATION: 30 %
INR PPP: 1.49 (ref 0.9–1.1)
LYMPHOCYTES # BLD AUTO: 1.03 10*3/MM3 (ref 0.7–3.1)
LYMPHOCYTES NFR BLD AUTO: 8 % (ref 19.6–45.3)
MAGNESIUM SERPL-MCNC: 1.6 MG/DL (ref 1.6–2.4)
MAGNESIUM SERPL-MCNC: 1.8 MG/DL (ref 1.6–2.4)
MAGNESIUM SERPL-MCNC: 2 MG/DL (ref 1.6–2.4)
MCH RBC QN AUTO: 31.3 PG (ref 26.6–33)
MCH RBC QN AUTO: 31.5 PG (ref 26.6–33)
MCH RBC QN AUTO: 31.6 PG (ref 26.6–33)
MCHC RBC AUTO-ENTMCNC: 33 G/DL (ref 31.5–35.7)
MCHC RBC AUTO-ENTMCNC: 33.2 G/DL (ref 31.5–35.7)
MCHC RBC AUTO-ENTMCNC: 34 G/DL (ref 31.5–35.7)
MCV RBC AUTO: 93.1 FL (ref 79–97)
MCV RBC AUTO: 94.8 FL (ref 79–97)
MCV RBC AUTO: 94.8 FL (ref 79–97)
MODALITY: ABNORMAL
MODALITY: ABNORMAL
MONOCYTES # BLD AUTO: 0.74 10*3/MM3 (ref 0.1–0.9)
MONOCYTES NFR BLD AUTO: 5.7 % (ref 5–12)
NEUTROPHILS NFR BLD AUTO: 10.84 10*3/MM3 (ref 1.7–7)
NEUTROPHILS NFR BLD AUTO: 84.1 % (ref 42.7–76)
NRBC BLD AUTO-RTO: 0 /100 WBC (ref 0–0.2)
O2 A-A PPRESDIFF RESPIRATORY: 0.6 MMHG
O2 A-A PPRESDIFF RESPIRATORY: 0.8 MMHG
PCO2 BLDA: 34.9 MM HG (ref 35–45)
PCO2 BLDA: 36.5 MM HG (ref 35–45)
PCO2 BLDA: 39.8 MM HG (ref 35–45)
PCO2 BLDA: 45.3 MM HG (ref 35–45)
PCO2 BLDA: 50 MM HG (ref 35–45)
PCO2 BLDA: 50.1 MM HG (ref 35–45)
PCO2 BLDA: 51.7 MM HG (ref 35–45)
PCO2 BLDA: 53.7 MM HG (ref 35–45)
PCO2 BLDA: 56.1 MM HG (ref 35–45)
PCO2 BLDA: 56.7 MM HG (ref 35–45)
PEEP RESPIRATORY: 6 CM[H2O]
PEEP RESPIRATORY: 7.5 CM[H2O]
PH BLDA: 7.33 PH UNITS (ref 7.35–7.6)
PH BLDA: 7.33 PH UNITS (ref 7.35–7.6)
PH BLDA: 7.34 PH UNITS (ref 7.35–7.6)
PH BLDA: 7.37 PH UNITS (ref 7.35–7.6)
PH BLDA: 7.38 PH UNITS (ref 7.35–7.6)
PH BLDA: 7.39 PH UNITS (ref 7.35–7.6)
PH BLDA: 7.4 PH UNITS (ref 7.35–7.6)
PH BLDA: 7.4 PH UNITS (ref 7.35–7.6)
PH BLDA: 7.41 PH UNITS (ref 7.35–7.45)
PH BLDA: 7.43 PH UNITS (ref 7.35–7.45)
PHOSPHATE SERPL-MCNC: 2.3 MG/DL (ref 2.5–4.5)
PHOSPHATE SERPL-MCNC: 2.5 MG/DL (ref 2.5–4.5)
PHOSPHATE SERPL-MCNC: 2.9 MG/DL (ref 2.5–4.5)
PLATELET # BLD AUTO: 118 10*3/MM3 (ref 140–450)
PLATELET # BLD AUTO: 122 10*3/MM3 (ref 140–450)
PLATELET # BLD AUTO: 140 10*3/MM3 (ref 140–450)
PMV BLD AUTO: 9.7 FL (ref 6–12)
PMV BLD AUTO: 9.7 FL (ref 6–12)
PMV BLD AUTO: 9.8 FL (ref 6–12)
PO2 BLDA: 147.2 MM HG (ref 80–100)
PO2 BLDA: 30 MMHG (ref 80–105)
PO2 BLDA: 429 MM HG (ref 80–100)
PO2 BLDA: 461 MMHG (ref 80–105)
PO2 BLDA: 467 MMHG (ref 80–105)
PO2 BLDA: 468 MMHG (ref 80–105)
PO2 BLDA: 505 MMHG (ref 80–105)
PO2 BLDA: 51 MMHG (ref 80–105)
PO2 BLDA: 517 MMHG (ref 80–105)
PO2 BLDA: 525 MMHG (ref 80–105)
POTASSIUM BLDA-SCNC: 4.1 MMOL/L (ref 3.5–4.9)
POTASSIUM BLDA-SCNC: 5 MMOL/L (ref 3.5–4.9)
POTASSIUM BLDA-SCNC: 6 MMOL/L (ref 3.5–4.9)
POTASSIUM BLDA-SCNC: 6.2 MMOL/L (ref 3.5–4.9)
POTASSIUM BLDA-SCNC: 6.4 MMOL/L (ref 3.5–4.9)
POTASSIUM BLDA-SCNC: 6.5 MMOL/L (ref 3.5–4.9)
POTASSIUM SERPL-SCNC: 3.9 MMOL/L (ref 3.5–5.2)
POTASSIUM SERPL-SCNC: 4 MMOL/L (ref 3.5–5.2)
POTASSIUM SERPL-SCNC: 4.2 MMOL/L (ref 3.5–5.2)
PROTHROMBIN TIME: 17.7 SECONDS (ref 11.7–14.2)
PSV: 8 CMH2O
QT INTERVAL: 410 MS
RBC # BLD AUTO: 3.32 10*6/MM3 (ref 4.14–5.8)
RBC # BLD AUTO: 3.65 10*6/MM3 (ref 4.14–5.8)
RBC # BLD AUTO: 3.68 10*6/MM3 (ref 4.14–5.8)
RH BLD: POSITIVE
SAO2 % BLDA: 100 % (ref 95–98)
SAO2 % BLDA: 55 % (ref 95–98)
SAO2 % BLDA: 80 % (ref 95–98)
SAO2 % BLDCOA: 100 % (ref 92–99)
SAO2 % BLDCOA: 99.3 % (ref 92–99)
SET MECH RESP RATE: 16
SET MECH RESP RATE: 18
SODIUM SERPL-SCNC: 138 MMOL/L (ref 136–145)
SODIUM SERPL-SCNC: 139 MMOL/L (ref 136–145)
SODIUM SERPL-SCNC: 139 MMOL/L (ref 136–145)
TOTAL RATE: 16 BREATHS/MINUTE
TOTAL RATE: 18 BREATHS/MINUTE
VENTILATOR MODE: ABNORMAL
VENTILATOR MODE: AC
VT ON VENT VENT: 500 ML
VT ON VENT VENT: 600 ML
WBC NRBC COR # BLD: 12.81 10*3/MM3 (ref 3.4–10.8)
WBC NRBC COR # BLD: 12.89 10*3/MM3 (ref 3.4–10.8)
WBC NRBC COR # BLD: 13.08 10*3/MM3 (ref 3.4–10.8)

## 2022-08-08 PROCEDURE — 027L0ZZ DILATION OF LEFT VENTRICLE, OPEN APPROACH: ICD-10-PCS | Performed by: THORACIC SURGERY (CARDIOTHORACIC VASCULAR SURGERY)

## 2022-08-08 PROCEDURE — C1713 ANCHOR/SCREW BN/BN,TIS/BN: HCPCS | Performed by: THORACIC SURGERY (CARDIOTHORACIC VASCULAR SURGERY)

## 2022-08-08 PROCEDURE — C1751 CATH, INF, PER/CENT/MIDLINE: HCPCS | Performed by: ANESTHESIOLOGY

## 2022-08-08 PROCEDURE — 25010000002 ATROPINE PER 0.01 MG: Performed by: ANESTHESIOLOGY

## 2022-08-08 PROCEDURE — 25010000002 HEPARIN (PORCINE) PER 1000 UNITS: Performed by: THORACIC SURGERY (CARDIOTHORACIC VASCULAR SURGERY)

## 2022-08-08 PROCEDURE — 94799 UNLISTED PULMONARY SVC/PX: CPT

## 2022-08-08 PROCEDURE — 0 METHADONE PER 10 MG: Performed by: ANESTHESIOLOGY

## 2022-08-08 PROCEDURE — P9047 ALBUMIN (HUMAN), 25%, 50ML: HCPCS

## 2022-08-08 PROCEDURE — 85018 HEMOGLOBIN: CPT

## 2022-08-08 PROCEDURE — 33533 CABG ARTERIAL SINGLE: CPT | Performed by: PHYSICIAN ASSISTANT

## 2022-08-08 PROCEDURE — 33533 CABG ARTERIAL SINGLE: CPT | Performed by: THORACIC SURGERY (CARDIOTHORACIC VASCULAR SURGERY)

## 2022-08-08 PROCEDURE — 33518 CABG ARTERY-VEIN TWO: CPT | Performed by: THORACIC SURGERY (CARDIOTHORACIC VASCULAR SURGERY)

## 2022-08-08 PROCEDURE — 021109W BYPASS CORONARY ARTERY, TWO ARTERIES FROM AORTA WITH AUTOLOGOUS VENOUS TISSUE, OPEN APPROACH: ICD-10-PCS | Performed by: THORACIC SURGERY (CARDIOTHORACIC VASCULAR SURGERY)

## 2022-08-08 PROCEDURE — 85730 THROMBOPLASTIN TIME PARTIAL: CPT | Performed by: NURSE PRACTITIONER

## 2022-08-08 PROCEDURE — 83735 ASSAY OF MAGNESIUM: CPT | Performed by: NURSE PRACTITIONER

## 2022-08-08 PROCEDURE — 25010000002 HEPARIN (PORCINE) PER 1000 UNITS: Performed by: ANESTHESIOLOGY

## 2022-08-08 PROCEDURE — 25010000002 PROPOFOL 10 MG/ML EMULSION: Performed by: NURSE PRACTITIONER

## 2022-08-08 PROCEDURE — 02RF08Z REPLACEMENT OF AORTIC VALVE WITH ZOOPLASTIC TISSUE, OPEN APPROACH: ICD-10-PCS | Performed by: THORACIC SURGERY (CARDIOTHORACIC VASCULAR SURGERY)

## 2022-08-08 PROCEDURE — 83735 ASSAY OF MAGNESIUM: CPT | Performed by: THORACIC SURGERY (CARDIOTHORACIC VASCULAR SURGERY)

## 2022-08-08 PROCEDURE — 02100Z9 BYPASS CORONARY ARTERY, ONE ARTERY FROM LEFT INTERNAL MAMMARY, OPEN APPROACH: ICD-10-PCS | Performed by: THORACIC SURGERY (CARDIOTHORACIC VASCULAR SURGERY)

## 2022-08-08 PROCEDURE — 86900 BLOOD TYPING SEROLOGIC ABO: CPT

## 2022-08-08 PROCEDURE — 25010000002 HEPARIN (PORCINE) PER 1000 UNITS

## 2022-08-08 PROCEDURE — 93005 ELECTROCARDIOGRAM TRACING: CPT | Performed by: NURSE PRACTITIONER

## 2022-08-08 PROCEDURE — 82803 BLOOD GASES ANY COMBINATION: CPT

## 2022-08-08 PROCEDURE — 25010000002 MAGNESIUM SULFATE IN D5W 1G/100ML (PREMIX) 1-5 GM/100ML-% SOLUTION: Performed by: NURSE PRACTITIONER

## 2022-08-08 PROCEDURE — 33508 ENDOSCOPIC VEIN HARVEST: CPT | Performed by: THORACIC SURGERY (CARDIOTHORACIC VASCULAR SURGERY)

## 2022-08-08 PROCEDURE — 82947 ASSAY GLUCOSE BLOOD QUANT: CPT

## 2022-08-08 PROCEDURE — 88305 TISSUE EXAM BY PATHOLOGIST: CPT | Performed by: THORACIC SURGERY (CARDIOTHORACIC VASCULAR SURGERY)

## 2022-08-08 PROCEDURE — P9041 ALBUMIN (HUMAN),5%, 50ML: HCPCS | Performed by: NURSE PRACTITIONER

## 2022-08-08 PROCEDURE — 25010000002 ONDANSETRON PER 1 MG: Performed by: NURSE PRACTITIONER

## 2022-08-08 PROCEDURE — 25010000002 ALBUMIN HUMAN 25% PER 50 ML

## 2022-08-08 PROCEDURE — 85025 COMPLETE CBC W/AUTO DIFF WBC: CPT | Performed by: NURSE PRACTITIONER

## 2022-08-08 PROCEDURE — 33405 REPLACEMENT AORTIC VALVE OPN: CPT | Performed by: THORACIC SURGERY (CARDIOTHORACIC VASCULAR SURGERY)

## 2022-08-08 PROCEDURE — 33518 CABG ARTERY-VEIN TWO: CPT | Performed by: PHYSICIAN ASSISTANT

## 2022-08-08 PROCEDURE — 5A1221Z PERFORMANCE OF CARDIAC OUTPUT, CONTINUOUS: ICD-10-PCS | Performed by: THORACIC SURGERY (CARDIOTHORACIC VASCULAR SURGERY)

## 2022-08-08 PROCEDURE — 85610 PROTHROMBIN TIME: CPT | Performed by: NURSE PRACTITIONER

## 2022-08-08 PROCEDURE — 33508 ENDOSCOPIC VEIN HARVEST: CPT | Performed by: PHYSICIAN ASSISTANT

## 2022-08-08 PROCEDURE — B245ZZ4 ULTRASONOGRAPHY OF LEFT HEART, TRANSESOPHAGEAL: ICD-10-PCS | Performed by: THORACIC SURGERY (CARDIOTHORACIC VASCULAR SURGERY)

## 2022-08-08 PROCEDURE — 94761 N-INVAS EAR/PLS OXIMETRY MLT: CPT

## 2022-08-08 PROCEDURE — 63710000001 INSULIN REGULAR HUMAN PER 5 UNITS: Performed by: ANESTHESIOLOGY

## 2022-08-08 PROCEDURE — 0 POTASSIUM CHLORIDE PER 2 MEQ: Performed by: NURSE PRACTITIONER

## 2022-08-08 PROCEDURE — 25010000002 CEFAZOLIN IN DEXTROSE 2-4 GM/100ML-% SOLUTION: Performed by: NURSE PRACTITIONER

## 2022-08-08 PROCEDURE — 85384 FIBRINOGEN ACTIVITY: CPT | Performed by: NURSE PRACTITIONER

## 2022-08-08 PROCEDURE — 25010000002 ATROPINE PER 0.01 MG

## 2022-08-08 PROCEDURE — 82962 GLUCOSE BLOOD TEST: CPT

## 2022-08-08 PROCEDURE — 0 PROTAMINE SULFATE PER 10 MG: Performed by: THORACIC SURGERY (CARDIOTHORACIC VASCULAR SURGERY)

## 2022-08-08 PROCEDURE — A4648 IMPLANTABLE TISSUE MARKER: HCPCS | Performed by: THORACIC SURGERY (CARDIOTHORACIC VASCULAR SURGERY)

## 2022-08-08 PROCEDURE — 71045 X-RAY EXAM CHEST 1 VIEW: CPT

## 2022-08-08 PROCEDURE — 82330 ASSAY OF CALCIUM: CPT | Performed by: NURSE PRACTITIONER

## 2022-08-08 PROCEDURE — 25010000002 PAPAVERINE PER 60 MG: Performed by: THORACIC SURGERY (CARDIOTHORACIC VASCULAR SURGERY)

## 2022-08-08 PROCEDURE — 25010000002 ALBUMIN HUMAN 5% PER 50 ML: Performed by: NURSE PRACTITIONER

## 2022-08-08 PROCEDURE — 25010000002 VANCOMYCIN 1 G RECONSTITUTED SOLUTION

## 2022-08-08 PROCEDURE — 06BQ4ZZ EXCISION OF LEFT SAPHENOUS VEIN, PERCUTANEOUS ENDOSCOPIC APPROACH: ICD-10-PCS | Performed by: THORACIC SURGERY (CARDIOTHORACIC VASCULAR SURGERY)

## 2022-08-08 PROCEDURE — 85027 COMPLETE CBC AUTOMATED: CPT | Performed by: NURSE PRACTITIONER

## 2022-08-08 PROCEDURE — 80069 RENAL FUNCTION PANEL: CPT | Performed by: NURSE PRACTITIONER

## 2022-08-08 PROCEDURE — 25010000002 PROPOFOL 10 MG/ML EMULSION: Performed by: ANESTHESIOLOGY

## 2022-08-08 PROCEDURE — 33405 REPLACEMENT AORTIC VALVE OPN: CPT | Performed by: PHYSICIAN ASSISTANT

## 2022-08-08 PROCEDURE — 25010000002 PHENYLEPHRINE 10 MG/ML SOLUTION 5 ML VIAL: Performed by: ANESTHESIOLOGY

## 2022-08-08 PROCEDURE — 80069 RENAL FUNCTION PANEL: CPT | Performed by: THORACIC SURGERY (CARDIOTHORACIC VASCULAR SURGERY)

## 2022-08-08 PROCEDURE — 93318 ECHO TRANSESOPHAGEAL INTRAOP: CPT | Performed by: ANESTHESIOLOGY

## 2022-08-08 PROCEDURE — C1889 IMPLANT/INSERT DEVICE, NOC: HCPCS | Performed by: THORACIC SURGERY (CARDIOTHORACIC VASCULAR SURGERY)

## 2022-08-08 PROCEDURE — 86901 BLOOD TYPING SEROLOGIC RH(D): CPT

## 2022-08-08 PROCEDURE — 85347 COAGULATION TIME ACTIVATED: CPT

## 2022-08-08 PROCEDURE — 85014 HEMATOCRIT: CPT

## 2022-08-08 PROCEDURE — 94002 VENT MGMT INPAT INIT DAY: CPT

## 2022-08-08 DEVICE — COR-KNOT MINI® COMBO KITBASE PACKAGE TYPE - KITEACH STERILE PACKAGE KIT CONTAINS (2) SINGLE PATIENT USE COR-KNOT MINI® DEVICES AND (12) COR-KNOT® QUICK LOADS®.
Type: IMPLANTABLE DEVICE | Site: STERNUM | Status: FUNCTIONAL
Brand: COR-KNOT MINI®

## 2022-08-08 DEVICE — SS SUTURE, 4 PER SLEEVE
Type: IMPLANTABLE DEVICE | Site: STERNUM | Status: FUNCTIONAL
Brand: MYO/WIRE II

## 2022-08-08 DEVICE — VLV AORT AVALUS BIOPROSTHESIS 25MM: Type: IMPLANTABLE DEVICE | Site: STERNUM | Status: FUNCTIONAL

## 2022-08-08 RX ORDER — NALOXONE HCL 0.4 MG/ML
0.4 VIAL (ML) INJECTION
Status: DISCONTINUED | OUTPATIENT
Start: 2022-08-08 | End: 2022-08-15 | Stop reason: HOSPADM

## 2022-08-08 RX ORDER — POTASSIUM CHLORIDE 1.5 G/1.77G
40 POWDER, FOR SOLUTION ORAL AS NEEDED
Status: DISCONTINUED | OUTPATIENT
Start: 2022-08-08 | End: 2022-08-15 | Stop reason: HOSPADM

## 2022-08-08 RX ORDER — ACETAMINOPHEN 160 MG/5ML
650 SOLUTION ORAL EVERY 4 HOURS PRN
Status: DISCONTINUED | OUTPATIENT
Start: 2022-08-09 | End: 2022-08-15 | Stop reason: HOSPADM

## 2022-08-08 RX ORDER — SODIUM CHLORIDE 9 MG/ML
INJECTION, SOLUTION INTRAVENOUS CONTINUOUS PRN
Status: DISCONTINUED | OUTPATIENT
Start: 2022-08-08 | End: 2022-08-08 | Stop reason: SURG

## 2022-08-08 RX ORDER — POTASSIUM CHLORIDE 7.45 MG/ML
10 INJECTION INTRAVENOUS
Status: DISCONTINUED | OUTPATIENT
Start: 2022-08-08 | End: 2022-08-15 | Stop reason: HOSPADM

## 2022-08-08 RX ORDER — BUPIVACAINE HCL/0.9 % NACL/PF 0.125 %
PLASTIC BAG, INJECTION (ML) EPIDURAL AS NEEDED
Status: DISCONTINUED | OUTPATIENT
Start: 2022-08-08 | End: 2022-08-08 | Stop reason: SURG

## 2022-08-08 RX ORDER — DOPAMINE HYDROCHLORIDE 160 MG/100ML
2-20 INJECTION, SOLUTION INTRAVENOUS CONTINUOUS PRN
Status: DISCONTINUED | OUTPATIENT
Start: 2022-08-08 | End: 2022-08-09

## 2022-08-08 RX ORDER — ALBUMIN, HUMAN INJ 5% 5 %
1500 SOLUTION INTRAVENOUS AS NEEDED
Status: DISPENSED | OUTPATIENT
Start: 2022-08-08 | End: 2022-08-09

## 2022-08-08 RX ORDER — LIDOCAINE HYDROCHLORIDE 10 MG/ML
0.5 INJECTION, SOLUTION EPIDURAL; INFILTRATION; INTRACAUDAL; PERINEURAL ONCE AS NEEDED
Status: DISCONTINUED | OUTPATIENT
Start: 2022-08-08 | End: 2022-08-08 | Stop reason: HOSPADM

## 2022-08-08 RX ORDER — MIDAZOLAM HYDROCHLORIDE 1 MG/ML
2 INJECTION INTRAMUSCULAR; INTRAVENOUS
Status: DISCONTINUED | OUTPATIENT
Start: 2022-08-08 | End: 2022-08-11

## 2022-08-08 RX ORDER — MORPHINE SULFATE 2 MG/ML
1 INJECTION, SOLUTION INTRAMUSCULAR; INTRAVENOUS EVERY 4 HOURS PRN
Status: DISCONTINUED | OUTPATIENT
Start: 2022-08-08 | End: 2022-08-11

## 2022-08-08 RX ORDER — ASPIRIN 81 MG/1
81 TABLET ORAL DAILY
Status: DISCONTINUED | OUTPATIENT
Start: 2022-08-09 | End: 2022-08-15 | Stop reason: HOSPADM

## 2022-08-08 RX ORDER — PHENYLEPHRINE HCL IN 0.9% NACL 0.5 MG/5ML
.2-2 SYRINGE (ML) INTRAVENOUS CONTINUOUS PRN
Status: DISCONTINUED | OUTPATIENT
Start: 2022-08-08 | End: 2022-08-09

## 2022-08-08 RX ORDER — BISACODYL 10 MG
10 SUPPOSITORY, RECTAL RECTAL DAILY PRN
Status: DISCONTINUED | OUTPATIENT
Start: 2022-08-09 | End: 2022-08-15 | Stop reason: HOSPADM

## 2022-08-08 RX ORDER — HYDROCODONE BITARTRATE AND ACETAMINOPHEN 5; 325 MG/1; MG/1
2 TABLET ORAL EVERY 4 HOURS PRN
Status: DISCONTINUED | OUTPATIENT
Start: 2022-08-08 | End: 2022-08-11

## 2022-08-08 RX ORDER — SODIUM CHLORIDE, SODIUM LACTATE, POTASSIUM CHLORIDE, CALCIUM CHLORIDE 600; 310; 30; 20 MG/100ML; MG/100ML; MG/100ML; MG/100ML
9 INJECTION, SOLUTION INTRAVENOUS CONTINUOUS
Status: DISCONTINUED | OUTPATIENT
Start: 2022-08-08 | End: 2022-08-08

## 2022-08-08 RX ORDER — NICOTINE POLACRILEX 4 MG
15 LOZENGE BUCCAL
Status: DISCONTINUED | OUTPATIENT
Start: 2022-08-08 | End: 2022-08-09

## 2022-08-08 RX ORDER — CYCLOBENZAPRINE HCL 10 MG
10 TABLET ORAL EVERY 8 HOURS PRN
Status: DISCONTINUED | OUTPATIENT
Start: 2022-08-09 | End: 2022-08-15 | Stop reason: HOSPADM

## 2022-08-08 RX ORDER — HEPARIN SODIUM 5000 [USP'U]/ML
INJECTION, SOLUTION INTRAVENOUS; SUBCUTANEOUS AS NEEDED
Status: DISCONTINUED | OUTPATIENT
Start: 2022-08-08 | End: 2022-08-08 | Stop reason: HOSPADM

## 2022-08-08 RX ORDER — CHLORHEXIDINE GLUCONATE 0.12 MG/ML
15 RINSE ORAL ONCE
Status: DISCONTINUED | OUTPATIENT
Start: 2022-08-08 | End: 2022-08-08 | Stop reason: HOSPADM

## 2022-08-08 RX ORDER — AMINOCAPROIC ACID 250 MG/ML
INJECTION, SOLUTION INTRAVENOUS AS NEEDED
Status: DISCONTINUED | OUTPATIENT
Start: 2022-08-08 | End: 2022-08-08 | Stop reason: SURG

## 2022-08-08 RX ORDER — MIDAZOLAM HYDROCHLORIDE 1 MG/ML
0.5 INJECTION INTRAMUSCULAR; INTRAVENOUS
Status: DISCONTINUED | OUTPATIENT
Start: 2022-08-08 | End: 2022-08-08 | Stop reason: HOSPADM

## 2022-08-08 RX ORDER — CHLORHEXIDINE GLUCONATE 500 MG/1
1 CLOTH TOPICAL EVERY 12 HOURS PRN
Status: DISCONTINUED | OUTPATIENT
Start: 2022-08-08 | End: 2022-08-08 | Stop reason: HOSPADM

## 2022-08-08 RX ORDER — POTASSIUM CHLORIDE 29.8 MG/ML
20 INJECTION INTRAVENOUS
Status: COMPLETED | OUTPATIENT
Start: 2022-08-08 | End: 2022-08-08

## 2022-08-08 RX ORDER — ACETAMINOPHEN 325 MG/1
650 TABLET ORAL EVERY 4 HOURS
Status: DISCONTINUED | OUTPATIENT
Start: 2022-08-08 | End: 2022-08-09

## 2022-08-08 RX ORDER — ACETAMINOPHEN 325 MG/1
650 TABLET ORAL EVERY 4 HOURS PRN
Status: DISCONTINUED | OUTPATIENT
Start: 2022-08-09 | End: 2022-08-15 | Stop reason: HOSPADM

## 2022-08-08 RX ORDER — HEPARIN SODIUM 1000 [USP'U]/ML
INJECTION, SOLUTION INTRAVENOUS; SUBCUTANEOUS AS NEEDED
Status: DISCONTINUED | OUTPATIENT
Start: 2022-08-08 | End: 2022-08-08 | Stop reason: SURG

## 2022-08-08 RX ORDER — MORPHINE SULFATE 2 MG/ML
4 INJECTION, SOLUTION INTRAMUSCULAR; INTRAVENOUS
Status: DISCONTINUED | OUTPATIENT
Start: 2022-08-08 | End: 2022-08-09

## 2022-08-08 RX ORDER — POTASSIUM CHLORIDE 29.8 MG/ML
20 INJECTION INTRAVENOUS
Status: DISCONTINUED | OUTPATIENT
Start: 2022-08-08 | End: 2022-08-15 | Stop reason: HOSPADM

## 2022-08-08 RX ORDER — POTASSIUM CHLORIDE 750 MG/1
40 TABLET, FILM COATED, EXTENDED RELEASE ORAL AS NEEDED
Status: DISCONTINUED | OUTPATIENT
Start: 2022-08-08 | End: 2022-08-15 | Stop reason: HOSPADM

## 2022-08-08 RX ORDER — POLYETHYLENE GLYCOL 3350 17 G/17G
17 POWDER, FOR SOLUTION ORAL DAILY PRN
Status: DISCONTINUED | OUTPATIENT
Start: 2022-08-08 | End: 2022-08-15 | Stop reason: HOSPADM

## 2022-08-08 RX ORDER — ATENOLOL 25 MG/1
25 TABLET ORAL DAILY
COMMUNITY
End: 2022-08-15 | Stop reason: HOSPADM

## 2022-08-08 RX ORDER — ACETAMINOPHEN 650 MG/1
650 SUPPOSITORY RECTAL EVERY 4 HOURS
Status: DISCONTINUED | OUTPATIENT
Start: 2022-08-08 | End: 2022-08-09

## 2022-08-08 RX ORDER — PROTAMINE SULFATE 10 MG/ML
INJECTION, SOLUTION INTRAVENOUS AS NEEDED
Status: DISCONTINUED | OUTPATIENT
Start: 2022-08-08 | End: 2022-08-08 | Stop reason: HOSPADM

## 2022-08-08 RX ORDER — DEXMEDETOMIDINE HYDROCHLORIDE 4 UG/ML
INJECTION, SOLUTION INTRAVENOUS CONTINUOUS PRN
Status: DISCONTINUED | OUTPATIENT
Start: 2022-08-08 | End: 2022-08-08 | Stop reason: SURG

## 2022-08-08 RX ORDER — CEFAZOLIN SODIUM 2 G/100ML
2 INJECTION, SOLUTION INTRAVENOUS
Status: DISCONTINUED | OUTPATIENT
Start: 2022-08-08 | End: 2022-08-08 | Stop reason: HOSPADM

## 2022-08-08 RX ORDER — NOREPINEPHRINE BIT/0.9 % NACL 8 MG/250ML
.02-.3 INFUSION BOTTLE (ML) INTRAVENOUS CONTINUOUS PRN
Status: DISCONTINUED | OUTPATIENT
Start: 2022-08-08 | End: 2022-08-09

## 2022-08-08 RX ORDER — ACETAMINOPHEN 10 MG/ML
1000 INJECTION, SOLUTION INTRAVENOUS EVERY 8 HOURS
Status: COMPLETED | OUTPATIENT
Start: 2022-08-08 | End: 2022-08-09

## 2022-08-08 RX ORDER — METOCLOPRAMIDE HYDROCHLORIDE 5 MG/ML
10 INJECTION INTRAMUSCULAR; INTRAVENOUS EVERY 6 HOURS
Status: DISCONTINUED | OUTPATIENT
Start: 2022-08-08 | End: 2022-08-09

## 2022-08-08 RX ORDER — MILRINONE LACTATE 0.2 MG/ML
.25-.375 INJECTION, SOLUTION INTRAVENOUS CONTINUOUS PRN
Status: DISCONTINUED | OUTPATIENT
Start: 2022-08-08 | End: 2022-08-09

## 2022-08-08 RX ORDER — FENTANYL CITRATE 50 UG/ML
50 INJECTION, SOLUTION INTRAMUSCULAR; INTRAVENOUS
Status: DISCONTINUED | OUTPATIENT
Start: 2022-08-08 | End: 2022-08-08 | Stop reason: HOSPADM

## 2022-08-08 RX ORDER — METHADONE HYDROCHLORIDE 10 MG/ML
INJECTION, SOLUTION INTRAMUSCULAR; INTRAVENOUS; SUBCUTANEOUS AS NEEDED
Status: DISCONTINUED | OUTPATIENT
Start: 2022-08-08 | End: 2022-08-08 | Stop reason: SURG

## 2022-08-08 RX ORDER — PAPAVERINE HYDROCHLORIDE 30 MG/ML
INJECTION INTRAMUSCULAR; INTRAVENOUS AS NEEDED
Status: DISCONTINUED | OUTPATIENT
Start: 2022-08-08 | End: 2022-08-08 | Stop reason: HOSPADM

## 2022-08-08 RX ORDER — BISACODYL 5 MG/1
10 TABLET, DELAYED RELEASE ORAL DAILY PRN
Status: DISCONTINUED | OUTPATIENT
Start: 2022-08-08 | End: 2022-08-15 | Stop reason: HOSPADM

## 2022-08-08 RX ORDER — NITROGLYCERIN 5 MG/ML
INJECTION, SOLUTION INTRAVENOUS AS NEEDED
Status: DISCONTINUED | OUTPATIENT
Start: 2022-08-08 | End: 2022-08-08 | Stop reason: SURG

## 2022-08-08 RX ORDER — MAGNESIUM SULFATE 1 G/100ML
1 INJECTION INTRAVENOUS EVERY 8 HOURS
Status: COMPLETED | OUTPATIENT
Start: 2022-08-08 | End: 2022-08-09

## 2022-08-08 RX ORDER — ROCURONIUM BROMIDE 10 MG/ML
INJECTION, SOLUTION INTRAVENOUS AS NEEDED
Status: DISCONTINUED | OUTPATIENT
Start: 2022-08-08 | End: 2022-08-08 | Stop reason: SURG

## 2022-08-08 RX ORDER — LIDOCAINE HYDROCHLORIDE 20 MG/ML
INJECTION, SOLUTION INFILTRATION; PERINEURAL AS NEEDED
Status: DISCONTINUED | OUTPATIENT
Start: 2022-08-08 | End: 2022-08-08 | Stop reason: SURG

## 2022-08-08 RX ORDER — CHLORHEXIDINE GLUCONATE 0.12 MG/ML
15 RINSE ORAL ONCE
Status: COMPLETED | OUTPATIENT
Start: 2022-08-08 | End: 2022-08-08

## 2022-08-08 RX ORDER — PANTOPRAZOLE SODIUM 40 MG/10ML
40 INJECTION, POWDER, LYOPHILIZED, FOR SOLUTION INTRAVENOUS ONCE
Status: DISCONTINUED | OUTPATIENT
Start: 2022-08-08 | End: 2022-08-15 | Stop reason: HOSPADM

## 2022-08-08 RX ORDER — NITROGLYCERIN 20 MG/100ML
5-200 INJECTION INTRAVENOUS
Status: DISCONTINUED | OUTPATIENT
Start: 2022-08-08 | End: 2022-08-09

## 2022-08-08 RX ORDER — MAGNESIUM SULFATE 1 G/100ML
1 INJECTION INTRAVENOUS ONCE
Status: COMPLETED | OUTPATIENT
Start: 2022-08-08 | End: 2022-08-08

## 2022-08-08 RX ORDER — SODIUM CHLORIDE 0.9 % (FLUSH) 0.9 %
3 SYRINGE (ML) INJECTION EVERY 12 HOURS SCHEDULED
Status: DISCONTINUED | OUTPATIENT
Start: 2022-08-08 | End: 2022-08-08 | Stop reason: HOSPADM

## 2022-08-08 RX ORDER — FAMOTIDINE 10 MG/ML
20 INJECTION, SOLUTION INTRAVENOUS ONCE
Status: DISCONTINUED | OUTPATIENT
Start: 2022-08-08 | End: 2022-08-08 | Stop reason: HOSPADM

## 2022-08-08 RX ORDER — DEXMEDETOMIDINE HYDROCHLORIDE 4 UG/ML
.2-1.5 INJECTION INTRAVENOUS
Status: DISCONTINUED | OUTPATIENT
Start: 2022-08-08 | End: 2022-08-09

## 2022-08-08 RX ORDER — PROPOFOL 10 MG/ML
VIAL (ML) INTRAVENOUS CONTINUOUS PRN
Status: DISCONTINUED | OUTPATIENT
Start: 2022-08-08 | End: 2022-08-08 | Stop reason: SURG

## 2022-08-08 RX ORDER — SODIUM CHLORIDE 9 MG/ML
9 INJECTION, SOLUTION INTRAVENOUS CONTINUOUS
Status: DISCONTINUED | OUTPATIENT
Start: 2022-08-08 | End: 2022-08-08

## 2022-08-08 RX ORDER — CEFAZOLIN SODIUM 2 G/100ML
2 INJECTION, SOLUTION INTRAVENOUS EVERY 8 HOURS
Status: COMPLETED | OUTPATIENT
Start: 2022-08-08 | End: 2022-08-10

## 2022-08-08 RX ORDER — DEXTROSE MONOHYDRATE 25 G/50ML
10-50 INJECTION, SOLUTION INTRAVENOUS
Status: DISCONTINUED | OUTPATIENT
Start: 2022-08-08 | End: 2022-08-09

## 2022-08-08 RX ORDER — CEFAZOLIN SODIUM 2 G/100ML
2 INJECTION, SOLUTION INTRAVENOUS
Status: COMPLETED | OUTPATIENT
Start: 2022-08-08 | End: 2022-08-08

## 2022-08-08 RX ORDER — ALPRAZOLAM 0.25 MG/1
0.25 TABLET ORAL EVERY 8 HOURS PRN
Status: DISCONTINUED | OUTPATIENT
Start: 2022-08-08 | End: 2022-08-15 | Stop reason: HOSPADM

## 2022-08-08 RX ORDER — SODIUM CHLORIDE 9 MG/ML
30 INJECTION, SOLUTION INTRAVENOUS CONTINUOUS
Status: DISCONTINUED | OUTPATIENT
Start: 2022-08-08 | End: 2022-08-15 | Stop reason: HOSPADM

## 2022-08-08 RX ORDER — OXYCODONE HYDROCHLORIDE 5 MG/1
10 TABLET ORAL EVERY 4 HOURS PRN
Status: DISCONTINUED | OUTPATIENT
Start: 2022-08-08 | End: 2022-08-11

## 2022-08-08 RX ORDER — MEPERIDINE HYDROCHLORIDE 25 MG/ML
25 INJECTION INTRAMUSCULAR; INTRAVENOUS; SUBCUTANEOUS EVERY 4 HOURS PRN
Status: ACTIVE | OUTPATIENT
Start: 2022-08-08 | End: 2022-08-08

## 2022-08-08 RX ORDER — ACETAMINOPHEN 650 MG/1
650 SUPPOSITORY RECTAL EVERY 4 HOURS PRN
Status: DISCONTINUED | OUTPATIENT
Start: 2022-08-09 | End: 2022-08-15 | Stop reason: HOSPADM

## 2022-08-08 RX ORDER — NOREPINEPHRINE BITARTRATE 1 MG/ML
INJECTION, SOLUTION INTRAVENOUS CONTINUOUS PRN
Status: DISCONTINUED | OUTPATIENT
Start: 2022-08-08 | End: 2022-08-08 | Stop reason: SURG

## 2022-08-08 RX ORDER — ATROPINE SULFATE 0.4 MG/ML
AMPUL (ML) INJECTION AS NEEDED
Status: DISCONTINUED | OUTPATIENT
Start: 2022-08-08 | End: 2022-08-08 | Stop reason: SURG

## 2022-08-08 RX ORDER — AMOXICILLIN 250 MG
2 CAPSULE ORAL NIGHTLY
Status: DISCONTINUED | OUTPATIENT
Start: 2022-08-09 | End: 2022-08-15 | Stop reason: HOSPADM

## 2022-08-08 RX ORDER — SODIUM CHLORIDE 0.9 % (FLUSH) 0.9 %
3-10 SYRINGE (ML) INJECTION AS NEEDED
Status: DISCONTINUED | OUTPATIENT
Start: 2022-08-08 | End: 2022-08-08 | Stop reason: HOSPADM

## 2022-08-08 RX ORDER — FAMOTIDINE 10 MG/ML
20 INJECTION, SOLUTION INTRAVENOUS ONCE
Status: COMPLETED | OUTPATIENT
Start: 2022-08-08 | End: 2022-08-08

## 2022-08-08 RX ORDER — ATORVASTATIN CALCIUM 20 MG/1
40 TABLET, FILM COATED ORAL NIGHTLY
Status: DISCONTINUED | OUTPATIENT
Start: 2022-08-08 | End: 2022-08-15 | Stop reason: HOSPADM

## 2022-08-08 RX ORDER — ACETAMINOPHEN 160 MG/5ML
650 SOLUTION ORAL EVERY 4 HOURS
Status: DISCONTINUED | OUTPATIENT
Start: 2022-08-08 | End: 2022-08-09

## 2022-08-08 RX ORDER — ONDANSETRON 2 MG/ML
4 INJECTION INTRAMUSCULAR; INTRAVENOUS EVERY 6 HOURS PRN
Status: DISCONTINUED | OUTPATIENT
Start: 2022-08-08 | End: 2022-08-15 | Stop reason: HOSPADM

## 2022-08-08 RX ORDER — ENOXAPARIN SODIUM 100 MG/ML
40 INJECTION SUBCUTANEOUS DAILY
Status: DISCONTINUED | OUTPATIENT
Start: 2022-08-09 | End: 2022-08-13

## 2022-08-08 RX ORDER — CHLORHEXIDINE GLUCONATE 0.12 MG/ML
15 RINSE ORAL EVERY 12 HOURS
Status: DISCONTINUED | OUTPATIENT
Start: 2022-08-08 | End: 2022-08-15 | Stop reason: HOSPADM

## 2022-08-08 RX ORDER — PANTOPRAZOLE SODIUM 40 MG/1
40 TABLET, DELAYED RELEASE ORAL EVERY MORNING
Status: DISCONTINUED | OUTPATIENT
Start: 2022-08-09 | End: 2022-08-15 | Stop reason: HOSPADM

## 2022-08-08 RX ORDER — PROPOFOL 10 MG/ML
VIAL (ML) INTRAVENOUS AS NEEDED
Status: DISCONTINUED | OUTPATIENT
Start: 2022-08-08 | End: 2022-08-08 | Stop reason: SURG

## 2022-08-08 RX ADMIN — CEFAZOLIN SODIUM 2 G: 2 INJECTION, SOLUTION INTRAVENOUS at 17:54

## 2022-08-08 RX ADMIN — MAGNESIUM SULFATE 1 G: 1 INJECTION INTRAVENOUS at 19:30

## 2022-08-08 RX ADMIN — MAGNESIUM SULFATE 1 G: 1 INJECTION INTRAVENOUS at 13:00

## 2022-08-08 RX ADMIN — AMINOCAPROIC ACID 10 G: 250 INJECTION, SOLUTION INTRAVENOUS at 07:26

## 2022-08-08 RX ADMIN — HEPARIN SODIUM 22000 UNITS: 1000 INJECTION INTRAVENOUS; SUBCUTANEOUS at 08:31

## 2022-08-08 RX ADMIN — METHADONE HYDROCHLORIDE 10 MG: 10 INJECTION, SOLUTION INTRAMUSCULAR; INTRAVENOUS; SUBCUTANEOUS at 07:33

## 2022-08-08 RX ADMIN — ALBUMIN HUMAN 250 ML: 0.05 INJECTION, SOLUTION INTRAVENOUS at 15:14

## 2022-08-08 RX ADMIN — METHADONE HYDROCHLORIDE 10 MG: 10 INJECTION, SOLUTION INTRAMUSCULAR; INTRAVENOUS; SUBCUTANEOUS at 06:42

## 2022-08-08 RX ADMIN — ALBUMIN HUMAN 250 ML: 0.05 INJECTION, SOLUTION INTRAVENOUS at 14:34

## 2022-08-08 RX ADMIN — SODIUM CHLORIDE: 9 INJECTION, SOLUTION INTRAVENOUS at 07:13

## 2022-08-08 RX ADMIN — PHENYLEPHRINE HYDROCHLORIDE 0.5 MCG/KG/MIN: 10 INJECTION INTRAVENOUS at 06:54

## 2022-08-08 RX ADMIN — ROCURONIUM BROMIDE 20 MG: 50 INJECTION INTRAVENOUS at 09:13

## 2022-08-08 RX ADMIN — NICARDIPINE HYDROCHLORIDE 5 MG/HR: 2.5 INJECTION, SOLUTION INTRAVENOUS at 07:39

## 2022-08-08 RX ADMIN — PROPOFOL INJECTABLE EMULSION 35 MCG/KG/MIN: 10 INJECTION, EMULSION INTRAVENOUS at 13:54

## 2022-08-08 RX ADMIN — ACETAMINOPHEN 1000 MG: 10 INJECTION, SOLUTION INTRAVENOUS at 14:59

## 2022-08-08 RX ADMIN — NITROGLYCERIN 200 MCG: 5 INJECTION, SOLUTION INTRAVENOUS at 07:39

## 2022-08-08 RX ADMIN — CEFAZOLIN SODIUM 2 G: 2 INJECTION, SOLUTION INTRAVENOUS at 10:28

## 2022-08-08 RX ADMIN — AMINOCAPROIC ACID 10 G: 250 INJECTION, SOLUTION INTRAVENOUS at 10:23

## 2022-08-08 RX ADMIN — FAMOTIDINE 20 MG: 10 INJECTION INTRAVENOUS at 06:05

## 2022-08-08 RX ADMIN — ROCURONIUM BROMIDE 50 MG: 50 INJECTION INTRAVENOUS at 06:47

## 2022-08-08 RX ADMIN — NITROGLYCERIN 100 MCG: 5 INJECTION, SOLUTION INTRAVENOUS at 10:27

## 2022-08-08 RX ADMIN — DEXMEDETOMIDINE HYDROCHLORIDE 0.5 MCG/KG/HR: 4 INJECTION INTRAVENOUS at 14:54

## 2022-08-08 RX ADMIN — ONDANSETRON 4 MG: 2 INJECTION INTRAMUSCULAR; INTRAVENOUS at 21:43

## 2022-08-08 RX ADMIN — ACETAMINOPHEN 1000 MG: 10 INJECTION, SOLUTION INTRAVENOUS at 20:15

## 2022-08-08 RX ADMIN — ROCURONIUM BROMIDE 30 MG: 50 INJECTION INTRAVENOUS at 11:03

## 2022-08-08 RX ADMIN — PHENYLEPHRINE-NACL PREF SYR 1 MG/10ML-0.9% (100 MCG/ML) 100 MCG: 1-0.9/1 SOLUTION PREFILLED SYRINGE at 10:30

## 2022-08-08 RX ADMIN — ATROPINE SULFATE 0.4 MG: 0.4 INJECTION, SOLUTION INTRAMUSCULAR; INTRAVENOUS; SUBCUTANEOUS at 07:48

## 2022-08-08 RX ADMIN — METOPROLOL TARTRATE 12.5 MG: 25 TABLET, FILM COATED ORAL at 06:05

## 2022-08-08 RX ADMIN — Medication 35 MCG/KG/MIN: at 08:35

## 2022-08-08 RX ADMIN — PHENYLEPHRINE-NACL PREF SYR 1 MG/10ML-0.9% (100 MCG/ML) 100 MCG: 1-0.9/1 SOLUTION PREFILLED SYRINGE at 08:36

## 2022-08-08 RX ADMIN — SODIUM CHLORIDE 9 ML/HR: 9 INJECTION, SOLUTION INTRAVENOUS at 06:05

## 2022-08-08 RX ADMIN — LIDOCAINE HYDROCHLORIDE 60 MG: 20 INJECTION, SOLUTION INFILTRATION; PERINEURAL at 06:45

## 2022-08-08 RX ADMIN — PROPOFOL 100 MG: 10 INJECTION, EMULSION INTRAVENOUS at 06:46

## 2022-08-08 RX ADMIN — NOREPINEPHRINE BITARTRATE 0.03 MCG/KG/MIN: 1 INJECTION, SOLUTION, CONCENTRATE INTRAVENOUS at 09:59

## 2022-08-08 RX ADMIN — DEXMEDETOMIDINE HYDROCHLORIDE 1 MCG/KG/HR: 4 INJECTION, SOLUTION INTRAVENOUS at 07:14

## 2022-08-08 RX ADMIN — SODIUM CHLORIDE 2.3 UNITS/HR: 9 INJECTION, SOLUTION INTRAVENOUS at 08:59

## 2022-08-08 RX ADMIN — PHENYLEPHRINE-NACL PREF SYR 1 MG/10ML-0.9% (100 MCG/ML) 100 MCG: 1-0.9/1 SOLUTION PREFILLED SYRINGE at 07:50

## 2022-08-08 RX ADMIN — CHLORHEXIDINE GLUCONATE 15 ML: 1.2 SOLUTION ORAL at 06:05

## 2022-08-08 RX ADMIN — ALBUMIN HUMAN 250 ML: 0.05 INJECTION, SOLUTION INTRAVENOUS at 17:14

## 2022-08-08 RX ADMIN — PROPOFOL 50 MG: 10 INJECTION, EMULSION INTRAVENOUS at 10:38

## 2022-08-08 RX ADMIN — POTASSIUM CHLORIDE 20 MEQ: 29.8 INJECTION, SOLUTION INTRAVENOUS at 19:55

## 2022-08-08 RX ADMIN — ALBUMIN HUMAN 250 ML: 0.05 INJECTION, SOLUTION INTRAVENOUS at 15:30

## 2022-08-08 RX ADMIN — CEFAZOLIN SODIUM 2 G: 2 INJECTION, SOLUTION INTRAVENOUS at 07:23

## 2022-08-08 RX ADMIN — MAGNESIUM SULFATE IN DEXTROSE 1 G: 10 INJECTION, SOLUTION INTRAVENOUS at 17:28

## 2022-08-08 RX ADMIN — MUPIROCIN 1 APPLICATION: 20 OINTMENT TOPICAL at 20:58

## 2022-08-08 NOTE — ANESTHESIA PROCEDURE NOTES
Arterial Line      Patient reassessed immediately prior to procedure    Patient location during procedure: OR  Start time: 8/8/2022 6:40 AM  Stop Time:8/8/2022 6:44 AM       Performed By   Anesthesiologist: Asael Rosenberg MD  Preanesthetic Checklist  Completed: patient identified, risks and benefits discussed, surgical consent, pre-op evaluation and timeout performed  Arterial Line Prep   Sterile Tech: cap, gloves and mask  Prep: ChloraPrep  Patient monitoring: blood pressure monitoring, continuous pulse oximetry and EKG  Arterial Line Procedure   Laterality:left  Location:  radial artery  Catheter size: 20 G   Guidance: ultrasound guided  PROCEDURE NOTE/ULTRASOUND INTERPRETATION.  Using ultrasound guidance the potential vascular sites for insertion of the catheter were visualized to determine the patency of the vessel to be used for vascular access.  After selecting the appropriate site for insertion, the needle was visualized under ultrasound being inserted into the radial artery, followed by ultrasound confirmation of wire and catheter placement. There were no abnormalities seen on ultrasound; an image was taken; and the patient tolerated the procedure with no complications.   Number of attempts: 1  Successful placement: yes  Post Assessment   Dressing Type: occlusive dressing applied and secured with tape.   Complications no  Patient Tolerance: patient tolerated the procedure well with no apparent complications  Additional Notes  Ultrasound Interpretation:  Using ultrasound guidance the potential vascular sites for insertion of the catheter were visualized to determine the patency of the vessel to be used for vascular access.  After selecting the appropriate site for insertion, the needle was visualized under ultrasound being inserted into the vessel, followed by ultrasound confirmation of wire and catheter placement.  There were no abnormalities seen on ultrasound; an image was taken/ and the patient  tolerated the procedure with no complications.

## 2022-08-08 NOTE — ANESTHESIA POSTPROCEDURE EVALUATION
Patient: Woo Dobbs    Procedure Summary     Date: 08/08/22 Room / Location: [place] [address] / [facility] CARDIOVASCULAR OPERATING ROOM    Anesthesia Start: 0629 Anesthesia Stop: 1147    Procedure: KEVIN STERNOTOMY CORONARY ARTERY BYPASS GRAFT TIMES 3 USING LEFT INTERNAL MAMMARY ARTERY AND LEFT GREATER SAPHENOUS VEIN GRAFT PER ENDOSCOPIC VEIN HARVESTING, AORTIC VALVE REPLACEMENT AND PRP (N/A Chest) Diagnosis:       Nonrheumatic aortic valve stenosis      (Nonrheumatic aortic valve stenosis [I35.0])    Surgeons: Jr Gentry Courtney MD Provider: Asael Rosenberg MD    Anesthesia Type: general, [place], CVL, PAC ASA Status: 4          Anesthesia Type: general, [place], CVL, PAC    Vitals  Vitals Value Taken Time   BP 99/68 08/08/22 1216   Temp 35.2 °C (95.36 °F) 08/08/22 1231   Pulse 70 08/08/22 1231   Resp 16 08/08/22 1150   SpO2 100 % 08/08/22 1231   Vitals shown include unvalidated device data.        Post Anesthesia Care and Evaluation    Patient location during evaluation: bedside  Pain management: adequate    Airway patency: patent  Anesthetic complications: No anesthetic complications    Cardiovascular status: acceptable  Respiratory status: acceptable  Hydration status: acceptable

## 2022-08-08 NOTE — ANESTHESIA PROCEDURE NOTES
Diagnostic Intraoperative KEVIN with 2D and 3D imaging and follow up color Doppler study    Procedure Performed: Diagnostic Intraoperative KEVIN with 2D and 3D imaging and follow up color Doppler study       Start Time:  8/8/2022 7:34 AM       End Time:   8/8/2022 7:54 AM      General Procedure Information  Physician Requesting Echo: Jr Gentry Courtney MD  Location performed:  OR  Intubated  Bite block not placed  Heart visualized  Probe Insertion:  Easy  Probe Type:  Multiplane  Modalities:  2D only, color flow mapping, continuous wave Doppler and pulse wave Doppler    Echocardiographic and Doppler Measurements    Ventricles    Left Ventricle:  Global Function normal.  Ejection Fraction 55%.                                  Anesthesia Information      Echocardiogram Comments:       Diagnosis: non-rheumatic mitral regurgitation    Mean gradient after AVR was 8 mm Hg.  25 mm bioprosthetic valve in aortic position with no paravalvular leak.  EF 60%

## 2022-08-08 NOTE — ANESTHESIA PROCEDURE NOTES
Central Line      Patient reassessed immediately prior to procedure    Patient location during procedure: OR  Start time: 8/8/2022 6:55 AM  Stop Time:8/8/2022 7:05 AM  Indications: vascular access and central pressure monitoring  Staff  Anesthesiologist: Asael Rosenberg MD  Preanesthetic Checklist  Completed: patient identified and risks and benefits discussed  Central Line Prep  Sterile Tech:cap, gloves, gown, mask and sterile barriers  Prep: chloraprep  Patient monitoring: blood pressure monitoring, continuous pulse oximetry and EKG  Central Line Procedure  Laterality:right  Location:internal jugular  Catheter Type:double lumen and MAC  Catheter Size:9 Fr  Guidance:ultrasound guided  PROCEDURE NOTE/ULTRASOUND INTERPRETATION.  Using ultrasound guidance the potential vascular sites for insertion of the catheter were visualized to determine the patency of the vessel to be used for vascular access.  After selecting the appropriate site for insertion, the needle was visualized under ultrasound being inserted into the internal jugular vein, followed by ultrasound confirmation of wire and catheter placement. There were no abnormalities seen on ultrasound; an image was taken; and the patient tolerated the procedure with no complications. Images: still images obtained, printed/placed on chart  Assessment  Post procedure:biopatch applied, line sutured, occlusive dressing applied and secured with tape  Assessement:blood return through all ports  Complications:no  Patient Tolerance:patient tolerated the procedure well with no apparent complications  Additional Notes  Ultrasound Interpretation:  Using ultrasound guidance the potential vascular sites for insertion of the catheter were visualized to determine the patency of the vessel to be used for vascular access.  After selecting the appropriate site for insertion, the needle was visualized under ultrasound being inserted into the vessel, followed by ultrasound  confirmation of wire and catheter placement.  There were no abnormalities seen on ultrasound; an image was taken/ and the patient tolerated the procedure with no complications.

## 2022-08-08 NOTE — ANESTHESIA PROCEDURE NOTES
[place] catheter      Patient reassessed immediately prior to procedure    Start time: 8/8/2022 7:05 AM  Stop Time:8/8/2022 7:10 AM  Staff  Anesthesiologist: Asael Rosenberg MD  Preanesthetic Checklist  Completed: patient identified, risks and benefits discussed, surgical consent, pre-op evaluation and timeout performed  Central Line Prep  Sterile Tech:cap, gloves, gown, mask and sterile barriers  Prep: chloraprep  Patient monitoring: blood pressure monitoring, continuous pulse oximetry and EKG  Central Line Procedure  Laterality:right  Location:internal jugular  Catheter Type:[place]  Assessment  Post procedure:biopatch applied, line sutured and occlusive dressing applied  Assessement:blood return through all ports and free fluid flow  Patient Tolerance:patient tolerated the procedure well with no apparent complications  Additional Notes  ions.

## 2022-08-09 ENCOUNTER — APPOINTMENT (OUTPATIENT)
Dept: GENERAL RADIOLOGY | Facility: HOSPITAL | Age: 82
End: 2022-08-09

## 2022-08-09 LAB
ALBUMIN SERPL-MCNC: 4.6 G/DL (ref 3.5–5.2)
ANION GAP SERPL CALCULATED.3IONS-SCNC: 15 MMOL/L (ref 5–15)
BASOPHILS # BLD AUTO: 0.03 10*3/MM3 (ref 0–0.2)
BASOPHILS NFR BLD AUTO: 0.2 % (ref 0–1.5)
BUN SERPL-MCNC: 20 MG/DL (ref 8–23)
BUN/CREAT SERPL: 17.1 (ref 7–25)
CA-I BLD-MCNC: 5 MG/DL (ref 4.6–5.4)
CA-I SERPL ISE-MCNC: 1.24 MMOL/L (ref 1.15–1.35)
CALCIUM SPEC-SCNC: 8.5 MG/DL (ref 8.6–10.5)
CHLORIDE SERPL-SCNC: 105 MMOL/L (ref 98–107)
CO2 SERPL-SCNC: 20 MMOL/L (ref 22–29)
CREAT SERPL-MCNC: 1.17 MG/DL (ref 0.76–1.27)
DEPRECATED RDW RBC AUTO: 44.4 FL (ref 37–54)
EGFRCR SERPLBLD CKD-EPI 2021: 62.6 ML/MIN/1.73
EOSINOPHIL # BLD AUTO: 0 10*3/MM3 (ref 0–0.4)
EOSINOPHIL NFR BLD AUTO: 0 % (ref 0.3–6.2)
ERYTHROCYTE [DISTWIDTH] IN BLOOD BY AUTOMATED COUNT: 13.1 % (ref 12.3–15.4)
GLUCOSE BLDC GLUCOMTR-MCNC: 130 MG/DL (ref 70–130)
GLUCOSE BLDC GLUCOMTR-MCNC: 144 MG/DL (ref 70–130)
GLUCOSE BLDC GLUCOMTR-MCNC: 151 MG/DL (ref 70–130)
GLUCOSE BLDC GLUCOMTR-MCNC: 152 MG/DL (ref 70–130)
GLUCOSE BLDC GLUCOMTR-MCNC: 161 MG/DL (ref 70–130)
GLUCOSE BLDC GLUCOMTR-MCNC: 176 MG/DL (ref 70–130)
GLUCOSE BLDC GLUCOMTR-MCNC: 185 MG/DL (ref 70–130)
GLUCOSE BLDC GLUCOMTR-MCNC: 187 MG/DL (ref 70–130)
GLUCOSE BLDC GLUCOMTR-MCNC: 195 MG/DL (ref 70–130)
GLUCOSE SERPL-MCNC: 159 MG/DL (ref 65–99)
HCT VFR BLD AUTO: 30.4 % (ref 37.5–51)
HGB BLD-MCNC: 10.3 G/DL (ref 13–17.7)
IMM GRANULOCYTES # BLD AUTO: 0.07 10*3/MM3 (ref 0–0.05)
IMM GRANULOCYTES NFR BLD AUTO: 0.5 % (ref 0–0.5)
INR PPP: 1.32 (ref 0.9–1.1)
LAB AP CASE REPORT: NORMAL
LYMPHOCYTES # BLD AUTO: 0.51 10*3/MM3 (ref 0.7–3.1)
LYMPHOCYTES NFR BLD AUTO: 3.8 % (ref 19.6–45.3)
MAGNESIUM SERPL-MCNC: 2.1 MG/DL (ref 1.6–2.4)
MCH RBC QN AUTO: 31.5 PG (ref 26.6–33)
MCHC RBC AUTO-ENTMCNC: 33.9 G/DL (ref 31.5–35.7)
MCV RBC AUTO: 93 FL (ref 79–97)
MONOCYTES # BLD AUTO: 0.68 10*3/MM3 (ref 0.1–0.9)
MONOCYTES NFR BLD AUTO: 5 % (ref 5–12)
NEUTROPHILS NFR BLD AUTO: 12.18 10*3/MM3 (ref 1.7–7)
NEUTROPHILS NFR BLD AUTO: 90.5 % (ref 42.7–76)
NRBC BLD AUTO-RTO: 0 /100 WBC (ref 0–0.2)
PATH REPORT.FINAL DX SPEC: NORMAL
PATH REPORT.GROSS SPEC: NORMAL
PHOSPHATE SERPL-MCNC: 5 MG/DL (ref 2.5–4.5)
PLATELET # BLD AUTO: 123 10*3/MM3 (ref 140–450)
PMV BLD AUTO: 10.1 FL (ref 6–12)
POTASSIUM SERPL-SCNC: 4.6 MMOL/L (ref 3.5–5.2)
PROTHROMBIN TIME: 16.2 SECONDS (ref 11.7–14.2)
QT INTERVAL: 376 MS
RBC # BLD AUTO: 3.27 10*6/MM3 (ref 4.14–5.8)
SODIUM SERPL-SCNC: 140 MMOL/L (ref 136–145)
WBC NRBC COR # BLD: 13.47 10*3/MM3 (ref 3.4–10.8)

## 2022-08-09 PROCEDURE — 80069 RENAL FUNCTION PANEL: CPT | Performed by: NURSE PRACTITIONER

## 2022-08-09 PROCEDURE — 25010000002 CALCIUM GLUCONATE-NACL 1-0.675 GM/50ML-% SOLUTION: Performed by: NURSE PRACTITIONER

## 2022-08-09 PROCEDURE — 63710000001 INSULIN LISPRO (HUMAN) PER 5 UNITS: Performed by: NURSE PRACTITIONER

## 2022-08-09 PROCEDURE — 82962 GLUCOSE BLOOD TEST: CPT

## 2022-08-09 PROCEDURE — 25010000002 ENOXAPARIN PER 10 MG: Performed by: NURSE PRACTITIONER

## 2022-08-09 PROCEDURE — 97162 PT EVAL MOD COMPLEX 30 MIN: CPT

## 2022-08-09 PROCEDURE — 82330 ASSAY OF CALCIUM: CPT | Performed by: NURSE PRACTITIONER

## 2022-08-09 PROCEDURE — 93010 ELECTROCARDIOGRAM REPORT: CPT | Performed by: INTERNAL MEDICINE

## 2022-08-09 PROCEDURE — 97530 THERAPEUTIC ACTIVITIES: CPT

## 2022-08-09 PROCEDURE — 93005 ELECTROCARDIOGRAM TRACING: CPT | Performed by: NURSE PRACTITIONER

## 2022-08-09 PROCEDURE — 25010000002 FUROSEMIDE PER 20 MG: Performed by: NURSE PRACTITIONER

## 2022-08-09 PROCEDURE — 25010000002 MAGNESIUM SULFATE IN D5W 1G/100ML (PREMIX) 1-5 GM/100ML-% SOLUTION: Performed by: NURSE PRACTITIONER

## 2022-08-09 PROCEDURE — 85025 COMPLETE CBC W/AUTO DIFF WBC: CPT | Performed by: NURSE PRACTITIONER

## 2022-08-09 PROCEDURE — 99222 1ST HOSP IP/OBS MODERATE 55: CPT | Performed by: INTERNAL MEDICINE

## 2022-08-09 PROCEDURE — 25010000002 CEFAZOLIN IN DEXTROSE 2-4 GM/100ML-% SOLUTION: Performed by: NURSE PRACTITIONER

## 2022-08-09 PROCEDURE — 85610 PROTHROMBIN TIME: CPT | Performed by: NURSE PRACTITIONER

## 2022-08-09 PROCEDURE — 83735 ASSAY OF MAGNESIUM: CPT | Performed by: NURSE PRACTITIONER

## 2022-08-09 PROCEDURE — 94799 UNLISTED PULMONARY SVC/PX: CPT

## 2022-08-09 PROCEDURE — 71045 X-RAY EXAM CHEST 1 VIEW: CPT

## 2022-08-09 PROCEDURE — 25010000002 METOCLOPRAMIDE PER 10 MG: Performed by: NURSE PRACTITIONER

## 2022-08-09 RX ORDER — POTASSIUM CHLORIDE 750 MG/1
20 TABLET, FILM COATED, EXTENDED RELEASE ORAL ONCE
Status: COMPLETED | OUTPATIENT
Start: 2022-08-09 | End: 2022-08-09

## 2022-08-09 RX ORDER — CALCIUM GLUCONATE 20 MG/ML
2 INJECTION, SOLUTION INTRAVENOUS ONCE
Status: COMPLETED | OUTPATIENT
Start: 2022-08-09 | End: 2022-08-09

## 2022-08-09 RX ORDER — DEXTROSE MONOHYDRATE 25 G/50ML
25 INJECTION, SOLUTION INTRAVENOUS
Status: DISCONTINUED | OUTPATIENT
Start: 2022-08-09 | End: 2022-08-15 | Stop reason: HOSPADM

## 2022-08-09 RX ORDER — INSULIN LISPRO 100 [IU]/ML
0-9 INJECTION, SOLUTION INTRAVENOUS; SUBCUTANEOUS
Status: DISCONTINUED | OUTPATIENT
Start: 2022-08-09 | End: 2022-08-15 | Stop reason: HOSPADM

## 2022-08-09 RX ORDER — NITROGLYCERIN 0.4 MG/1
0.4 TABLET SUBLINGUAL
Status: DISCONTINUED | OUTPATIENT
Start: 2022-08-09 | End: 2022-08-15 | Stop reason: HOSPADM

## 2022-08-09 RX ORDER — FUROSEMIDE 10 MG/ML
40 INJECTION INTRAMUSCULAR; INTRAVENOUS ONCE
Status: COMPLETED | OUTPATIENT
Start: 2022-08-09 | End: 2022-08-09

## 2022-08-09 RX ORDER — NICOTINE POLACRILEX 4 MG
15 LOZENGE BUCCAL
Status: DISCONTINUED | OUTPATIENT
Start: 2022-08-09 | End: 2022-08-15 | Stop reason: HOSPADM

## 2022-08-09 RX ORDER — CYCLOSPORINE 0.5 MG/ML
1 EMULSION OPHTHALMIC 2 TIMES DAILY
Status: DISCONTINUED | OUTPATIENT
Start: 2022-08-09 | End: 2022-08-15 | Stop reason: HOSPADM

## 2022-08-09 RX ORDER — GUAIFENESIN 600 MG/1
1200 TABLET, EXTENDED RELEASE ORAL EVERY 12 HOURS SCHEDULED
Status: DISCONTINUED | OUTPATIENT
Start: 2022-08-09 | End: 2022-08-15 | Stop reason: HOSPADM

## 2022-08-09 RX ADMIN — ATORVASTATIN CALCIUM 40 MG: 20 TABLET, FILM COATED ORAL at 20:01

## 2022-08-09 RX ADMIN — METOPROLOL TARTRATE 12.5 MG: 25 TABLET, FILM COATED ORAL at 15:30

## 2022-08-09 RX ADMIN — PANTOPRAZOLE SODIUM 40 MG: 40 TABLET, DELAYED RELEASE ORAL at 06:26

## 2022-08-09 RX ADMIN — MAGNESIUM SULFATE 1 G: 1 INJECTION INTRAVENOUS at 04:49

## 2022-08-09 RX ADMIN — INSULIN LISPRO 2 UNITS: 100 INJECTION, SOLUTION INTRAVENOUS; SUBCUTANEOUS at 18:16

## 2022-08-09 RX ADMIN — CHLORHEXIDINE GLUCONATE 15 ML: 1.2 SOLUTION ORAL at 00:07

## 2022-08-09 RX ADMIN — OXYCODONE HYDROCHLORIDE 10 MG: 5 TABLET ORAL at 12:16

## 2022-08-09 RX ADMIN — GUAIFENESIN 1200 MG: 600 TABLET, EXTENDED RELEASE ORAL at 08:37

## 2022-08-09 RX ADMIN — POTASSIUM CHLORIDE 20 MEQ: 750 TABLET, EXTENDED RELEASE ORAL at 08:37

## 2022-08-09 RX ADMIN — METOCLOPRAMIDE HYDROCHLORIDE 10 MG: 5 INJECTION INTRAMUSCULAR; INTRAVENOUS at 00:07

## 2022-08-09 RX ADMIN — HYDROCODONE BITARTRATE AND ACETAMINOPHEN 2 TABLET: 5; 325 TABLET ORAL at 07:44

## 2022-08-09 RX ADMIN — CYCLOSPORINE 1 DROP: 0.5 EMULSION OPHTHALMIC at 20:03

## 2022-08-09 RX ADMIN — METOPROLOL TARTRATE 25 MG: 25 TABLET, FILM COATED ORAL at 20:01

## 2022-08-09 RX ADMIN — CEFAZOLIN SODIUM 2 G: 2 INJECTION, SOLUTION INTRAVENOUS at 18:15

## 2022-08-09 RX ADMIN — CYCLOSPORINE 1 DROP: 0.5 EMULSION OPHTHALMIC at 11:48

## 2022-08-09 RX ADMIN — FUROSEMIDE 40 MG: 10 INJECTION, SOLUTION INTRAMUSCULAR; INTRAVENOUS at 08:38

## 2022-08-09 RX ADMIN — ACETAMINOPHEN 1000 MG: 10 INJECTION, SOLUTION INTRAVENOUS at 04:09

## 2022-08-09 RX ADMIN — ENOXAPARIN SODIUM 40 MG: 100 INJECTION SUBCUTANEOUS at 18:16

## 2022-08-09 RX ADMIN — CALCIUM GLUCONATE 2 G: 20 INJECTION, SOLUTION INTRAVENOUS at 07:44

## 2022-08-09 RX ADMIN — CHLORHEXIDINE GLUCONATE 15 ML: 1.2 SOLUTION ORAL at 11:49

## 2022-08-09 RX ADMIN — HYDROCODONE BITARTRATE AND ACETAMINOPHEN 2 TABLET: 5; 325 TABLET ORAL at 22:53

## 2022-08-09 RX ADMIN — METOCLOPRAMIDE HYDROCHLORIDE 10 MG: 5 INJECTION INTRAMUSCULAR; INTRAVENOUS at 06:26

## 2022-08-09 RX ADMIN — GUAIFENESIN 1200 MG: 600 TABLET, EXTENDED RELEASE ORAL at 20:01

## 2022-08-09 RX ADMIN — DOCUSATE SODIUM 50MG AND SENNOSIDES 8.6MG 2 TABLET: 8.6; 5 TABLET, FILM COATED ORAL at 20:01

## 2022-08-09 RX ADMIN — MUPIROCIN 1 APPLICATION: 20 OINTMENT TOPICAL at 20:03

## 2022-08-09 RX ADMIN — CHLORHEXIDINE GLUCONATE 15 ML: 1.2 SOLUTION ORAL at 23:00

## 2022-08-09 RX ADMIN — ASPIRIN 81 MG: 81 TABLET, FILM COATED ORAL at 08:37

## 2022-08-09 RX ADMIN — INSULIN LISPRO 2 UNITS: 100 INJECTION, SOLUTION INTRAVENOUS; SUBCUTANEOUS at 11:48

## 2022-08-09 RX ADMIN — CEFAZOLIN SODIUM 2 G: 2 INJECTION, SOLUTION INTRAVENOUS at 10:37

## 2022-08-09 RX ADMIN — CEFAZOLIN SODIUM 2 G: 2 INJECTION, SOLUTION INTRAVENOUS at 02:07

## 2022-08-09 RX ADMIN — MUPIROCIN 1 APPLICATION: 20 OINTMENT TOPICAL at 08:37

## 2022-08-09 RX ADMIN — OXYCODONE HYDROCHLORIDE 10 MG: 5 TABLET ORAL at 18:16

## 2022-08-09 RX ADMIN — METOPROLOL TARTRATE 12.5 MG: 25 TABLET, FILM COATED ORAL at 08:37

## 2022-08-10 ENCOUNTER — APPOINTMENT (OUTPATIENT)
Dept: GENERAL RADIOLOGY | Facility: HOSPITAL | Age: 82
End: 2022-08-10

## 2022-08-10 LAB
ANION GAP SERPL CALCULATED.3IONS-SCNC: 13.4 MMOL/L (ref 5–15)
BUN SERPL-MCNC: 24 MG/DL (ref 8–23)
BUN/CREAT SERPL: 23.1 (ref 7–25)
CALCIUM SPEC-SCNC: 9 MG/DL (ref 8.6–10.5)
CHLORIDE SERPL-SCNC: 100 MMOL/L (ref 98–107)
CO2 SERPL-SCNC: 23.6 MMOL/L (ref 22–29)
CREAT SERPL-MCNC: 1.04 MG/DL (ref 0.76–1.27)
DEPRECATED RDW RBC AUTO: 43.4 FL (ref 37–54)
EGFRCR SERPLBLD CKD-EPI 2021: 72.1 ML/MIN/1.73
ERYTHROCYTE [DISTWIDTH] IN BLOOD BY AUTOMATED COUNT: 12.8 % (ref 12.3–15.4)
GLUCOSE BLDC GLUCOMTR-MCNC: 158 MG/DL (ref 70–130)
GLUCOSE BLDC GLUCOMTR-MCNC: 176 MG/DL (ref 70–130)
GLUCOSE BLDC GLUCOMTR-MCNC: 180 MG/DL (ref 70–130)
GLUCOSE BLDC GLUCOMTR-MCNC: 213 MG/DL (ref 70–130)
GLUCOSE SERPL-MCNC: 170 MG/DL (ref 65–99)
HCT VFR BLD AUTO: 31.2 % (ref 37.5–51)
HGB BLD-MCNC: 10.6 G/DL (ref 13–17.7)
MCH RBC QN AUTO: 31.6 PG (ref 26.6–33)
MCHC RBC AUTO-ENTMCNC: 34 G/DL (ref 31.5–35.7)
MCV RBC AUTO: 93.1 FL (ref 79–97)
PLATELET # BLD AUTO: 136 10*3/MM3 (ref 140–450)
PMV BLD AUTO: 10.1 FL (ref 6–12)
POTASSIUM SERPL-SCNC: 4.5 MMOL/L (ref 3.5–5.2)
QT INTERVAL: 373 MS
RBC # BLD AUTO: 3.35 10*6/MM3 (ref 4.14–5.8)
SODIUM SERPL-SCNC: 137 MMOL/L (ref 136–145)
WBC NRBC COR # BLD: 14.55 10*3/MM3 (ref 3.4–10.8)

## 2022-08-10 PROCEDURE — 93005 ELECTROCARDIOGRAM TRACING: CPT | Performed by: NURSE PRACTITIONER

## 2022-08-10 PROCEDURE — 93010 ELECTROCARDIOGRAM REPORT: CPT | Performed by: INTERNAL MEDICINE

## 2022-08-10 PROCEDURE — 85027 COMPLETE CBC AUTOMATED: CPT | Performed by: NURSE PRACTITIONER

## 2022-08-10 PROCEDURE — 25010000002 CEFAZOLIN IN DEXTROSE 2-4 GM/100ML-% SOLUTION: Performed by: NURSE PRACTITIONER

## 2022-08-10 PROCEDURE — 97530 THERAPEUTIC ACTIVITIES: CPT

## 2022-08-10 PROCEDURE — 71045 X-RAY EXAM CHEST 1 VIEW: CPT

## 2022-08-10 PROCEDURE — 80048 BASIC METABOLIC PNL TOTAL CA: CPT | Performed by: NURSE PRACTITIONER

## 2022-08-10 PROCEDURE — 94799 UNLISTED PULMONARY SVC/PX: CPT

## 2022-08-10 PROCEDURE — 99232 SBSQ HOSP IP/OBS MODERATE 35: CPT | Performed by: NURSE PRACTITIONER

## 2022-08-10 PROCEDURE — 82962 GLUCOSE BLOOD TEST: CPT

## 2022-08-10 PROCEDURE — 25010000002 ENOXAPARIN PER 10 MG: Performed by: NURSE PRACTITIONER

## 2022-08-10 PROCEDURE — 63710000001 INSULIN LISPRO (HUMAN) PER 5 UNITS: Performed by: NURSE PRACTITIONER

## 2022-08-10 RX ORDER — DOCUSATE SODIUM 100 MG/1
100 CAPSULE, LIQUID FILLED ORAL 2 TIMES DAILY
Status: DISCONTINUED | OUTPATIENT
Start: 2022-08-10 | End: 2022-08-15 | Stop reason: HOSPADM

## 2022-08-10 RX ORDER — POLYETHYLENE GLYCOL 3350 17 G/17G
17 POWDER, FOR SOLUTION ORAL DAILY
Status: DISCONTINUED | OUTPATIENT
Start: 2022-08-10 | End: 2022-08-15 | Stop reason: HOSPADM

## 2022-08-10 RX ORDER — TAMSULOSIN HYDROCHLORIDE 0.4 MG/1
0.4 CAPSULE ORAL DAILY
Status: DISCONTINUED | OUTPATIENT
Start: 2022-08-10 | End: 2022-08-15 | Stop reason: HOSPADM

## 2022-08-10 RX ORDER — FUROSEMIDE 40 MG/1
40 TABLET ORAL DAILY
Status: DISCONTINUED | OUTPATIENT
Start: 2022-08-10 | End: 2022-08-15 | Stop reason: HOSPADM

## 2022-08-10 RX ORDER — POTASSIUM CHLORIDE 750 MG/1
20 TABLET, FILM COATED, EXTENDED RELEASE ORAL DAILY
Status: DISCONTINUED | OUTPATIENT
Start: 2022-08-10 | End: 2022-08-15 | Stop reason: HOSPADM

## 2022-08-10 RX ADMIN — CHLORHEXIDINE GLUCONATE 15 ML: 1.2 SOLUTION ORAL at 12:53

## 2022-08-10 RX ADMIN — CYCLOSPORINE 1 DROP: 0.5 EMULSION OPHTHALMIC at 09:43

## 2022-08-10 RX ADMIN — FUROSEMIDE 40 MG: 40 TABLET ORAL at 09:42

## 2022-08-10 RX ADMIN — HYDROCODONE BITARTRATE AND ACETAMINOPHEN 2 TABLET: 5; 325 TABLET ORAL at 15:52

## 2022-08-10 RX ADMIN — CYCLOSPORINE 1 DROP: 0.5 EMULSION OPHTHALMIC at 20:30

## 2022-08-10 RX ADMIN — ATORVASTATIN CALCIUM 40 MG: 20 TABLET, FILM COATED ORAL at 20:21

## 2022-08-10 RX ADMIN — ACETAMINOPHEN 650 MG: 325 TABLET, FILM COATED ORAL at 09:47

## 2022-08-10 RX ADMIN — INSULIN LISPRO 2 UNITS: 100 INJECTION, SOLUTION INTRAVENOUS; SUBCUTANEOUS at 07:32

## 2022-08-10 RX ADMIN — DOCUSATE SODIUM 100 MG: 100 CAPSULE, LIQUID FILLED ORAL at 09:41

## 2022-08-10 RX ADMIN — INSULIN LISPRO 2 UNITS: 100 INJECTION, SOLUTION INTRAVENOUS; SUBCUTANEOUS at 16:08

## 2022-08-10 RX ADMIN — CEFAZOLIN SODIUM 2 G: 2 INJECTION, SOLUTION INTRAVENOUS at 02:46

## 2022-08-10 RX ADMIN — GUAIFENESIN 1200 MG: 600 TABLET, EXTENDED RELEASE ORAL at 20:21

## 2022-08-10 RX ADMIN — METOPROLOL TARTRATE 25 MG: 25 TABLET, FILM COATED ORAL at 09:47

## 2022-08-10 RX ADMIN — DOCUSATE SODIUM 50MG AND SENNOSIDES 8.6MG 2 TABLET: 8.6; 5 TABLET, FILM COATED ORAL at 20:21

## 2022-08-10 RX ADMIN — PANTOPRAZOLE SODIUM 40 MG: 40 TABLET, DELAYED RELEASE ORAL at 06:35

## 2022-08-10 RX ADMIN — METOPROLOL TARTRATE 25 MG: 25 TABLET, FILM COATED ORAL at 20:21

## 2022-08-10 RX ADMIN — MUPIROCIN 1 APPLICATION: 20 OINTMENT TOPICAL at 09:43

## 2022-08-10 RX ADMIN — DOCUSATE SODIUM 100 MG: 100 CAPSULE, LIQUID FILLED ORAL at 20:21

## 2022-08-10 RX ADMIN — ENOXAPARIN SODIUM 40 MG: 100 INJECTION SUBCUTANEOUS at 17:16

## 2022-08-10 RX ADMIN — ASPIRIN 81 MG: 81 TABLET, FILM COATED ORAL at 09:42

## 2022-08-10 RX ADMIN — POTASSIUM CHLORIDE 20 MEQ: 750 TABLET, EXTENDED RELEASE ORAL at 09:42

## 2022-08-10 RX ADMIN — TAMSULOSIN HYDROCHLORIDE 0.4 MG: 0.4 CAPSULE ORAL at 09:42

## 2022-08-10 RX ADMIN — MUPIROCIN 1 APPLICATION: 20 OINTMENT TOPICAL at 20:22

## 2022-08-10 RX ADMIN — GUAIFENESIN 1200 MG: 600 TABLET, EXTENDED RELEASE ORAL at 09:41

## 2022-08-10 RX ADMIN — INSULIN LISPRO 4 UNITS: 100 INJECTION, SOLUTION INTRAVENOUS; SUBCUTANEOUS at 11:14

## 2022-08-11 ENCOUNTER — APPOINTMENT (OUTPATIENT)
Dept: GENERAL RADIOLOGY | Facility: HOSPITAL | Age: 82
End: 2022-08-11

## 2022-08-11 LAB
ANION GAP SERPL CALCULATED.3IONS-SCNC: 12.8 MMOL/L (ref 5–15)
BACTERIA UR QL AUTO: ABNORMAL /HPF
BILIRUB UR QL STRIP: NEGATIVE
BUN SERPL-MCNC: 31 MG/DL (ref 8–23)
BUN/CREAT SERPL: 29 (ref 7–25)
CALCIUM SPEC-SCNC: 8.8 MG/DL (ref 8.6–10.5)
CHLORIDE SERPL-SCNC: 100 MMOL/L (ref 98–107)
CLARITY UR: ABNORMAL
CO2 SERPL-SCNC: 24.2 MMOL/L (ref 22–29)
COLOR UR: ABNORMAL
CREAT SERPL-MCNC: 1.07 MG/DL (ref 0.76–1.27)
DEPRECATED RDW RBC AUTO: 46.5 FL (ref 37–54)
EGFRCR SERPLBLD CKD-EPI 2021: 69.7 ML/MIN/1.73
ERYTHROCYTE [DISTWIDTH] IN BLOOD BY AUTOMATED COUNT: 13 % (ref 12.3–15.4)
GLUCOSE BLDC GLUCOMTR-MCNC: 169 MG/DL (ref 70–130)
GLUCOSE BLDC GLUCOMTR-MCNC: 175 MG/DL (ref 70–130)
GLUCOSE BLDC GLUCOMTR-MCNC: 178 MG/DL (ref 70–130)
GLUCOSE BLDC GLUCOMTR-MCNC: 195 MG/DL (ref 70–130)
GLUCOSE BLDC GLUCOMTR-MCNC: 213 MG/DL (ref 70–130)
GLUCOSE SERPL-MCNC: 164 MG/DL (ref 65–99)
GLUCOSE UR STRIP-MCNC: NEGATIVE MG/DL
HCT VFR BLD AUTO: 31.7 % (ref 37.5–51)
HGB BLD-MCNC: 10.3 G/DL (ref 13–17.7)
HGB UR QL STRIP.AUTO: ABNORMAL
HYALINE CASTS UR QL AUTO: ABNORMAL /LPF
KETONES UR QL STRIP: ABNORMAL
LEUKOCYTE ESTERASE UR QL STRIP.AUTO: ABNORMAL
MCH RBC QN AUTO: 31.9 PG (ref 26.6–33)
MCHC RBC AUTO-ENTMCNC: 32.5 G/DL (ref 31.5–35.7)
MCV RBC AUTO: 98.1 FL (ref 79–97)
NITRITE UR QL STRIP: NEGATIVE
PH UR STRIP.AUTO: <=5 [PH] (ref 5–8)
PLATELET # BLD AUTO: 126 10*3/MM3 (ref 140–450)
PMV BLD AUTO: 9.9 FL (ref 6–12)
POTASSIUM SERPL-SCNC: 4.4 MMOL/L (ref 3.5–5.2)
PROT UR QL STRIP: ABNORMAL
QT INTERVAL: 296 MS
QT INTERVAL: 359 MS
QT INTERVAL: 377 MS
RBC # BLD AUTO: 3.23 10*6/MM3 (ref 4.14–5.8)
RBC # UR STRIP: ABNORMAL /HPF
REF LAB TEST METHOD: ABNORMAL
SODIUM SERPL-SCNC: 137 MMOL/L (ref 136–145)
SP GR UR STRIP: 1.02 (ref 1–1.03)
SQUAMOUS #/AREA URNS HPF: ABNORMAL /HPF
UROBILINOGEN UR QL STRIP: ABNORMAL
WBC # UR STRIP: ABNORMAL /HPF
WBC NRBC COR # BLD: 13.63 10*3/MM3 (ref 3.4–10.8)

## 2022-08-11 PROCEDURE — 25010000002 AMIODARONE IN DEXTROSE 5% 150-4.21 MG/100ML-% SOLUTION: Performed by: THORACIC SURGERY (CARDIOTHORACIC VASCULAR SURGERY)

## 2022-08-11 PROCEDURE — 99222 1ST HOSP IP/OBS MODERATE 55: CPT | Performed by: PHYSICIAN ASSISTANT

## 2022-08-11 PROCEDURE — 85027 COMPLETE CBC AUTOMATED: CPT | Performed by: NURSE PRACTITIONER

## 2022-08-11 PROCEDURE — 87086 URINE CULTURE/COLONY COUNT: CPT | Performed by: NURSE PRACTITIONER

## 2022-08-11 PROCEDURE — 93005 ELECTROCARDIOGRAM TRACING: CPT | Performed by: THORACIC SURGERY (CARDIOTHORACIC VASCULAR SURGERY)

## 2022-08-11 PROCEDURE — 97530 THERAPEUTIC ACTIVITIES: CPT

## 2022-08-11 PROCEDURE — 25010000002 FUROSEMIDE PER 20 MG: Performed by: THORACIC SURGERY (CARDIOTHORACIC VASCULAR SURGERY)

## 2022-08-11 PROCEDURE — 81001 URINALYSIS AUTO W/SCOPE: CPT | Performed by: NURSE PRACTITIONER

## 2022-08-11 PROCEDURE — 25010000002 AMIODARONE IN DEXTROSE 5% 360-4.14 MG/200ML-% SOLUTION: Performed by: THORACIC SURGERY (CARDIOTHORACIC VASCULAR SURGERY)

## 2022-08-11 PROCEDURE — 93010 ELECTROCARDIOGRAM REPORT: CPT | Performed by: INTERNAL MEDICINE

## 2022-08-11 PROCEDURE — 82962 GLUCOSE BLOOD TEST: CPT

## 2022-08-11 PROCEDURE — 99232 SBSQ HOSP IP/OBS MODERATE 35: CPT | Performed by: NURSE PRACTITIONER

## 2022-08-11 PROCEDURE — 71046 X-RAY EXAM CHEST 2 VIEWS: CPT

## 2022-08-11 PROCEDURE — 25010000002 ENOXAPARIN PER 10 MG: Performed by: NURSE PRACTITIONER

## 2022-08-11 PROCEDURE — 80048 BASIC METABOLIC PNL TOTAL CA: CPT | Performed by: NURSE PRACTITIONER

## 2022-08-11 PROCEDURE — 63710000001 INSULIN LISPRO (HUMAN) PER 5 UNITS: Performed by: NURSE PRACTITIONER

## 2022-08-11 PROCEDURE — 94799 UNLISTED PULMONARY SVC/PX: CPT

## 2022-08-11 RX ORDER — AMIODARONE HYDROCHLORIDE 200 MG/1
200 TABLET ORAL DAILY
Status: DISCONTINUED | OUTPATIENT
Start: 2022-09-02 | End: 2022-08-15 | Stop reason: HOSPADM

## 2022-08-11 RX ORDER — AMIODARONE HYDROCHLORIDE 200 MG/1
200 TABLET ORAL EVERY 8 HOURS
Status: DISCONTINUED | OUTPATIENT
Start: 2022-08-12 | End: 2022-08-15 | Stop reason: HOSPADM

## 2022-08-11 RX ORDER — FUROSEMIDE 10 MG/ML
40 INJECTION INTRAMUSCULAR; INTRAVENOUS ONCE
Status: COMPLETED | OUTPATIENT
Start: 2022-08-11 | End: 2022-08-11

## 2022-08-11 RX ORDER — AMIODARONE HYDROCHLORIDE 200 MG/1
200 TABLET ORAL ONCE
Status: COMPLETED | OUTPATIENT
Start: 2022-08-12 | End: 2022-08-12

## 2022-08-11 RX ORDER — WARFARIN SODIUM 2 MG/1
2 TABLET ORAL
Status: COMPLETED | OUTPATIENT
Start: 2022-08-11 | End: 2022-08-11

## 2022-08-11 RX ORDER — TRAMADOL HYDROCHLORIDE 50 MG/1
50 TABLET ORAL EVERY 8 HOURS PRN
Status: DISCONTINUED | OUTPATIENT
Start: 2022-08-11 | End: 2022-08-11

## 2022-08-11 RX ORDER — AMIODARONE HYDROCHLORIDE 200 MG/1
200 TABLET ORAL EVERY 12 HOURS
Status: DISCONTINUED | OUTPATIENT
Start: 2022-08-19 | End: 2022-08-15 | Stop reason: HOSPADM

## 2022-08-11 RX ADMIN — DOCUSATE SODIUM 100 MG: 100 CAPSULE, LIQUID FILLED ORAL at 20:26

## 2022-08-11 RX ADMIN — PANTOPRAZOLE SODIUM 40 MG: 40 TABLET, DELAYED RELEASE ORAL at 06:37

## 2022-08-11 RX ADMIN — FUROSEMIDE 40 MG: 40 TABLET ORAL at 08:06

## 2022-08-11 RX ADMIN — FUROSEMIDE 40 MG: 10 INJECTION, SOLUTION INTRAMUSCULAR; INTRAVENOUS at 17:39

## 2022-08-11 RX ADMIN — GUAIFENESIN 1200 MG: 600 TABLET, EXTENDED RELEASE ORAL at 08:07

## 2022-08-11 RX ADMIN — POTASSIUM CHLORIDE 20 MEQ: 750 TABLET, EXTENDED RELEASE ORAL at 08:08

## 2022-08-11 RX ADMIN — METOPROLOL TARTRATE 25 MG: 25 TABLET, FILM COATED ORAL at 08:09

## 2022-08-11 RX ADMIN — AMIODARONE HYDROCHLORIDE 150 MG: 1.5 INJECTION, SOLUTION INTRAVENOUS at 17:33

## 2022-08-11 RX ADMIN — DOCUSATE SODIUM 50MG AND SENNOSIDES 8.6MG 2 TABLET: 8.6; 5 TABLET, FILM COATED ORAL at 20:26

## 2022-08-11 RX ADMIN — CHLORHEXIDINE GLUCONATE 15 ML: 1.2 SOLUTION ORAL at 23:32

## 2022-08-11 RX ADMIN — INSULIN LISPRO 2 UNITS: 100 INJECTION, SOLUTION INTRAVENOUS; SUBCUTANEOUS at 06:37

## 2022-08-11 RX ADMIN — DOCUSATE SODIUM 100 MG: 100 CAPSULE, LIQUID FILLED ORAL at 08:08

## 2022-08-11 RX ADMIN — GUAIFENESIN 1200 MG: 600 TABLET, EXTENDED RELEASE ORAL at 20:26

## 2022-08-11 RX ADMIN — WARFARIN 2 MG: 2 TABLET ORAL at 18:54

## 2022-08-11 RX ADMIN — AMIODARONE HYDROCHLORIDE 1 MG/MIN: 1.8 INJECTION, SOLUTION INTRAVENOUS at 17:49

## 2022-08-11 RX ADMIN — CHLORHEXIDINE GLUCONATE 15 ML: 1.2 SOLUTION ORAL at 11:59

## 2022-08-11 RX ADMIN — ASPIRIN 81 MG: 81 TABLET, FILM COATED ORAL at 08:08

## 2022-08-11 RX ADMIN — AMIODARONE HYDROCHLORIDE 0.5 MG/MIN: 1.8 INJECTION, SOLUTION INTRAVENOUS at 23:32

## 2022-08-11 RX ADMIN — INSULIN LISPRO 2 UNITS: 100 INJECTION, SOLUTION INTRAVENOUS; SUBCUTANEOUS at 11:57

## 2022-08-11 RX ADMIN — INSULIN LISPRO 2 UNITS: 100 INJECTION, SOLUTION INTRAVENOUS; SUBCUTANEOUS at 17:34

## 2022-08-11 RX ADMIN — POLYETHYLENE GLYCOL 3350 17 G: 17 POWDER, FOR SOLUTION ORAL at 08:09

## 2022-08-11 RX ADMIN — MUPIROCIN 1 APPLICATION: 20 OINTMENT TOPICAL at 20:25

## 2022-08-11 RX ADMIN — ENOXAPARIN SODIUM 40 MG: 100 INJECTION SUBCUTANEOUS at 17:39

## 2022-08-11 RX ADMIN — CYCLOSPORINE 1 DROP: 0.5 EMULSION OPHTHALMIC at 21:33

## 2022-08-11 RX ADMIN — METOPROLOL TARTRATE 25 MG: 25 TABLET, FILM COATED ORAL at 20:26

## 2022-08-11 RX ADMIN — CYCLOSPORINE 1 DROP: 0.5 EMULSION OPHTHALMIC at 08:07

## 2022-08-11 RX ADMIN — ATORVASTATIN CALCIUM 40 MG: 20 TABLET, FILM COATED ORAL at 20:25

## 2022-08-11 RX ADMIN — TAMSULOSIN HYDROCHLORIDE 0.4 MG: 0.4 CAPSULE ORAL at 08:06

## 2022-08-11 RX ADMIN — MUPIROCIN 1 APPLICATION: 20 OINTMENT TOPICAL at 08:07

## 2022-08-12 ENCOUNTER — APPOINTMENT (OUTPATIENT)
Dept: CT IMAGING | Facility: HOSPITAL | Age: 82
End: 2022-08-12

## 2022-08-12 LAB
ANION GAP SERPL CALCULATED.3IONS-SCNC: 12.9 MMOL/L (ref 5–15)
BACTERIA SPEC AEROBE CULT: NO GROWTH
BUN SERPL-MCNC: 43 MG/DL (ref 8–23)
BUN/CREAT SERPL: 34.7 (ref 7–25)
CALCIUM SPEC-SCNC: 8.8 MG/DL (ref 8.6–10.5)
CHLORIDE SERPL-SCNC: 98 MMOL/L (ref 98–107)
CO2 SERPL-SCNC: 25.1 MMOL/L (ref 22–29)
CREAT SERPL-MCNC: 1.24 MG/DL (ref 0.76–1.27)
DEPRECATED RDW RBC AUTO: 43.9 FL (ref 37–54)
EGFRCR SERPLBLD CKD-EPI 2021: 58.4 ML/MIN/1.73
ERYTHROCYTE [DISTWIDTH] IN BLOOD BY AUTOMATED COUNT: 12.6 % (ref 12.3–15.4)
GLUCOSE BLDC GLUCOMTR-MCNC: 177 MG/DL (ref 70–130)
GLUCOSE BLDC GLUCOMTR-MCNC: 188 MG/DL (ref 70–130)
GLUCOSE BLDC GLUCOMTR-MCNC: 202 MG/DL (ref 70–130)
GLUCOSE BLDC GLUCOMTR-MCNC: 244 MG/DL (ref 70–130)
GLUCOSE SERPL-MCNC: 175 MG/DL (ref 65–99)
HCT VFR BLD AUTO: 31.5 % (ref 37.5–51)
HGB BLD-MCNC: 10.2 G/DL (ref 13–17.7)
INR PPP: 1.21 (ref 0.9–1.1)
MCH RBC QN AUTO: 30.8 PG (ref 26.6–33)
MCHC RBC AUTO-ENTMCNC: 32.4 G/DL (ref 31.5–35.7)
MCV RBC AUTO: 95.2 FL (ref 79–97)
PLATELET # BLD AUTO: 172 10*3/MM3 (ref 140–450)
PMV BLD AUTO: 10.2 FL (ref 6–12)
POTASSIUM SERPL-SCNC: 3.8 MMOL/L (ref 3.5–5.2)
PROTHROMBIN TIME: 15.2 SECONDS (ref 11.7–14.2)
QT INTERVAL: 421 MS
QT INTERVAL: 520 MS
RBC # BLD AUTO: 3.31 10*6/MM3 (ref 4.14–5.8)
SODIUM SERPL-SCNC: 136 MMOL/L (ref 136–145)
WBC NRBC COR # BLD: 12.55 10*3/MM3 (ref 3.4–10.8)

## 2022-08-12 PROCEDURE — 80048 BASIC METABOLIC PNL TOTAL CA: CPT | Performed by: NURSE PRACTITIONER

## 2022-08-12 PROCEDURE — 63710000001 INSULIN LISPRO (HUMAN) PER 5 UNITS: Performed by: NURSE PRACTITIONER

## 2022-08-12 PROCEDURE — 85027 COMPLETE CBC AUTOMATED: CPT | Performed by: NURSE PRACTITIONER

## 2022-08-12 PROCEDURE — 85610 PROTHROMBIN TIME: CPT | Performed by: THORACIC SURGERY (CARDIOTHORACIC VASCULAR SURGERY)

## 2022-08-12 PROCEDURE — 97530 THERAPEUTIC ACTIVITIES: CPT

## 2022-08-12 PROCEDURE — 99233 SBSQ HOSP IP/OBS HIGH 50: CPT | Performed by: PHYSICIAN ASSISTANT

## 2022-08-12 PROCEDURE — 93005 ELECTROCARDIOGRAM TRACING: CPT | Performed by: THORACIC SURGERY (CARDIOTHORACIC VASCULAR SURGERY)

## 2022-08-12 PROCEDURE — 94799 UNLISTED PULMONARY SVC/PX: CPT

## 2022-08-12 PROCEDURE — 99232 SBSQ HOSP IP/OBS MODERATE 35: CPT | Performed by: NURSE PRACTITIONER

## 2022-08-12 PROCEDURE — 82962 GLUCOSE BLOOD TEST: CPT

## 2022-08-12 PROCEDURE — 70450 CT HEAD/BRAIN W/O DYE: CPT

## 2022-08-12 PROCEDURE — 25010000002 ENOXAPARIN PER 10 MG: Performed by: NURSE PRACTITIONER

## 2022-08-12 PROCEDURE — 93010 ELECTROCARDIOGRAM REPORT: CPT | Performed by: INTERNAL MEDICINE

## 2022-08-12 RX ORDER — WARFARIN SODIUM 2 MG/1
2 TABLET ORAL
Status: DISCONTINUED | OUTPATIENT
Start: 2022-08-12 | End: 2022-08-13

## 2022-08-12 RX ORDER — WARFARIN SODIUM 2 MG/1
2 TABLET ORAL
Status: DISCONTINUED | OUTPATIENT
Start: 2022-08-12 | End: 2022-08-12

## 2022-08-12 RX ADMIN — INSULIN LISPRO 2 UNITS: 100 INJECTION, SOLUTION INTRAVENOUS; SUBCUTANEOUS at 06:54

## 2022-08-12 RX ADMIN — METOPROLOL TARTRATE 25 MG: 25 TABLET, FILM COATED ORAL at 20:17

## 2022-08-12 RX ADMIN — DOCUSATE SODIUM 50MG AND SENNOSIDES 8.6MG 2 TABLET: 8.6; 5 TABLET, FILM COATED ORAL at 20:17

## 2022-08-12 RX ADMIN — CYCLOSPORINE 1 DROP: 0.5 EMULSION OPHTHALMIC at 08:43

## 2022-08-12 RX ADMIN — FUROSEMIDE 40 MG: 40 TABLET ORAL at 08:42

## 2022-08-12 RX ADMIN — ATORVASTATIN CALCIUM 40 MG: 20 TABLET, FILM COATED ORAL at 20:17

## 2022-08-12 RX ADMIN — INSULIN LISPRO 2 UNITS: 100 INJECTION, SOLUTION INTRAVENOUS; SUBCUTANEOUS at 12:22

## 2022-08-12 RX ADMIN — WARFARIN 2 MG: 2 TABLET ORAL at 16:36

## 2022-08-12 RX ADMIN — AMIODARONE HYDROCHLORIDE 200 MG: 200 TABLET ORAL at 20:17

## 2022-08-12 RX ADMIN — ASPIRIN 81 MG: 81 TABLET, FILM COATED ORAL at 08:42

## 2022-08-12 RX ADMIN — DOCUSATE SODIUM 100 MG: 100 CAPSULE, LIQUID FILLED ORAL at 20:17

## 2022-08-12 RX ADMIN — ENOXAPARIN SODIUM 40 MG: 100 INJECTION SUBCUTANEOUS at 16:29

## 2022-08-12 RX ADMIN — POLYETHYLENE GLYCOL 3350 17 G: 17 POWDER, FOR SOLUTION ORAL at 07:57

## 2022-08-12 RX ADMIN — INSULIN LISPRO 4 UNITS: 100 INJECTION, SOLUTION INTRAVENOUS; SUBCUTANEOUS at 16:27

## 2022-08-12 RX ADMIN — MUPIROCIN 1 APPLICATION: 20 OINTMENT TOPICAL at 08:42

## 2022-08-12 RX ADMIN — CYCLOSPORINE 1 DROP: 0.5 EMULSION OPHTHALMIC at 20:18

## 2022-08-12 RX ADMIN — GUAIFENESIN 1200 MG: 600 TABLET, EXTENDED RELEASE ORAL at 20:18

## 2022-08-12 RX ADMIN — MUPIROCIN 1 APPLICATION: 20 OINTMENT TOPICAL at 20:16

## 2022-08-12 RX ADMIN — POTASSIUM CHLORIDE 20 MEQ: 750 TABLET, EXTENDED RELEASE ORAL at 08:42

## 2022-08-12 RX ADMIN — PANTOPRAZOLE SODIUM 40 MG: 40 TABLET, DELAYED RELEASE ORAL at 06:54

## 2022-08-12 RX ADMIN — AMIODARONE HYDROCHLORIDE 200 MG: 200 TABLET ORAL at 12:37

## 2022-08-12 RX ADMIN — METOPROLOL TARTRATE 25 MG: 25 TABLET, FILM COATED ORAL at 08:42

## 2022-08-12 RX ADMIN — TAMSULOSIN HYDROCHLORIDE 0.4 MG: 0.4 CAPSULE ORAL at 08:42

## 2022-08-13 LAB
ANION GAP SERPL CALCULATED.3IONS-SCNC: 10.7 MMOL/L (ref 5–15)
BUN SERPL-MCNC: 35 MG/DL (ref 8–23)
BUN/CREAT SERPL: 35.4 (ref 7–25)
CALCIUM SPEC-SCNC: 8.6 MG/DL (ref 8.6–10.5)
CHLORIDE SERPL-SCNC: 98 MMOL/L (ref 98–107)
CO2 SERPL-SCNC: 23.3 MMOL/L (ref 22–29)
CREAT SERPL-MCNC: 0.99 MG/DL (ref 0.76–1.27)
EGFRCR SERPLBLD CKD-EPI 2021: 76.5 ML/MIN/1.73
GLUCOSE BLDC GLUCOMTR-MCNC: 127 MG/DL (ref 70–130)
GLUCOSE BLDC GLUCOMTR-MCNC: 145 MG/DL (ref 70–130)
GLUCOSE BLDC GLUCOMTR-MCNC: 191 MG/DL (ref 70–130)
GLUCOSE BLDC GLUCOMTR-MCNC: 197 MG/DL (ref 70–130)
GLUCOSE BLDC GLUCOMTR-MCNC: 232 MG/DL (ref 70–130)
GLUCOSE SERPL-MCNC: 165 MG/DL (ref 65–99)
INR PPP: 1.16 (ref 0.9–1.1)
POTASSIUM SERPL-SCNC: 3.9 MMOL/L (ref 3.5–5.2)
PROTHROMBIN TIME: 14.7 SECONDS (ref 11.7–14.2)
QT INTERVAL: 374 MS
SODIUM SERPL-SCNC: 132 MMOL/L (ref 136–145)

## 2022-08-13 PROCEDURE — 94799 UNLISTED PULMONARY SVC/PX: CPT

## 2022-08-13 PROCEDURE — 80048 BASIC METABOLIC PNL TOTAL CA: CPT | Performed by: NURSE PRACTITIONER

## 2022-08-13 PROCEDURE — 63710000001 INSULIN LISPRO (HUMAN) PER 5 UNITS: Performed by: NURSE PRACTITIONER

## 2022-08-13 PROCEDURE — 93010 ELECTROCARDIOGRAM REPORT: CPT | Performed by: INTERNAL MEDICINE

## 2022-08-13 PROCEDURE — 97530 THERAPEUTIC ACTIVITIES: CPT

## 2022-08-13 PROCEDURE — 25010000002 ENOXAPARIN PER 10 MG: Performed by: NURSE PRACTITIONER

## 2022-08-13 PROCEDURE — 93005 ELECTROCARDIOGRAM TRACING: CPT | Performed by: THORACIC SURGERY (CARDIOTHORACIC VASCULAR SURGERY)

## 2022-08-13 PROCEDURE — 94761 N-INVAS EAR/PLS OXIMETRY MLT: CPT

## 2022-08-13 PROCEDURE — 85610 PROTHROMBIN TIME: CPT | Performed by: NURSE PRACTITIONER

## 2022-08-13 PROCEDURE — 97110 THERAPEUTIC EXERCISES: CPT

## 2022-08-13 PROCEDURE — 99232 SBSQ HOSP IP/OBS MODERATE 35: CPT | Performed by: INTERNAL MEDICINE

## 2022-08-13 PROCEDURE — 82962 GLUCOSE BLOOD TEST: CPT

## 2022-08-13 RX ORDER — WARFARIN SODIUM 4 MG/1
4 TABLET ORAL
Status: DISCONTINUED | OUTPATIENT
Start: 2022-08-13 | End: 2022-08-15 | Stop reason: HOSPADM

## 2022-08-13 RX ORDER — ENOXAPARIN SODIUM 100 MG/ML
40 INJECTION SUBCUTANEOUS EVERY 12 HOURS
Status: DISCONTINUED | OUTPATIENT
Start: 2022-08-13 | End: 2022-08-15 | Stop reason: HOSPADM

## 2022-08-13 RX ORDER — ATENOLOL 25 MG/1
25 TABLET ORAL EVERY 12 HOURS SCHEDULED
Status: DISCONTINUED | OUTPATIENT
Start: 2022-08-13 | End: 2022-08-15 | Stop reason: HOSPADM

## 2022-08-13 RX ADMIN — PANTOPRAZOLE SODIUM 40 MG: 40 TABLET, DELAYED RELEASE ORAL at 06:39

## 2022-08-13 RX ADMIN — WARFARIN SODIUM 4 MG: 4 TABLET ORAL at 18:30

## 2022-08-13 RX ADMIN — AMIODARONE HYDROCHLORIDE 200 MG: 200 TABLET ORAL at 20:17

## 2022-08-13 RX ADMIN — POLYETHYLENE GLYCOL 3350 17 G: 17 POWDER, FOR SOLUTION ORAL at 08:54

## 2022-08-13 RX ADMIN — METFORMIN HYDROCHLORIDE 1000 MG: 1000 TABLET, FILM COATED ORAL at 10:53

## 2022-08-13 RX ADMIN — DOCUSATE SODIUM 100 MG: 100 CAPSULE, LIQUID FILLED ORAL at 20:18

## 2022-08-13 RX ADMIN — TAMSULOSIN HYDROCHLORIDE 0.4 MG: 0.4 CAPSULE ORAL at 08:50

## 2022-08-13 RX ADMIN — ATENOLOL 25 MG: 25 TABLET ORAL at 23:32

## 2022-08-13 RX ADMIN — INSULIN LISPRO 2 UNITS: 100 INJECTION, SOLUTION INTRAVENOUS; SUBCUTANEOUS at 06:39

## 2022-08-13 RX ADMIN — CYCLOSPORINE 1 DROP: 0.5 EMULSION OPHTHALMIC at 20:26

## 2022-08-13 RX ADMIN — INSULIN LISPRO 2 UNITS: 100 INJECTION, SOLUTION INTRAVENOUS; SUBCUTANEOUS at 16:08

## 2022-08-13 RX ADMIN — ENOXAPARIN SODIUM 40 MG: 100 INJECTION SUBCUTANEOUS at 10:53

## 2022-08-13 RX ADMIN — MUPIROCIN 1 APPLICATION: 20 OINTMENT TOPICAL at 20:19

## 2022-08-13 RX ADMIN — ATORVASTATIN CALCIUM 40 MG: 20 TABLET, FILM COATED ORAL at 20:16

## 2022-08-13 RX ADMIN — POTASSIUM CHLORIDE 20 MEQ: 750 TABLET, EXTENDED RELEASE ORAL at 08:50

## 2022-08-13 RX ADMIN — DOCUSATE SODIUM 100 MG: 100 CAPSULE, LIQUID FILLED ORAL at 08:54

## 2022-08-13 RX ADMIN — CYCLOSPORINE 1 DROP: 0.5 EMULSION OPHTHALMIC at 08:54

## 2022-08-13 RX ADMIN — METFORMIN HYDROCHLORIDE 1000 MG: 1000 TABLET, FILM COATED ORAL at 16:08

## 2022-08-13 RX ADMIN — ASPIRIN 81 MG: 81 TABLET, FILM COATED ORAL at 08:50

## 2022-08-13 RX ADMIN — DOCUSATE SODIUM 50MG AND SENNOSIDES 8.6MG 2 TABLET: 8.6; 5 TABLET, FILM COATED ORAL at 20:17

## 2022-08-13 RX ADMIN — AMIODARONE HYDROCHLORIDE 200 MG: 200 TABLET ORAL at 04:13

## 2022-08-13 RX ADMIN — MUPIROCIN 1 APPLICATION: 20 OINTMENT TOPICAL at 08:50

## 2022-08-13 RX ADMIN — INSULIN LISPRO 4 UNITS: 100 INJECTION, SOLUTION INTRAVENOUS; SUBCUTANEOUS at 12:03

## 2022-08-13 RX ADMIN — METOPROLOL TARTRATE 25 MG: 25 TABLET, FILM COATED ORAL at 08:50

## 2022-08-13 RX ADMIN — AMIODARONE HYDROCHLORIDE 200 MG: 200 TABLET ORAL at 12:03

## 2022-08-13 RX ADMIN — GUAIFENESIN 1200 MG: 600 TABLET, EXTENDED RELEASE ORAL at 08:50

## 2022-08-13 RX ADMIN — ENOXAPARIN SODIUM 40 MG: 100 INJECTION SUBCUTANEOUS at 23:31

## 2022-08-13 RX ADMIN — ATENOLOL 25 MG: 25 TABLET ORAL at 12:03

## 2022-08-13 RX ADMIN — GUAIFENESIN 1200 MG: 600 TABLET, EXTENDED RELEASE ORAL at 20:17

## 2022-08-13 RX ADMIN — FUROSEMIDE 40 MG: 40 TABLET ORAL at 08:50

## 2022-08-14 LAB
ANION GAP SERPL CALCULATED.3IONS-SCNC: 8.9 MMOL/L (ref 5–15)
BUN SERPL-MCNC: 35 MG/DL (ref 8–23)
BUN/CREAT SERPL: 33.7 (ref 7–25)
CALCIUM SPEC-SCNC: 8.4 MG/DL (ref 8.6–10.5)
CHLORIDE SERPL-SCNC: 99 MMOL/L (ref 98–107)
CO2 SERPL-SCNC: 26.1 MMOL/L (ref 22–29)
CREAT SERPL-MCNC: 1.04 MG/DL (ref 0.76–1.27)
DEPRECATED RDW RBC AUTO: 43.6 FL (ref 37–54)
EGFRCR SERPLBLD CKD-EPI 2021: 72.1 ML/MIN/1.73
ERYTHROCYTE [DISTWIDTH] IN BLOOD BY AUTOMATED COUNT: 12.5 % (ref 12.3–15.4)
GLUCOSE BLDC GLUCOMTR-MCNC: 130 MG/DL (ref 70–130)
GLUCOSE BLDC GLUCOMTR-MCNC: 145 MG/DL (ref 70–130)
GLUCOSE BLDC GLUCOMTR-MCNC: 209 MG/DL (ref 70–130)
GLUCOSE SERPL-MCNC: 156 MG/DL (ref 65–99)
HCT VFR BLD AUTO: 30.3 % (ref 37.5–51)
HGB BLD-MCNC: 9.8 G/DL (ref 13–17.7)
INR PPP: 1.2 (ref 0.9–1.1)
MCH RBC QN AUTO: 30.6 PG (ref 26.6–33)
MCHC RBC AUTO-ENTMCNC: 32.3 G/DL (ref 31.5–35.7)
MCV RBC AUTO: 94.7 FL (ref 79–97)
PLATELET # BLD AUTO: 231 10*3/MM3 (ref 140–450)
PMV BLD AUTO: 9.9 FL (ref 6–12)
POTASSIUM SERPL-SCNC: 4 MMOL/L (ref 3.5–5.2)
PROTHROMBIN TIME: 15.1 SECONDS (ref 11.7–14.2)
RBC # BLD AUTO: 3.2 10*6/MM3 (ref 4.14–5.8)
SODIUM SERPL-SCNC: 134 MMOL/L (ref 136–145)
WBC NRBC COR # BLD: 8.62 10*3/MM3 (ref 3.4–10.8)

## 2022-08-14 PROCEDURE — 93005 ELECTROCARDIOGRAM TRACING: CPT | Performed by: THORACIC SURGERY (CARDIOTHORACIC VASCULAR SURGERY)

## 2022-08-14 PROCEDURE — 85027 COMPLETE CBC AUTOMATED: CPT | Performed by: THORACIC SURGERY (CARDIOTHORACIC VASCULAR SURGERY)

## 2022-08-14 PROCEDURE — 94799 UNLISTED PULMONARY SVC/PX: CPT

## 2022-08-14 PROCEDURE — 97530 THERAPEUTIC ACTIVITIES: CPT

## 2022-08-14 PROCEDURE — 80048 BASIC METABOLIC PNL TOTAL CA: CPT | Performed by: NURSE PRACTITIONER

## 2022-08-14 PROCEDURE — 82962 GLUCOSE BLOOD TEST: CPT

## 2022-08-14 PROCEDURE — 63710000001 INSULIN LISPRO (HUMAN) PER 5 UNITS: Performed by: NURSE PRACTITIONER

## 2022-08-14 PROCEDURE — 25010000002 ENOXAPARIN PER 10 MG: Performed by: NURSE PRACTITIONER

## 2022-08-14 PROCEDURE — 99232 SBSQ HOSP IP/OBS MODERATE 35: CPT | Performed by: INTERNAL MEDICINE

## 2022-08-14 PROCEDURE — 85610 PROTHROMBIN TIME: CPT | Performed by: NURSE PRACTITIONER

## 2022-08-14 PROCEDURE — 93010 ELECTROCARDIOGRAM REPORT: CPT | Performed by: INTERNAL MEDICINE

## 2022-08-14 RX ADMIN — WARFARIN SODIUM 4 MG: 4 TABLET ORAL at 18:12

## 2022-08-14 RX ADMIN — ACETAMINOPHEN 650 MG: 325 TABLET, FILM COATED ORAL at 08:38

## 2022-08-14 RX ADMIN — ATENOLOL 25 MG: 25 TABLET ORAL at 08:33

## 2022-08-14 RX ADMIN — METFORMIN HYDROCHLORIDE 1000 MG: 1000 TABLET, FILM COATED ORAL at 16:34

## 2022-08-14 RX ADMIN — GUAIFENESIN 1200 MG: 600 TABLET, EXTENDED RELEASE ORAL at 08:33

## 2022-08-14 RX ADMIN — DOCUSATE SODIUM 50MG AND SENNOSIDES 8.6MG 2 TABLET: 8.6; 5 TABLET, FILM COATED ORAL at 21:59

## 2022-08-14 RX ADMIN — AMIODARONE HYDROCHLORIDE 200 MG: 200 TABLET ORAL at 04:59

## 2022-08-14 RX ADMIN — TAMSULOSIN HYDROCHLORIDE 0.4 MG: 0.4 CAPSULE ORAL at 08:33

## 2022-08-14 RX ADMIN — POLYETHYLENE GLYCOL 3350 17 G: 17 POWDER, FOR SOLUTION ORAL at 08:34

## 2022-08-14 RX ADMIN — DOCUSATE SODIUM 100 MG: 100 CAPSULE, LIQUID FILLED ORAL at 08:34

## 2022-08-14 RX ADMIN — FUROSEMIDE 40 MG: 40 TABLET ORAL at 08:33

## 2022-08-14 RX ADMIN — ATENOLOL 25 MG: 25 TABLET ORAL at 21:58

## 2022-08-14 RX ADMIN — AMIODARONE HYDROCHLORIDE 200 MG: 200 TABLET ORAL at 21:58

## 2022-08-14 RX ADMIN — MUPIROCIN 1 APPLICATION: 20 OINTMENT TOPICAL at 08:33

## 2022-08-14 RX ADMIN — CYCLOSPORINE 1 DROP: 0.5 EMULSION OPHTHALMIC at 21:59

## 2022-08-14 RX ADMIN — METFORMIN HYDROCHLORIDE 1000 MG: 1000 TABLET, FILM COATED ORAL at 08:33

## 2022-08-14 RX ADMIN — MUPIROCIN 1 APPLICATION: 20 OINTMENT TOPICAL at 22:00

## 2022-08-14 RX ADMIN — ENOXAPARIN SODIUM 40 MG: 100 INJECTION SUBCUTANEOUS at 22:00

## 2022-08-14 RX ADMIN — INSULIN LISPRO 4 UNITS: 100 INJECTION, SOLUTION INTRAVENOUS; SUBCUTANEOUS at 11:11

## 2022-08-14 RX ADMIN — GUAIFENESIN 1200 MG: 600 TABLET, EXTENDED RELEASE ORAL at 21:57

## 2022-08-14 RX ADMIN — ENOXAPARIN SODIUM 40 MG: 100 INJECTION SUBCUTANEOUS at 11:11

## 2022-08-14 RX ADMIN — PANTOPRAZOLE SODIUM 40 MG: 40 TABLET, DELAYED RELEASE ORAL at 04:59

## 2022-08-14 RX ADMIN — AMIODARONE HYDROCHLORIDE 200 MG: 200 TABLET ORAL at 12:45

## 2022-08-14 RX ADMIN — CYCLOSPORINE 1 DROP: 0.5 EMULSION OPHTHALMIC at 08:34

## 2022-08-14 RX ADMIN — ASPIRIN 81 MG: 81 TABLET, FILM COATED ORAL at 08:33

## 2022-08-14 RX ADMIN — ATORVASTATIN CALCIUM 40 MG: 20 TABLET, FILM COATED ORAL at 21:58

## 2022-08-14 RX ADMIN — CHLORHEXIDINE GLUCONATE 15 ML: 1.2 SOLUTION ORAL at 22:00

## 2022-08-14 RX ADMIN — POTASSIUM CHLORIDE 20 MEQ: 750 TABLET, EXTENDED RELEASE ORAL at 08:33

## 2022-08-15 ENCOUNTER — READMISSION MANAGEMENT (OUTPATIENT)
Dept: CALL CENTER | Facility: HOSPITAL | Age: 82
End: 2022-08-15

## 2022-08-15 VITALS
WEIGHT: 153.4 LBS | BODY MASS INDEX: 23.25 KG/M2 | HEART RATE: 62 BPM | RESPIRATION RATE: 20 BRPM | SYSTOLIC BLOOD PRESSURE: 111 MMHG | TEMPERATURE: 98.3 F | DIASTOLIC BLOOD PRESSURE: 61 MMHG | OXYGEN SATURATION: 96 % | HEIGHT: 68 IN

## 2022-08-15 PROBLEM — I48.0 AF (PAROXYSMAL ATRIAL FIBRILLATION): Status: ACTIVE | Noted: 2022-08-15

## 2022-08-15 LAB
ANION GAP SERPL CALCULATED.3IONS-SCNC: 8.4 MMOL/L (ref 5–15)
BH BB BLOOD EXPIRATION DATE: NORMAL
BH BB BLOOD EXPIRATION DATE: NORMAL
BH BB BLOOD TYPE BARCODE: 5100
BH BB BLOOD TYPE BARCODE: 5100
BH BB DISPENSE STATUS: NORMAL
BH BB DISPENSE STATUS: NORMAL
BH BB PRODUCT CODE: NORMAL
BH BB PRODUCT CODE: NORMAL
BH BB UNIT NUMBER: NORMAL
BH BB UNIT NUMBER: NORMAL
BUN SERPL-MCNC: 29 MG/DL (ref 8–23)
BUN/CREAT SERPL: 33.3 (ref 7–25)
CALCIUM SPEC-SCNC: 8.4 MG/DL (ref 8.6–10.5)
CHLORIDE SERPL-SCNC: 100 MMOL/L (ref 98–107)
CO2 SERPL-SCNC: 24.6 MMOL/L (ref 22–29)
CREAT SERPL-MCNC: 0.87 MG/DL (ref 0.76–1.27)
CROSSMATCH INTERPRETATION: NORMAL
CROSSMATCH INTERPRETATION: NORMAL
EGFRCR SERPLBLD CKD-EPI 2021: 86.7 ML/MIN/1.73
GLUCOSE BLDC GLUCOMTR-MCNC: 177 MG/DL (ref 70–130)
GLUCOSE BLDC GLUCOMTR-MCNC: 200 MG/DL (ref 70–130)
GLUCOSE SERPL-MCNC: 154 MG/DL (ref 65–99)
INR PPP: 1.23 (ref 0.9–1.1)
POTASSIUM SERPL-SCNC: 4.4 MMOL/L (ref 3.5–5.2)
PROTHROMBIN TIME: 15.3 SECONDS (ref 11.7–14.2)
QT INTERVAL: 410 MS
SODIUM SERPL-SCNC: 133 MMOL/L (ref 136–145)
UNIT  ABO: NORMAL
UNIT  ABO: NORMAL
UNIT  RH: NORMAL
UNIT  RH: NORMAL

## 2022-08-15 PROCEDURE — 94799 UNLISTED PULMONARY SVC/PX: CPT

## 2022-08-15 PROCEDURE — 25010000002 ENOXAPARIN PER 10 MG: Performed by: NURSE PRACTITIONER

## 2022-08-15 PROCEDURE — 94618 PULMONARY STRESS TESTING: CPT

## 2022-08-15 PROCEDURE — 82962 GLUCOSE BLOOD TEST: CPT

## 2022-08-15 PROCEDURE — 94761 N-INVAS EAR/PLS OXIMETRY MLT: CPT

## 2022-08-15 PROCEDURE — 25010000002 ONDANSETRON PER 1 MG: Performed by: NURSE PRACTITIONER

## 2022-08-15 PROCEDURE — 94760 N-INVAS EAR/PLS OXIMETRY 1: CPT

## 2022-08-15 PROCEDURE — 63710000001 INSULIN LISPRO (HUMAN) PER 5 UNITS: Performed by: NURSE PRACTITIONER

## 2022-08-15 PROCEDURE — 85610 PROTHROMBIN TIME: CPT | Performed by: NURSE PRACTITIONER

## 2022-08-15 PROCEDURE — 99231 SBSQ HOSP IP/OBS SF/LOW 25: CPT | Performed by: NURSE PRACTITIONER

## 2022-08-15 PROCEDURE — 80048 BASIC METABOLIC PNL TOTAL CA: CPT | Performed by: NURSE PRACTITIONER

## 2022-08-15 RX ORDER — ATENOLOL 25 MG/1
25 TABLET ORAL EVERY 12 HOURS SCHEDULED
Qty: 60 TABLET | Refills: 2 | Status: SHIPPED | OUTPATIENT
Start: 2022-08-15 | End: 2022-09-15 | Stop reason: SDUPTHER

## 2022-08-15 RX ORDER — WARFARIN SODIUM 4 MG/1
TABLET ORAL
Qty: 30 TABLET | Refills: 2 | Status: SHIPPED | OUTPATIENT
Start: 2022-08-15 | End: 2022-09-13 | Stop reason: SDUPTHER

## 2022-08-15 RX ORDER — CHLORAL HYDRATE 500 MG
2000 CAPSULE ORAL
Start: 2022-08-22 | End: 2023-01-19

## 2022-08-15 RX ORDER — ACETAMINOPHEN 325 MG/1
650 TABLET ORAL EVERY 4 HOURS PRN
Start: 2022-08-15 | End: 2023-02-08 | Stop reason: ALTCHOICE

## 2022-08-15 RX ORDER — AMIODARONE HYDROCHLORIDE 200 MG/1
TABLET ORAL
Qty: 70 TABLET | Refills: 0 | Status: SHIPPED | OUTPATIENT
Start: 2022-08-16 | End: 2022-09-15

## 2022-08-15 RX ORDER — AMOXICILLIN 250 MG
2 CAPSULE ORAL NIGHTLY
Start: 2022-08-15 | End: 2023-01-19

## 2022-08-15 RX ORDER — DIPHENOXYLATE HYDROCHLORIDE AND ATROPINE SULFATE 2.5; .025 MG/1; MG/1
1 TABLET ORAL DAILY
Qty: 30 TABLET
Start: 2022-08-22 | End: 2023-01-19

## 2022-08-15 RX ORDER — ATORVASTATIN CALCIUM 40 MG/1
40 TABLET, FILM COATED ORAL NIGHTLY
Qty: 30 TABLET | Refills: 2 | Status: SHIPPED | OUTPATIENT
Start: 2022-08-15 | End: 2022-09-15 | Stop reason: SDUPTHER

## 2022-08-15 RX ORDER — AMIODARONE HYDROCHLORIDE 200 MG/1
200 TABLET ORAL DAILY
Qty: 30 TABLET | Refills: 0 | Status: SHIPPED | OUTPATIENT
Start: 2022-09-02 | End: 2022-08-15 | Stop reason: SDUPTHER

## 2022-08-15 RX ORDER — GUAIFENESIN 600 MG/1
1200 TABLET, EXTENDED RELEASE ORAL EVERY 12 HOURS SCHEDULED
Start: 2022-08-15 | End: 2022-12-28

## 2022-08-15 RX ORDER — FUROSEMIDE 40 MG/1
40 TABLET ORAL DAILY
Qty: 14 TABLET | Refills: 0 | Status: SHIPPED | OUTPATIENT
Start: 2022-08-16 | End: 2022-09-14

## 2022-08-15 RX ORDER — AMIODARONE HYDROCHLORIDE 200 MG/1
200 TABLET ORAL EVERY 12 HOURS
Qty: 28 TABLET | Refills: 0 | Status: SHIPPED | OUTPATIENT
Start: 2022-08-19 | End: 2022-08-15 | Stop reason: SDUPTHER

## 2022-08-15 RX ORDER — ERGOCALCIFEROL (VITAMIN D2) 10 MCG
50000 TABLET ORAL WEEKLY
Qty: 30 TABLET
Start: 2022-08-22 | End: 2023-01-19

## 2022-08-15 RX ADMIN — TAMSULOSIN HYDROCHLORIDE 0.4 MG: 0.4 CAPSULE ORAL at 08:38

## 2022-08-15 RX ADMIN — ATENOLOL 25 MG: 25 TABLET ORAL at 08:38

## 2022-08-15 RX ADMIN — POLYETHYLENE GLYCOL 3350 17 G: 17 POWDER, FOR SOLUTION ORAL at 08:38

## 2022-08-15 RX ADMIN — ASPIRIN 81 MG: 81 TABLET, FILM COATED ORAL at 08:37

## 2022-08-15 RX ADMIN — POTASSIUM CHLORIDE 20 MEQ: 750 TABLET, EXTENDED RELEASE ORAL at 08:39

## 2022-08-15 RX ADMIN — ONDANSETRON 4 MG: 2 INJECTION INTRAMUSCULAR; INTRAVENOUS at 00:30

## 2022-08-15 RX ADMIN — METFORMIN HYDROCHLORIDE 1000 MG: 1000 TABLET, FILM COATED ORAL at 08:38

## 2022-08-15 RX ADMIN — MUPIROCIN 1 APPLICATION: 20 OINTMENT TOPICAL at 08:39

## 2022-08-15 RX ADMIN — FUROSEMIDE 40 MG: 40 TABLET ORAL at 08:37

## 2022-08-15 RX ADMIN — DOCUSATE SODIUM 100 MG: 100 CAPSULE, LIQUID FILLED ORAL at 08:37

## 2022-08-15 RX ADMIN — INSULIN LISPRO 4 UNITS: 100 INJECTION, SOLUTION INTRAVENOUS; SUBCUTANEOUS at 11:33

## 2022-08-15 RX ADMIN — PANTOPRAZOLE SODIUM 40 MG: 40 TABLET, DELAYED RELEASE ORAL at 07:38

## 2022-08-15 RX ADMIN — CYCLOSPORINE 1 DROP: 0.5 EMULSION OPHTHALMIC at 08:38

## 2022-08-15 RX ADMIN — AMIODARONE HYDROCHLORIDE 200 MG: 200 TABLET ORAL at 07:39

## 2022-08-15 RX ADMIN — ENOXAPARIN SODIUM 40 MG: 100 INJECTION SUBCUTANEOUS at 12:54

## 2022-08-15 RX ADMIN — GUAIFENESIN 1200 MG: 600 TABLET, EXTENDED RELEASE ORAL at 08:37

## 2022-08-15 RX ADMIN — AMIODARONE HYDROCHLORIDE 200 MG: 200 TABLET ORAL at 12:54

## 2022-08-15 RX ADMIN — INSULIN LISPRO 2 UNITS: 100 INJECTION, SOLUTION INTRAVENOUS; SUBCUTANEOUS at 07:37

## 2022-08-16 NOTE — OUTREACH NOTE
Prep Survey    Flowsheet Row Responses   [facility] facility patient discharged from? [place]   Is LACE score < 7 ? No   Emergency Room discharge w/ pulse ox? No   Eligibility Readm Mgmt   Discharge diagnosis CABGx3   Does the patient have one of the following disease processes/diagnoses(primary or secondary)? Cardiothoracic surgery   Does the patient have Home health ordered? Yes   What is the Home health agency?  [facility]   Is there a DME ordered? No   Medication alerts for this patient started coumadin   Prep survey completed? Yes          SOCRATES CARROLL - Registered Nurse

## 2022-08-17 RX ORDER — ONDANSETRON 4 MG/1
4 TABLET, FILM COATED ORAL EVERY 6 HOURS PRN
Qty: 12 TABLET | Refills: 1 | Status: SHIPPED | OUTPATIENT
Start: 2022-08-17 | End: 2023-01-19

## 2022-08-18 ENCOUNTER — TELEPHONE (OUTPATIENT)
Dept: CARDIAC SURGERY | Facility: CLINIC | Age: 82
End: 2022-08-18

## 2022-08-18 ENCOUNTER — TELEPHONE (OUTPATIENT)
Dept: PHARMACY | Facility: HOSPITAL | Age: 82
End: 2022-08-18

## 2022-08-18 ENCOUNTER — READMISSION MANAGEMENT (OUTPATIENT)
Dept: CALL CENTER | Facility: HOSPITAL | Age: 82
End: 2022-08-18

## 2022-08-18 NOTE — TELEPHONE ENCOUNTER
Received referral for warfarin management. Spoke with Bianca with [facility]. She confirmed that an INR order is on file to be performed with tomorrow's visit. She is agreeable to communicating the result with our office for management.

## 2022-08-18 NOTE — OUTREACH NOTE
CT Surgery Week 1 Survey    Flowsheet Row Responses   [facility], [place], operated by [facility] patient discharged from? [place]   Does the patient have one of the following disease processes/diagnoses(primary or secondary)? Cardiothoracic surgery   Week 1 attempt successful? Yes   Call start time 0954   Call end time 1004   Discharge diagnosis CABGx3   Meds reviewed with patient/caregiver? Yes   Is the patient having any side effects they believe may be caused by any medication additions or changes? No   Does the patient have all medications related to this admission filled (includes all antibiotics, pain medications, cardiac medications, etc.) Yes   Is the patient taking all medications as directed (includes completed medication regime)? Yes   Does the patient have a primary care provider?  Yes   Does the patient have an appointment scheduled with their C/T surgeon? Yes  [9/14/22]   Has the patient kept scheduled appointments due by today? Yes  [Attending PCP apt today, 8/18/22]   What is the Home health agency?  [facility]    Home health comments HH and PT visits pt per pt    Psychosocial issues? No   Did the patient receive a copy of their discharge instructions? Yes   Nursing interventions Reviewed instructions with patient   What is the patient's perception of their health status since discharge? Improving   Nursing interventions Nurse provided patient education   Is the patient/caregiver able to teach back normal signs of recovery? Nausea and lack of appetite, Pain or discomfort at incisional site   Nursing interventions Reassured on normal signs of recovery   Is the patient /caregiver able to teach back basic post-op care? Keep incision areas clean, dry and protected   Is the patient/caregiver able to teach back signs and symptoms of incisional infection? Increased redness, swelling or pain at the incisonal site, Increased drainage or bleeding, Incisional warmth, Pus or odor from incision, Fever   Is the patient/caregiver able  to teach back steps to recovery at home? Set small, achievable goals for return to baseline health, Rest and rebuild strength, gradually increase activity   If the patient is a current smoker, are they able to teach back resources for cessation? Not a smoker   Is the patient/caregiver able to teach back the hierarchy of who to call/visit for symptoms/problems? PCP, Specialist, Home health nurse, Urgent Care, ED, 911 Yes   Week 1 call completed? Yes   Wrap up additional comments Patient is doing well overall-a little pain in the right upper part of incisional site and also c/o decreased appetite. Walked twice yesterday (8/17/22) and has been following his PT exercise instructions.             KENDAL ORTEGA - Registered Nurse

## 2022-08-18 NOTE — TELEPHONE ENCOUNTER
8/17/2022   calling with concerns. She states  is very nauseous and unable to keep any food down. He is drinking water.     She states he ate some cheerio with half of a banana, was unable to keep it down. He then later in the day tried to eat some sugar free Jello and was unable to keep it down.     Discussed with Kathryn MÉNDEZ, per Kathryn day to send in Zofran 4mg q6 prn, #12, 1 refill.     Zofran RX escribed. Mrs. Dobbs notified.    Advised should conditions not improve or worsen to notify office. She verbalized understanding and this was agreeable.

## 2022-08-19 ENCOUNTER — ANTICOAGULATION VISIT (OUTPATIENT)
Dept: PHARMACY | Facility: HOSPITAL | Age: 82
End: 2022-08-19

## 2022-08-19 DIAGNOSIS — I48.0 AF (PAROXYSMAL ATRIAL FIBRILLATION): Primary | ICD-10-CM

## 2022-08-19 LAB — INR PPP: 2

## 2022-08-19 NOTE — PROGRESS NOTES
Anticoagulation Clinic Progress Note    Anticoagulation Summary  As of 2022    INR goal:  2.0-3.0   TTR:  --   INR used for dosin.00 (2022)   Warfarin maintenance plan:  4 mg every day   Weekly warfarin total:  28 mg   Plan last modified:  [place], PharmD (2022)   Next INR check:     Target end date:      Indications    AF (paroxysmal atrial fibrillation) (HCC) [I48.0]             Anticoagulation Episode Summary     INR check location:      Preferred lab:      Send INR reminders to:   [facility] [place]    Comments:  s/p AVR and CABG. 1st INR check scheduled with [facility] on       Anticoagulation Care Providers     Provider Role Specialty Phone number    Helena Humphries, DEV Referring Cardiology [phone]          Clinic Interview:  Patient Findings     Negatives:  Signs/symptoms of thrombosis, Signs/symptoms of bleeding,   Laboratory test error suspected, Change in health, Change in alcohol use,   Change in activity, Upcoming invasive procedure, Emergency department   visit, Upcoming dental procedure, Missed doses, Extra doses, Change in   medications, Change in diet/appetite, Hospital admission, Bruising, Other   complaints    Comments:  Patient reports still having some discomfort due to surgery.   Confirmed dose.        Clinical Outcomes     Negatives:  Major bleeding event, Thromboembolic event,   Anticoagulation-related hospital admission, Anticoagulation-related ED   visit, Anticoagulation-related fatality    Comments:  Patient reports still having some discomfort due to surgery.   Confirmed dose.          INR History:  Anticoagulation Monitoring 2022   INR 2.00   INR Date 2022   INR Goal 2.0-3.0   Last Week Total 26 mg   Next Week Total 28 mg   Sun 4 mg   Mon 4 mg   Tue 4 mg   Wed 4 mg   Thu 4 mg   Fri 4 mg   Sat 4 mg   Visit Report -       Plan:  1. INR is Therapeutic today- see above in Anticoagulation Summary.   Will instruct Woo Dobbs to  Continue their warfarin regimen- see above in Anticoagulation Summary.  2. Follow up in 5 days.   3. Provided instructions to patient and sent fax to [facility] with instructions and follow up.  They have been instructed to call if any changes in medications, doses, concerns, etc. Patient expresses understanding and has no further questions at this time.    [place], PharmD

## 2022-08-24 ENCOUNTER — ANTICOAGULATION VISIT (OUTPATIENT)
Dept: PHARMACY | Facility: HOSPITAL | Age: 82
End: 2022-08-24

## 2022-08-24 DIAGNOSIS — I48.0 AF (PAROXYSMAL ATRIAL FIBRILLATION): Primary | ICD-10-CM

## 2022-08-24 LAB — INR PPP: 1.5

## 2022-08-24 NOTE — PROGRESS NOTES
Anticoagulation Clinic Progress Note    Anticoagulation Summary  As of 2022    INR goal:  2.0-3.0   TTR:  --   INR used for dosin.50 (2022)   Warfarin maintenance plan:  4 mg every day   Weekly warfarin total:  28 mg   Plan last modified:  [place], PharmD (2022)   Next INR check:  2022   Target end date:      Indications    AF (paroxysmal atrial fibrillation) (HCC) [I48.0]             Anticoagulation Episode Summary     INR check location:      Preferred lab:      Send INR reminders to:  [facility]    Comments:  s/p AVR and CABG. 1st INR check scheduled with [facility] on       Anticoagulation Care Providers     Provider Role Specialty Phone number    Helena Humphries, APRN Referring Cardiology [phone]          INR History:  Anticoagulation Monitoring 2022   INR 2.00 1.50   INR Date 2022   INR Goal 2.0-3.0 2.0-3.0   Trend - Same   Last Week Total 26 mg 28 mg   Next Week Total 28 mg 30 mg   Sun 4 mg 4 mg   Mon 4 mg -   Tue 4 mg -   Wed - 6 mg ()   Thu - 4 mg   Fri 4 mg 4 mg   Sat 4 mg 4 mg   Visit Report - -       Plan:  1. INR is Subtherapeutic today- see above in Anticoagulation Summary.   Provided instructions to Bianca with [facility] to Increase their warfarin regimen- see above in Anticoagulation Summary. Spoke with patients wife and counseled on warfarin   2. Follow up in 5 days      Miya Gan RPH

## 2022-08-26 ENCOUNTER — READMISSION MANAGEMENT (OUTPATIENT)
Dept: CALL CENTER | Facility: HOSPITAL | Age: 82
End: 2022-08-26

## 2022-08-26 NOTE — OUTREACH NOTE
CT Surgery Week 2 Survey    Flowsheet Row Responses   [facility] patient discharged from? [facility]   Does the patient have one of the following disease processes/diagnoses(primary or secondary)? Cardiothoracic surgery   Week 2 attempt successful? Yes   Call start time 0958   Call end time 1051   Discharge diagnosis CABGx3   Is patient permission given to speak with other caregiver? Yes   List who call center can speak with Spouse   Person spoke with today (if not patient) and relationship Spouse   Meds reviewed with patient/caregiver? Yes   Is the patient having any side effects they believe may be caused by any medication additions or changes? No   Does the patient have all medications related to this admission filled (includes all antibiotics, pain medications, cardiac medications, etc.) Yes   Is the patient taking all medications as directed (includes completed medication regime)? Yes   Has the patient kept scheduled appointments due by today? Yes   What is the Home health agency?  [facility]    Has home health visited the patient within 72 hours of discharge? Yes   Psychosocial issues? No   What is the patient's perception of their health status since discharge? Improving   Nursing interventions Nurse provided patient education   Is the patient /caregiver able to teach back basic post-op care? Continue use of incentive spirometry at least 1 week post discharge, Practice 'cough and deep breath'   Week 2 call completed? Yes   Wrap up additional comments Patient continues to improve, no concerns.          SUE WARE - Registered Nurse

## 2022-08-30 ENCOUNTER — ANTICOAGULATION VISIT (OUTPATIENT)
Dept: PHARMACY | Facility: HOSPITAL | Age: 82
End: 2022-08-30

## 2022-08-30 DIAGNOSIS — I48.0 AF (PAROXYSMAL ATRIAL FIBRILLATION): Primary | ICD-10-CM

## 2022-08-30 LAB — INR PPP: 1.8

## 2022-08-30 NOTE — PROGRESS NOTES
Anticoagulation Clinic Progress Note    Anticoagulation Summary  As of 2022    INR goal:  2.0-3.0   TTR:  0.0 % (5 d)   INR used for dosin.80 (2022)   Warfarin maintenance plan:  6 mg every Tue, Thu; 4 mg all other days   Weekly warfarin total:  32 mg   Plan last modified:  [place], PharmD (2022)   Next INR check:  2022   Target end date:      Indications    AF (paroxysmal atrial fibrillation) (HCC) [I48.0]             Anticoagulation Episode Summary     INR check location:      Preferred lab:      Send INR reminders to:   [facility] [place]    Comments:  s/p AVR and CABG. 1st INR check scheduled with [facility] on       Anticoagulation Care Providers     Provider Role Specialty Phone number    Helena Humphries APRN Referring Cardiology [phone]          Clinic Interview:  Patient Findings     Positives:  Other complaints    Negatives:  Signs/symptoms of thrombosis, Signs/symptoms of bleeding,   Laboratory test error suspected, Change in health, Change in alcohol use,   Change in activity, Upcoming invasive procedure, Emergency department   visit, Upcoming dental procedure, Missed doses, Extra doses, Change in   medications, Change in diet/appetite, Hospital admission, Bruising    Comments:  Spouse reports no changes; however, confusion about meals and   vitamin K. She isn't fixing any greens for him but says he missed it and   wants to eat them.  Provided counseling on vitamin k and stressed   consistency.        Clinical Outcomes     Negatives:  Major bleeding event, Thromboembolic event,   Anticoagulation-related hospital admission, Anticoagulation-related ED   visit, Anticoagulation-related fatality    Comments:  Spouse reports no changes; however, confusion about meals and   vitamin K. She isn't fixing any greens for him but says he missed it and   wants to eat them.  Provided counseling on vitamin k and stressed   consistency.          INR  History:  Anticoagulation Monitoring 8/19/2022 8/24/2022 8/30/2022   INR 2.00 1.50 1.80   INR Date 8/19/2022 8/24/2022 8/30/2022   INR Goal 2.0-3.0 2.0-3.0 2.0-3.0   Trend - Same Up   Last Week Total 26 mg 28 mg 30 mg   Next Week Total 28 mg 30 mg 32 mg   Sun 4 mg 4 mg 4 mg   Mon 4 mg - 4 mg   Tue 4 mg - 6 mg   Wed - 6 mg (8/24) 4 mg   Thu - 4 mg 6 mg   Fri 4 mg 4 mg 4 mg   Sat 4 mg 4 mg 4 mg   Visit Report - - -   Some recent data might be hidden       Plan:  1. INR is Subtherapeutic today- see above in Anticoagulation Summary.   Will instruct Woo Dobbs to Change their warfarin regimen- see above in Anticoagulation Summary.  Increase to 6 mg on Tuesdays and Thursdays and 4 mg on all other days until next INR.  2. Follow up in 1 weeks  3. Spoke with patient's spouse, Melina. Faxed order to [facility].    They have been instructed to call if any changes in medications, doses, concerns, etc. Patient expresses understanding and has no further questions at this time.    [place], PharmD   Time-based billing (NON-critical care)

## 2022-09-06 LAB — INR PPP: 2

## 2022-09-13 ENCOUNTER — ANTICOAGULATION VISIT (OUTPATIENT)
Dept: PHARMACY | Facility: HOSPITAL | Age: 82
End: 2022-09-13

## 2022-09-13 DIAGNOSIS — I48.0 AF (PAROXYSMAL ATRIAL FIBRILLATION): Primary | ICD-10-CM

## 2022-09-13 LAB — INR PPP: 2.2

## 2022-09-13 RX ORDER — WARFARIN SODIUM 4 MG/1
TABLET ORAL
Qty: 100 TABLET | Refills: 0 | Status: SHIPPED | OUTPATIENT
Start: 2022-09-13 | End: 2022-10-11 | Stop reason: SDUPTHER

## 2022-09-13 RX ORDER — AMIODARONE HYDROCHLORIDE 200 MG/1
TABLET ORAL
Qty: 70 TABLET | Refills: 0 | OUTPATIENT
Start: 2022-09-13 | End: 2022-10-30

## 2022-09-13 RX ORDER — WARFARIN SODIUM 4 MG/1
TABLET ORAL
Qty: 30 TABLET | Refills: 2 | OUTPATIENT
Start: 2022-09-13

## 2022-09-13 RX ORDER — FUROSEMIDE 40 MG/1
40 TABLET ORAL DAILY
Qty: 14 TABLET | Refills: 0 | OUTPATIENT
Start: 2022-09-13

## 2022-09-13 NOTE — PROGRESS NOTES
Anticoagulation Clinic Progress Note    Anticoagulation Summary  As of 2022    INR goal:  2.0-3.0   TTR:  36.8 % (2.7 wk)   INR used for dosin.20 (2022)   Warfarin maintenance plan:  4 mg every day   Weekly warfarin total:  28 mg   Plan last modified:  [place], PharmD (2022)   Next INR check:  2022   Target end date:      Indications    AF (paroxysmal atrial fibrillation) (HCC) [I48.0]             Anticoagulation Episode Summary     INR check location:      Preferred lab:      Send INR reminders to:  [facility] [place]    Comments:  s/p AVR and CABG. 1st INR check scheduled with [facility] on       Anticoagulation Care Providers     Provider Role Specialty Phone number    Helena Humphries, DEV Referring Cardiology [phone]          Clinic Interview:  Patient Findings     Negatives:  Signs/symptoms of thrombosis, Signs/symptoms of bleeding,   Laboratory test error suspected, Change in health, Change in alcohol use,   Change in activity, Upcoming invasive procedure, Emergency department   visit, Upcoming dental procedure, Missed doses, Extra doses, Change in   medications, Change in diet/appetite, Hospital admission, Bruising, Other   complaints    Comments:  [facility]  discharging today, we had not received INR   results for 2 weeks--[facility] sending to PCP.  Spouse reports dosing as   4 mg daily (not 6 mg TTH, 4mg AOD). Updated track to reflect.       Clinical Outcomes     Negatives:  Major bleeding event, Thromboembolic event,   Anticoagulation-related hospital admission, Anticoagulation-related ED   visit, Anticoagulation-related fatality    Comments:  [facility]  discharging today, we had not received INR   results for 2 weeks--[facility] sending to PCP.  Spouse reports dosing as   4 mg daily (not 6 mg TTH, 4mg AOD). Updated track to reflect.         INR History:  Anticoagulation Monitoring 2022   INR 1.50 1.80 2.20   INR  Date 8/24/2022 8/30/2022 9/13/2022   INR Goal 2.0-3.0 2.0-3.0 2.0-3.0   Trend Same Up Down   Last Week Total 28 mg 30 mg 32 mg   Next Week Total 30 mg 32 mg 28 mg   Sun 4 mg 4 mg -   Mon - 4 mg -   Tue - 6 mg 4 mg   Wed 6 mg (8/24) 4 mg -   Thu 4 mg 6 mg -   Fri 4 mg 4 mg -   Sat 4 mg 4 mg -   Visit Report - - -   Some recent data might be hidden       Plan:  1. INR is Therapeutic today- see above in Anticoagulation Summary.   Will instruct Woo JOAN Dobbs to Continue their warfarin regimen- see above in Anticoagulation Summary.  Updated dosing to reflect patient reported dosing of 4 mg daily.  2. Follow up tomorrow for new patient education.   3. They have been instructed to call if any changes in medications, doses, concerns, etc. Patient expresses understanding and has no further questions at this time.    [place], PharmD

## 2022-09-14 ENCOUNTER — ANTICOAGULATION VISIT (OUTPATIENT)
Dept: PHARMACY | Facility: HOSPITAL | Age: 82
End: 2022-09-14

## 2022-09-14 ENCOUNTER — OFFICE VISIT (OUTPATIENT)
Dept: CARDIAC SURGERY | Facility: CLINIC | Age: 82
End: 2022-09-14

## 2022-09-14 ENCOUNTER — TELEPHONE (OUTPATIENT)
Dept: CARDIOLOGY | Facility: CLINIC | Age: 82
End: 2022-09-14

## 2022-09-14 VITALS
WEIGHT: 150 LBS | RESPIRATION RATE: 18 BRPM | BODY MASS INDEX: 22.73 KG/M2 | OXYGEN SATURATION: 99 % | HEIGHT: 68 IN | SYSTOLIC BLOOD PRESSURE: 160 MMHG | HEART RATE: 52 BPM | TEMPERATURE: 97.1 F | DIASTOLIC BLOOD PRESSURE: 75 MMHG

## 2022-09-14 DIAGNOSIS — Z95.2 S/P AVR (AORTIC VALVE REPLACEMENT): ICD-10-CM

## 2022-09-14 DIAGNOSIS — I48.0 AF (PAROXYSMAL ATRIAL FIBRILLATION): Primary | ICD-10-CM

## 2022-09-14 DIAGNOSIS — Z95.1 S/P CABG X 3: Primary | ICD-10-CM

## 2022-09-14 LAB — INR PPP: 2.6 (ref 0.9–1.1)

## 2022-09-14 PROCEDURE — G0463 HOSPITAL OUTPT CLINIC VISIT: HCPCS

## 2022-09-14 PROCEDURE — 99024 POSTOP FOLLOW-UP VISIT: CPT | Performed by: NURSE PRACTITIONER

## 2022-09-14 PROCEDURE — 36416 COLLJ CAPILLARY BLOOD SPEC: CPT

## 2022-09-14 PROCEDURE — 85610 PROTHROMBIN TIME: CPT

## 2022-09-14 NOTE — PROGRESS NOTES
"CARDIOVASCULAR SURGERY FOLLOW-UP PROGRESS NOTE  Chief Complaint: Postop follow-up        HPI:   Dear Agustín Abad MD and colleagues:    It was nice to see Woo Dobbs in follow up 5 weeks after surgery.  As you know, he is a 81 y.o. male with aortic stenosis, CAD, IHSS, bladder cancer, hypertension, DM 2, hyperlipidemia, GERD, BPH who underwent CABG x3 with LIMA, tissue AVR, IHSS resection on 8/8/2022 at [facility] with Dr. Courtney. 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  He is asking if he can discontinue taking warfarin, I instructed the patient to discuss with Dr. Ball regarding timing, but typically will stay on some sort of anticoagulation for 3 to 6 months postprocedure.  He comes in today complaining of nothing.  His activity level has been good.  From a surgical standpoint, the sternal incision is well approximated without erythema, edema, or drainage.  The sternum is stable to palpation, and the patient denies any popping or clicking with deep inspiration or coughing.      Physical Exam:         /75 (BP Location: Left arm, Patient Position: Sitting, Cuff Size: Adult)   Pulse 52   Temp 97.1 °F (36.2 °C)   Resp 18   Ht 172.7 cm (68\")   Wt 68 kg (150 lb)   SpO2 99%   BMI 22.81 kg/m²   Heart:  regular rate and rhythm, S1, S2 normal, no murmur, click, rub or gallop  Lungs:  clear to auscultation bilaterally  Extremities:  no edema  Incision(s):  mid chest healing well, left leg healing well, sternum stable    Assessment/Plan:     S/P CABG, AVR and IHSS resection. Overall, he is doing well.    No significant post-op complications    No heavy lifting > 10 pounds for 1 more weeks  Follow-up as scheduled with cardiology  Follow-up with CT surgery prn  Prophylactic antibiotics before dental work and other " surgeries lifelong with artificial valve, prosthesis, or grafting    Continue lifting restriction of 10 lbs until 6 weeks and 50 lbs until 12 weeks from the date of surgery, no excessive jarring motions or twisting motions until 12 weeks from the date of surgery    Return to clinic if any signs or symptoms of infection or sternal instability develop       Thank you for allowing me to participate in the care of your patient.    Regards,  DEV Norton     Current outpatient and discharge medications have been reconciled for the patient.  Reviewed by: DEV Norton

## 2022-09-14 NOTE — PROGRESS NOTES
Anticoagulation Clinic Progress Note  Anticoagulation Summary  As of 2022    INR goal:  2.0-3.0   TTR:  41.3 % (2.9 wk)   INR used for dosin.60 (2022)   Warfarin maintenance plan:  4 mg every day   Weekly warfarin total:  28 mg   No change documented:  Blas Rey [facility]   Plan last modified:  [place], PharmD (2022)   Next INR check:  2022   Target end date:      Indications    AF (paroxysmal atrial fibrillation) (HCC) [I48.0]             Anticoagulation Episode Summary     INR check location:      Preferred lab:      [facility] [place]    Comments:  s/p AVR and CABG. 1st INR check scheduled with [facility] on       Anticoagulation Care Providers     Provider Role Specialty Phone number    Helena Humphries, DEV Referring Cardiology [phone]          Clinic Interview:  Patient Findings     Negatives:  Signs/symptoms of thrombosis, Signs/symptoms of bleeding,   Laboratory test error suspected, Change in health, Change in alcohol use,   Change in activity, Upcoming invasive procedure, Emergency department   visit, Upcoming dental procedure, Missed doses, Extra doses, Change in   medications, Change in diet/appetite, Hospital admission, Bruising, Other   complaints    Comments:  Patient came into clinic today for new start warfarin   counseling. Patient stated that he ran out of medication on . So   missed 2 doses (,). Refills at [facility], lab orders have been put   in. Patient stated that since his surgery there have been no other new   start medications. He voiced that his wife fixes him vegetables on a daily   basis and counseled him on the effects of Vitamin K in dark leafy greens.         Clinical Outcomes     Negatives:  Major bleeding event, Thromboembolic event,   Anticoagulation-related hospital admission, Anticoagulation-related ED   visit, Anticoagulation-related fatality    Comments:  Patient came into clinic  today for new start warfarin   counseling. Patient stated that he ran out of medication on . So   missed 2 doses (,). Refills at [facility], lab orders have been put   in. Patient stated that since his surgery there have been no other new   start medications. He voiced that his wife fixes him vegetables on a daily   basis and counseled him on the effects of Vitamin K in dark leafy greens.           Education:  Woo Dobbs is a new start in the [facility]. We discussed the followin) Warfarin's indication, mechanism, and dosing  2) Enforced the importance of taking warfarin as instructed and at the same time every day, preferably in the evening so that we can make dose adjustments more easily following subsequent clinic visits  3) What he should do about a missed dose; pts can take missed doses within about 12 hours of their usual scheduled dose, but he was instructed on the importance of not doubling up on doses unless told to do so by the [facility]  4) Explained possible side effects of warfarin therapy, including increased risk of bleeding, s/sx of bleeding and s/sx of any additional clots/PE/CVA.   5) Discussed monitoring of warfarin, the INR, goal INR range, and the frequency of monitoring  6) Reviewed drug/food/tobacco/EtOH interactions and provided written information covering these topics in more detail, explaining that green, leafy vegetables interact most heavily with warfarin  7) Instructed the pt not to take or discontinue any medications without informing his physician/pharmacist and reminded him to inform us of any dietary changes, as well  8) Explained that he would be coming into the clinic more frequently in these first few weeks of therapy as we try to adjust his dose and achieve a therapeutic INR x 2 consecutive readings. Once that is achieved, patient will follow up in clinic every 4 weeks, on average.    He stated no problems with  transportation or scheduling clinic appts in this manner. he expressed understanding of the information provided and has no additional questions at this time.    Woo Dobbs was presented with a copy of the Patients Rights and Responsibilities. he expressed verbal consent and agreement to receive care in the [facility] under the current collaborative care agreement with [facility].       INR History:  Anticoagulation Monitoring 8/30/2022 9/13/2022 9/14/2022   INR 1.80 2.20 2.60   INR Date 8/30/2022 9/13/2022 9/14/2022   INR Goal 2.0-3.0 2.0-3.0 2.0-3.0   Trend Up Down Same   Last Week Total 30 mg 32 mg 26 mg   Next Week Total 32 mg 28 mg 28 mg   Sun 4 mg - 4 mg   Mon 4 mg - 4 mg   Tue 6 mg 4 mg -   Wed 4 mg - 4 mg   Thu 6 mg - 4 mg   Fri 4 mg - 4 mg   Sat 4 mg - 4 mg   Visit Report - - -   Some recent data might be hidden       Plan:  1. INR is Therapeutic today- see above in Anticoagulation Summary.   Will instruct Woo Dobbs to Continue their warfarin regimen- see above in Anticoagulation Summary. Patient stated that he ran out of medication on 9/12. So   missed 2 doses (9/12,9/13). However will continue him on the same dose because weekly total will remain the same.  2. Follow up in 1 week  3. Patient desires warfarin refills.  4. Verbal and written information provided. Patient expresses understanding and has no further questions at this time.    Blas Rey, PharmD  Pharmacy Resident

## 2022-09-14 NOTE — TELEPHONE ENCOUNTER
Pt called and left a vm that he needed refills on his medication.  I called pt to discuss, but had to leave a vm.  Pt has an appt with Aleks tomorrow 09/15.

## 2022-09-15 ENCOUNTER — READMISSION MANAGEMENT (OUTPATIENT)
Dept: CALL CENTER | Facility: HOSPITAL | Age: 82
End: 2022-09-15

## 2022-09-15 ENCOUNTER — OFFICE VISIT (OUTPATIENT)
Dept: CARDIOLOGY | Facility: CLINIC | Age: 82
End: 2022-09-15

## 2022-09-15 VITALS
DIASTOLIC BLOOD PRESSURE: 70 MMHG | HEIGHT: 68 IN | RESPIRATION RATE: 16 BRPM | HEART RATE: 54 BPM | SYSTOLIC BLOOD PRESSURE: 140 MMHG | OXYGEN SATURATION: 97 % | WEIGHT: 159 LBS | BODY MASS INDEX: 24.1 KG/M2

## 2022-09-15 DIAGNOSIS — I10 PRIMARY HYPERTENSION: ICD-10-CM

## 2022-09-15 DIAGNOSIS — Z95.1 S/P CABG X 3: ICD-10-CM

## 2022-09-15 DIAGNOSIS — E78.2 MIXED HYPERLIPIDEMIA: ICD-10-CM

## 2022-09-15 DIAGNOSIS — Z95.2 S/P AVR (AORTIC VALVE REPLACEMENT): ICD-10-CM

## 2022-09-15 DIAGNOSIS — I48.0 AF (PAROXYSMAL ATRIAL FIBRILLATION): Primary | ICD-10-CM

## 2022-09-15 PROCEDURE — 93000 ELECTROCARDIOGRAM COMPLETE: CPT | Performed by: NURSE PRACTITIONER

## 2022-09-15 PROCEDURE — 99214 OFFICE O/P EST MOD 30 MIN: CPT | Performed by: NURSE PRACTITIONER

## 2022-09-15 RX ORDER — ATORVASTATIN CALCIUM 40 MG/1
40 TABLET, FILM COATED ORAL NIGHTLY
Qty: 90 TABLET | Refills: 3 | Status: SHIPPED | OUTPATIENT
Start: 2022-09-15

## 2022-09-15 RX ORDER — ATENOLOL 25 MG/1
25 TABLET ORAL EVERY 12 HOURS SCHEDULED
Qty: 180 TABLET | Refills: 3 | Status: SHIPPED | OUTPATIENT
Start: 2022-09-15 | End: 2022-11-01

## 2022-09-15 RX ORDER — AMIODARONE HYDROCHLORIDE 200 MG/1
200 TABLET ORAL DAILY
Qty: 90 TABLET | Refills: 3 | Status: SHIPPED | OUTPATIENT
Start: 2022-09-15 | End: 2022-11-01

## 2022-09-15 NOTE — PROGRESS NOTES
Date of Office Visit: 09/15/2022  Encounter Provider: DEV Mars  Place of Service: [facility] CARDIOLOGY  Patient Name: Woo Dobbs  :1940  Primary Cardiologist: Dr. Ball    CC:  F/u hospitalization    Dear Dr. Ivan    HPI: Woo Dobbs is a pleasant 81 y.o. male who presents 09/15/2022 for cardiac follow up.  Reviewed his past medical records including notes, labs and testing in preparation for today's visit.    The patient was seen in the office for follow-up in 2022 with complaints of worsening dyspnea on exertion and chest discomfort.  He last saw Dr. Holguin in 2019 at which time he was found to have mild aortic valve stenosis.  He was lost to follow-up until his recent follow-up.  A stress test and echocardiogram were recommended.  These were performed on 2022.  Stress test was abnormal showing evidence of anterior ischemia.  His echocardiogram showed evidence of moderate to severe aortic valve stenosis.     He subsequently went cardiac catheterization on 2022 which showed severe stenosis of the proximal LAD involving the origin of a medium caliber first diagonal branch and confirmed the finding of aortic valve stenosis.  He was evaluated by surgery at that time and they recommended proceeding with CABG x2 and aortic valve replacement.  Surgery was delayed following he underwent work-up for microscopic hematuria.  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     He was admitted on 2022 and underwent CABG x3 including a LIMA to LAD, saphenous vein graft to the nondominant right coronary artery, and a saphenous vein graft to the diagonal branch.  He also underwent bioprosthetic aortic valve replacement with a 25 mm Medtronic Avalus bovine pericardial valve.    He had a's very small left apical pneumothorax on chest x-ray on postop day 2 which  resolved.  On postop day 3 he went into atrial fibrillation and began hallucinating.  Later in the day his speech became less understandable and his thought process and appropriate.  The stroke team and neurology were consulted and he was evaluated.  There was a question of an old infarct on CT and possible TIA.  Patient was started on Coumadin for AC without any further episodes.  Atenolol was adjusted for rate control.  He was discharged on postop day 7 to home with home health.    He presents today in follow-up.  He states he is progressing slowly.  He has not been contacted by cardiac rehab.  I have placed an order for that.  He is anxious to get started.  He states his medications as directed.  He does still have some surgical soreness.  He states that his incision is sensitive to touch and his shirt rubbing against it bothers him.  He denies any chest pain or chest pressure, dizziness, lightheadedness, syncope or syncopal episodes.  He denies any edema or palpitations.  He still has a little bit of shortness of breath that is very mild with walking.  Past Medical History:   Diagnosis Date   • Asthma    • BPH (benign prostatic hyperplasia)    • Colon polyp    • GERD (gastroesophageal reflux disease)    • Hyperlipidemia    • Hypertension    • Shortness of breath 08/05/2022   • Type 2 diabetes mellitus (HCC)        Past Surgical History:   Procedure Laterality Date   • CARDIAC CATHETERIZATION N/A 7/14/2022    Procedure: Coronary angiography;  Surgeon: Unique Calabrese MD;  Location: [facility] INVASIVE LOCATION;  Service: Cardiovascular;  Laterality: N/A;   • CARDIAC CATHETERIZATION N/A 7/14/2022    Procedure: Left heart cath with simultaneous LV/Ao pressures;  Surgeon: Unique Calabrese MD;  Location: [facility] CATH INVASIVE LOCATION;  Service: Cardiovascular;  Laterality: N/A;   • CARDIAC CATHETERIZATION N/A 7/14/2022    Procedure: Right Heart Cath;  Surgeon: Unique Calabrese MD;  Location: [facility] CATH INVASIVE  LOCATION;  Service: Cardiovascular;  Laterality: N/A;   • COLONOSCOPY     • CORONARY ARTERY BYPASS GRAFT WITH AORTIC VALVE REPAIR/REPLACEMENT N/A 8/8/2022    Procedure: KEVIN STERNOTOMY CORONARY ARTERY BYPASS GRAFT TIMES 3 USING LEFT INTERNAL MAMMARY ARTERY AND LEFT GREATER SAPHENOUS VEIN GRAFT PER ENDOSCOPIC VEIN HARVESTING, AORTIC VALVE REPLACEMENT AND PRP;  Surgeon: Jr Gentry Courtney MD;  Location: [facility];  Service: Cardiothoracic;  Laterality: N/A;   • VASECTOMY         Social History     Socioeconomic History   • Marital status:    • Number of children: 3   Tobacco Use   • Smoking status: Never Smoker   • Smokeless tobacco: Never Used   • Tobacco comment: caffeine use: 1 -2 cups daily.    Vaping Use   • Vaping Use: Never used   Substance and Sexual Activity   • Alcohol use: No   • Drug use: No   • Sexual activity: Defer       Family History   Problem Relation Age of Onset   • Colon cancer Neg Hx    • Colon polyps Neg Hx    • Malig Hyperthermia Neg Hx        The following portion of the patient's history were reviewed and updated as appropriate: past medical history, past surgical history, past social history, past family history, allergies, current medications, and problem list.    Review of Systems   Constitutional: Positive for malaise/fatigue. Negative for diaphoresis and fever.   HENT: Negative for congestion, hearing loss, hoarse voice, nosebleeds and sore throat.    Eyes: Negative for photophobia, vision loss in left eye, vision loss in right eye and visual disturbance.   Cardiovascular: Negative for chest pain, dyspnea on exertion, irregular heartbeat, leg swelling, near-syncope, orthopnea, palpitations, paroxysmal nocturnal dyspnea and syncope.   Respiratory: Positive for shortness of breath (mild). Negative for cough, hemoptysis, sleep disturbances due to breathing, snoring, sputum production and wheezing.    Endocrine: Negative for cold intolerance, heat intolerance, polydipsia,  polyphagia and polyuria.   Hematologic/Lymphatic: Negative for bleeding problem. Does not bruise/bleed easily.   Skin: Negative for color change, dry skin, poor wound healing, rash and suspicious lesions.   Musculoskeletal: Negative for arthritis, back pain, falls, gout, joint pain, joint swelling, muscle cramps, muscle weakness and myalgias.   Gastrointestinal: Negative for bloating, abdominal pain, constipation, diarrhea, dysphagia, melena, nausea and vomiting.   Neurological: Negative for excessive daytime sleepiness, dizziness, headaches, light-headedness, loss of balance, numbness, paresthesias, seizures, vertigo and weakness.   Psychiatric/Behavioral: Negative for depression, memory loss and substance abuse. The patient is not nervous/anxious.        Allergies   Allergen Reactions   • Loratadine Other (See Comments)     Emotional side effects.          Current Outpatient Medications:   •  acetaminophen (TYLENOL) 325 MG tablet, Take 2 tablets by mouth Every 4 (Four) Hours As Needed for Mild Pain ., Disp: , Rfl:   •  amiodarone (PACERONE) 200 MG tablet, Take 1 tablet by mouth every 8 hours for 4 days, then take 1 tablet every 12 hours for 14 days, then take 1 tablet daily for 30 days., Disp: 70 tablet, Rfl: 0  •  aspirin 81 MG chewable tablet, Chew 81 mg Daily., Disp: , Rfl:   •  atenolol (TENORMIN) 25 MG tablet, Take 1 tablet by mouth Every 12 (Twelve) Hours., Disp: 60 tablet, Rfl: 2  •  atorvastatin (LIPITOR) 40 MG tablet, Take 1 tablet by mouth Every Night., Disp: 30 tablet, Rfl: 2  •  Ergocalciferol (Vitamin D2) 10 MCG (400 UNIT) tablet, Take 50,000 Int'l Units by mouth 1 (One) Time Per Week. fridays, Disp: 30 tablet, Rfl:   •  glucose blood test strip, Test blood sugar Twice daily, Disp: 200 each, Rfl: 12  •  glucose monitor monitoring kit, 1 each As Needed (Diabetes 2)., Disp: 1 each, Rfl: 0  •  guaiFENesin (MUCINEX) 600 MG 12 hr tablet, Take 2 tablets by mouth Every 12 (Twelve) Hours., Disp: , Rfl:   •   "Lancets misc, 1 each 3 (Three) Times a Day Before Meals., Disp: 200 each, Rfl: 5  •  metFORMIN (GLUCOPHAGE) 1000 MG tablet, Take 1,000 mg by mouth 2 (Two) Times a Day With Meals., Disp: , Rfl:   •  multivitamin (THERAGRAN) tablet tablet, Take 1 tablet by mouth Daily., Disp: 30 tablet, Rfl:   •  nitroglycerin (Nitrostat) 0.4 MG SL tablet, Place 1 tablet under the tongue Every 5 (Five) Minutes As Needed for Chest Pain. Take no more than 3 doses in 15 minutes., Disp: 30 tablet, Rfl: 1  •  Omega-3 Fatty Acids (fish oil) 1000 MG capsule capsule, Take 2 capsules by mouth Daily With Breakfast., Disp: , Rfl:   •  omeprazole (priLOSEC) 40 MG capsule, Take 1 capsule by mouth 3 (Three) Times a Week if Needed (Heartburn). (Patient taking differently: Take 40 mg by mouth Daily.), Disp: 90 capsule, Rfl: 3  •  ondansetron (ZOFRAN) 4 MG tablet, Take 1 tablet by mouth Every 6 (Six) Hours As Needed for Nausea or Vomiting., Disp: 12 tablet, Rfl: 1  •  sennosides-docusate (PERICOLACE) 8.6-50 MG per tablet, Take 2 tablets by mouth Every Night., Disp: , Rfl:   •  tamsulosin (FLOMAX) 0.4 MG capsule 24 hr capsule, Take 1 capsule by mouth Daily., Disp: 30 capsule, Rfl: 0  •  warfarin (COUMADIN) 4 MG tablet, Take 1.5 tablets (6mg) by mouth on tuesdays and thursdays and take 1 tablet (4mg) by mouth on all other days or as directed by medication management clinic.  Indications: Atrial Fibrillation, Disp: 100 tablet, Rfl: 0        Objective:     Vitals:    09/15/22 1535   BP: 140/70   Pulse: 54   Resp: 16   SpO2: 97%   Weight: 72.1 kg (159 lb)   Height: 172.7 cm (68\")     Body mass index is 24.18 kg/m².      Vitals reviewed.   Constitutional:       General: Not in acute distress.     Appearance: Healthy appearance. Well-developed.   Eyes:      General:         Right eye: No discharge.         Left eye: No discharge.      Conjunctiva/sclera: Conjunctivae normal.   HENT:      Head: Normocephalic and atraumatic.      Right Ear: External ear " normal.      Left Ear: External ear normal.      Nose: Nose normal.   Neck:      Thyroid: No thyromegaly.      Vascular: No JVD.      Trachea: No tracheal deviation.      Lymphadenopathy: No cervical adenopathy.   Pulmonary:      Effort: Pulmonary effort is normal. No respiratory distress.      Breath sounds: Normal breath sounds. No wheezing. No rales.   Chest:      Chest wall: Not tender to palpatation.   Cardiovascular:      Normal rate. Regular rhythm.      Murmurs: There is a systolic murmur.      No gallop. Systolic ejection click.   Pulses:     Intact distal pulses.   Edema:     Peripheral edema absent.   Abdominal:      General: There is no distension.      Palpations: Abdomen is soft.      Tenderness: There is no abdominal tenderness.   Musculoskeletal: Normal range of motion.         General: No tenderness or deformity.      Cervical back: Normal range of motion and neck supple. Skin:     General: Skin is warm and dry.      Findings: No erythema or rash.   Neurological:      Mental Status: Alert and oriented to person, place, and time.      Coordination: Coordination normal.   Psychiatric:         Attention and Perception: Attention normal.         Behavior: Behavior normal. Behavior is cooperative.         Thought Content: Thought content normal.         Judgment: Judgment normal.               ECG 12 Lead    Date/Time: 9/15/2022 3:51 PM  Performed by: Avis Mezt APRN  Authorized by: Avis Metz APRN   Comparison: compared with previous ECG from 8/12/2022  Rhythm: sinus rhythm  Rate: normal  Conduction: conduction normal  ST Depression: V5 and V6  T inversion: V2 and V3  T flattening: II and aVL  QRS axis: normal    Clinical impression: abnormal EKG              Assessment:       Diagnosis Plan   1. AF (paroxysmal atrial fibrillation) ([facility])     2. S/P AVR (aortic valve replacement)     3. S/P CABG x 3     4. Mixed hyperlipidemia     5. Primary hypertension            Plan:          1. Aortic valve  stenosis and  CAD - status post aortic valve replacement with 25 mm Medtronic Avalus bovine pericardial valve and CABG x 3 with LIMA to mid LAD, vein graft to right coronary artery, and vein graft to D1.  Preserved LV function.  Doing well and progressing.  I placed an order for cardiac rehab as it does not appear one was placed.  2. Post operative atrial fibrillation - on oral amiodarone and beta blocker. AC with warfarin.  He is now followed in our clinic.  3. Hypertension-controlled  4. hyperlipemia continue lipid-lowering therapy.  He is currently on atorvastatin 40.  5. Diabetes mellitus type II    Recovering well, RTO in 4 to 6 weeks with JK    As always, it has been a pleasure to participate in your patient's care. Thank you.       Sincerely,       DEV Mars      Current Outpatient Medications:   •  acetaminophen (TYLENOL) 325 MG tablet, Take 2 tablets by mouth Every 4 (Four) Hours As Needed for Mild Pain ., Disp: , Rfl:   •  aspirin 81 MG chewable tablet, Chew 81 mg Daily., Disp: , Rfl:   •  atenolol (TENORMIN) 25 MG tablet, Take 1 tablet by mouth Every 12 (Twelve) Hours., Disp: 180 tablet, Rfl: 3  •  atorvastatin (LIPITOR) 40 MG tablet, Take 1 tablet by mouth Every Night., Disp: 90 tablet, Rfl: 3  •  Ergocalciferol (Vitamin D2) 10 MCG (400 UNIT) tablet, Take 50,000 Int'l Units by mouth 1 (One) Time Per Week. fridays, Disp: 30 tablet, Rfl:   •  glucose blood test strip, Test blood sugar Twice daily, Disp: 200 each, Rfl: 12  •  glucose monitor monitoring kit, 1 each As Needed (Diabetes 2)., Disp: 1 each, Rfl: 0  •  guaiFENesin (MUCINEX) 600 MG 12 hr tablet, Take 2 tablets by mouth Every 12 (Twelve) Hours., Disp: , Rfl:   •  Lancets misc, 1 each 3 (Three) Times a Day Before Meals., Disp: 200 each, Rfl: 5  •  metFORMIN (GLUCOPHAGE) 1000 MG tablet, Take 1,000 mg by mouth 2 (Two) Times a Day With Meals., Disp: , Rfl:   •  multivitamin (THERAGRAN) tablet tablet, Take 1 tablet by mouth Daily., Disp: 30  tablet, Rfl:   •  nitroglycerin (Nitrostat) 0.4 MG SL tablet, Place 1 tablet under the tongue Every 5 (Five) Minutes As Needed for Chest Pain. Take no more than 3 doses in 15 minutes., Disp: 30 tablet, Rfl: 1  •  Omega-3 Fatty Acids (fish oil) 1000 MG capsule capsule, Take 2 capsules by mouth Daily With Breakfast., Disp: , Rfl:   •  omeprazole (priLOSEC) 40 MG capsule, Take 1 capsule by mouth 3 (Three) Times a Week if Needed (Heartburn). (Patient taking differently: Take 40 mg by mouth Daily.), Disp: 90 capsule, Rfl: 3  •  ondansetron (ZOFRAN) 4 MG tablet, Take 1 tablet by mouth Every 6 (Six) Hours As Needed for Nausea or Vomiting., Disp: 12 tablet, Rfl: 1  •  sennosides-docusate (PERICOLACE) 8.6-50 MG per tablet, Take 2 tablets by mouth Every Night., Disp: , Rfl:   •  tamsulosin (FLOMAX) 0.4 MG capsule 24 hr capsule, Take 1 capsule by mouth Daily., Disp: 30 capsule, Rfl: 0  •  warfarin (COUMADIN) 4 MG tablet, Take 1.5 tablets (6mg) by mouth on tuesdays and thursdays and take 1 tablet (4mg) by mouth on all other days or as directed by medication management clinic.  Indications: Atrial Fibrillation, Disp: 100 tablet, Rfl: 0  •  amiodarone (PACERONE) 200 MG tablet, Take 1 tablet by mouth Daily., Disp: 90 tablet, Rfl: 3      Dictated utilizing Dragon dictation

## 2022-09-15 NOTE — OUTREACH NOTE
CT Surgery Week 4 Survey    Flowsheet Row Responses   [facility] patient discharged from? [place]   Does the patient have one of the following disease processes/diagnoses(primary or secondary)? Cardiothoracic surgery   Week 4 attempt successful? Yes   Call start time 1230   Call end time 1240   Discharge diagnosis CABGx3   Is patient permission given to speak with other caregiver? Yes   List who call center can speak with Spouse   Person spoke with today (if not patient) and relationship Spouse   Meds reviewed with patient/caregiver? Yes   Is the patient taking all medications as directed (includes completed medication regime)? Yes   Has the patient kept scheduled appointments due by today? Yes   Is the patient still receiving Home Health Services? No   Psychosocial issues? No   What is the patient's perception of their health status since discharge? Improving   Is the patient/caregiver able to teach back steps to recovery at home? Set small, achievable goals for return to baseline health, Rest and rebuild strength, gradually increase activity   Is the patient /caregiver able to teach back the importance of cardiac rehab? Yes   Nursing interventions Provided education on importance of cardiac rehab   If the patient is a current smoker, are they able to teach back resources for cessation? Not a smoker   Is the patient/caregiver able to teach back the hierarchy of who to call/visit for symptoms/problems? PCP, Specialist, Home health nurse, Urgent Care, ED, 911 Yes   Week 4 Call Completed? Yes   Would the patient like one additional call? No   Graduated Yes   Is the patient interested in additional calls from an ambulatory ?  NOTE:  applies to high risk patients requiring additional follow-up. No   Did the patient feel the follow up calls were helpful during their recovery period? Yes   Was the number of calls appropriate? Yes   Wrap up additional comments 24hr nurseline # given to spouse. She was  appreciative of the follow up calls.           CRISTINA BENÍTEZ - Registered Nurse

## 2022-09-26 ENCOUNTER — ANTICOAGULATION VISIT (OUTPATIENT)
Dept: PHARMACY | Facility: HOSPITAL | Age: 82
End: 2022-09-26

## 2022-09-26 ENCOUNTER — LAB (OUTPATIENT)
Dept: LAB | Facility: HOSPITAL | Age: 82
End: 2022-09-26

## 2022-09-26 DIAGNOSIS — I48.0 AF (PAROXYSMAL ATRIAL FIBRILLATION): ICD-10-CM

## 2022-09-26 DIAGNOSIS — I48.0 AF (PAROXYSMAL ATRIAL FIBRILLATION): Primary | ICD-10-CM

## 2022-09-26 LAB
INR PPP: 3.18 (ref 0.9–1.1)
PROTHROMBIN TIME: 33.3 SECONDS (ref 12.1–15)

## 2022-09-26 PROCEDURE — 85610 PROTHROMBIN TIME: CPT

## 2022-09-26 PROCEDURE — 36415 COLL VENOUS BLD VENIPUNCTURE: CPT

## 2022-09-27 NOTE — PROGRESS NOTES
Anticoagulation Clinic Progress Note    Anticoagulation Summary  As of 9/26/2022    INR goal:  2.0-3.0   TTR:  51.6 % (1.1 mo)   INR used for dosing:  3.18 (9/26/2022)   Warfarin maintenance plan:  4 mg every day   Weekly warfarin total:  28 mg   No change documented:  Moisés Morales, PharmD   Plan last modified:  [place], PharmD (9/13/2022)   Next INR check:  10/3/2022   Target end date:      Indications    AF (paroxysmal atrial fibrillation) (HCC) [I48.0]             Anticoagulation Episode Summary     INR check location:      Preferred lab:      Send INR reminders to:   [facility]    Comments:  s/p AVR and CABG.      Anticoagulation Care Providers     Provider Role Specialty Phone number    Carilisa Helena VIEIRA, DEV Referring Cardiology [phone]          Clinic Interview:  Patient Findings     Positives:  Extra doses    Negatives:  Signs/symptoms of thrombosis, Signs/symptoms of bleeding,   Laboratory test error suspected, Change in health, Change in alcohol use,   Change in activity, Upcoming invasive procedure, Emergency department   visit, Upcoming dental procedure, Missed doses, Change in medications,   Change in diet/appetite, Hospital admission, Bruising, Other complaints    Comments:  Unsuccessful reaching by phone until 9/27/22. He reports he   continued taking 6 mg Tues/Thurs, 4 mg all other days.       Clinical Outcomes     Negatives:  Major bleeding event, Thromboembolic event,   Anticoagulation-related hospital admission, Anticoagulation-related ED   visit, Anticoagulation-related fatality    Comments:  Unsuccessful reaching by phone until 9/27/22. He reports he   continued taking 6 mg Tues/Thurs, 4 mg all other days.         INR History:  Anticoagulation Monitoring 9/13/2022 9/14/2022 9/26/2022   INR 2.20 2.60 3.18   INR Date 9/13/2022 9/14/2022 9/26/2022   INR Goal 2.0-3.0 2.0-3.0 2.0-3.0   Trend Down Same Same   Last Week Total 32 mg 26 mg 32 mg   Next Week Total 28 mg 28  mg 28 mg   Sun - 4 mg 4 mg   Mon - 4 mg 4 mg   Tue 4 mg - 4 mg   Wed - 4 mg 4 mg   Thu - 4 mg 4 mg   Fri - 4 mg 4 mg   Sat - 4 mg 4 mg   Visit Report - - -   Some recent data might be hidden       Plan:  1. INR is Supratherapeutic today- see above in Anticoagulation Summary.   Will instruct Woo Dobbs to Change their warfarin regimen to 4 mg daily - see above in Anticoagulation Summary.  2. Follow up in 1 week  3. They have been instructed to call if any changes in medications, doses, concerns, etc. Patient expresses understanding and has no further questions at this time.    Moisés Morales, PharmD

## 2022-10-03 ENCOUNTER — LAB (OUTPATIENT)
Dept: LAB | Facility: HOSPITAL | Age: 82
End: 2022-10-03

## 2022-10-03 ENCOUNTER — ANTICOAGULATION VISIT (OUTPATIENT)
Dept: PHARMACY | Facility: HOSPITAL | Age: 82
End: 2022-10-03

## 2022-10-03 DIAGNOSIS — I48.0 AF (PAROXYSMAL ATRIAL FIBRILLATION): Primary | ICD-10-CM

## 2022-10-03 DIAGNOSIS — I48.0 AF (PAROXYSMAL ATRIAL FIBRILLATION): ICD-10-CM

## 2022-10-03 LAB
INR PPP: 1.27 (ref 0.9–1.1)
PROTHROMBIN TIME: 16.1 SECONDS (ref 12.1–15)

## 2022-10-03 PROCEDURE — 36415 COLL VENOUS BLD VENIPUNCTURE: CPT

## 2022-10-03 PROCEDURE — 85610 PROTHROMBIN TIME: CPT

## 2022-10-04 NOTE — PROGRESS NOTES
Anticoagulation Clinic Progress Note    Anticoagulation Summary  As of 10/3/2022    INR goal:  2.0-3.0   TTR:  51.7 % (1.3 mo)   INR used for dosin.27 (10/3/2022)   Warfarin maintenance plan:  4 mg every day   Weekly warfarin total:  28 mg   Plan last modified:  [place], PharmD (2022)   Next INR check:  10/10/2022   Target end date:      Indications    AF (paroxysmal atrial fibrillation) (HCC) [I48.0]             Anticoagulation Episode Summary     INR check location:      Preferred lab:      Send INR reminders to:  [facility] [place]    Comments:  s/p AVR and CABG.      Anticoagulation Care Providers     Provider Role Specialty Phone number    Helena Humphries, APRN Referring Cardiology [phone]          Clinic Interview:  Patient Findings     Positives:  Change in medications, Change in diet/appetite    Negatives:  Signs/symptoms of thrombosis, Signs/symptoms of bleeding,   Laboratory test error suspected, Change in health, Change in alcohol use,   Change in activity, Upcoming invasive procedure, Emergency department   visit, Upcoming dental procedure, Missed doses, Extra doses, Hospital   admission, Bruising, Other complaints    Comments:  Patient reports no missed doses. States he had eaten broccoli   and potato soup this week. States he has a pulled muscle and went to his   PCP yesterday, who gave him a ketorolac shot and a steroid shot.       Clinical Outcomes     Negatives:  Major bleeding event, Thromboembolic event,   Anticoagulation-related hospital admission, Anticoagulation-related ED   visit, Anticoagulation-related fatality    Comments:  Patient reports no missed doses. States he had eaten broccoli   and potato soup this week. States he has a pulled muscle and went to his   PCP yesterday, who gave him a ketorolac shot and a dexamethasone shot.         INR History:  Anticoagulation Monitoring 2022 2022 10/3/2022   INR 2.60 3.18 1.27   INR Date 2022  9/26/2022 10/3/2022   INR Goal 2.0-3.0 2.0-3.0 2.0-3.0   Trend Same Same Same   Last Week Total 26 mg 32 mg 28 mg   Next Week Total 28 mg 28 mg 30 mg   Sun 4 mg 4 mg 4 mg   Mon 4 mg 4 mg 4 mg   Tue - 4 mg 6 mg (10/4)   Wed 4 mg 4 mg 4 mg   Thu 4 mg 4 mg 4 mg   Fri 4 mg 4 mg 4 mg   Sat 4 mg 4 mg 4 mg   Visit Report - - -   Some recent data might be hidden       Plan:  1. INR is Subtherapeutic today- see above in Anticoagulation Summary.   Will instruct Woo FRANCO Dobbs to Change their warfarin regimen- see above in Anticoagulation Summary. Will boost today only due to INR potentially increasing with the dexamethasone and ketorolac.  2. Follow up in 1 weeks  3. Pt has agreed to only be called if INR out of range. They have been instructed to call if any changes in medications, doses, concerns, etc. Patient expresses understanding and has no further questions at this time.    Blas Rey, PharmD  Pharmacy Resident

## 2022-10-10 ENCOUNTER — TREATMENT (OUTPATIENT)
Dept: CARDIAC REHAB | Facility: HOSPITAL | Age: 82
End: 2022-10-10

## 2022-10-10 ENCOUNTER — LAB (OUTPATIENT)
Dept: LAB | Facility: HOSPITAL | Age: 82
End: 2022-10-10

## 2022-10-10 DIAGNOSIS — I48.0 AF (PAROXYSMAL ATRIAL FIBRILLATION): ICD-10-CM

## 2022-10-10 DIAGNOSIS — Z95.1 S/P CABG (CORONARY ARTERY BYPASS GRAFT): Primary | ICD-10-CM

## 2022-10-10 LAB
GLUCOSE BLDC GLUCOMTR-MCNC: 181 MG/DL (ref 70–130)
INR PPP: 1.5 (ref 0.9–1.1)
PROTHROMBIN TIME: 18.4 SECONDS (ref 12.1–15)

## 2022-10-10 PROCEDURE — 82962 GLUCOSE BLOOD TEST: CPT

## 2022-10-10 PROCEDURE — 36415 COLL VENOUS BLD VENIPUNCTURE: CPT

## 2022-10-10 PROCEDURE — 93798 PHYS/QHP OP CAR RHAB W/ECG: CPT

## 2022-10-10 PROCEDURE — 93797 PHYS/QHP OP CAR RHAB WO ECG: CPT

## 2022-10-10 PROCEDURE — 85610 PROTHROMBIN TIME: CPT

## 2022-10-11 ENCOUNTER — ANTICOAGULATION VISIT (OUTPATIENT)
Dept: PHARMACY | Facility: HOSPITAL | Age: 82
End: 2022-10-11

## 2022-10-11 DIAGNOSIS — I48.0 AF (PAROXYSMAL ATRIAL FIBRILLATION): Primary | ICD-10-CM

## 2022-10-11 RX ORDER — WARFARIN SODIUM 4 MG/1
TABLET ORAL
Qty: 100 TABLET | Refills: 0 | Status: SHIPPED | OUTPATIENT
Start: 2022-10-11 | End: 2022-12-07 | Stop reason: SDUPTHER

## 2022-10-11 NOTE — PROGRESS NOTES
Anticoagulation Clinic Progress Note    Anticoagulation Summary  As of 10/11/2022    INR goal:  2.0-3.0   TTR:  43.9 % (1.5 mo)   INR used for dosin.50 (10/10/2022)   Warfarin maintenance plan:  6 mg every Tue, Thu; 4 mg all other days   Weekly warfarin total:  32 mg   Plan last modified:  [place], PharmD (10/11/2022)   Next INR check:  10/24/2022   Target end date:      Indications    AF (paroxysmal atrial fibrillation) (HCC) [I48.0]             Anticoagulation Episode Summary     INR check location:      Preferred lab:      Send INR reminders to:   [facility]    Comments:  s/p AVR and CABG.      Anticoagulation Care Providers     Provider Role Specialty Phone number    Helena Humphries, DEV Referring Cardiology [phone]          Clinic Interview:  Patient Findings     Negatives:  Signs/symptoms of thrombosis, Signs/symptoms of bleeding,   Laboratory test error suspected, Change in health, Change in alcohol use,   Change in activity, Upcoming invasive procedure, Emergency department   visit, Upcoming dental procedure, Missed doses, Extra doses, Change in   medications, Change in diet/appetite, Hospital admission, Bruising, Other   complaints      Clinical Outcomes     Negatives:  Major bleeding event, Thromboembolic event,   Anticoagulation-related hospital admission, Anticoagulation-related ED   visit, Anticoagulation-related fatality        INR History:  Anticoagulation Monitoring 2022 10/3/2022 10/11/2022   INR 3.18 1.27 1.50   INR Date 2022 10/3/2022 10/10/2022   INR Goal 2.0-3.0 2.0-3.0 2.0-3.0   Trend Same Same Up   Last Week Total 32 mg 28 mg 30 mg   Next Week Total 28 mg 30 mg 32 mg   Sun 4 mg 4 mg 4 mg   Mon 4 mg 4 mg 4 mg   Tue 4 mg 6 mg (10/4) 6 mg   Wed 4 mg 4 mg 4 mg   Thu 4 mg 4 mg 6 mg   Fri 4 mg 4 mg 4 mg   Sat 4 mg 4 mg 4 mg   Visit Report - - -   Some recent data might be hidden       Plan:  1. INR is Subtherapeutic today- see above in Anticoagulation  Summary.   Will instruct Woo Dobbs to Change their warfarin regimen- see above in Anticoagulation Summary.  Increase weekly dose to 6 mg on Tuesdays and Thursdays and 4 mg on all other days.  2. Follow up in 2 weeks  3. They have been instructed to call if any changes in medications, doses, concerns, etc. Patient expresses understanding and has no further questions at this time.    Key [place], PharmD

## 2022-10-12 ENCOUNTER — TREATMENT (OUTPATIENT)
Dept: CARDIAC REHAB | Facility: HOSPITAL | Age: 82
End: 2022-10-12

## 2022-10-12 DIAGNOSIS — Z95.1 S/P CABG (CORONARY ARTERY BYPASS GRAFT): Primary | ICD-10-CM

## 2022-10-12 LAB — GLUCOSE BLDC GLUCOMTR-MCNC: 152 MG/DL (ref 70–130)

## 2022-10-12 PROCEDURE — 93798 PHYS/QHP OP CAR RHAB W/ECG: CPT

## 2022-10-12 PROCEDURE — 82962 GLUCOSE BLOOD TEST: CPT

## 2022-10-14 ENCOUNTER — TREATMENT (OUTPATIENT)
Dept: CARDIAC REHAB | Facility: HOSPITAL | Age: 82
End: 2022-10-14

## 2022-10-14 DIAGNOSIS — Z95.1 S/P CABG (CORONARY ARTERY BYPASS GRAFT): Primary | ICD-10-CM

## 2022-10-14 LAB — GLUCOSE BLDC GLUCOMTR-MCNC: 98 MG/DL (ref 70–130)

## 2022-10-14 PROCEDURE — 93798 PHYS/QHP OP CAR RHAB W/ECG: CPT

## 2022-10-14 PROCEDURE — 82962 GLUCOSE BLOOD TEST: CPT

## 2022-10-17 ENCOUNTER — TREATMENT (OUTPATIENT)
Dept: CARDIAC REHAB | Facility: HOSPITAL | Age: 82
End: 2022-10-17

## 2022-10-17 ENCOUNTER — ANTICOAGULATION VISIT (OUTPATIENT)
Dept: PHARMACY | Facility: HOSPITAL | Age: 82
End: 2022-10-17

## 2022-10-17 ENCOUNTER — LAB (OUTPATIENT)
Dept: LAB | Facility: HOSPITAL | Age: 82
End: 2022-10-17

## 2022-10-17 DIAGNOSIS — I48.0 AF (PAROXYSMAL ATRIAL FIBRILLATION): Primary | ICD-10-CM

## 2022-10-17 DIAGNOSIS — I48.0 AF (PAROXYSMAL ATRIAL FIBRILLATION): ICD-10-CM

## 2022-10-17 DIAGNOSIS — Z95.1 S/P CABG (CORONARY ARTERY BYPASS GRAFT): Primary | ICD-10-CM

## 2022-10-17 LAB
GLUCOSE BLDC GLUCOMTR-MCNC: 185 MG/DL (ref 70–130)
INR PPP: 1.9 (ref 0.9–1.1)
PROTHROMBIN TIME: 22.1 SECONDS (ref 12.1–15)

## 2022-10-17 PROCEDURE — 85610 PROTHROMBIN TIME: CPT

## 2022-10-17 PROCEDURE — 93798 PHYS/QHP OP CAR RHAB W/ECG: CPT

## 2022-10-17 PROCEDURE — 82962 GLUCOSE BLOOD TEST: CPT

## 2022-10-17 PROCEDURE — 36415 COLL VENOUS BLD VENIPUNCTURE: CPT

## 2022-10-17 NOTE — PROGRESS NOTES
Anticoagulation Clinic Progress Note    Anticoagulation Summary  As of 10/17/2022    INR goal:  2.0-3.0   TTR:  38.2 % (1.8 mo)   INR used for dosin.90 (10/17/2022)   Warfarin maintenance plan:  6 mg every Tue, Thu; 4 mg all other days   Weekly warfarin total:  32 mg   No change documented:  Moisés Morales, PharmD   Plan last modified:  Key Rock PharmD (10/11/2022)   Next INR check:  10/24/2022   Target end date:      Indications    AF (paroxysmal atrial fibrillation) ([facility]) [I48.0]             Anticoagulation Episode Summary     INR check location:      Preferred lab:      Send INR reminders to:   [facility]    Comments:  s/p AVR and CABG.      Anticoagulation Care Providers     Provider Role Specialty Phone number    Helena Humphries APRN Referring Cardiology [phone]          Clinic Interview:  Patient Findings     Negatives:  Signs/symptoms of thrombosis, Signs/symptoms of bleeding,   Laboratory test error suspected, Change in health, Change in alcohol use,   Change in activity, Upcoming invasive procedure, Emergency department   visit, Upcoming dental procedure, Missed doses, Extra doses, Change in   medications, Change in diet/appetite, Hospital admission, Bruising, Other   complaints      Clinical Outcomes     Negatives:  Major bleeding event, Thromboembolic event,   Anticoagulation-related hospital admission, Anticoagulation-related ED   visit, Anticoagulation-related fatality        INR History:  Anticoagulation Monitoring 10/3/2022 10/11/2022 10/17/2022   INR 1.27 1.50 1.90   INR Date 10/3/2022 10/10/2022 10/17/2022   INR Goal 2.0-3.0 2.0-3.0 2.0-3.0   Trend Same Up Same   Last Week Total 28 mg 30 mg 32 mg   Next Week Total 30 mg 32 mg 32 mg   Sun 4 mg 4 mg 4 mg   Mon 4 mg 4 mg 4 mg   Tue 6 mg (10/4) 6 mg 6 mg   Wed 4 mg 4 mg 4 mg   Thu 4 mg 6 mg 6 mg   Fri 4 mg 4 mg 4 mg   Sat 4 mg 4 mg 4 mg   Visit Report - - -   Some recent data might be hidden       Plan:  1. INR is  slightly Subtherapeutic today- see above in Anticoagulation Summary.  Will instruct Woo FRANCO Dobbs to Continue the same warfarin regimen for now - see above in Anticoagulation Summary.  2. Follow up in 1 week  3. Spoke with Melina (spouse). They have been instructed to call if any changes in medications, doses, concerns, etc. Patient expresses understanding and has no further questions at this time.    Moisés Morales, PharmD

## 2022-10-19 ENCOUNTER — TREATMENT (OUTPATIENT)
Dept: CARDIAC REHAB | Facility: HOSPITAL | Age: 82
End: 2022-10-19

## 2022-10-19 DIAGNOSIS — Z95.1 S/P CABG (CORONARY ARTERY BYPASS GRAFT): Primary | ICD-10-CM

## 2022-10-19 LAB — GLUCOSE BLDC GLUCOMTR-MCNC: 119 MG/DL (ref 70–130)

## 2022-10-19 PROCEDURE — 82962 GLUCOSE BLOOD TEST: CPT

## 2022-10-19 PROCEDURE — 93798 PHYS/QHP OP CAR RHAB W/ECG: CPT

## 2022-10-21 ENCOUNTER — TREATMENT (OUTPATIENT)
Dept: CARDIAC REHAB | Facility: HOSPITAL | Age: 82
End: 2022-10-21

## 2022-10-21 DIAGNOSIS — Z95.1 S/P CABG (CORONARY ARTERY BYPASS GRAFT): Primary | ICD-10-CM

## 2022-10-21 LAB — GLUCOSE BLDC GLUCOMTR-MCNC: 199 MG/DL (ref 70–130)

## 2022-10-21 PROCEDURE — 82962 GLUCOSE BLOOD TEST: CPT

## 2022-10-21 PROCEDURE — 93798 PHYS/QHP OP CAR RHAB W/ECG: CPT

## 2022-10-24 ENCOUNTER — ANTICOAGULATION VISIT (OUTPATIENT)
Dept: PHARMACY | Facility: HOSPITAL | Age: 82
End: 2022-10-24

## 2022-10-24 ENCOUNTER — TREATMENT (OUTPATIENT)
Dept: CARDIAC REHAB | Facility: HOSPITAL | Age: 82
End: 2022-10-24

## 2022-10-24 ENCOUNTER — LAB (OUTPATIENT)
Dept: LAB | Facility: HOSPITAL | Age: 82
End: 2022-10-24

## 2022-10-24 DIAGNOSIS — Z95.1 S/P CABG (CORONARY ARTERY BYPASS GRAFT): Primary | ICD-10-CM

## 2022-10-24 DIAGNOSIS — I48.0 AF (PAROXYSMAL ATRIAL FIBRILLATION): Primary | ICD-10-CM

## 2022-10-24 DIAGNOSIS — I48.0 AF (PAROXYSMAL ATRIAL FIBRILLATION): ICD-10-CM

## 2022-10-24 LAB
INR PPP: 1.81 (ref 0.9–1.1)
PROTHROMBIN TIME: 21.3 SECONDS (ref 12.1–15)

## 2022-10-24 PROCEDURE — 36415 COLL VENOUS BLD VENIPUNCTURE: CPT

## 2022-10-24 PROCEDURE — 85610 PROTHROMBIN TIME: CPT

## 2022-10-24 PROCEDURE — 93798 PHYS/QHP OP CAR RHAB W/ECG: CPT

## 2022-10-25 NOTE — PROGRESS NOTES
Anticoagulation Clinic Progress Note    Anticoagulation Summary  As of 10/24/2022    INR goal:  2.0-3.0   TTR:  33.8 % (2 mo)   INR used for dosin.81 (10/24/2022)   Warfarin maintenance plan:  6 mg every Tue, Thu, Sat; 4 mg all other days   Weekly warfarin total:  34 mg   Plan last modified:  Moisés Morales, PharmD (10/25/2022)   Next INR check:  10/31/2022   Target end date:      Indications    AF (paroxysmal atrial fibrillation) (HCC) [I48.0]             Anticoagulation Episode Summary     INR check location:      Preferred lab:      Send INR reminders to:   [facility]    Comments:  s/p AVR and CABG.      Anticoagulation Care Providers     Provider Role Specialty Phone number    Carilisa Helena VIEIRA, DEV Referring Cardiology [phone]          Clinic Interview:  Patient Findings     Positives:  Change in medications    Negatives:  Signs/symptoms of thrombosis, Signs/symptoms of bleeding,   Laboratory test error suspected, Change in health, Change in alcohol use,   Change in activity, Upcoming invasive procedure, Emergency department   visit, Upcoming dental procedure, Missed doses, Extra doses, Change in   diet/appetite, Hospital admission, Bruising, Other complaints    Comments:  Unsuccessful reaching by phone until 10/25/22. Reports he has   been taking guaifenesin for chest congestion.      Clinical Outcomes     Negatives:  Major bleeding event, Thromboembolic event,   Anticoagulation-related hospital admission, Anticoagulation-related ED   visit, Anticoagulation-related fatality    Comments:  Unsuccessful reaching by phone until 10/25/22. Reports he has   been taking guaifenesin for chest congestion.        INR History:  Anticoagulation Monitoring 10/11/2022 10/17/2022 10/24/2022   INR 1.50 1.90 1.81   INR Date 10/10/2022 10/17/2022 10/24/2022   INR Goal 2.0-3.0 2.0-3.0 2.0-3.0   Trend Up Same Up   Last Week Total 30 mg 32 mg 32 mg   Next Week Total 32 mg 32 mg 36 mg   Sun 4 mg 4 mg 4 mg    Mon 4 mg 4 mg 4 mg   Tue 6 mg 6 mg 8 mg (10/25)   Wed 4 mg 4 mg 4 mg   Thu 6 mg 6 mg 6 mg   Fri 4 mg 4 mg 4 mg   Sat 4 mg 4 mg 6 mg   Visit Report - - -   Some recent data might be hidden       Plan:  1. INR was Subtherapeutic 10/24/22 - see above in Anticoagulation Summary. Unsuccessful reaching by phone until 10/25/22.   Will instruct Woo Dobbs to Increase their warfarin regimen (8 mg 10/25/22, then increase to 6 mg Tues/Thurs/Sat, 4 mg all other days) - see above in Anticoagulation Summary.  2. Follow up in 1 week  3. They have been instructed to call if any changes in medications, doses, concerns, etc. Patient expresses understanding and has no further questions at this time.    Moisés Morales, PharmD

## 2022-10-26 ENCOUNTER — TREATMENT (OUTPATIENT)
Dept: CARDIAC REHAB | Facility: HOSPITAL | Age: 82
End: 2022-10-26

## 2022-10-26 DIAGNOSIS — Z95.1 S/P CABG (CORONARY ARTERY BYPASS GRAFT): Primary | ICD-10-CM

## 2022-10-26 PROCEDURE — 93798 PHYS/QHP OP CAR RHAB W/ECG: CPT

## 2022-10-28 ENCOUNTER — TREATMENT (OUTPATIENT)
Dept: CARDIAC REHAB | Facility: HOSPITAL | Age: 82
End: 2022-10-28

## 2022-10-28 DIAGNOSIS — Z95.1 S/P CABG (CORONARY ARTERY BYPASS GRAFT): Primary | ICD-10-CM

## 2022-10-28 PROCEDURE — 93798 PHYS/QHP OP CAR RHAB W/ECG: CPT

## 2022-10-31 ENCOUNTER — ANTICOAGULATION VISIT (OUTPATIENT)
Dept: PHARMACY | Facility: HOSPITAL | Age: 82
End: 2022-10-31

## 2022-10-31 ENCOUNTER — TREATMENT (OUTPATIENT)
Dept: CARDIAC REHAB | Facility: HOSPITAL | Age: 82
End: 2022-10-31

## 2022-10-31 ENCOUNTER — LAB (OUTPATIENT)
Dept: LAB | Facility: HOSPITAL | Age: 82
End: 2022-10-31

## 2022-10-31 DIAGNOSIS — I48.0 AF (PAROXYSMAL ATRIAL FIBRILLATION): ICD-10-CM

## 2022-10-31 DIAGNOSIS — Z95.1 S/P CABG (CORONARY ARTERY BYPASS GRAFT): Primary | ICD-10-CM

## 2022-10-31 DIAGNOSIS — I48.0 AF (PAROXYSMAL ATRIAL FIBRILLATION): Primary | ICD-10-CM

## 2022-10-31 LAB
INR PPP: 2.15 (ref 0.9–1.1)
PROTHROMBIN TIME: 24.4 SECONDS (ref 12.1–15)

## 2022-10-31 PROCEDURE — 36415 COLL VENOUS BLD VENIPUNCTURE: CPT

## 2022-10-31 PROCEDURE — 93798 PHYS/QHP OP CAR RHAB W/ECG: CPT

## 2022-10-31 PROCEDURE — 85610 PROTHROMBIN TIME: CPT

## 2022-11-01 ENCOUNTER — OFFICE VISIT (OUTPATIENT)
Dept: CARDIOLOGY | Facility: CLINIC | Age: 82
End: 2022-11-01

## 2022-11-01 VITALS
OXYGEN SATURATION: 98 % | RESPIRATION RATE: 16 BRPM | BODY MASS INDEX: 23.34 KG/M2 | DIASTOLIC BLOOD PRESSURE: 76 MMHG | SYSTOLIC BLOOD PRESSURE: 140 MMHG | HEART RATE: 51 BPM | WEIGHT: 154 LBS | HEIGHT: 68 IN

## 2022-11-01 DIAGNOSIS — Z95.2 S/P AVR (AORTIC VALVE REPLACEMENT): ICD-10-CM

## 2022-11-01 DIAGNOSIS — Z95.1 S/P CABG X 3: ICD-10-CM

## 2022-11-01 DIAGNOSIS — E78.2 MIXED HYPERLIPIDEMIA: ICD-10-CM

## 2022-11-01 DIAGNOSIS — I48.0 AF (PAROXYSMAL ATRIAL FIBRILLATION): ICD-10-CM

## 2022-11-01 DIAGNOSIS — E11.59 TYPE 2 DIABETES MELLITUS WITH OTHER CIRCULATORY COMPLICATION, WITHOUT LONG-TERM CURRENT USE OF INSULIN: ICD-10-CM

## 2022-11-01 DIAGNOSIS — I10 PRIMARY HYPERTENSION: ICD-10-CM

## 2022-11-01 DIAGNOSIS — I25.810 CORONARY ARTERY DISEASE INVOLVING CORONARY BYPASS GRAFT OF NATIVE HEART WITHOUT ANGINA PECTORIS: Primary | ICD-10-CM

## 2022-11-01 DIAGNOSIS — I65.23 CAROTID ATHEROSCLEROSIS, BILATERAL: ICD-10-CM

## 2022-11-01 PROBLEM — I35.0 NONRHEUMATIC AORTIC VALVE STENOSIS: Status: RESOLVED | Noted: 2022-07-12 | Resolved: 2022-11-01

## 2022-11-01 PROBLEM — R94.39 ABNORMAL STRESS TEST: Status: RESOLVED | Noted: 2022-07-12 | Resolved: 2022-11-01

## 2022-11-01 PROBLEM — I20.8 STABLE ANGINA (HCC): Status: RESOLVED | Noted: 2022-07-12 | Resolved: 2022-11-01

## 2022-11-01 PROBLEM — I20.89 STABLE ANGINA: Status: RESOLVED | Noted: 2022-07-12 | Resolved: 2022-11-01

## 2022-11-01 PROCEDURE — 93000 ELECTROCARDIOGRAM COMPLETE: CPT | Performed by: INTERNAL MEDICINE

## 2022-11-01 PROCEDURE — 99214 OFFICE O/P EST MOD 30 MIN: CPT | Performed by: INTERNAL MEDICINE

## 2022-11-01 RX ORDER — ATENOLOL 25 MG/1
25 TABLET ORAL DAILY
Qty: 180 TABLET | Refills: 3
Start: 2022-11-01

## 2022-11-01 RX ORDER — AMIODARONE HYDROCHLORIDE 200 MG/1
100 TABLET ORAL DAILY
Qty: 90 TABLET | Refills: 3
Start: 2022-11-01 | End: 2023-02-08

## 2022-11-01 NOTE — PROGRESS NOTES
Date of Office Visit: 22  Encounter Provider: Khurram Ball MD  Place of Service: [facility] CARDIOLOGY  Patient Name: Woo Dobbs  :1940    Chief Complaint   Patient presents with   • Coronary Artery Disease   :     HPI:     Mr. Dobbs is 81 y.o. and presents today to follow up. I have reviewed prior notes and there are no changes except for any new updates described below. I have also reviewed any information entered into the medical record by the patient or by ancillary staff.     He was seen in ; his PCP had noted a murmur. An echo showed aortic valve calcification without stenosis. He was then lost to follow up. He was referred to us in July; he reported progressive dyspnea. An echo showed normal LVSF and mod-severe AS. A stress test showed LAD ischemia.  Coronary angiography revealed severe disease. He underwent CABG x 3 and tissue AVR in 2022.  He had recurrent postoperative atrial fibrillation in recovery; he was discharged on amiodarone and warfarin.  He had a lot of problems with confusion.  He has had a slow recovery, although he is steadily improving.  He is going to rehab.  He just feels very fatigued.    He was diagnosed with a bladder tumor prior to surgery.  He has not yet followed up with urology to discuss this.  He has not had any hematuria.    Past Medical History:   Diagnosis Date   • Aortic stenosis     2022: tissue AVR (25mm MDT Avalus bovine pericardial)   • Asthma    • BPH (benign prostatic hyperplasia)    • CAD (coronary artery disease)     2022: 95% prox/50% distal LAD, 80% D1, 30-40% Cx, diffuse dz RCA. CABG x 3 2022: LIMA-LAD, SVG-D1, SVG-RCA   • Carotid atherosclerosis, bilateral     2022: 16-49% bilaterally   • Colon polyp    • GERD (gastroesophageal reflux disease)    • Hyperlipidemia    • Hypertension    • Paroxysmal atrial fibrillation (HCC)    • Type 2 diabetes mellitus (HCC)        Past Surgical History:    Procedure Laterality Date   • CARDIAC CATHETERIZATION N/A 7/14/2022    Procedure: Coronary angiography;  Surgeon: Unique Calabrese MD;  [facility];  Service: Cardiovascular;  Laterality: N/A;   • CARDIAC CATHETERIZATION N/A 7/14/2022    Procedure: Left heart cath with simultaneous LV/Ao pressures;  Surgeon: Unique Calabrese MD;  [facility];  Service: Cardiovascular;  Laterality: N/A;   • CARDIAC CATHETERIZATION N/A 7/14/2022    Procedure: Right Heart Cath;  Surgeon: Unique Calabrese MD;  [facility];  Service: Cardiovascular;  Laterality: N/A;   • COLONOSCOPY     • CORONARY ARTERY BYPASS GRAFT WITH AORTIC VALVE REPAIR/REPLACEMENT N/A 8/8/2022    Procedure: KEVIN STERNOTOMY CORONARY ARTERY BYPASS GRAFT TIMES 3 USING LEFT INTERNAL MAMMARY ARTERY AND LEFT GREATER SAPHENOUS VEIN GRAFT PER ENDOSCOPIC VEIN HARVESTING, AORTIC VALVE REPLACEMENT AND PRP;  Surgeon: Jr Gentry Courtney MD;  Location: [facility];  Service: Cardiothoracic;  Laterality: N/A;   • VASECTOMY         Social History     Socioeconomic History   • Marital status:    • Number of children: 3   Tobacco Use   • Smoking status: Never   • Smokeless tobacco: Never   • Tobacco comments:     caffeine use: 1 -2 cups daily.    Vaping Use   • Vaping Use: Never used   Substance and Sexual Activity   • Alcohol use: No   • Drug use: No   • Sexual activity: Defer       Family History   Problem Relation Age of Onset   • Colon cancer Neg Hx    • Colon polyps Neg Hx    • Malig Hyperthermia Neg Hx        Review of Systems   Constitutional: Positive for malaise/fatigue.   Cardiovascular: Positive for dyspnea on exertion.   All other systems reviewed and are negative.      Allergies   Allergen Reactions   • Loratadine Other (See Comments)     Emotional side effects.          Current Outpatient Medications:   •  acetaminophen (TYLENOL) 325 MG tablet, Take 2 tablets by mouth Every 4  (Four) Hours As Needed for Mild Pain ., Disp: , Rfl:   •  amiodarone (PACERONE) 200 MG tablet, Take 1 tablet by mouth Daily., Disp: 90 tablet, Rfl: 3  •  aspirin 81 MG chewable tablet, Chew 81 mg Daily., Disp: , Rfl:   •  atenolol (TENORMIN) 25 MG tablet, Take 1 tablet by mouth Every 12 (Twelve) Hours., Disp: 180 tablet, Rfl: 3  •  atorvastatin (LIPITOR) 40 MG tablet, Take 1 tablet by mouth Every Night., Disp: 90 tablet, Rfl: 3  •  Ergocalciferol (Vitamin D2) 10 MCG (400 UNIT) tablet, Take 50,000 Int'l Units by mouth 1 (One) Time Per Week. fridays, Disp: 30 tablet, Rfl:   •  glucose blood test strip, Test blood sugar Twice daily, Disp: 200 each, Rfl: 12  •  glucose monitor monitoring kit, 1 each As Needed (Diabetes 2)., Disp: 1 each, Rfl: 0  •  guaiFENesin (MUCINEX) 600 MG 12 hr tablet, Take 2 tablets by mouth Every 12 (Twelve) Hours., Disp: , Rfl:   •  Lancets misc, 1 each 3 (Three) Times a Day Before Meals., Disp: 200 each, Rfl: 5  •  metFORMIN (GLUCOPHAGE) 1000 MG tablet, Take 1,000 mg by mouth 2 (Two) Times a Day With Meals., Disp: , Rfl:   •  multivitamin (THERAGRAN) tablet tablet, Take 1 tablet by mouth Daily., Disp: 30 tablet, Rfl:   •  nitroglycerin (Nitrostat) 0.4 MG SL tablet, Place 1 tablet under the tongue Every 5 (Five) Minutes As Needed for Chest Pain. Take no more than 3 doses in 15 minutes., Disp: 30 tablet, Rfl: 1  •  Omega-3 Fatty Acids (fish oil) 1000 MG capsule capsule, Take 2 capsules by mouth Daily With Breakfast., Disp: , Rfl:   •  omeprazole (priLOSEC) 40 MG capsule, Take 1 capsule by mouth 3 (Three) Times a Week if Needed (Heartburn). (Patient taking differently: Take 1 capsule by mouth Daily.), Disp: 90 capsule, Rfl: 3  •  ondansetron (ZOFRAN) 4 MG tablet, Take 1 tablet by mouth Every 6 (Six) Hours As Needed for Nausea or Vomiting., Disp: 12 tablet, Rfl: 1  •  sennosides-docusate (PERICOLACE) 8.6-50 MG per tablet, Take 2 tablets by mouth Every Night., Disp: , Rfl:   •  tamsulosin (FLOMAX)  "0.4 MG capsule 24 hr capsule, Take 1 capsule by mouth Daily., Disp: 30 capsule, Rfl: 0  •  warfarin (COUMADIN) 4 MG tablet, Take 1.5 tablets (6mg) by mouth on tuesdays and thursdays and take 1 tablet (4mg) by mouth on all other days or as directed by medication management clinic.  Indications: Atrial Fibrillation, Disp: 100 tablet, Rfl: 0      Objective:     Vitals:    11/01/22 1419   BP: 140/76   Pulse: 51   Resp: 16   SpO2: 98%   Weight: 69.9 kg (154 lb)   Height: 172.7 cm (68\")     Body mass index is 23.42 kg/m².    Vitals reviewed.   Constitutional:       Appearance: Healthy appearance. Well-developed and not in distress.   Eyes:      Conjunctiva/sclera: Conjunctivae normal.   HENT:      Head: Normocephalic.      Nose: Nose normal.         Comments: masked  Neck:      Vascular: No JVD. JVD normal.      Lymphadenopathy: No cervical adenopathy.   Pulmonary:      Effort: Pulmonary effort is normal.      Breath sounds: Normal breath sounds.   Cardiovascular:      Normal rate. Regular rhythm.      Murmurs: There is a grade 1/6 systolic murmur.   Pulses:     Intact distal pulses.   Edema:     Peripheral edema absent.   Abdominal:      Palpations: Abdomen is soft.      Tenderness: There is no abdominal tenderness.   Musculoskeletal: Normal range of motion.      Cervical back: Normal range of motion. Skin:     General: Skin is warm and dry.      Findings: No rash.   Neurological:      General: No focal deficit present.      Mental Status: Alert, oriented to person, place, and time and oriented to person, place and time.      Cranial Nerves: No cranial nerve deficit.   Psychiatric:         Behavior: Behavior normal.         Thought Content: Thought content normal.         Judgment: Judgment normal.           ECG 12 Lead    Date/Time: 11/1/2022 3:34 PM  Performed by: Khurram Ball MD  Authorized by: Khurram Ball MD   Comparison: compared with previous ECG   Similar to previous ECG  Rhythm: sinus " bradycardia  Conduction: conduction normal  T Waves: T waves normal  QRS axis: normal  Other findings: left ventricular hypertrophy with strain    Clinical impression: abnormal EKG              Assessment:       Diagnosis Plan   1. Coronary artery disease involving coronary bypass graft of native heart without angina pectoris        2. S/P CABG x 3        3. Mixed hyperlipidemia        4. S/P AVR (aortic valve replacement)  Adult Transthoracic Echo Complete W/ Cont if Necessary Per Protocol      5. AF (paroxysmal atrial fibrillation) ([facility])  Holter Monitor - 72 Hour Up To 15 Days      6. Primary hypertension        7. Carotid atherosclerosis, bilateral        8. Type 2 diabetes mellitus with other circulatory complication, without long-term current use of insulin ([facility])               Plan:       1/2/3.  He is status post three-vessel bypass in August 2022.  He has preserved left ventricular systolic function.  He is on aspirin and atorvastatin.  He is currently in rehab.    4.  He is status post tissue aortic valve replacement in August 2022 for severe aortic stenosis.  I will get a baseline echo.    5.  He had recurrent atrial fibrillation in the postoperative period.  He was discharged on amiodarone and atenolol.  He feels very fatigued and his heart rate is low.  I am going to cut the amiodarone in half to 1/2 tab daily.  I am going to decrease atenolol to 25 mg daily.  I will get a 14-day rhythm monitor at her next visit.  If he still has evidence of atrial fibrillation, I feel that we can safely switch him to apixaban at that point (he was discharged on warfarin because of the valve replacement).  The question is whether he requires lifelong anticoagulation or not.  I think we will have to assess his atrial size and get his monitor results back.  I would like to ultimately get him off amiodarone at the 6-month samantha.    6.  He is mildly hypertensive.  Since I am decreasing his atenolol dose, I am going to add back  losartan, which she was on before surgery, but half the previous dose.  I will start 50 mg daily.    7.  He had 16-49% bilateral carotid stenosis in August 2022.  This will be repeated in August 2023.    I asked him to call and get into see his urologist for further treatment of his bladder tumor.    Sincerely,       Khurram Ball MD

## 2022-11-01 NOTE — PROGRESS NOTES
Anticoagulation Clinic Progress Note    Anticoagulation Summary  As of 10/31/2022    INR goal:  2.0-3.0   TTR:  34.8 % (2.2 mo)   INR used for dosin.15 (10/31/2022)   Warfarin maintenance plan:  6 mg every Tue, Thu, Sat; 4 mg all other days   Weekly warfarin total:  34 mg   No change documented:  Miya Gan RPH   Plan last modified:  Moisés Morales, PharmD (10/25/2022)   Next INR check:  2022   Target end date:      Indications    AF (paroxysmal atrial fibrillation) ([facility]) [I48.0]             Anticoagulation Episode Summary     INR check location:      Preferred lab:      [facility] [place]    Comments:  s/p AVR and CABG.      Anticoagulation Care Providers     Provider Role Specialty Phone number    Helena Humphries, DEV Referring Cardiology [phone]          Clinic Interview:  No pertinent clinical findings have been reported.    INR History:  Anticoagulation Monitoring 10/17/2022 10/24/2022 10/31/2022   INR 1.90 1.81 2.15   INR Date 10/17/2022 10/24/2022 10/31/2022   INR Goal 2.0-3.0 2.0-3.0 2.0-3.0   Trend Same Up Same   Last Week Total 32 mg 32 mg 36 mg   Next Week Total 32 mg 36 mg 34 mg   Sun 4 mg 4 mg 4 mg   Mon 4 mg 4 mg 4 mg   Tue 6 mg 8 mg (10/25) 6 mg   Wed 4 mg 4 mg 4 mg   Thu 6 mg 6 mg 6 mg   Fri 4 mg 4 mg 4 mg   Sat 4 mg 6 mg 6 mg   Visit Report - - -   Some recent data might be hidden       Plan:  1. INR is therapeutic today- see above in Anticoagulation Summary.    Woo Dobbs to continue their warfarin regimen- see above in Anticoagulation Summary.  2. Follow up in 1 week  3.  They have been instructed to call if any changes in medications, doses, concerns, etc. Patient expresses understanding and has no further questions at this time.    Miya Gan RPH

## 2022-11-02 ENCOUNTER — TREATMENT (OUTPATIENT)
Dept: CARDIAC REHAB | Facility: HOSPITAL | Age: 82
End: 2022-11-02

## 2022-11-02 DIAGNOSIS — Z95.1 S/P CABG (CORONARY ARTERY BYPASS GRAFT): Primary | ICD-10-CM

## 2022-11-02 PROCEDURE — 93798 PHYS/QHP OP CAR RHAB W/ECG: CPT

## 2022-11-04 ENCOUNTER — TREATMENT (OUTPATIENT)
Dept: CARDIAC REHAB | Facility: HOSPITAL | Age: 82
End: 2022-11-04

## 2022-11-04 DIAGNOSIS — Z95.1 S/P CABG (CORONARY ARTERY BYPASS GRAFT): Primary | ICD-10-CM

## 2022-11-04 PROCEDURE — 93798 PHYS/QHP OP CAR RHAB W/ECG: CPT

## 2022-11-07 ENCOUNTER — LAB (OUTPATIENT)
Dept: LAB | Facility: HOSPITAL | Age: 82
End: 2022-11-07

## 2022-11-07 ENCOUNTER — TREATMENT (OUTPATIENT)
Dept: CARDIAC REHAB | Facility: HOSPITAL | Age: 82
End: 2022-11-07

## 2022-11-07 ENCOUNTER — ANTICOAGULATION VISIT (OUTPATIENT)
Dept: PHARMACY | Facility: HOSPITAL | Age: 82
End: 2022-11-07

## 2022-11-07 DIAGNOSIS — Z95.1 S/P CABG (CORONARY ARTERY BYPASS GRAFT): Primary | ICD-10-CM

## 2022-11-07 DIAGNOSIS — I48.0 AF (PAROXYSMAL ATRIAL FIBRILLATION): ICD-10-CM

## 2022-11-07 DIAGNOSIS — I48.0 AF (PAROXYSMAL ATRIAL FIBRILLATION): Primary | ICD-10-CM

## 2022-11-07 LAB
INR PPP: 1.79 (ref 0.9–1.1)
PROTHROMBIN TIME: 21.1 SECONDS (ref 12.1–15)

## 2022-11-07 PROCEDURE — 93798 PHYS/QHP OP CAR RHAB W/ECG: CPT

## 2022-11-07 PROCEDURE — 36415 COLL VENOUS BLD VENIPUNCTURE: CPT

## 2022-11-07 PROCEDURE — 85610 PROTHROMBIN TIME: CPT

## 2022-11-08 NOTE — PROGRESS NOTES
Anticoagulation Clinic Progress Note    Anticoagulation Summary  As of 2022    INR goal:  2.0-3.0   TTR:  35.5 % (2.5 mo)   INR used for dosin.79 (2022)   Warfarin maintenance plan:  4 mg every Mon, Wed, Fri; 6 mg all other days; Starting 2022   Weekly warfarin total:  36 mg   Plan last modified:  [place], PharmD (2022)   Next INR check:  2022   Target end date:      Indications    AF (paroxysmal atrial fibrillation) (HCC) [I48.0]             Anticoagulation Episode Summary     INR check location:      Preferred lab:      Send INR reminders to:   [facility]    Comments:  s/p AVR and CABG.      Anticoagulation Care Providers     Provider Role Specialty Phone number    Helena Humphries APRN Referring Cardiology [phone]          Clinic Interview:  Patient Findings     Negatives:  Signs/symptoms of thrombosis, Signs/symptoms of bleeding,   Laboratory test error suspected, Change in health, Change in alcohol use,   Change in activity, Upcoming invasive procedure, Emergency department   visit, Upcoming dental procedure, Missed doses, Extra doses, Change in   medications, Change in diet/appetite, Hospital admission, Bruising, Other   complaints      Clinical Outcomes     Negatives:  Major bleeding event, Thromboembolic event,   Anticoagulation-related hospital admission, Anticoagulation-related ED   visit, Anticoagulation-related fatality        INR History:  Anticoagulation Monitoring 10/24/2022 10/31/2022 2022   INR 1.81 2.15 1.79   INR Date 10/24/2022 10/31/2022 2022   INR Goal 2.0-3.0 2.0-3.0 2.0-3.0   Trend Up Same Up   Last Week Total 32 mg 36 mg 34 mg   Next Week Total 36 mg 34 mg 38 mg   Sun 4 mg 4 mg 6 mg   Mon 4 mg 4 mg 4 mg   Tue 8 mg (10/25) 6 mg 8 mg (); Otherwise 6 mg   Wed 4 mg 4 mg 4 mg   Thu 6 mg 6 mg 6 mg   Fri 4 mg 4 mg 4 mg   Sat 6 mg 6 mg 6 mg   Visit Report - - -   Some recent data might be hidden       Plan:  1. INR is  Subtherapeutic today- see above in Anticoagulation Summary.   Will instruct Woo Dobbs to Change their warfarin regimen- see above in Anticoagulation Summary.  2. Follow up in 2 weeks  3. Provided instructions and follow up to patient's wife, Melina. They have been instructed to call if any changes in medications, doses, concerns, etc. Patient expresses understanding and has no further questions at this time.    Key [place], PharmD

## 2022-11-09 ENCOUNTER — TREATMENT (OUTPATIENT)
Dept: CARDIAC REHAB | Facility: HOSPITAL | Age: 82
End: 2022-11-09

## 2022-11-09 DIAGNOSIS — Z95.1 S/P CABG (CORONARY ARTERY BYPASS GRAFT): Primary | ICD-10-CM

## 2022-11-09 PROCEDURE — 93798 PHYS/QHP OP CAR RHAB W/ECG: CPT

## 2022-11-11 ENCOUNTER — TREATMENT (OUTPATIENT)
Dept: CARDIAC REHAB | Facility: HOSPITAL | Age: 82
End: 2022-11-11

## 2022-11-11 DIAGNOSIS — Z95.1 S/P CABG (CORONARY ARTERY BYPASS GRAFT): Primary | ICD-10-CM

## 2022-11-11 PROCEDURE — 93798 PHYS/QHP OP CAR RHAB W/ECG: CPT

## 2022-11-14 ENCOUNTER — LAB (OUTPATIENT)
Dept: LAB | Facility: HOSPITAL | Age: 82
End: 2022-11-14

## 2022-11-14 ENCOUNTER — TREATMENT (OUTPATIENT)
Dept: CARDIAC REHAB | Facility: HOSPITAL | Age: 82
End: 2022-11-14

## 2022-11-14 ENCOUNTER — ANTICOAGULATION VISIT (OUTPATIENT)
Dept: PHARMACY | Facility: HOSPITAL | Age: 82
End: 2022-11-14

## 2022-11-14 DIAGNOSIS — I48.0 AF (PAROXYSMAL ATRIAL FIBRILLATION): ICD-10-CM

## 2022-11-14 DIAGNOSIS — Z95.1 S/P CABG (CORONARY ARTERY BYPASS GRAFT): Primary | ICD-10-CM

## 2022-11-14 DIAGNOSIS — I48.0 AF (PAROXYSMAL ATRIAL FIBRILLATION): Primary | ICD-10-CM

## 2022-11-14 LAB
INR PPP: 2.49 (ref 0.9–1.1)
PROTHROMBIN TIME: 27.5 SECONDS (ref 12.1–15)

## 2022-11-14 PROCEDURE — 85610 PROTHROMBIN TIME: CPT

## 2022-11-14 PROCEDURE — 36415 COLL VENOUS BLD VENIPUNCTURE: CPT

## 2022-11-14 PROCEDURE — 93798 PHYS/QHP OP CAR RHAB W/ECG: CPT

## 2022-11-14 NOTE — PROGRESS NOTES
Anticoagulation Clinic Progress Note    Anticoagulation Summary  As of 2022    INR goal:  2.0-3.0   TTR:  38.6 % (2.7 mo)   INR used for dosin.49 (2022)   Warfarin maintenance plan:  4 mg every Mon, Wed, Fri; 6 mg all other days; Starting 2022   Weekly warfarin total:  36 mg   No change documented:  [place] PharmD   Plan last modified:  [place] PharmD (2022)   Next INR check:  2022   Target end date:      Indications    AF (paroxysmal atrial fibrillation) ([facility]) [I48.0]             Anticoagulation Episode Summary     INR check location:      Preferred lab:      Send INR reminders to:  [facility] [place]    Comments:  s/p AVR and CABG.      Anticoagulation Care Providers     Provider Role Specialty Phone number    Carilisa Helena VIEIRA, DEV Referring Cardiology [phone]          Clinic Interview:  Patient Findings     Negatives:  Signs/symptoms of thrombosis, Signs/symptoms of bleeding,   Laboratory test error suspected, Change in health, Change in alcohol use,   Change in activity, Upcoming invasive procedure, Emergency department   visit, Upcoming dental procedure, Missed doses, Extra doses, Change in   medications, Change in diet/appetite, Hospital admission, Bruising, Other   complaints      Clinical Outcomes     Negatives:  Major bleeding event, Thromboembolic event,   Anticoagulation-related hospital admission, Anticoagulation-related ED   visit, Anticoagulation-related fatality        INR History:  Anticoagulation Monitoring 10/31/2022 2022 2022   INR 2.15 1.79 2.49   INR Date 10/31/2022 2022 2022   INR Goal 2.0-3.0 2.0-3.0 2.0-3.0   Trend Same Up Same   Last Week Total 36 mg 34 mg 38 mg   Next Week Total 34 mg 38 mg 36 mg   Sun 4 mg 6 mg 6 mg   Mon 4 mg 4 mg 4 mg   Tue 6 mg 8 mg (); Otherwise 6 mg 6 mg   Wed 4 mg 4 mg 4 mg   Thu 6 mg 6 mg 6 mg   Fri 4 mg 4 mg 4 mg   Sat 6 mg 6 mg 6 mg   Visit Report - - -   Some recent  data might be hidden       Plan:  1. INR is Therapeutic today- see above in Anticoagulation Summary.   Will instruct Woo Dobbs to Continue their warfarin regimen- see above in Anticoagulation Summary.  2. Follow up in 1 weeks  3. They have been instructed to call if any changes in medications, doses, concerns, etc. Patient expresses understanding and has no further questions at this time.    [place], PharmD

## 2022-11-16 ENCOUNTER — TREATMENT (OUTPATIENT)
Dept: CARDIAC REHAB | Facility: HOSPITAL | Age: 82
End: 2022-11-16

## 2022-11-16 DIAGNOSIS — Z95.1 S/P CABG (CORONARY ARTERY BYPASS GRAFT): Primary | ICD-10-CM

## 2022-11-16 PROCEDURE — 93798 PHYS/QHP OP CAR RHAB W/ECG: CPT

## 2022-11-18 ENCOUNTER — TREATMENT (OUTPATIENT)
Dept: CARDIAC REHAB | Facility: HOSPITAL | Age: 82
End: 2022-11-18

## 2022-11-18 DIAGNOSIS — Z95.1 S/P CABG (CORONARY ARTERY BYPASS GRAFT): Primary | ICD-10-CM

## 2022-11-18 PROCEDURE — 93798 PHYS/QHP OP CAR RHAB W/ECG: CPT

## 2022-11-21 ENCOUNTER — ANTICOAGULATION VISIT (OUTPATIENT)
Dept: PHARMACY | Facility: HOSPITAL | Age: 82
End: 2022-11-21

## 2022-11-21 ENCOUNTER — LAB (OUTPATIENT)
Dept: LAB | Facility: HOSPITAL | Age: 82
End: 2022-11-21

## 2022-11-21 ENCOUNTER — TREATMENT (OUTPATIENT)
Dept: CARDIAC REHAB | Facility: HOSPITAL | Age: 82
End: 2022-11-21

## 2022-11-21 DIAGNOSIS — I48.0 AF (PAROXYSMAL ATRIAL FIBRILLATION): Primary | ICD-10-CM

## 2022-11-21 DIAGNOSIS — I48.0 AF (PAROXYSMAL ATRIAL FIBRILLATION): ICD-10-CM

## 2022-11-21 DIAGNOSIS — Z95.1 S/P CABG (CORONARY ARTERY BYPASS GRAFT): Primary | ICD-10-CM

## 2022-11-21 LAB
INR PPP: 1.32 (ref 0.9–1.1)
PROTHROMBIN TIME: 16.6 SECONDS (ref 12.1–15)

## 2022-11-21 PROCEDURE — 93798 PHYS/QHP OP CAR RHAB W/ECG: CPT

## 2022-11-21 PROCEDURE — 36415 COLL VENOUS BLD VENIPUNCTURE: CPT

## 2022-11-21 PROCEDURE — 85610 PROTHROMBIN TIME: CPT

## 2022-11-21 NOTE — PROGRESS NOTES
Anticoagulation Clinic Progress Note    Anticoagulation Summary  As of 2022    INR goal:  2.0-3.0   TTR:  38.8 % (2.9 mo)   INR used for dosin.32 (2022)   Warfarin maintenance plan:  4 mg every Tue, Thu; 6 mg all other days; Starting 2022   Weekly warfarin total:  38 mg   Plan last modified:  Moisés Morales, PharmD (2022)   Next INR check:  2022   Target end date:      Indications    AF (paroxysmal atrial fibrillation) ([facility]) [I48.0]             Anticoagulation Episode Summary     INR check location:      Preferred lab:      Send INR reminders to:   [facility]    Comments:  s/p AVR and CABG * [facility]*      Anticoagulation Care Providers     Provider Role Specialty Phone number    Surendra Humphriesmónica VIEIRA, DEV Referring Cardiology [phone]          Clinic Interview:  Patient Findings     Positives:  Change in medications, Change in diet/appetite, Other   complaints    Negatives:  Signs/symptoms of thrombosis, Signs/symptoms of bleeding,   Laboratory test error suspected, Change in health, Change in alcohol use,   Change in activity, Upcoming invasive procedure, Emergency department   visit, Upcoming dental procedure, Missed doses, Extra doses, Hospital   admission, Bruising    Comments:  Amiodarone dose was decreased from 200 mg daily to 100 mg   daily on 22. Reports he took 6 mg Tues/Thurs/Sat, 4 mg all other   days. Reports he had a salad, which is not a consistent part of his   routine.       Clinical Outcomes     Negatives:  Major bleeding event, Thromboembolic event,   Anticoagulation-related hospital admission, Anticoagulation-related ED   visit, Anticoagulation-related fatality    Comments:  Amiodarone dose was decreased from 200 mg daily to 100 mg   daily on 22. Reports he took 6 mg Tues/Thurs/Sat, 4 mg all other   days. Reports he had a salad, which is not a consistent part of his   routine.         INR History:  Anticoagulation  Monitoring 11/7/2022 11/14/2022 11/21/2022   INR 1.79 2.49 1.32   INR Date 11/7/2022 11/14/2022 11/21/2022   INR Goal 2.0-3.0 2.0-3.0 2.0-3.0   Trend Up Same Up   Last Week Total 34 mg 38 mg 34 mg   Next Week Total 38 mg 36 mg 40 mg   Sun 6 mg 6 mg 6 mg   Mon 4 mg 4 mg 8 mg (11/21)   Tue 8 mg (11/8); Otherwise 6 mg 6 mg 4 mg   Wed 4 mg 4 mg 6 mg   Thu 6 mg 6 mg 4 mg   Fri 4 mg 4 mg 6 mg   Sat 6 mg 6 mg 6 mg   Visit Report - - -   Some recent data might be hidden       Plan:  1. INR is Subtherapeutic today- see above in Anticoagulation Summary.   Will instruct Woo Dobbs to Increase their warfarin regimen- see above in Anticoagulation Summary.  2. Follow up in 1 week.  3. They have been instructed to call if any changes in medications, doses, concerns, etc. Patient expresses understanding and has no further questions at this time.    Moisés Morales, PharmD

## 2022-11-23 ENCOUNTER — TELEPHONE (OUTPATIENT)
Dept: CARDIOLOGY | Facility: CLINIC | Age: 82
End: 2022-11-23

## 2022-11-23 ENCOUNTER — TREATMENT (OUTPATIENT)
Dept: CARDIAC REHAB | Facility: HOSPITAL | Age: 82
End: 2022-11-23

## 2022-11-23 DIAGNOSIS — Z95.1 S/P CABG (CORONARY ARTERY BYPASS GRAFT): Primary | ICD-10-CM

## 2022-11-23 PROCEDURE — 93798 PHYS/QHP OP CAR RHAB W/ECG: CPT

## 2022-11-23 NOTE — TELEPHONE ENCOUNTER
Dr. Ball Pt:  Out of office: [facility]  EST APC: CATHY HUMAIRA   [phone]  Please advise.        Pt will need to be scheduled for a TURBT asap with Dr. Nimesh Arvizu.  They are requesting cardiac pre op risk assessment and management of ASA and Warfarin.

## 2022-11-25 ENCOUNTER — APPOINTMENT (OUTPATIENT)
Dept: CARDIAC REHAB | Facility: HOSPITAL | Age: 82
End: 2022-11-25

## 2022-11-28 ENCOUNTER — ANTICOAGULATION VISIT (OUTPATIENT)
Dept: PHARMACY | Facility: HOSPITAL | Age: 82
End: 2022-11-28

## 2022-11-28 ENCOUNTER — LAB (OUTPATIENT)
Dept: LAB | Facility: HOSPITAL | Age: 82
End: 2022-11-28

## 2022-11-28 ENCOUNTER — TREATMENT (OUTPATIENT)
Dept: CARDIAC REHAB | Facility: HOSPITAL | Age: 82
End: 2022-11-28

## 2022-11-28 DIAGNOSIS — Z95.1 S/P CABG (CORONARY ARTERY BYPASS GRAFT): Primary | ICD-10-CM

## 2022-11-28 DIAGNOSIS — I48.0 AF (PAROXYSMAL ATRIAL FIBRILLATION): ICD-10-CM

## 2022-11-28 DIAGNOSIS — I48.0 AF (PAROXYSMAL ATRIAL FIBRILLATION): Primary | ICD-10-CM

## 2022-11-28 LAB
INR PPP: 2.37 (ref 0.9–1.1)
PROTHROMBIN TIME: 26.4 SECONDS (ref 12.1–15)

## 2022-11-28 PROCEDURE — 93798 PHYS/QHP OP CAR RHAB W/ECG: CPT

## 2022-11-28 PROCEDURE — 36415 COLL VENOUS BLD VENIPUNCTURE: CPT

## 2022-11-28 PROCEDURE — 85610 PROTHROMBIN TIME: CPT

## 2022-11-28 NOTE — PROGRESS NOTES
Anticoagulation Clinic Progress Note    Anticoagulation Summary  As of 2022    INR goal:  2.0-3.0   TTR:  38.6 % (3.2 mo)   INR used for dosin.37 (2022)   Warfarin maintenance plan:  4 mg every Tue, Thu; 6 mg all other days; Starting 2022   Weekly warfarin total:  38 mg   No change documented:  [facility], [facility]   Plan last modified:  Moisés Morales, PharmD (2022)   Next INR check:  2022   Target end date:      Indications    AF (paroxysmal atrial fibrillation) ([facility]) [I48.0]             Anticoagulation Episode Summary     INR check location:      Preferred lab:      Send INR reminders to:   [facility]    Comments:  s/p AVR and CABG * [facility]*      Anticoagulation Care Providers     Provider Role Specialty Phone number    Helena Humphries APRN Referring Cardiology [phone]          Drug interactions: has remained unchanged.  Diet: has remained unchanged.    Clinic Interview:  No pertinent clinical findings have been reported.    INR History:  Anticoagulation Monitoring 2022   INR 2.49 1.32 2.37   INR Date 2022   INR Goal 2.0-3.0 2.0-3.0 2.0-3.0   Trend Same Up Same   Last Week Total 38 mg 34 mg 40 mg   Next Week Total 36 mg 40 mg 38 mg   Sun 6 mg 6 mg 6 mg   Mon 4 mg 8 mg () 6 mg   Tue 6 mg 4 mg 4 mg   Wed 4 mg 6 mg 6 mg   Thu 6 mg 4 mg 4 mg   Fri 4 mg 6 mg 6 mg   Sat 6 mg 6 mg 6 mg   Visit Report - - -   Some recent data might be hidden       Plan:  1. INR is Therapeutic today- see above in Anticoagulation Summary.   Spoke w/ pts wife, no issues to report, instructed for pt to continue new dose and sarai in 1 wk   see above in Anticoagulation Summary.  2. Follow up in 1 weeks  3. Pt has agreed to only be called if INR out of range. They have been instructed to call if any changes in medications, doses, concerns, etc. Patient expresses understanding and has no further questions at  this time.    [facility], located within [facility]

## 2022-11-30 ENCOUNTER — TREATMENT (OUTPATIENT)
Dept: CARDIAC REHAB | Facility: HOSPITAL | Age: 82
End: 2022-11-30

## 2022-11-30 DIAGNOSIS — Z95.1 S/P CABG (CORONARY ARTERY BYPASS GRAFT): Primary | ICD-10-CM

## 2022-11-30 PROCEDURE — 93798 PHYS/QHP OP CAR RHAB W/ECG: CPT

## 2022-12-02 ENCOUNTER — ANESTHESIA EVENT (OUTPATIENT)
Dept: PERIOP | Facility: HOSPITAL | Age: 82
End: 2022-12-02
Payer: MEDICARE

## 2022-12-04 ENCOUNTER — APPOINTMENT (OUTPATIENT)
Dept: GENERAL RADIOLOGY | Facility: HOSPITAL | Age: 82
End: 2022-12-04

## 2022-12-04 ENCOUNTER — HOSPITAL ENCOUNTER (EMERGENCY)
Facility: HOSPITAL | Age: 82
Discharge: HOME | End: 2022-12-04
Attending: EMERGENCY MEDICINE | Admitting: EMERGENCY MEDICINE

## 2022-12-04 VITALS
HEIGHT: 68 IN | SYSTOLIC BLOOD PRESSURE: 183 MMHG | OXYGEN SATURATION: 99 % | HEART RATE: 66 BPM | DIASTOLIC BLOOD PRESSURE: 80 MMHG | TEMPERATURE: 98 F | WEIGHT: 157 LBS | RESPIRATION RATE: 16 BRPM | BODY MASS INDEX: 23.79 KG/M2

## 2022-12-04 DIAGNOSIS — J06.9 VIRAL URI WITH COUGH: Primary | ICD-10-CM

## 2022-12-04 LAB
FLUAV AG NPH QL: NEGATIVE
FLUBV AG NPH QL IA: NEGATIVE

## 2022-12-04 PROCEDURE — 99283 EMERGENCY DEPT VISIT LOW MDM: CPT

## 2022-12-04 PROCEDURE — 87804 INFLUENZA ASSAY W/OPTIC: CPT | Performed by: EMERGENCY MEDICINE

## 2022-12-04 PROCEDURE — 71046 X-RAY EXAM CHEST 2 VIEWS: CPT

## 2022-12-04 RX ORDER — BENZONATATE 200 MG/1
200 CAPSULE ORAL 3 TIMES DAILY PRN
Qty: 20 CAPSULE | Refills: 0 | Status: SHIPPED | OUTPATIENT
Start: 2022-12-04 | End: 2022-12-28

## 2022-12-04 RX ORDER — AZITHROMYCIN 250 MG/1
TABLET, FILM COATED ORAL
Qty: 6 TABLET | Refills: 0 | Status: SHIPPED | OUTPATIENT
Start: 2022-12-04 | End: 2023-01-19

## 2022-12-04 NOTE — ED PROVIDER NOTES
Subjective     History provided by:  Patient and spouse    History of Present Illness    · Chief complaint: Cough    · Location: Lungs    · Quality/Severity: The patient has had a cough is occasionally productive of yellow sputum.  The cough is severe.    · Timing/Onset: Started 4 days ago.    · Modifying Factors: No aggravating or relieving factors.    · Associated symptoms: He has associated runny nose and sore throat.  Denies a fever, body aches or shortness of breath.    · Narrative: The patient is an 8-year-old white male with a 4-day history of a cough productive of yellow sputum, runny nose and sore throat.  His wife currently has similar symptoms.  The patient is worried because he recently had a three-vessel CABG and a tissue aortic valve replacement on 8/8/2022.  He states he is scheduled for surgery by Dr. 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    Review of Systems   Constitutional: Negative for activity change, appetite change, chills, diaphoresis, fatigue and fever.   HENT: Positive for congestion, rhinorrhea, sore throat and trouble swallowing. Negative for dental problem, ear pain, hearing loss, mouth sores, postnasal drip, sinus pressure and voice change.    Eyes: Negative for photophobia, pain, discharge, redness and visual disturbance.   Respiratory: Positive for cough. Negative for chest tightness, shortness of breath, wheezing and stridor.    Cardiovascular: Negative for chest pain, palpitations and leg swelling.   Gastrointestinal: Negative for abdominal pain, diarrhea, nausea and vomiting.   Genitourinary: Negative for difficulty urinating, dysuria, flank pain, frequency, hematuria and urgency.   Musculoskeletal: Negative for arthralgias, back pain, gait problem, joint swelling, myalgias, neck pain and neck stiffness.   Skin: Negative for color change and rash.   Neurological: Negative for dizziness, tremors, seizures, syncope, facial asymmetry, speech  "difficulty, weakness, light-headedness, numbness and headaches.   Hematological: Negative for adenopathy.   Psychiatric/Behavioral: Negative.  Negative for confusion and decreased concentration. The patient is not nervous/anxious.      Past Medical History:   Diagnosis Date   • Aortic stenosis     8/2022: tissue AVR (25mm MDT Avalus bovine pericardial)   • Asthma    • BPH (benign prostatic hyperplasia)    • CAD (coronary artery disease)     7/2022: 95% prox/50% distal LAD, 80% D1, 30-40% Cx, diffuse dz RCA. CABG x 3 8/2022: LIMA-LAD, SVG-D1, SVG-RCA   • Carotid atherosclerosis, bilateral     8/2022: 16-49% bilaterally   • Colon polyp    • GERD (gastroesophageal reflux disease)    • Hyperlipidemia    • Hypertension    • Paroxysmal atrial fibrillation (HCC)    • Type 2 diabetes mellitus (HCC)      BP (!) 183/80   Pulse 66   Temp 98 °F (36.7 °C)   Resp 16   Ht 172.7 cm (68\")   Wt 71.2 kg (157 lb)   SpO2 99%   BMI 23.87 kg/m²     Past Medical History:   Diagnosis Date   • Aortic stenosis     8/2022: tissue AVR (25mm MDT Avalus bovine pericardial)   • Asthma    • BPH (benign prostatic hyperplasia)    • CAD (coronary artery disease)     7/2022: 95% prox/50% distal LAD, 80% D1, 30-40% Cx, diffuse dz RCA. CABG x 3 8/2022: LIMA-LAD, SVG-D1, SVG-RCA   • Carotid atherosclerosis, bilateral     8/2022: 16-49% bilaterally   • Colon polyp    • GERD (gastroesophageal reflux disease)    • Hyperlipidemia    • Hypertension    • Paroxysmal atrial fibrillation (HCC)    • Type 2 diabetes mellitus (HCC)        Allergies   Allergen Reactions   • Loratadine Other (See Comments)     Emotional side effects.        Past Surgical History:   Procedure Laterality Date   • CARDIAC CATHETERIZATION N/A 7/14/2022    Procedure: Coronary angiography;  Surgeon: Unique Calabrese MD;  Location: [facility] CATH INVASIVE LOCATION;  Service: Cardiovascular;  Laterality: N/A;   • CARDIAC CATHETERIZATION N/A 7/14/2022    Procedure: Left heart cath with " simultaneous LV/Ao pressures;  Surgeon: Unique Calabrese MD;  Location:  [facility] CATH INVASIVE LOCATION;  Service: Cardiovascular;  Laterality: N/A;   • CARDIAC CATHETERIZATION N/A 7/14/2022    Procedure: Right Heart Cath;  Surgeon: Unique Calabrese MD;  [facility];  Service: Cardiovascular;  Laterality: N/A;   • COLONOSCOPY     • CORONARY ARTERY BYPASS GRAFT WITH AORTIC VALVE REPAIR/REPLACEMENT N/A 8/8/2022    Procedure: KEVIN STERNOTOMY CORONARY ARTERY BYPASS GRAFT TIMES 3 USING LEFT INTERNAL MAMMARY ARTERY AND LEFT GREATER SAPHENOUS VEIN GRAFT PER ENDOSCOPIC VEIN HARVESTING, AORTIC VALVE REPLACEMENT AND PRP;  Surgeon: Jr Gentry Courtney MD;  Location: [facility] CVOR;  Service: Cardiothoracic;  Laterality: N/A;   • VASECTOMY         Family History   Problem Relation Age of Onset   • Colon cancer Neg Hx    • Colon polyps Neg Hx    • Malig Hyperthermia Neg Hx        Social History     Socioeconomic History   • Marital status:    • Number of children: 3   Tobacco Use   • Smoking status: Never   • Smokeless tobacco: Never   • Tobacco comments:     caffeine use: 1 -2 cups daily.    Vaping Use   • Vaping Use: Never used   Substance and Sexual Activity   • Alcohol use: No   • Drug use: No   • Sexual activity: Defer           Objective   Physical Exam  Vitals and nursing note reviewed.   Constitutional:       General: He is not in acute distress.     Appearance: Normal appearance. He is well-developed. He is not ill-appearing, toxic-appearing or diaphoretic.      Comments: The patient appears healthy in no acute distress.  Review of his vital signs: He is afebrile with a temperature of 98, respirations are normal 16 with a normal room air oxygen saturation of 99%, heart rate normal 66, blood pressure elevated 183/80.   HENT:      Head: Normocephalic and atraumatic.      Nose: Congestion present.      Mouth/Throat:      Mouth: Mucous membranes are moist.      Pharynx: Oropharynx is clear.    Eyes:      General: No scleral icterus.        Right eye: No discharge.         Left eye: No discharge.      Conjunctiva/sclera: Conjunctivae normal.      Pupils: Pupils are equal, round, and reactive to light.   Neck:      Thyroid: No thyromegaly.      Vascular: No JVD.   Cardiovascular:      Rate and Rhythm: Normal rate and regular rhythm.      Heart sounds: Murmur ( 1/6 systolic) heard.   Pulmonary:      Effort: Pulmonary effort is normal.      Breath sounds: Wheezing present. No rhonchi or rales.      Comments: The patient has faint end expiratory wheezes throughout all fields.  Chest:      Chest wall: No tenderness.   Abdominal:      General: Bowel sounds are normal. There is no distension.      Palpations: Abdomen is soft.      Tenderness: There is no abdominal tenderness. There is no right CVA tenderness, left CVA tenderness, guarding or rebound.   Musculoskeletal:         General: No tenderness or deformity. Normal range of motion.      Cervical back: Normal range of motion and neck supple.   Lymphadenopathy:      Cervical: No cervical adenopathy.   Skin:     General: Skin is warm and dry.      Capillary Refill: Capillary refill takes less than 2 seconds.      Coloration: Skin is not pale.      Findings: No erythema or rash.   Neurological:      General: No focal deficit present.      Mental Status: He is alert and oriented to person, place, and time.      Cranial Nerves: No cranial nerve deficit.      Coordination: Coordination normal.      Comments: No focal motor sensory deficit   Psychiatric:         Mood and Affect: Mood normal.         Behavior: Behavior normal.         Thought Content: Thought content normal.         Judgment: Judgment normal.         Procedures           ED Course  ED Course as of 12/04/22 1223   Sun Dec 04, 2022   1154 Patient had a negative home COVID test today.  He is influenza antigens are negative here.  His chest x-ray was interpreted by me and the radiologist as no acute  disease. [TP]   1154 Is my impression the patient has a viral URI causing his symptoms.  The patient is insistent that we try something so I will prescribe him a Z-Alex as well as Tessalon. [TP]      ED Course User Index  [TP] Dank Messer MD                                           MDM  Number of Diagnoses or Management Options  Viral URI with cough: new and requires workup     Amount and/or Complexity of Data Reviewed  Clinical lab tests: ordered and reviewed  Tests in the radiology section of CPT®: ordered and reviewed    Risk of Complications, Morbidity, and/or Mortality  Presenting problems: moderate  Diagnostic procedures: high  Management options: moderate  General comments: My differential diagnosis for cough includes but is not limited to:  Upper respiratory infection, bronchitis, pneumonia, COPD exacerbation, cough variant asthma, cardiac asthma, coronary artery disease, congestive heart failure, bacterial, viral or lung infections, lung cancer, aspiration pneumonitis, aspiration of foreign body and Covid -19        Patient Progress  Patient progress: stable      Final diagnoses:   Viral URI with cough       ED Disposition  ED Disposition     ED Disposition   Discharge    Condition   Stable    Comment   --             Agustín Ivan MD  [address] [place], [place] [address] [phone]    Schedule an appointment as soon as possible for a visit in 4 days  If not improved         Medication List      New Prescriptions    azithromycin 250 MG tablet  Commonly known as: Zithromax Z-Alex  Take 2 tablets the first day, then 1 tablet daily for 4 days.     benzonatate 200 MG capsule  Commonly known as: TESSALON  Take 1 capsule by mouth 3 (Three) Times a Day As Needed for Cough.        Changed    omeprazole 40 MG capsule  Commonly known as: priLOSEC  Take 1 capsule by mouth 3 (Three) Times a Week if Needed (Heartburn).  What changed: when to take this           Where to Get Your Medications      These  medications were sent to [id number] - [place] - [address] - [phone]  - [phone] FX  [address] [facility] [place] [address]    Phone: [phone]   · azithromycin 250 MG tablet  · benzonatate 200 MG capsule         Labs Reviewed   INFLUENZA ANTIGEN, RAPID - Normal     XR Chest 2 View   Final Result   No clearly acute cardiopulmonary findings. Radiographic findings of COPD.      Signer Name: Woo Sanchez MD    Signed: 12/4/2022 11:19 AM    Workstation Name: [id number]     [facility]             Medication List      New Prescriptions    azithromycin 250 MG tablet  Commonly known as: Zithromax Z-Alex  Take 2 tablets the first day, then 1 tablet daily for 4 days.     benzonatate 200 MG capsule  Commonly known as: TESSALON  Take 1 capsule by mouth 3 (Three) Times a Day As Needed for Cough.        Changed    omeprazole 40 MG capsule  Commonly known as: priLOSEC  Take 1 capsule by mouth 3 (Three) Times a Week if Needed (Heartburn).  What changed: when to take this           Where to Get Your Medications      These medications were sent to [facility] - [place] - [address] - [phone]  - [phone] FX  [address] [facility] [address] [place] [address]    Phone: [phone]   · azithromycin 250 MG tablet  · benzonatate 200 MG capsule              Dank Messer MD  12/04/22 1223

## 2022-12-05 ENCOUNTER — ANTICOAGULATION VISIT (OUTPATIENT)
Dept: PHARMACY | Facility: HOSPITAL | Age: 82
End: 2022-12-05

## 2022-12-05 ENCOUNTER — LAB (OUTPATIENT)
Dept: LAB | Facility: HOSPITAL | Age: 82
End: 2022-12-05

## 2022-12-05 DIAGNOSIS — I48.0 AF (PAROXYSMAL ATRIAL FIBRILLATION): Primary | ICD-10-CM

## 2022-12-05 DIAGNOSIS — I48.0 AF (PAROXYSMAL ATRIAL FIBRILLATION): ICD-10-CM

## 2022-12-05 LAB
INR PPP: 3.15 (ref 0.9–1.1)
PROTHROMBIN TIME: 33.1 SECONDS (ref 12.1–15)

## 2022-12-05 PROCEDURE — 36415 COLL VENOUS BLD VENIPUNCTURE: CPT

## 2022-12-05 PROCEDURE — 85610 PROTHROMBIN TIME: CPT

## 2022-12-05 NOTE — PROGRESS NOTES
Anticoagulation Clinic Progress Note    Anticoagulation Summary  As of 12/5/2022    INR goal:  2.0-3.0   TTR:  41.5 % (3.4 mo)   INR used for dosing:  3.15 (12/5/2022)   Warfarin maintenance plan:  4 mg every Tue, Thu; 6 mg all other days; Starting 12/5/2022   Weekly warfarin total:  38 mg   Plan last modified:  Moisés Morales, PharmD (11/21/2022)   Next INR check:  12/12/2022   Target end date:      Indications    AF (paroxysmal atrial fibrillation) ([facility]) [I48.0]             Anticoagulation Episode Summary     INR check location:      Preferred lab:      Send INR reminders to:   [facility]    Comments:  s/p AVR and CABG * [facility]*      Anticoagulation Care Providers     Provider Role Specialty Phone number    Yandel Helena VIEIRA, DEV Referring Cardiology [phone]          Clinic Interview:  Patient Findings     Positives:  Change in medications    Negatives:  Signs/symptoms of thrombosis, Signs/symptoms of bleeding,   Laboratory test error suspected, Change in health, Change in alcohol use,   Change in activity, Upcoming invasive procedure, Emergency department   visit, Upcoming dental procedure, Missed doses, Extra doses, Change in   diet/appetite, Hospital admission, Bruising, Other complaints    Comments:  Viral URI in ED yesterday, Prescribed a zpak per patient   request. Procedure on 12/9 likely being canceled due to infection- waiting   for call from doctor. Skip warfarin today. Patients spouse is under the   impression that he does not need to hold for the procedure (no notes). She   is waiting to hear from the doctor on Wednesday to confirm if procedure is   canceled. She will call us back if procedure if not canceled. Plan for INR   in 1 week if it is.       Clinical Outcomes     Negatives:  Major bleeding event, Thromboembolic event,   Anticoagulation-related hospital admission, Anticoagulation-related ED   visit, Anticoagulation-related fatality    Comments:  Viral URI  in ED yesterday, Prescribed a zpak per patient   request. Procedure on 12/9 likely being canceled due to infection- waiting   for call from doctor. Skip warfarin today. Patients spouse is under the   impression that he does not need to hold for the procedure (no notes). She   is waiting to hear from the doctor on Wednesday to confirm if procedure is   canceled. She will call us back if procedure if not canceled. Plan for INR   in 1 week if it is.         INR History:  Anticoagulation Monitoring 11/21/2022 11/28/2022 12/5/2022   INR 1.32 2.37 3.15   INR Date 11/21/2022 11/28/2022 12/5/2022   INR Goal 2.0-3.0 2.0-3.0 2.0-3.0   Trend Up Same Same   Last Week Total 34 mg 40 mg 38 mg   Next Week Total 40 mg 38 mg 32 mg   Sun 6 mg 6 mg 6 mg   Mon 8 mg (11/21) 6 mg Hold (12/5)   Tue 4 mg 4 mg 4 mg   Wed 6 mg 6 mg 6 mg   Thu 4 mg 4 mg 4 mg   Fri 6 mg 6 mg 6 mg   Sat 6 mg 6 mg 6 mg   Visit Report - - -   Some recent data might be hidden       Plan:  1. INR is Supratherapeutic today- see above in Anticoagulation Summary.   Will instruct Woo Dobbs to Change their warfarin regimen- see above in Anticoagulation Summary.   Viral URI in ED yesterday, Prescribed a zpak per patient   request. Procedure on 12/9 likely being canceled due to infection- waiting   for call from doctor. Skip warfarin today. Patients spouse is under the   impression that he does not need to hold for the procedure (no notes). She   is waiting to hear from the doctor on Wednesday to confirm if procedure is   canceled. She will call us back if procedure if not canceled. Plan for INR   in 1 week if it is.    2. Follow up in 1 week  3. They have been instructed to call if any changes in medications, doses, concerns, etc. Patient expresses understanding and has no further questions at this time.    Miya Gan [facility]

## 2022-12-06 ENCOUNTER — PRE-ADMISSION TESTING (OUTPATIENT)
Dept: PREADMISSION TESTING | Facility: HOSPITAL | Age: 82
End: 2022-12-06

## 2022-12-06 NOTE — PAT
Pt seen in ER 12/4 dx w/URI, given zithromax and cough med. Reviewed w/Dr Regan, stated pt will need to finish meds and be symptom free, no prod cough for 7 days prior to rescheduling. Instructed pt and notified Janine at Dr Arvizu's office.

## 2022-12-07 RX ORDER — WARFARIN SODIUM 4 MG/1
TABLET ORAL
Qty: 155 TABLET | Refills: 0 | Status: SHIPPED | OUTPATIENT
Start: 2022-12-07 | End: 2023-01-23 | Stop reason: HOSPADM

## 2022-12-09 ENCOUNTER — ANESTHESIA (OUTPATIENT)
Dept: PERIOP | Facility: HOSPITAL | Age: 82
End: 2022-12-09
Payer: MEDICARE

## 2022-12-12 ENCOUNTER — LAB (OUTPATIENT)
Dept: LAB | Facility: HOSPITAL | Age: 82
End: 2022-12-12

## 2022-12-12 ENCOUNTER — ANTICOAGULATION VISIT (OUTPATIENT)
Dept: PHARMACY | Facility: HOSPITAL | Age: 82
End: 2022-12-12

## 2022-12-12 DIAGNOSIS — I48.0 AF (PAROXYSMAL ATRIAL FIBRILLATION): Primary | ICD-10-CM

## 2022-12-12 DIAGNOSIS — I48.0 AF (PAROXYSMAL ATRIAL FIBRILLATION): ICD-10-CM

## 2022-12-12 LAB
INR PPP: 2.22 (ref 0.9–1.1)
PROTHROMBIN TIME: 25.1 SECONDS (ref 12.1–15)

## 2022-12-12 PROCEDURE — 85610 PROTHROMBIN TIME: CPT

## 2022-12-12 PROCEDURE — 36415 COLL VENOUS BLD VENIPUNCTURE: CPT

## 2022-12-12 NOTE — PROGRESS NOTES
Anticoagulation Clinic Progress Note    Anticoagulation Summary  As of 2022    INR goal:  2.0-3.0   TTR:  44.2 % (3.6 mo)   INR used for dosin.22 (2022)   Warfarin maintenance plan:  4 mg every Tue, Thu; 6 mg all other days; Starting 2022   Weekly warfarin total:  38 mg   No change documented:  Moisés Morales, PharmD   Plan last modified:  Moisés Morales, PharmD (2022)   Next INR check:  2022   Target end date:      Indications    AF (paroxysmal atrial fibrillation) ([facility]) [I48.0]             Anticoagulation Episode Summary     INR check location:      Preferred lab:      Send INR reminders to:   [facility]    Comments:  s/p AVR and CABG * [facility]*      Anticoagulation Care Providers     Provider Role Specialty Phone number    Helena Humphries DEV VIEIRA Referring Cardiology [phone]          Clinic Interview:  Patient Findings     Positives:  Change in medications    Negatives:  Signs/symptoms of thrombosis, Signs/symptoms of bleeding,   Laboratory test error suspected, Change in health, Change in alcohol use,   Change in activity, Upcoming invasive procedure, Emergency department   visit, Upcoming dental procedure, Missed doses, Extra doses, Change in   diet/appetite, Hospital admission, Bruising, Other complaints    Comments:  Reports today was prescribed 10-day courses of amox/clav and   prednisone taper.      Clinical Outcomes     Negatives:  Major bleeding event, Thromboembolic event,   Anticoagulation-related hospital admission, Anticoagulation-related ED   visit, Anticoagulation-related fatality    Comments:  Reports today was prescribed 10-day courses of amox/clav and   prednisone taper.        INR History:  Anticoagulation Monitoring 2022   INR 2.37 3.15 2.22   INR Date 2022   INR Goal 2.0-3.0 2.0-3.0 2.0-3.0   Trend Same Same Same   Last Week Total 40 mg 48 mg 36 mg   Next Week Total 38  mg 36 mg 38 mg   Sun 6 mg 6 mg -   Mon 6 mg Hold (12/5) 6 mg   Tue 4 mg 8 mg (12/6) 4 mg   Wed 6 mg 6 mg 6 mg   Thu 4 mg 4 mg 4 mg   Fri 6 mg 6 mg -   Sat 6 mg 6 mg -   Visit Report - - -   Some recent data might be hidden       Plan:  1. INR is Therapeutic today- see above in Anticoagulation Summary.   Will instruct Woo FRANCO Dobbs to Continue their warfarin regimen- see above in Anticoagulation Summary.  2. Follow up in 4 days.   3. They have been instructed to call if any changes in medications, doses, concerns, etc. Patient expresses understanding and has no further questions at this time.    Moisés Morales, PharmD

## 2022-12-19 ENCOUNTER — ANTICOAGULATION VISIT (OUTPATIENT)
Dept: PHARMACY | Facility: HOSPITAL | Age: 82
End: 2022-12-19

## 2022-12-19 ENCOUNTER — LAB (OUTPATIENT)
Dept: LAB | Facility: HOSPITAL | Age: 82
End: 2022-12-19

## 2022-12-19 DIAGNOSIS — I48.0 AF (PAROXYSMAL ATRIAL FIBRILLATION): ICD-10-CM

## 2022-12-19 DIAGNOSIS — I48.0 AF (PAROXYSMAL ATRIAL FIBRILLATION): Primary | ICD-10-CM

## 2022-12-19 LAB
INR PPP: 2.96 (ref 0.9–1.1)
PROTHROMBIN TIME: 31.5 SECONDS (ref 12.1–15)

## 2022-12-19 PROCEDURE — 85610 PROTHROMBIN TIME: CPT

## 2022-12-19 PROCEDURE — 36415 COLL VENOUS BLD VENIPUNCTURE: CPT

## 2022-12-21 NOTE — PROGRESS NOTES
Anticoagulation Clinic Progress Note    Anticoagulation Summary  As of 2022    INR goal:  2.0-3.0   TTR:  47.5 % (3.9 mo)   INR used for dosin.96 (2022)   Warfarin maintenance plan:  4 mg every Tue, Thu; 6 mg all other days; Starting 2022   Weekly warfarin total:  38 mg   Plan last modified:  Moisés Morales, PharmD (2022)   Next INR check:  2022   Target end date:      Indications    AF (paroxysmal atrial fibrillation) ([facility]) [I48.0]             Anticoagulation Episode Summary     INR check location:      Preferred lab:      Send INR reminders to:   [facility]    Comments:  s/p AVR and CABG * [facility]*      Anticoagulation Care Providers     Provider Role Specialty Phone number    Helena Humphries DARIELDEV Referring Cardiology [phone]          Clinic Interview:  Patient Findings     Positives:  Change in medications, Other complaints    Negatives:  Signs/symptoms of thrombosis, Signs/symptoms of bleeding,   Laboratory test error suspected, Change in health, Change in alcohol use,   Change in activity, Upcoming invasive procedure, Emergency department   visit, Upcoming dental procedure, Missed doses, Extra doses, Change in   diet/appetite, Hospital admission, Bruising    Comments:  Unsuccessful reaching by phone until 22. Reports   finished prednisone 2 days ago. Reports he still has 1-2 days remaining of   amox/clav. Reports he accidentally took warfarin 6 mg on 22, so he   took lower 4 mg dose on 22 (takes early in AM). TURBT 23.       Clinical Outcomes     Negatives:  Major bleeding event, Thromboembolic event,   Anticoagulation-related hospital admission, Anticoagulation-related ED   visit, Anticoagulation-related fatality    Comments:  Unsuccessful reaching by phone until 22. Reports   finished prednisone 2 days ago. Reports he still has 1-2 days remaining of   amox/clav. Reports he accidentally took warfarin 6 mg on  12/20/22, so he   took lower 4 mg dose on 12/21/22 (takes early in AM). TURBT 1/9/23.         INR History:  Anticoagulation Monitoring 12/5/2022 12/12/2022 12/19/2022   INR 3.15 2.22 2.96   INR Date 12/5/2022 12/12/2022 12/19/2022   INR Goal 2.0-3.0 2.0-3.0 2.0-3.0   Trend Same Same Same   Last Week Total 48 mg 36 mg 38 mg   Next Week Total 36 mg 38 mg 36 mg   Sun 6 mg - 6 mg   Mon Hold (12/5) 6 mg 6 mg   Tue 8 mg (12/6) 4 mg 6 mg (12/20); Otherwise 4 mg   Wed 6 mg 6 mg 4 mg (12/21)   Thu 4 mg 4 mg 2 mg (12/22)   Fri 6 mg - 6 mg   Sat 6 mg - 6 mg   Visit Report - - -   Some recent data might be hidden       Plan:  1. INR was Therapeutic 12/19/22 - see above in Anticoagulation Summary. Unsuccessful reaching by phone until 12/21/22.   Will instruct Woo Dobbs to Change their warfarin regimen- see above in Anticoagulation Summary.  2. Follow up in 1 week  3. They have been instructed to call if any changes in medications, doses, concerns, etc. Patient expresses understanding and has no further questions at this time.    Moisés Morales, PharmD

## 2022-12-28 ENCOUNTER — LAB (OUTPATIENT)
Dept: LAB | Facility: HOSPITAL | Age: 82
End: 2022-12-28
Payer: MEDICARE

## 2022-12-28 ENCOUNTER — ANTICOAGULATION VISIT (OUTPATIENT)
Dept: PHARMACY | Facility: HOSPITAL | Age: 82
End: 2022-12-28

## 2022-12-28 ENCOUNTER — PRE-ADMISSION TESTING (OUTPATIENT)
Dept: PREADMISSION TESTING | Facility: HOSPITAL | Age: 82
End: 2022-12-28
Payer: MEDICARE

## 2022-12-28 VITALS
DIASTOLIC BLOOD PRESSURE: 88 MMHG | WEIGHT: 157 LBS | SYSTOLIC BLOOD PRESSURE: 156 MMHG | HEIGHT: 68 IN | BODY MASS INDEX: 23.79 KG/M2 | OXYGEN SATURATION: 99 % | HEART RATE: 68 BPM | RESPIRATION RATE: 16 BRPM

## 2022-12-28 DIAGNOSIS — I48.0 AF (PAROXYSMAL ATRIAL FIBRILLATION): ICD-10-CM

## 2022-12-28 DIAGNOSIS — I48.0 AF (PAROXYSMAL ATRIAL FIBRILLATION): Primary | ICD-10-CM

## 2022-12-28 LAB
ANION GAP SERPL CALCULATED.3IONS-SCNC: 8.3 MMOL/L (ref 5–15)
BUN SERPL-MCNC: 16 MG/DL (ref 8–23)
BUN/CREAT SERPL: 16.2 (ref 7–25)
CALCIUM SPEC-SCNC: 9.3 MG/DL (ref 8.6–10.5)
CHLORIDE SERPL-SCNC: 102 MMOL/L (ref 98–107)
CO2 SERPL-SCNC: 25.7 MMOL/L (ref 22–29)
CREAT SERPL-MCNC: 0.99 MG/DL (ref 0.76–1.27)
DEPRECATED RDW RBC AUTO: 48 FL (ref 37–54)
EGFRCR SERPLBLD CKD-EPI 2021: 76.1 ML/MIN/1.73
ERYTHROCYTE [DISTWIDTH] IN BLOOD BY AUTOMATED COUNT: 15.1 % (ref 12.3–15.4)
GLUCOSE SERPL-MCNC: 212 MG/DL (ref 65–99)
HBA1C MFR BLD: 8.2 % (ref 4.8–5.6)
HCT VFR BLD AUTO: 37.7 % (ref 37.5–51)
HGB BLD-MCNC: 12.2 G/DL (ref 13–17.7)
INR PPP: 1.96 (ref 0.9–1.1)
MCH RBC QN AUTO: 28.2 PG (ref 26.6–33)
MCHC RBC AUTO-ENTMCNC: 32.4 G/DL (ref 31.5–35.7)
MCV RBC AUTO: 87.1 FL (ref 79–97)
PLATELET # BLD AUTO: 173 10*3/MM3 (ref 140–450)
PMV BLD AUTO: 9.8 FL (ref 6–12)
POTASSIUM SERPL-SCNC: 5.2 MMOL/L (ref 3.5–5.2)
PROTHROMBIN TIME: 22.7 SECONDS (ref 12.1–15)
RBC # BLD AUTO: 4.33 10*6/MM3 (ref 4.14–5.8)
SODIUM SERPL-SCNC: 136 MMOL/L (ref 136–145)
WBC NRBC COR # BLD: 5.72 10*3/MM3 (ref 3.4–10.8)

## 2022-12-28 PROCEDURE — 85027 COMPLETE CBC AUTOMATED: CPT | Performed by: UROLOGY

## 2022-12-28 PROCEDURE — 83036 HEMOGLOBIN GLYCOSYLATED A1C: CPT

## 2022-12-28 PROCEDURE — 80048 BASIC METABOLIC PNL TOTAL CA: CPT | Performed by: UROLOGY

## 2022-12-28 PROCEDURE — 85610 PROTHROMBIN TIME: CPT

## 2022-12-28 PROCEDURE — 36415 COLL VENOUS BLD VENIPUNCTURE: CPT

## 2022-12-28 NOTE — PAT
Pt here for PAT visit.  Pre-op tests completed, chg soap given, and instructions reviewed.  Instructed clears until 2 hrs prior to arrival time, voiced understanding. OK to stop ASA per Dr Ball. Pt stated medical management clinic takes care of his coumadin and they are aware of his surgery and ok's to stop 5 days prior to surgery.

## 2022-12-28 NOTE — DISCHARGE INSTRUCTIONS
PRE-ADMISSION TESTING INSTRUCTIONS FOR ADULTS    Take these medications the morning of surgery with a small sip of water: Amiodarone, Atenolol, Omeprazole,      Follow Cardiologist instructions on when to stop Coumadin/ASA   Do not take any insulin or diabetes medications the morning of surgery.      No aspirin, advil, aleve, ibuprofen, naproxen, diet pills, decongestants, or herbal/vitamins for a week prior to surgery.       Tylenol/Acetaminophen is okay to take if needed.    General Instructions:    DO NOT EAT SOLID FOOD AFTER MIDNIGHT THE NIGHT BEFORE SURGERY. No gum, mints, or hard candy after midnight the night before surgery.  You may drink clear liquids the day of surgery up until 2 hours before your arrival time.  Clear liquids are liquids you can see through. Nothing RED in color.    Plain water    Sports drinks  Sodas     Gelatin (Jell-O)  Fruit juices without pulp such as white grape juice and apple juice  Popsicles that contain no fruit or yogurt  Tea or coffee (no cream or milk added)    It is beneficial for you to have a clear drink that contains carbohydrates just before you leave your house and before your fasting time begins.  We suggest a 20 ounce bottle of Gatorade or Powerade for non-diabetic patients or a 20 ounce bottle of G2 or Powerade Zero for diabetic patients.     Patients who avoid smoking, chewing tobacco and alcohol for 4 weeks prior to surgery have a reduced risk of post-operative complications.  If at all possible, quit smoking as many days before surgery as you can.    Do not smoke, use chewing tobacco or drink alcohol the day of surgery    Bring your C-PAP/ BI-PAP machine if you use one.  Wear clean comfortable clothes and socks.  Do not wear contact lenses, lotion, deodorant, or make-up.  Bring a case for your glasses if applicable. You may brush your teeth the morning of surgery.  You may wear dentures/partials, do not put adhesive/glue on them.  Leave all other jewelry and  valuables at home.      Preventing a Surgical Site Infection:    Shower the night before and on the morning of surgery using the chlorhexidine soap you were given.  Use a clean washcloth with the soap.  Place clean sheets on your bed after showering the night before surgery. Do not use the CHG soap on your hair, face, or private areas. Wash your body gently for five (5) minutes. Do not scrub your skin.  Dry with a clean towel and dress in clean clothing.  Do not shave the surgical area for 10 days-2 weeks prior to surgery  because the razor can irritate skin and make it easier to develop an infection.  Make sure you, your family, and all healthcare providers clean their hands with soap and water or an alcohol based hand  before caring for you or your wound.      Day of surgery:    Your surgeon’s office will advise you of your arrival time for the day of surgery.    Upon arrival, a Pre-op nurse and Anesthesia provider will review your health history, obtain vital signs, and answer questions you may have. The anesthesia provider will also discuss the type of anesthesia that will be needed for your procedure, which may include general anesthesia. The only belongings needed at this time will be your home medications and if applicable your C-PAP/BI-PAP machine.  If you are staying overnight your family can leave the rest of your belongings in the car and bring them to your room later.  A Pre-op nurse will start an IV and you may receive medication in preparation for surgery, including something to help you relax.  Your family will be able to see you in the Pre-op area.  While you are in surgery your family should notify the waiting room  if they leave the waiting room area and provide a contact phone number.    IF you have any questions, you can call the Pre-Admission Department at [phone] or your surgeon's office.  Notify your surgeon if  you become sick, have a fever, productive cough, or  cannot be here the day of surgery    Please be aware that surgery does come with discomfort.  We want to make every effort to control your discomfort so please discuss any uncontrolled symptoms with your nurse.   Your doctor will most likely have prescribed pain medications.      If you are going home after surgery, you will receive individualized written care instructions before being discharged.  A responsible adult (over the age of 18) must drive you to and from the hospital on the day of your surgery and stay with you for 24 hours after anesthesia.    If you are staying overnight following surgery, you will be transported to your hospital room following the recovery period.  [facility] has all private rooms.    You may receive a survey regarding the care you received. Your feedback is very important and will be used to collect the necessary data to help us to continue to provide excellent care.     Deductibles and co-payments are collected on the day of service. Please be prepared to pay the required co-pay, deductible or deposit on the day of service as defined by your plan.

## 2022-12-29 NOTE — PROGRESS NOTES
Anticoagulation Clinic Progress Note    Anticoagulation Summary  As of 2022    INR goal:  2.0-3.0   TTR:  51.0 % (4.2 mo)   INR used for dosin.96 (2022)   Warfarin maintenance plan:  4 mg every Tue, Thu; 6 mg all other days; Starting 2022   Weekly warfarin total:  38 mg   Plan last modified:  Moisés Morales, PharmD (2022)   Next INR check:  2023   Target end date:      Indications    AF (paroxysmal atrial fibrillation) ([facility]) [I48.0]             Anticoagulation Episode Summary     INR check location:      Preferred lab:      Send INR reminders to:   [facility]    Comments:  s/p AVR and CABG * [facility]*      Anticoagulation Care Providers     Provider Role Specialty Phone number    YandelHelena, DEV Referring Cardiology [phone]          Clinic Interview:  Patient Findings     Negatives:  Signs/symptoms of thrombosis, Signs/symptoms of bleeding,   Laboratory test error suspected, Change in health, Change in alcohol use,   Change in activity, Upcoming invasive procedure, Emergency department   visit, Upcoming dental procedure, Missed doses, Extra doses, Change in   medications, Change in diet/appetite, Hospital admission, Bruising, Other   complaints    Comments:  Confirmed dosing and reviewed holding instructions  for TURBT   procedure on .  Plan to take 6 mg daily post procedure until INR on   .       Clinical Outcomes     Negatives:  Major bleeding event, Thromboembolic event,   Anticoagulation-related hospital admission, Anticoagulation-related ED   visit, Anticoagulation-related fatality    Comments:  Confirmed dosing and reviewed holding instructions  for TURBT   procedure on .  Plan to take 6 mg daily post procedure until INR on   .         INR History:  Anticoagulation Monitoring 2022   INR 2.22 2.96 1.96   INR Date 2022   INR Goal 2.0-3.0 2.0-3.0 2.0-3.0   Trend Same  Same Same   Last Week Total 36 mg 38 mg 34 mg   Next Week Total 38 mg 36 mg 38 mg   Sun - 6 mg Hold (1/8); Otherwise 6 mg   Mon 6 mg 6 mg Hold (1/9); Otherwise 6 mg   Tue 4 mg 6 mg (12/20); Otherwise 4 mg 6 mg (1/10); Otherwise 4 mg   Wed 6 mg 4 mg (12/21) Hold (1/4); Otherwise 6 mg   Thu 4 mg 2 mg (12/22) Hold (1/5), 6 mg (1/12); Otherwise 4 mg   Fri - 6 mg Hold (1/6); Otherwise 6 mg   Sat - 6 mg Hold (1/7); Otherwise 6 mg   Visit Report - - -   Some recent data might be hidden       Plan:  1. INR is Therapeutic today- see above in Anticoagulation Summary.   Will instruct Woo Dobbs to Continue their warfarin regimen- see above in Anticoagulation Summary.  2. Follow up in 2 weeks (after procedure on 1/13.)  3. They have been instructed to call if any changes in medications, doses, concerns, etc. Patient expresses understanding and has no further questions at this time.    [place], PharmD

## 2023-01-02 ENCOUNTER — LAB (OUTPATIENT)
Dept: LAB | Facility: HOSPITAL | Age: 83
End: 2023-01-02
Payer: MEDICARE

## 2023-01-02 DIAGNOSIS — I48.0 AF (PAROXYSMAL ATRIAL FIBRILLATION): ICD-10-CM

## 2023-01-02 LAB
INR PPP: 1.93 (ref 0.9–1.1)
PROTHROMBIN TIME: 22.4 SECONDS (ref 12.1–15)

## 2023-01-02 PROCEDURE — 85610 PROTHROMBIN TIME: CPT

## 2023-01-02 PROCEDURE — 36415 COLL VENOUS BLD VENIPUNCTURE: CPT

## 2023-01-03 ENCOUNTER — ANTICOAGULATION VISIT (OUTPATIENT)
Dept: PHARMACY | Facility: HOSPITAL | Age: 83
End: 2023-01-03
Payer: MEDICARE

## 2023-01-03 DIAGNOSIS — I48.0 AF (PAROXYSMAL ATRIAL FIBRILLATION): Primary | ICD-10-CM

## 2023-01-03 NOTE — PROGRESS NOTES
Anticoagulation Clinic Progress Note    Anticoagulation Summary  As of 1/3/2023    INR goal:  2.0-3.0   TTR:  49.0 % (4.3 mo)   INR used for dosin.93 (2023)   Warfarin maintenance plan:  4 mg every e, Thu; 6 mg all other days; Starting 1/3/2023   Weekly warfarin total:  38 mg   Plan last modified:  Moisés Morales, PharmD (2022)   Next INR check:  2023   Target end date:      Indications    AF (paroxysmal atrial fibrillation) ([facility]) [I48.0]             Anticoagulation Episode Summary     INR check location:      Preferred lab:      Send INR reminders to:   [facility]    Comments:  s/p AVR and CABG * [facility]*      Anticoagulation Care Providers     Provider Role Specialty Phone number    Helena Humphries, DEV Referring Cardiology [phone]          Clinic Interview:  Patient Findings     Positives:  Upcoming invasive procedure    Negatives:  Signs/symptoms of thrombosis, Signs/symptoms of bleeding,   Laboratory test error suspected, Change in health, Change in alcohol use,   Change in activity, Emergency department visit, Upcoming dental procedure,   Missed doses, Extra doses, Change in medications, Change in diet/appetite,   Hospital admission, Bruising, Other complaints    Comments:  TURBT scheduled for ; now rescheduled to .  Patient is   holding for 5 days prior to procedure. No other changes to medication or   diet.       Clinical Outcomes     Negatives:  Major bleeding event, Thromboembolic event,   Anticoagulation-related hospital admission, Anticoagulation-related ED   visit, Anticoagulation-related fatality    Comments:  TURBT scheduled for ; now rescheduled to .  Patient is   holding for 5 days prior to procedure. No other changes to medication or   diet.         INR History:  Anticoagulation Monitoring 2022 2022 1/3/2023   INR 2.96 1.96 1.93   INR Date 2022   INR Goal 2.0-3.0 2.0-3.0 2.0-3.0    Trend Same Same Same   Last Week Total 38 mg 34 mg 38 mg   Next Week Total 36 mg 38 mg 38 mg   Sun 6 mg Hold (1/8); Otherwise 6 mg 6 mg   Mon 6 mg Hold (1/9); Otherwise 6 mg 6 mg   Tue 6 mg (12/20); Otherwise 4 mg 6 mg (1/10); Otherwise 4 mg 4 mg   Wed 4 mg (12/21) Hold (1/4); Otherwise 6 mg 6 mg   Thu 2 mg (12/22) Hold (1/5), 6 mg (1/12); Otherwise 4 mg 4 mg   Fri 6 mg Hold (1/6); Otherwise 6 mg 6 mg   Sat 6 mg Hold (1/7); Otherwise 6 mg 6 mg   Visit Report - - -   Some recent data might be hidden       Plan:  1. INR is Subtherapeutic today- see above in Anticoagulation Summary.   Will instruct Woo Dobbs to Continue their warfarin regimen- see above in Anticoagulation Summary.  2. Follow up in 2 weeks  3. They have been instructed to call if any changes in medications, doses, concerns, etc. Patient expresses understanding and has no further questions at this time.    [place], PharmD

## 2023-01-04 ENCOUNTER — TREATMENT (OUTPATIENT)
Dept: CARDIAC REHAB | Facility: HOSPITAL | Age: 83
End: 2023-01-04
Payer: MEDICARE

## 2023-01-04 DIAGNOSIS — Z95.1 S/P CABG (CORONARY ARTERY BYPASS GRAFT): Primary | ICD-10-CM

## 2023-01-04 PROCEDURE — 93798 PHYS/QHP OP CAR RHAB W/ECG: CPT

## 2023-01-06 ENCOUNTER — TREATMENT (OUTPATIENT)
Dept: CARDIAC REHAB | Facility: HOSPITAL | Age: 83
End: 2023-01-06
Payer: MEDICARE

## 2023-01-06 DIAGNOSIS — Z95.1 S/P CABG (CORONARY ARTERY BYPASS GRAFT): Primary | ICD-10-CM

## 2023-01-06 PROCEDURE — 93798 PHYS/QHP OP CAR RHAB W/ECG: CPT

## 2023-01-09 ENCOUNTER — TREATMENT (OUTPATIENT)
Dept: CARDIAC REHAB | Facility: HOSPITAL | Age: 83
End: 2023-01-09
Payer: MEDICARE

## 2023-01-09 DIAGNOSIS — Z95.1 S/P CABG (CORONARY ARTERY BYPASS GRAFT): Primary | ICD-10-CM

## 2023-01-09 PROCEDURE — 93798 PHYS/QHP OP CAR RHAB W/ECG: CPT

## 2023-01-11 ENCOUNTER — APPOINTMENT (OUTPATIENT)
Dept: CARDIAC REHAB | Facility: HOSPITAL | Age: 83
End: 2023-01-11
Payer: MEDICARE

## 2023-01-11 ENCOUNTER — HOSPITAL ENCOUNTER (OUTPATIENT)
Dept: CARDIOLOGY | Facility: HOSPITAL | Age: 83
Discharge: HOME | End: 2023-01-11
Payer: MEDICARE

## 2023-01-11 ENCOUNTER — TREATMENT (OUTPATIENT)
Dept: CARDIAC REHAB | Facility: HOSPITAL | Age: 83
End: 2023-01-11
Payer: MEDICARE

## 2023-01-11 VITALS
SYSTOLIC BLOOD PRESSURE: 156 MMHG | WEIGHT: 157 LBS | HEART RATE: 60 BPM | DIASTOLIC BLOOD PRESSURE: 88 MMHG | BODY MASS INDEX: 23.79 KG/M2 | HEIGHT: 68 IN

## 2023-01-11 DIAGNOSIS — I48.0 AF (PAROXYSMAL ATRIAL FIBRILLATION): ICD-10-CM

## 2023-01-11 DIAGNOSIS — Z95.2 S/P AVR (AORTIC VALVE REPLACEMENT): ICD-10-CM

## 2023-01-11 DIAGNOSIS — Z95.1 S/P CABG (CORONARY ARTERY BYPASS GRAFT): Primary | ICD-10-CM

## 2023-01-11 LAB
AORTIC ARCH: 2.8 CM
ASCENDING AORTA: 2.9 CM
BH CV ECHO MEAS - ACS: 1.84 CM
BH CV ECHO MEAS - AO MAX PG: 21.6 MMHG
BH CV ECHO MEAS - AO MEAN PG: 11.5 MMHG
BH CV ECHO MEAS - AO ROOT DIAM: 3.3 CM
BH CV ECHO MEAS - AO V2 MAX: 232.6 CM/SEC
BH CV ECHO MEAS - AO V2 VTI: 54.6 CM
BH CV ECHO MEAS - AVA(I,D): 1.52 CM2
BH CV ECHO MEAS - EDV(CUBED): 132 ML
BH CV ECHO MEAS - EDV(MOD-SP2): 91 ML
BH CV ECHO MEAS - EDV(MOD-SP4): 76 ML
BH CV ECHO MEAS - EF(MOD-BP): 61.6 %
BH CV ECHO MEAS - EF(MOD-SP2): 65.9 %
BH CV ECHO MEAS - EF(MOD-SP4): 55.3 %
BH CV ECHO MEAS - ESV(CUBED): 22.1 ML
BH CV ECHO MEAS - ESV(MOD-SP2): 31 ML
BH CV ECHO MEAS - ESV(MOD-SP4): 34 ML
BH CV ECHO MEAS - FS: 44.9 %
BH CV ECHO MEAS - IVS/LVPW: 0.96 CM
BH CV ECHO MEAS - IVSD: 0.96 CM
BH CV ECHO MEAS - LAT PEAK E' VEL: 7.1 CM/SEC
BH CV ECHO MEAS - LV DIASTOLIC VOL/BSA (35-75): 41.2 CM2
BH CV ECHO MEAS - LV MASS(C)D: 183.1 GRAMS
BH CV ECHO MEAS - LV MAX PG: 4.8 MMHG
BH CV ECHO MEAS - LV MEAN PG: 2.44 MMHG
BH CV ECHO MEAS - LV SYSTOLIC VOL/BSA (12-30): 18.4 CM2
BH CV ECHO MEAS - LV V1 MAX: 110 CM/SEC
BH CV ECHO MEAS - LV V1 VTI: 27.3 CM
BH CV ECHO MEAS - LVIDD: 5.1 CM
BH CV ECHO MEAS - LVIDS: 2.8 CM
BH CV ECHO MEAS - LVOT AREA: 3 CM2
BH CV ECHO MEAS - LVOT DIAM: 1.97 CM
BH CV ECHO MEAS - LVPWD: 1 CM
BH CV ECHO MEAS - MED PEAK E' VEL: 6.7 CM/SEC
BH CV ECHO MEAS - MV A DUR: 0.15 SEC
BH CV ECHO MEAS - MV A MAX VEL: 113.2 CM/SEC
BH CV ECHO MEAS - MV DEC TIME: 0.26 MSEC
BH CV ECHO MEAS - MV E MAX VEL: 115 CM/SEC
BH CV ECHO MEAS - MV E/A: 1.02
BH CV ECHO MEAS - PA ACC TIME: 0.13 SEC
BH CV ECHO MEAS - PA PR(ACCEL): 18.4 MMHG
BH CV ECHO MEAS - PA V2 MAX: 95.1 CM/SEC
BH CV ECHO MEAS - PULM A REVS DUR: 0.11 SEC
BH CV ECHO MEAS - PULM A REVS VEL: 23.2 CM/SEC
BH CV ECHO MEAS - PULM DIAS VEL: 46.4 CM/SEC
BH CV ECHO MEAS - PULM S/D: 0.94
BH CV ECHO MEAS - PULM SYS VEL: 43.6 CM/SEC
BH CV ECHO MEAS - QP/QS: 0.76
BH CV ECHO MEAS - RAP SYSTOLE: 3 MMHG
BH CV ECHO MEAS - RV MAX PG: 2.37 MMHG
BH CV ECHO MEAS - RV V1 MAX: 77 CM/SEC
BH CV ECHO MEAS - RV V1 VTI: 18.6 CM
BH CV ECHO MEAS - RVOT DIAM: 2.07 CM
BH CV ECHO MEAS - RVSP: 26 MMHG
BH CV ECHO MEAS - SI(MOD-SP2): 32.5 ML/M2
BH CV ECHO MEAS - SI(MOD-SP4): 22.8 ML/M2
BH CV ECHO MEAS - SUP REN AO DIAM: 2 CM
BH CV ECHO MEAS - SV(LVOT): 82.9 ML
BH CV ECHO MEAS - SV(MOD-SP2): 60 ML
BH CV ECHO MEAS - SV(MOD-SP4): 42 ML
BH CV ECHO MEAS - SV(RVOT): 62.7 ML
BH CV ECHO MEAS - TAPSE (>1.6): 1.39 CM
BH CV ECHO MEAS - TR MAX PG: 23.8 MMHG
BH CV ECHO MEAS - TR MAX VEL: 243.8 CM/SEC
BH CV ECHO MEASUREMENTS AVERAGE E/E' RATIO: 16.67
BH CV XLRA - RV BASE: 3.2 CM
BH CV XLRA - RV LENGTH: 6.3 CM
BH CV XLRA - RV MID: 3.1 CM
BH CV XLRA - TDI S': 8.3 CM/SEC
LEFT ATRIUM VOLUME INDEX: 25.2 ML/M2
MAXIMAL PREDICTED HEART RATE: 138 BPM
SINUS: 3 CM
STJ: 2.7 CM
STRESS TARGET HR: 117 BPM

## 2023-01-11 PROCEDURE — 93246 EXT ECG>7D<15D RECORDING: CPT

## 2023-01-11 PROCEDURE — 93798 PHYS/QHP OP CAR RHAB W/ECG: CPT

## 2023-01-11 PROCEDURE — 93306 TTE W/DOPPLER COMPLETE: CPT | Performed by: INTERNAL MEDICINE

## 2023-01-11 PROCEDURE — 93306 TTE W/DOPPLER COMPLETE: CPT

## 2023-01-11 NOTE — PROGRESS NOTES
This was a baseline echo after his valve replacement.  Please let him know that his heart is nice and strong and the valve replacement looks great.  I will touch base with him after his heart monitor is done.    Evin adan

## 2023-01-13 ENCOUNTER — TREATMENT (OUTPATIENT)
Dept: CARDIAC REHAB | Facility: HOSPITAL | Age: 83
End: 2023-01-13
Payer: MEDICARE

## 2023-01-13 DIAGNOSIS — Z95.1 S/P CABG (CORONARY ARTERY BYPASS GRAFT): Primary | ICD-10-CM

## 2023-01-13 PROCEDURE — 93798 PHYS/QHP OP CAR RHAB W/ECG: CPT

## 2023-01-16 ENCOUNTER — LAB (OUTPATIENT)
Dept: LAB | Facility: HOSPITAL | Age: 83
End: 2023-01-16
Payer: MEDICARE

## 2023-01-16 ENCOUNTER — TREATMENT (OUTPATIENT)
Dept: CARDIAC REHAB | Facility: HOSPITAL | Age: 83
End: 2023-01-16
Payer: MEDICARE

## 2023-01-16 ENCOUNTER — ANTICOAGULATION VISIT (OUTPATIENT)
Dept: PHARMACY | Facility: HOSPITAL | Age: 83
End: 2023-01-16
Payer: MEDICARE

## 2023-01-16 DIAGNOSIS — I48.0 AF (PAROXYSMAL ATRIAL FIBRILLATION): Primary | ICD-10-CM

## 2023-01-16 DIAGNOSIS — Z95.1 S/P CABG (CORONARY ARTERY BYPASS GRAFT): Primary | ICD-10-CM

## 2023-01-16 DIAGNOSIS — I48.0 AF (PAROXYSMAL ATRIAL FIBRILLATION): ICD-10-CM

## 2023-01-16 LAB
INR PPP: 1.91 (ref 0.9–1.1)
PROTHROMBIN TIME: 22.2 SECONDS (ref 12.1–15)

## 2023-01-16 PROCEDURE — 93798 PHYS/QHP OP CAR RHAB W/ECG: CPT

## 2023-01-16 PROCEDURE — 85610 PROTHROMBIN TIME: CPT

## 2023-01-16 PROCEDURE — 36415 COLL VENOUS BLD VENIPUNCTURE: CPT

## 2023-01-17 NOTE — PROGRESS NOTES
Anticoagulation Clinic Progress Note    Anticoagulation Summary  As of 2023    INR goal:  2.0-3.0   TTR:  44.2 % (4.8 mo)   INR used for dosin.91 (2023)   Warfarin maintenance plan:  4 mg every Tue, Thu; 6 mg all other days; Starting 2023   Weekly warfarin total:  38 mg   Plan last modified:  Moisés Morales, PharmD (2022)   Next INR check:  2023   Target end date:      Indications    AF (paroxysmal atrial fibrillation) ([facility]) [I48.0]             Anticoagulation Episode Summary     INR check location:      Preferred lab:      Send INR reminders to:   [facility]    Comments:  s/p AVR and CABG * [facility]*      Anticoagulation Care Providers     Provider Role Specialty Phone number    Helena Humphries APRN Referring Cardiology [phone]          Clinic Interview:  Patient Findings     Positives:  Upcoming invasive procedure    Negatives:  Signs/symptoms of thrombosis, Signs/symptoms of bleeding,   Laboratory test error suspected, Change in health, Change in alcohol use,   Change in activity, Emergency department visit, Upcoming dental procedure,   Missed doses, Extra doses, Change in medications, Change in diet/appetite,   Hospital admission, Bruising, Other complaints    Comments:  No changes. Reviewed holding instructions for upcoming TURBT   on .        Clinical Outcomes     Negatives:  Major bleeding event, Thromboembolic event,   Anticoagulation-related hospital admission, Anticoagulation-related ED   visit, Anticoagulation-related fatality    Comments:  No changes. Reviewed holding instructions for upcoming TURBT   on .          INR History:  Anticoagulation Monitoring 2022 1/3/2023 2023   INR 1.96 1.93 1.91   INR Date 2022   INR Goal 2.0-3.0 2.0-3.0 2.0-3.0   Trend Same Same Same   Last Week Total 34 mg 38 mg 38 mg   Next Week Total 38 mg 38 mg 10 mg   Sun Hold (); Otherwise 6 mg 6 mg Hold ();  Otherwise 6 mg   Mon Hold (1/9); Otherwise 6 mg 6 mg Hold (1/23); Otherwise 6 mg   Tue 6 mg (1/10); Otherwise 4 mg 4 mg 6 mg (1/24); Otherwise 4 mg   Wed Hold (1/4); Otherwise 6 mg 6 mg Hold (1/18); Otherwise 6 mg   Thu Hold (1/5), 6 mg (1/12); Otherwise 4 mg 4 mg Hold (1/19), 6 mg (1/26)   Fri Hold (1/6); Otherwise 6 mg 6 mg Hold (1/20); Otherwise 6 mg   Sat Hold (1/7); Otherwise 6 mg 6 mg Hold (1/21); Otherwise 6 mg   Visit Report - - -   Some recent data might be hidden       Plan:  1. INR is Subtherapeutic today- see above in Anticoagulation Summary.   Will instruct Woo Dobbs to Continue their warfarin regimen- see above in Anticoagulation Summary.  2. Follow up in 2 weeks  3. They have been instructed to call if any changes in medications, doses, concerns, etc. Patient expresses understanding and has no further questions at this time.    [place], PharmD

## 2023-01-23 ENCOUNTER — HOSPITAL ENCOUNTER (OUTPATIENT)
Facility: HOSPITAL | Age: 83
Setting detail: HOSPITAL OUTPATIENT SURGERY
Discharge: HOME | End: 2023-01-23
Attending: UROLOGY | Admitting: UROLOGY
Payer: MEDICARE

## 2023-01-23 ENCOUNTER — HOSPITAL ENCOUNTER (EMERGENCY)
Facility: HOSPITAL | Age: 83
Discharge: HOME | End: 2023-01-23
Attending: EMERGENCY MEDICINE | Admitting: EMERGENCY MEDICINE
Payer: MEDICARE

## 2023-01-23 VITALS
SYSTOLIC BLOOD PRESSURE: 169 MMHG | DIASTOLIC BLOOD PRESSURE: 81 MMHG | BODY MASS INDEX: 23.13 KG/M2 | HEIGHT: 68 IN | HEART RATE: 55 BPM | RESPIRATION RATE: 16 BRPM | OXYGEN SATURATION: 98 % | TEMPERATURE: 97.7 F | WEIGHT: 152.6 LBS

## 2023-01-23 VITALS
TEMPERATURE: 98.9 F | HEIGHT: 68 IN | OXYGEN SATURATION: 95 % | WEIGHT: 152 LBS | DIASTOLIC BLOOD PRESSURE: 80 MMHG | RESPIRATION RATE: 16 BRPM | HEART RATE: 57 BPM | SYSTOLIC BLOOD PRESSURE: 141 MMHG | BODY MASS INDEX: 23.04 KG/M2

## 2023-01-23 DIAGNOSIS — N32.9 LESION OF BLADDER: ICD-10-CM

## 2023-01-23 DIAGNOSIS — R33.9 URINARY RETENTION: Primary | ICD-10-CM

## 2023-01-23 DIAGNOSIS — N32.89 BLADDER MASS: Primary | ICD-10-CM

## 2023-01-23 LAB — GLUCOSE BLDC GLUCOMTR-MCNC: 155 MG/DL (ref 70–130)

## 2023-01-23 PROCEDURE — 25010000002 DEXAMETHASONE PER 1 MG: Performed by: NURSE ANESTHETIST, CERTIFIED REGISTERED

## 2023-01-23 PROCEDURE — 99282 EMERGENCY DEPT VISIT SF MDM: CPT

## 2023-01-23 PROCEDURE — 25010000002 PROPOFOL 10 MG/ML EMULSION: Performed by: NURSE ANESTHETIST, CERTIFIED REGISTERED

## 2023-01-23 PROCEDURE — 25010000002 SUCCINYLCHOLINE PER 20 MG: Performed by: NURSE ANESTHETIST, CERTIFIED REGISTERED

## 2023-01-23 PROCEDURE — 88307 TISSUE EXAM BY PATHOLOGIST: CPT | Performed by: UROLOGY

## 2023-01-23 PROCEDURE — 82962 GLUCOSE BLOOD TEST: CPT

## 2023-01-23 PROCEDURE — 25010000002 FENTANYL CITRATE (PF) 100 MCG/2ML SOLUTION: Performed by: NURSE ANESTHETIST, CERTIFIED REGISTERED

## 2023-01-23 PROCEDURE — 25010000002 GENTAMICIN PER 80 MG: Performed by: UROLOGY

## 2023-01-23 PROCEDURE — 25010000002 ONDANSETRON PER 1 MG: Performed by: NURSE ANESTHETIST, CERTIFIED REGISTERED

## 2023-01-23 PROCEDURE — 25010000002 AMPICILLIN PER 500 MG: Performed by: UROLOGY

## 2023-01-23 PROCEDURE — 25010000002 NEOSTIGMINE 10 MG/10ML SOLUTION: Performed by: NURSE ANESTHETIST, CERTIFIED REGISTERED

## 2023-01-23 PROCEDURE — 25010000002 MIDAZOLAM PER 1MG: Performed by: NURSE ANESTHETIST, CERTIFIED REGISTERED

## 2023-01-23 RX ORDER — SORBITOL 30 G/1000ML
IRRIGANT IRRIGATION AS NEEDED
Status: DISCONTINUED | OUTPATIENT
Start: 2023-01-23 | End: 2023-01-23 | Stop reason: HOSPADM

## 2023-01-23 RX ORDER — FENTANYL CITRATE 50 UG/ML
INJECTION, SOLUTION INTRAMUSCULAR; INTRAVENOUS AS NEEDED
Status: DISCONTINUED | OUTPATIENT
Start: 2023-01-23 | End: 2023-01-23 | Stop reason: SURG

## 2023-01-23 RX ORDER — PROPOFOL 10 MG/ML
VIAL (ML) INTRAVENOUS AS NEEDED
Status: DISCONTINUED | OUTPATIENT
Start: 2023-01-23 | End: 2023-01-23 | Stop reason: SURG

## 2023-01-23 RX ORDER — ONDANSETRON 2 MG/ML
4 INJECTION INTRAMUSCULAR; INTRAVENOUS ONCE AS NEEDED
Status: COMPLETED | OUTPATIENT
Start: 2023-01-23 | End: 2023-01-23

## 2023-01-23 RX ORDER — SODIUM CHLORIDE, SODIUM LACTATE, POTASSIUM CHLORIDE, CALCIUM CHLORIDE 600; 310; 30; 20 MG/100ML; MG/100ML; MG/100ML; MG/100ML
100 INJECTION, SOLUTION INTRAVENOUS CONTINUOUS
Status: DISCONTINUED | OUTPATIENT
Start: 2023-01-23 | End: 2023-01-23 | Stop reason: HOSPADM

## 2023-01-23 RX ORDER — KETAMINE HYDROCHLORIDE 10 MG/ML
INJECTION INTRAMUSCULAR; INTRAVENOUS AS NEEDED
Status: DISCONTINUED | OUTPATIENT
Start: 2023-01-23 | End: 2023-01-23 | Stop reason: SURG

## 2023-01-23 RX ORDER — ONDANSETRON 4 MG/1
4 TABLET, FILM COATED ORAL DAILY PRN
Qty: 4 TABLET | Refills: 1 | Status: SHIPPED | OUTPATIENT
Start: 2023-01-23 | End: 2023-02-08 | Stop reason: ALTCHOICE

## 2023-01-23 RX ORDER — SODIUM CHLORIDE 9 MG/ML
40 INJECTION, SOLUTION INTRAVENOUS AS NEEDED
Status: DISCONTINUED | OUTPATIENT
Start: 2023-01-23 | End: 2023-01-23 | Stop reason: HOSPADM

## 2023-01-23 RX ORDER — SODIUM CHLORIDE, SODIUM LACTATE, POTASSIUM CHLORIDE, CALCIUM CHLORIDE 600; 310; 30; 20 MG/100ML; MG/100ML; MG/100ML; MG/100ML
9 INJECTION, SOLUTION INTRAVENOUS CONTINUOUS PRN
Status: DISCONTINUED | OUTPATIENT
Start: 2023-01-23 | End: 2023-01-23 | Stop reason: HOSPADM

## 2023-01-23 RX ORDER — CEPHALEXIN 500 MG/1
500 CAPSULE ORAL ONCE
Status: DISCONTINUED | OUTPATIENT
Start: 2023-01-23 | End: 2023-01-23 | Stop reason: HOSPADM

## 2023-01-23 RX ORDER — SUCCINYLCHOLINE CHLORIDE 20 MG/ML
INJECTION INTRAMUSCULAR; INTRAVENOUS AS NEEDED
Status: DISCONTINUED | OUTPATIENT
Start: 2023-01-23 | End: 2023-01-23 | Stop reason: SURG

## 2023-01-23 RX ORDER — DEXAMETHASONE SODIUM PHOSPHATE 4 MG/ML
4 INJECTION, SOLUTION INTRA-ARTICULAR; INTRALESIONAL; INTRAMUSCULAR; INTRAVENOUS; SOFT TISSUE ONCE AS NEEDED
Status: COMPLETED | OUTPATIENT
Start: 2023-01-23 | End: 2023-01-23

## 2023-01-23 RX ORDER — SODIUM CHLORIDE 0.9 % (FLUSH) 0.9 %
10 SYRINGE (ML) INJECTION AS NEEDED
Status: DISCONTINUED | OUTPATIENT
Start: 2023-01-23 | End: 2023-01-23 | Stop reason: HOSPADM

## 2023-01-23 RX ORDER — HYDROCODONE BITARTRATE AND ACETAMINOPHEN 5; 325 MG/1; MG/1
1-2 TABLET ORAL EVERY 4 HOURS PRN
Qty: 6 TABLET | Refills: 0 | Status: SHIPPED | OUTPATIENT
Start: 2023-01-23 | End: 2023-02-08 | Stop reason: ALTCHOICE

## 2023-01-23 RX ORDER — OXYCODONE HYDROCHLORIDE AND ACETAMINOPHEN 5; 325 MG/1; MG/1
1 TABLET ORAL ONCE AS NEEDED
Status: DISCONTINUED | OUTPATIENT
Start: 2023-01-23 | End: 2023-01-23 | Stop reason: HOSPADM

## 2023-01-23 RX ORDER — ROCURONIUM BROMIDE 10 MG/ML
INJECTION, SOLUTION INTRAVENOUS AS NEEDED
Status: DISCONTINUED | OUTPATIENT
Start: 2023-01-23 | End: 2023-01-23 | Stop reason: SURG

## 2023-01-23 RX ORDER — MIDAZOLAM HYDROCHLORIDE 2 MG/2ML
0.5 INJECTION, SOLUTION INTRAMUSCULAR; INTRAVENOUS
Status: DISCONTINUED | OUTPATIENT
Start: 2023-01-23 | End: 2023-01-23 | Stop reason: HOSPADM

## 2023-01-23 RX ORDER — FENTANYL CITRATE 50 UG/ML
25 INJECTION, SOLUTION INTRAMUSCULAR; INTRAVENOUS
Status: DISCONTINUED | OUTPATIENT
Start: 2023-01-23 | End: 2023-01-23 | Stop reason: HOSPADM

## 2023-01-23 RX ORDER — DIPHENHYDRAMINE HYDROCHLORIDE 50 MG/ML
12.5 INJECTION INTRAMUSCULAR; INTRAVENOUS
Status: DISCONTINUED | OUTPATIENT
Start: 2023-01-23 | End: 2023-01-23 | Stop reason: HOSPADM

## 2023-01-23 RX ORDER — NEOSTIGMINE METHYLSULFATE 1 MG/ML
INJECTION, SOLUTION INTRAVENOUS AS NEEDED
Status: DISCONTINUED | OUTPATIENT
Start: 2023-01-23 | End: 2023-01-23 | Stop reason: SURG

## 2023-01-23 RX ORDER — LIDOCAINE HYDROCHLORIDE 10 MG/ML
0.5 INJECTION, SOLUTION EPIDURAL; INFILTRATION; INTRACAUDAL; PERINEURAL ONCE AS NEEDED
Status: DISCONTINUED | OUTPATIENT
Start: 2023-01-23 | End: 2023-01-23 | Stop reason: HOSPADM

## 2023-01-23 RX ORDER — MAGNESIUM HYDROXIDE 1200 MG/15ML
LIQUID ORAL AS NEEDED
Status: DISCONTINUED | OUTPATIENT
Start: 2023-01-23 | End: 2023-01-23 | Stop reason: HOSPADM

## 2023-01-23 RX ORDER — ONDANSETRON 2 MG/ML
4 INJECTION INTRAMUSCULAR; INTRAVENOUS ONCE AS NEEDED
Status: DISCONTINUED | OUTPATIENT
Start: 2023-01-23 | End: 2023-01-23 | Stop reason: HOSPADM

## 2023-01-23 RX ORDER — PHENAZOPYRIDINE HYDROCHLORIDE 100 MG/1
100 TABLET, FILM COATED ORAL 3 TIMES DAILY PRN
Qty: 12 TABLET | Refills: 0 | Status: SHIPPED | OUTPATIENT
Start: 2023-01-23

## 2023-01-23 RX ORDER — GLYCOPYRROLATE 0.2 MG/ML
INJECTION INTRAMUSCULAR; INTRAVENOUS AS NEEDED
Status: DISCONTINUED | OUTPATIENT
Start: 2023-01-23 | End: 2023-01-23 | Stop reason: SURG

## 2023-01-23 RX ORDER — SODIUM CHLORIDE 0.9 % (FLUSH) 0.9 %
10 SYRINGE (ML) INJECTION EVERY 12 HOURS SCHEDULED
Status: DISCONTINUED | OUTPATIENT
Start: 2023-01-23 | End: 2023-01-23 | Stop reason: HOSPADM

## 2023-01-23 RX ORDER — FAMOTIDINE 10 MG/ML
20 INJECTION, SOLUTION INTRAVENOUS
Status: COMPLETED | OUTPATIENT
Start: 2023-01-23 | End: 2023-01-23

## 2023-01-23 RX ORDER — LIDOCAINE HYDROCHLORIDE 20 MG/ML
INJECTION, SOLUTION INTRAVENOUS AS NEEDED
Status: DISCONTINUED | OUTPATIENT
Start: 2023-01-23 | End: 2023-01-23 | Stop reason: SURG

## 2023-01-23 RX ORDER — CEPHALEXIN 500 MG/1
500 CAPSULE ORAL 4 TIMES DAILY
Qty: 20 CAPSULE | Refills: 0 | Status: SHIPPED | OUTPATIENT
Start: 2023-01-23 | End: 2023-01-28

## 2023-01-23 RX ADMIN — ONDANSETRON 4 MG: 2 INJECTION INTRAMUSCULAR; INTRAVENOUS at 06:37

## 2023-01-23 RX ADMIN — PROPOFOL 50 MG: 10 INJECTION, EMULSION INTRAVENOUS at 08:05

## 2023-01-23 RX ADMIN — SUGAMMADEX 200 MG: 100 INJECTION, SOLUTION INTRAVENOUS at 08:47

## 2023-01-23 RX ADMIN — GLYCOPYRROLATE 0.6 MG: 0.2 INJECTION INTRAMUSCULAR; INTRAVENOUS at 08:35

## 2023-01-23 RX ADMIN — FAMOTIDINE 20 MG: 10 INJECTION INTRAVENOUS at 06:37

## 2023-01-23 RX ADMIN — NEOSTIGMINE METHYLSULFATE 5 MG: 0.5 INJECTION INTRAVENOUS at 08:35

## 2023-01-23 RX ADMIN — DEXAMETHASONE SODIUM PHOSPHATE 4 MG: 4 INJECTION, SOLUTION INTRAMUSCULAR; INTRAVENOUS at 06:38

## 2023-01-23 RX ADMIN — ROCURONIUM BROMIDE 5 MG: 10 INJECTION, SOLUTION INTRAVENOUS at 08:27

## 2023-01-23 RX ADMIN — LIDOCAINE HYDROCHLORIDE 60 MG: 20 INJECTION, SOLUTION INTRAVENOUS at 08:04

## 2023-01-23 RX ADMIN — SODIUM CHLORIDE, POTASSIUM CHLORIDE, SODIUM LACTATE AND CALCIUM CHLORIDE 9 ML/HR: 600; 310; 30; 20 INJECTION, SOLUTION INTRAVENOUS at 06:37

## 2023-01-23 RX ADMIN — FENTANYL CITRATE 50 MCG: 50 INJECTION, SOLUTION INTRAMUSCULAR; INTRAVENOUS at 08:27

## 2023-01-23 RX ADMIN — KETAMINE HYDROCHLORIDE 20 MG: 10 INJECTION INTRAMUSCULAR; INTRAVENOUS at 08:04

## 2023-01-23 RX ADMIN — PROPOFOL 100 MG: 10 INJECTION, EMULSION INTRAVENOUS at 08:04

## 2023-01-23 RX ADMIN — Medication 2 G: at 07:15

## 2023-01-23 RX ADMIN — ROCURONIUM BROMIDE 25 MG: 10 INJECTION, SOLUTION INTRAVENOUS at 08:20

## 2023-01-23 RX ADMIN — SUCCINYLCHOLINE CHLORIDE 100 MG: 20 INJECTION, SOLUTION INTRAMUSCULAR; INTRAVENOUS at 08:08

## 2023-01-23 RX ADMIN — MIDAZOLAM HYDROCHLORIDE 0.5 MG: 1 INJECTION, SOLUTION INTRAMUSCULAR; INTRAVENOUS at 07:56

## 2023-01-23 RX ADMIN — GENTAMICIN SULFATE 360 MG: 40 INJECTION, SOLUTION INTRAMUSCULAR; INTRAVENOUS at 06:39

## 2023-01-23 NOTE — ANESTHESIA POSTPROCEDURE EVALUATION
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        Post Anesthesia Care and Evaluation    Patient location during evaluation: bedside  Patient participation: complete - patient participated  Level of consciousness: awake and alert  Pain score: 0  Pain management: adequate    Airway patency: patent  Anesthetic complications: No anesthetic complications  PONV Status: none  Cardiovascular status: acceptable  Respiratory status: acceptable  Hydration status: acceptable

## 2023-01-23 NOTE — ANESTHESIA PREPROCEDURE EVALUATION
Anesthesia Evaluation     Patient summary reviewed and Nursing notes reviewed   no history of anesthetic complications:  NPO Solid Status: > 8 hours  NPO Liquid Status: > 2 hours           Airway   Mallampati: II  TM distance: >3 FB  Neck ROM: full  No difficulty expected  Dental          Pulmonary - normal exam    breath sounds clear to auscultation  (+) asthma (no inhaler use for years),recent URI (3 weeks ago) resolved,   (-) not a smoker  Cardiovascular   Exercise tolerance: good (4-7 METS)    ECG reviewed  PT is on anticoagulation therapy  Patient on routine beta blocker and Beta blocker given within 24 hours of surgery  Rhythm: regular    (+) hypertension well controlled less than 2 medications, valvular problems/murmurs (resolved with replacement 8/22) AS, CAD, CABG 3-6 Months, dysrhythmias Paroxysmal Atrial Fib, OTTO (mild), hyperlipidemia,  carotid artery disease (16-49%) carotid bilateral  (-) past MI, angina    ROS comment: ECG 12 Lead Date/Time: 11/1/2022 3:34 PM     Comparison: compared with previous ECG   Similar to previous ECG   Rhythm: sinus bradycardia   Conduction: conduction normal   T Waves: T waves normal   QRS axis: normal   Other findings: left ventricular hypertrophy with strain     TTE 1/11/22  Left ventricular systolic function is normal. Calculated left ventricular EF = 61.6%  •  Left ventricular diastolic function is consistent with (grade II w/high LAP) pseudonormalization.  •  Aortic valve area is 1.5 cm2.  •  Peak velocity of the flow distal to the aortic valve is 232.6 cm/s. Aortic valve mean pressure gradient is 12 mmHg.  •  There is a bioprosthetic aortic valve present.  •  Estimated right ventricular systolic pressure from tricuspid regurgitation is normal (<35 mmHg).    Currently wearing holter monitor    Neuro/Psych- negative ROS  GI/Hepatic/Renal/Endo    (+)  GERD well controlled,  diabetes mellitus type 2 well controlled,     ROS Comment: BPH with urinary hesitancy     Musculoskeletal     (+) arthralgias (left hip pain with excess movement),   (-) neck pain, neck stiffness  Abdominal  - normal exam    Abdomen: soft.   Substance History   (-) alcohol use, drug use     OB/GYN          Other        (-) arthritis, history of cancer, chronic steroid use                  Anesthesia Plan    ASA 3     general     intravenous induction     Anesthetic plan, risks, benefits, and alternatives have been provided, discussed and informed consent has been obtained with: patient.    Use of blood products discussed with patient  Consented to blood products.   Plan discussed with CRNA.        CODE STATUS:

## 2023-01-23 NOTE — ANESTHESIA PROCEDURE NOTES
Airway  Urgency: elective    Date/Time: 1/23/2023 8:10 AM  Airway not difficult    General Information and Staff    Patient location during procedure: OR  CRNA/CAA: Jaswinder Marquez CRNA  SRNA: Jaswinder Alfaro SRNA  Indications and Patient Condition  Indications for airway management: airway protection    Preoxygenated: yes  Mask difficulty assessment: 2 - vent by mask + OA or adjuvant +/- NMBA    Final Airway Details  Final airway type: endotracheal airway      Successful airway: ETT  Cuffed: yes   Successful intubation technique: direct laryngoscopy  Endotracheal tube insertion site: oral  Blade: Heaven  Blade size: 3  ETT size (mm): 7.5  Cormack-Lehane Classification: grade IIa - partial view of glottis  Placement verified by: chest auscultation and capnometry   Measured from: lips  ETT/EBT  to lips (cm): 23  Number of attempts at approach: 1  Assessment: .2 mm lip laceration left upper lip

## 2023-01-23 NOTE — ANESTHESIA PROCEDURE NOTES
Airway  Urgency: elective    Date/Time: 1/23/2023 8:06 AM  Airway not difficult    General Information and Staff    Patient location during procedure: OR    Indications and Patient Condition  Indications for airway management: airway protection    Preoxygenated: yes  Mask difficulty assessment: 0 - not attempted    Final Airway Details  Final airway type: supraglottic airway      Successful airway: unique  Size 4     Number of attempts at approach: 1  Assessment: lips, teeth, and gum same as pre-op

## 2023-01-24 NOTE — ED PROVIDER NOTES
Subjective   History of Present Illness  82-year-old male presents with suprapubic pain, concern for urinary retention.  Patient had a transurethral bladder mass resection performed this morning by Dr. Arvizu at this facility.  Reports that he urinated a small amount before being discharged around noon but since getting home has not urinated and has had increasing discomfort suprapubic region.  By time of my evaluation here patient has had Fuentes placed, had about a liter out of blood-tinged urine, and he is now pain-free.  No clots noted in the urine bag or tubing.  No fevers or chills.  No nausea or vomiting.        Review of Systems   Constitutional: Negative.    Respiratory: Negative.    Cardiovascular: Negative.    Gastrointestinal: Negative.    Genitourinary:        Per HPI   Musculoskeletal: Negative.    Skin: Negative.    Neurological: Negative.        Past Medical History:   Diagnosis Date   • Aortic stenosis     8/2022: tissue AVR (25mm MDT Avalus bovine pericardial)   • Asthma    • BPH (benign prostatic hyperplasia)    • CAD (coronary artery disease)     7/2022: 95% prox/50% distal LAD, 80% D1, 30-40% Cx, diffuse dz RCA. CABG x 3 8/2022: LIMA-LAD, SVG-D1, SVG-RCA   • Carotid atherosclerosis, bilateral     8/2022: 16-49% bilaterally   • Colon polyp    • GERD (gastroesophageal reflux disease)    • Hyperlipidemia    • Hypertension    • Paroxysmal atrial fibrillation (HCC)    • Type 2 diabetes mellitus (HCC)    • Warfarin anticoagulation        Allergies   Allergen Reactions   • Loratadine Other (See Comments)     Emotional side effects.        Past Surgical History:   Procedure Laterality Date   • CARDIAC CATHETERIZATION N/A 7/14/2022    Procedure: Coronary angiography;  Surgeon: Unique Calabrese MD;  Location: [facility] CATH INVASIVE LOCATION;  Service: Cardiovascular;  Laterality: N/A;   • CARDIAC CATHETERIZATION N/A 7/14/2022    Procedure: Left heart cath with simultaneous LV/Ao pressures;  Surgeon: Bharati  MD Unique;  Location:  [facility] CATH INVASIVE LOCATION;  Service: Cardiovascular;  Laterality: N/A;   • CARDIAC CATHETERIZATION N/A 7/14/2022    Procedure: Right Heart Cath;  Surgeon: Unique Calabrese MD;  [facility];  Service: Cardiovascular;  Laterality: N/A;   • COLONOSCOPY     • CORONARY ARTERY BYPASS GRAFT WITH AORTIC VALVE REPAIR/REPLACEMENT N/A 8/8/2022    Procedure: KEVIN STERNOTOMY CORONARY ARTERY BYPASS GRAFT TIMES 3 USING LEFT INTERNAL MAMMARY ARTERY AND LEFT GREATER SAPHENOUS VEIN GRAFT PER ENDOSCOPIC VEIN HARVESTING, AORTIC VALVE REPLACEMENT AND PRP;  Surgeon: Jr Gentry Courtney MD;  Location: [facility] CVOR;  Service: Cardiothoracic;  Laterality: N/A;   • VASECTOMY         Family History   Problem Relation Age of Onset   • Colon cancer Neg Hx    • Colon polyps Neg Hx    • Malig Hyperthermia Neg Hx        Social History     Socioeconomic History   • Marital status:    • Number of children: 3   Tobacco Use   • Smoking status: Never   • Smokeless tobacco: Never   • Tobacco comments:     caffeine use: 1 -2 cups daily.    Vaping Use   • Vaping Use: Never used   Substance and Sexual Activity   • Alcohol use: No   • Drug use: No   • Sexual activity: Defer           Objective   Physical Exam  Constitutional:       General: He is not in acute distress.     Appearance: He is not ill-appearing, toxic-appearing or diaphoretic.   HENT:      Head: Normocephalic and atraumatic.      Mouth/Throat:      Mouth: Mucous membranes are moist.      Pharynx: Oropharynx is clear.   Eyes:      Extraocular Movements: Extraocular movements intact.      Pupils: Pupils are equal, round, and reactive to light.   Cardiovascular:      Rate and Rhythm: Normal rate and regular rhythm.   Pulmonary:      Effort: Pulmonary effort is normal. No respiratory distress.   Abdominal:      General: There is no distension.      Palpations: Abdomen is soft.      Tenderness: There is no right CVA tenderness, left CVA  tenderness, guarding or rebound.      Comments: Minimal suprapubic tenderness   Musculoskeletal:         General: No deformity or signs of injury. Normal range of motion.   Skin:     General: Skin is warm and dry.   Neurological:      General: No focal deficit present.      Mental Status: He is alert and oriented to person, place, and time. Mental status is at baseline.   Psychiatric:         Mood and Affect: Mood normal.         Behavior: Behavior normal.         Thought Content: Thought content normal.         Judgment: Judgment normal.         Procedures           ED Course  ED Course as of 01/23/23 2007 Mon Jan 23, 2023 2006 Patient presents with acute urinary retention.  He does have some blood-tinged urine but does not appear to be passing any clots in urine and tube appears to be clearing.  We will send patient home with Fuentes catheter, leg bag.  Given his recent instrumentation, indwelling Fuentes catheter, will place on Keflex for prophylactic purposes.  First dose given here.  Advised patient and wife to call Dr. Arvizu's office first thing in the morning and schedule a follow-up appointment for the next couple of days.  Discussed expected course, return precautions, catheter management. [TD]      ED Course User Index  [TD] Dank Maddox MD                                           [facility]    Final diagnoses:   Urinary retention       ED Disposition  ED Disposition     ED Disposition   Discharge    Condition   Stable    Comment   --             Nimesh Arvizu MD  [address] [facility] [address]  [place] [address] [phone]    Schedule an appointment as soon as possible for a visit in 2 days           Medication List      New Prescriptions    cephalexin 500 MG capsule  Commonly known as: KEFLEX  Take 1 capsule by mouth 4 (Four) Times a Day for 5 days.           Where to Get Your Medications      These medications were sent to [facility] - [place], [place] - [address] [facility] [phone]  PH - [phone] FX  [address] [place], [place] [address]    Phone: [phone]   · cephalexin 500 MG capsule          Dank Maddox MD  01/23/23 2007

## 2023-01-25 ENCOUNTER — HOSPITAL ENCOUNTER (EMERGENCY)
Facility: HOSPITAL | Age: 83
Discharge: HOME | End: 2023-01-25
Attending: EMERGENCY MEDICINE | Admitting: EMERGENCY MEDICINE
Payer: MEDICARE

## 2023-01-25 VITALS
WEIGHT: 152 LBS | RESPIRATION RATE: 18 BRPM | HEIGHT: 68 IN | BODY MASS INDEX: 23.04 KG/M2 | OXYGEN SATURATION: 97 % | TEMPERATURE: 98.2 F | HEART RATE: 60 BPM | SYSTOLIC BLOOD PRESSURE: 188 MMHG | DIASTOLIC BLOOD PRESSURE: 89 MMHG

## 2023-01-25 DIAGNOSIS — T83.9XXA PROBLEM WITH FOLEY CATHETER, INITIAL ENCOUNTER: Primary | ICD-10-CM

## 2023-01-25 PROCEDURE — 99282 EMERGENCY DEPT VISIT SF MDM: CPT

## 2023-01-30 ENCOUNTER — ANTICOAGULATION VISIT (OUTPATIENT)
Dept: PHARMACY | Facility: HOSPITAL | Age: 83
End: 2023-01-30
Payer: MEDICARE

## 2023-01-30 ENCOUNTER — LAB (OUTPATIENT)
Dept: LAB | Facility: HOSPITAL | Age: 83
End: 2023-01-30
Payer: MEDICARE

## 2023-01-30 DIAGNOSIS — I48.0 AF (PAROXYSMAL ATRIAL FIBRILLATION): ICD-10-CM

## 2023-01-30 DIAGNOSIS — I48.0 AF (PAROXYSMAL ATRIAL FIBRILLATION): Primary | ICD-10-CM

## 2023-01-30 LAB
INR PPP: 1.08 (ref 0.9–1.1)
PROTHROMBIN TIME: 14.1 SECONDS (ref 12.1–15)

## 2023-01-30 PROCEDURE — 85610 PROTHROMBIN TIME: CPT

## 2023-01-30 PROCEDURE — 36415 COLL VENOUS BLD VENIPUNCTURE: CPT

## 2023-01-31 NOTE — PROGRESS NOTES
Anticoagulation Clinic Progress Note    Anticoagulation Summary  As of 2023    INR goal:  2.0-3.0   TTR:  40.3 % (5.3 mo)   INR used for dosin.08 (2023)   Warfarin maintenance plan:  4 mg every Tue, Thu; 6 mg all other days; Starting 2023   Weekly warfarin total:  38 mg   Plan last modified:  Moisés Morales, PharmD (2022)   Next INR check:  2023   Target end date:      Indications    AF (paroxysmal atrial fibrillation) ([facility]) [I48.0]             Anticoagulation Episode Summary     INR check location:      Preferred lab:      Send INR reminders to:   [facility]    Comments:  s/p AVR and CABG * [facility]*      Anticoagulation Care Providers     Provider Role Specialty Phone number    Helena Humphries APRN Referring Cardiology [phone]          Clinic Interview:  Patient Findings     Positives:  Other complaints    Negatives:  Signs/symptoms of thrombosis, Signs/symptoms of bleeding,   Laboratory test error suspected, Change in health, Change in alcohol use,   Change in activity, Upcoming invasive procedure, Emergency department   visit, Upcoming dental procedure, Missed doses, Extra doses, Change in   medications, Change in diet/appetite, Hospital admission, Bruising    Comments:  TURBT 23. ED visit for urinary retention on 23   following TURBT, at which time he was prescribed 5-day course of   cephalexin. Reports he resumed warfarin at dose of 4 mg Tues/Thurs, 6 mg   all other days. He denies any hematuria since warfarin was resumed.       Clinical Outcomes     Negatives:  Major bleeding event, Thromboembolic event,   Anticoagulation-related hospital admission, Anticoagulation-related ED   visit, Anticoagulation-related fatality    Comments:  TURBT 23. ED visit for urinary retention on 23   following TURBT, at which time he was prescribed 5-day course of   cephalexin. Reports he resumed warfarin at dose of 4 mg Tues/Thurs, 6 mg    all other days. He denies any hematuria since warfarin was resumed.         INR History:  Anticoagulation Monitoring 1/3/2023 1/16/2023 1/30/2023   INR 1.93 1.91 1.08   INR Date 1/2/2023 1/16/2023 1/30/2023   INR Goal 2.0-3.0 2.0-3.0 2.0-3.0   Trend Same Same Same   Last Week Total 38 mg 38 mg 18 mg   Next Week Total 38 mg 10 mg 42 mg   Sun 6 mg Hold (1/22); Otherwise 6 mg 6 mg   Mon 6 mg Hold (1/23); Otherwise 6 mg 6 mg   Tue 4 mg 6 mg (1/24); Otherwise 4 mg 6 mg (1/31)   Wed 6 mg Hold (1/18); Otherwise 6 mg 6 mg   Thu 4 mg Hold (1/19), 6 mg (1/26) 6 mg (2/2)   Fri 6 mg Hold (1/20); Otherwise 6 mg 6 mg   Sat 6 mg Hold (1/21); Otherwise 6 mg 6 mg   Visit Report - - -   Some recent data might be hidden       Plan:  1. INR is Subtherapeutic today- see above in Anticoagulation Summary.   Will instruct Woo Dobbs to take warfarin at dose of 6 mg daily for now to help raise INR more quickly - see above in Anticoagulation Summary.  2. Follow up in 1 week (INR at [facility] 2/6/23).   3. They have been instructed to call if any changes in medications, doses, concerns, etc. Patient expresses understanding and has no further questions at this time.    Moisés Morales, PharmD

## 2023-02-01 LAB
MAXIMAL PREDICTED HEART RATE: 138 BPM
STRESS TARGET HR: 117 BPM

## 2023-02-01 PROCEDURE — 93248 EXT ECG>7D<15D REV&INTERPJ: CPT | Performed by: INTERNAL MEDICINE

## 2023-02-01 NOTE — PROGRESS NOTES
You see him on 2/7/23. I think it's okay to stop amiodarone when you see him, but continue the BB and OAC. I should see him 6 mos after you see him, assuming he's doing well.    CHICOdk

## 2023-02-02 LAB
DX PRELIMINARY: NORMAL
LAB AP CASE REPORT: NORMAL
LAB AP DIAGNOSIS COMMENT: NORMAL
LAB AP SYNOPTIC CHECKLIST: NORMAL
PATH REPORT.FINAL DX SPEC: NORMAL
PATH REPORT.GROSS SPEC: NORMAL

## 2023-02-06 ENCOUNTER — ANTICOAGULATION VISIT (OUTPATIENT)
Dept: PHARMACY | Facility: HOSPITAL | Age: 83
End: 2023-02-06
Payer: MEDICARE

## 2023-02-06 ENCOUNTER — LAB (OUTPATIENT)
Dept: LAB | Facility: HOSPITAL | Age: 83
End: 2023-02-06
Payer: MEDICARE

## 2023-02-06 DIAGNOSIS — I48.0 AF (PAROXYSMAL ATRIAL FIBRILLATION): Primary | ICD-10-CM

## 2023-02-06 DIAGNOSIS — I48.0 AF (PAROXYSMAL ATRIAL FIBRILLATION): ICD-10-CM

## 2023-02-06 LAB
INR PPP: 1.59 (ref 0.9–1.1)
PROTHROMBIN TIME: 19.2 SECONDS (ref 12.1–15)

## 2023-02-06 PROCEDURE — 85610 PROTHROMBIN TIME: CPT

## 2023-02-06 PROCEDURE — 36415 COLL VENOUS BLD VENIPUNCTURE: CPT

## 2023-02-06 NOTE — PROGRESS NOTES
Anticoagulation Clinic Progress Note    Anticoagulation Summary  As of 2023    INR goal:  2.0-3.0   TTR:  38.6 % (5.5 mo)   INR used for dosin.59 (2023)   Warfarin maintenance plan:  4 mg every Tue, Thu; 6 mg all other days; Starting 2023   Weekly warfarin total:  38 mg   No change documented:  [place], PharmD   Plan last modified:  Moisés Morales, PharmD (2022)   Next INR check:  2023   Target end date:      Indications    AF (paroxysmal atrial fibrillation) ([facility]) [I48.0]             Anticoagulation Episode Summary     INR check location:      Preferred lab:      Send INR reminders to:   [facility]    Comments:  s/p AVR and CABG * [facility]*      Anticoagulation Care Providers     Provider Role Specialty Phone number    YandelHelena APRN Referring Cardiology [phone]          Clinic Interview:  Patient Findings     Positives:  Signs/symptoms of bleeding    Negatives:  Signs/symptoms of thrombosis, Laboratory test error   suspected, Change in health, Change in alcohol use, Change in activity,   Upcoming invasive procedure, Emergency department visit, Upcoming dental   procedure, Missed doses, Extra doses, Change in medications, Change in   diet/appetite, Hospital admission, Bruising, Other complaints    Comments:  Reports hematuria on Saturday and ; wife denies any   today.  Finished cephalexin and denies any other med changes.       Clinical Outcomes     Negatives:  Major bleeding event, Thromboembolic event,   Anticoagulation-related hospital admission, Anticoagulation-related ED   visit, Anticoagulation-related fatality    Comments:  Reports hematuria on Saturday and ; wife denies any   today.  Finished cephalexin and denies any other med changes.  Advised her to call urology if any further issues with bleeding and to see emergency attention if any red clots or profuse bleeding occurs.      INR History:  Anticoagulation  Monitoring 1/16/2023 1/30/2023 2/6/2023   INR 1.91 1.08 1.59   INR Date 1/16/2023 1/30/2023 2/6/2023   INR Goal 2.0-3.0 2.0-3.0 2.0-3.0   Trend Same Same Same   Last Week Total 38 mg 18 mg 42 mg   Next Week Total 10 mg 42 mg 38 mg   Sun Hold (1/22); Otherwise 6 mg 6 mg 6 mg   Mon Hold (1/23); Otherwise 6 mg 6 mg 6 mg   Tue 6 mg (1/24); Otherwise 4 mg 6 mg (1/31) 4 mg   Wed Hold (1/18); Otherwise 6 mg 6 mg 6 mg   Thu Hold (1/19), 6 mg (1/26) 6 mg (2/2) 4 mg   Fri Hold (1/20); Otherwise 6 mg 6 mg 6 mg   Sat Hold (1/21); Otherwise 6 mg 6 mg 6 mg   Visit Report - - -   Some recent data might be hidden       Plan:  1. INR is Subtherapeutic today- see above in Anticoagulation Summary.   Will instruct Woo Dobbs to Continue their warfarin regimen- see above in Anticoagulation Summary.  2. Follow up in 1 weeks  3. They have been instructed to call if any changes in medications, doses, concerns, etc. Patient expresses understanding and has no further questions at this time.    [place], PharmD

## 2023-02-08 ENCOUNTER — OFFICE VISIT (OUTPATIENT)
Dept: CARDIOLOGY | Facility: CLINIC | Age: 83
End: 2023-02-08
Payer: MEDICARE

## 2023-02-08 ENCOUNTER — TELEPHONE (OUTPATIENT)
Dept: PHARMACY | Facility: HOSPITAL | Age: 83
End: 2023-02-08
Payer: MEDICARE

## 2023-02-08 VITALS
HEART RATE: 64 BPM | BODY MASS INDEX: 23.34 KG/M2 | DIASTOLIC BLOOD PRESSURE: 70 MMHG | OXYGEN SATURATION: 97 % | HEIGHT: 68 IN | WEIGHT: 154 LBS | SYSTOLIC BLOOD PRESSURE: 130 MMHG | RESPIRATION RATE: 16 BRPM

## 2023-02-08 DIAGNOSIS — Z95.1 S/P CABG X 3: ICD-10-CM

## 2023-02-08 DIAGNOSIS — I10 PRIMARY HYPERTENSION: ICD-10-CM

## 2023-02-08 DIAGNOSIS — I25.810 CORONARY ARTERY DISEASE INVOLVING CORONARY BYPASS GRAFT OF NATIVE HEART WITHOUT ANGINA PECTORIS: Primary | ICD-10-CM

## 2023-02-08 DIAGNOSIS — I48.0 AF (PAROXYSMAL ATRIAL FIBRILLATION): ICD-10-CM

## 2023-02-08 DIAGNOSIS — E78.2 MIXED HYPERLIPIDEMIA: ICD-10-CM

## 2023-02-08 DIAGNOSIS — I65.23 CAROTID ATHEROSCLEROSIS, BILATERAL: ICD-10-CM

## 2023-02-08 DIAGNOSIS — Z95.2 S/P AVR (AORTIC VALVE REPLACEMENT): ICD-10-CM

## 2023-02-08 PROCEDURE — 99214 OFFICE O/P EST MOD 30 MIN: CPT | Performed by: NURSE PRACTITIONER

## 2023-02-08 PROCEDURE — 93000 ELECTROCARDIOGRAM COMPLETE: CPT | Performed by: NURSE PRACTITIONER

## 2023-02-08 RX ORDER — WARFARIN SODIUM 4 MG/1
4 TABLET ORAL
COMMUNITY
End: 2023-02-08

## 2023-02-08 NOTE — TELEPHONE ENCOUNTER
----- Message from DEV Velazquez sent at 2/8/2023  2:49 PM EST -----  Patient seen in the office today.  It is okay for him to be transition from warfarin therapy over to Eliquis 5 mg twice daily.  He just had an INR performed on 2/6/2023 which was 1.56.  He states she did not increase his therapy since he had just recently had a TURP.    He also stated that he had just had his warfarin refilled.  I discussed transitioning and that you would be calling with those instructions.  We did discuss that either he can finish up with his bottle of warfarin or transition over now.  I have put in a new order for the Eliquis 5 mg twice daily.    Please get patient a call thanks so much

## 2023-02-08 NOTE — PROGRESS NOTES
Date of Office Visit: 2023  Encounter Provider: DEV Mars  Place of Service: [facility] CARDIOLOGY  Patient Name: Woo Dobbs  :1940  Primary Cardiologist: Dr. Ball    CC:  F/u/ discuss Holter results    Dear Dr. Ivan    HPI: Woo Dobbs is a pleasant 82 y.o. male who presents 2023 for cardiac follow up. I have reviewed his past medical records including notes, labs and testing in preparation for today's visit.    He was seen in ; his PCP had noted a murmur. An echo showed aortic valve calcification without stenosis. He was then lost to follow up. He was referred to us in July; he reported progressive dyspnea. An echo showed normal LVSF and mod-severe AS. A stress test showed LAD ischemia.  Coronary angiography revealed severe disease. He underwent CABG x 3 and tissue AVR in 2022.  He had recurrent postoperative atrial fibrillation in recovery; he was discharged on amiodarone and warfarin.  He had a lot of problems with confusion.  He has had a slow recovery, although he is steadily improving.  He is going to rehab.  He just feels very fatigued.     He was diagnosed with a bladder tumor prior to surgery.  He has not yet followed up with urology to discuss this.  He has not had any hematuria.    2023  Interpretation Summary  •  Left ventricular systolic function is normal. Calculated left ventricular EF = 61.6%  •  Left ventricular diastolic function is consistent with (grade II w/high LAP) pseudonormalization.  •  Aortic valve area is 1.5 cm2.  •  Peak velocity of the flow distal to the aortic valve is 232.6 cm/s. Aortic valve mean pressure gradient is 12 mmHg.  •  There is a bioprosthetic aortic valve present.  •  Estimated right ventricular systolic pressure from tricuspid regurgitation is normal (<35 mmHg).    2023 Interpretation Summary   •  A relatively benign monitor study.  •  Single 5 beat run SVT, rate 130 bpm  •   Occasional ectopic atrial rhythm noted.     He presents today in follow-up.  Dr. Ball read his Holter and noted that he could stop the amiodarone.  He is to continue on his beta-blocker and oral anticoagulation.  He denies any palpitations, shortness of breath, lower extremity edema.  He has not had any dizziness, lightheadedness, syncope or presyncopal episodes.  He denies any chest pain, pressure or fatigue.  He is take his medications as directed.  His blood pressure is well controlled.  He did have surgery 1/23/2023 with Dr. Arvizu and had a  cystoscopy TUR small bladder tumor.  He was discharged home but later that evening he had urinary retention and had to go back to the ER.  He had a catheter placed for 2 days.  He was to remain off of his warfarin until he had been free of hematuria for 24 hours.  He is now back on his warfarin, his INR was checked 2/6/2023 and his INR was 1.56.  Past Medical History:   Diagnosis Date   • Aortic stenosis     8/2022: tissue AVR (25mm MDT Avalus bovine pericardial)   • Asthma    • BPH (benign prostatic hyperplasia)    • CAD (coronary artery disease)     7/2022: 95% prox/50% distal LAD, 80% D1, 30-40% Cx, diffuse dz RCA. CABG x 3 8/2022: LIMA-LAD, SVG-D1, SVG-RCA   • Carotid atherosclerosis, bilateral     8/2022: 16-49% bilaterally   • Colon polyp    • GERD (gastroesophageal reflux disease)    • Hyperlipidemia    • Hypertension    • Paroxysmal atrial fibrillation (HCC)    • Type 2 diabetes mellitus (HCC)    • Warfarin anticoagulation        Past Surgical History:   Procedure Laterality Date   • CARDIAC CATHETERIZATION N/A 7/14/2022    Procedure: Coronary angiography;  Surgeon: Unique Calabrese MD;  Location:  [facility];  Service: Cardiovascular;  Laterality: N/A;   • CARDIAC CATHETERIZATION N/A 7/14/2022    Procedure: Left heart cath with simultaneous LV/Ao pressures;  Surgeon: Unique Calabrese MD;  Location:  [facility];  Service:  Cardiovascular;  Laterality: N/A;   • CARDIAC CATHETERIZATION N/A 7/14/2022    Procedure: Right Heart Cath;  Surgeon: Unique Calabrese MD;  Location: [facility] CATH INVASIVE LOCATION;  Service: Cardiovascular;  Laterality: N/A;   • COLONOSCOPY     • CORONARY ARTERY BYPASS GRAFT WITH AORTIC VALVE REPAIR/REPLACEMENT N/A 8/8/2022    Procedure: KEVIN STERNOTOMY CORONARY ARTERY BYPASS GRAFT TIMES 3 USING LEFT INTERNAL MAMMARY ARTERY AND LEFT GREATER SAPHENOUS VEIN GRAFT PER ENDOSCOPIC VEIN HARVESTING, AORTIC VALVE REPLACEMENT AND PRP;  Surgeon: Jr Gentry Courtney MD;  Location: [facility] CVOR;  Service: Cardiothoracic;  Laterality: N/A;   • TRANSURETHRAL RESECTION OF BLADDER TUMOR N/A 1/23/2023    Procedure: TRANSURETHRAL RESECTION OF SMALL BLADDER TUMOR;  Surgeon: Nimesh Arvizu MD;  Location: [facility];  Service: Urology;  Laterality: N/A;   • VASECTOMY         Social History     Socioeconomic History   • Marital status:    • Number of children: 3   Tobacco Use   • Smoking status: Never   • Smokeless tobacco: Never   • Tobacco comments:     caffeine use: 1 -2 cups daily.    Vaping Use   • Vaping Use: Never used   Substance and Sexual Activity   • Alcohol use: No   • Drug use: No   • Sexual activity: Defer       Family History   Problem Relation Age of Onset   • Colon cancer Neg Hx    • Colon polyps Neg Hx    • Malig Hyperthermia Neg Hx        The following portion of the patient's history were reviewed and updated as appropriate: past medical history, past surgical history, past social history, past family history, allergies, current medications, and problem list.    Review of Systems   Constitutional: Negative for diaphoresis, fever and malaise/fatigue.   HENT: Negative for congestion, hearing loss, hoarse voice, nosebleeds and sore throat.    Eyes: Negative for photophobia, vision loss in left eye, vision loss in right eye and visual disturbance.   Cardiovascular: Negative for chest pain, dyspnea on exertion,  irregular heartbeat, leg swelling, near-syncope, orthopnea, palpitations, paroxysmal nocturnal dyspnea and syncope.   Respiratory: Negative for cough, hemoptysis, shortness of breath, sleep disturbances due to breathing, snoring, sputum production and wheezing.    Endocrine: Negative for cold intolerance, heat intolerance, polydipsia, polyphagia and polyuria.   Hematologic/Lymphatic: Negative for bleeding problem. Does not bruise/bleed easily.   Skin: Negative for color change, dry skin, poor wound healing, rash and suspicious lesions.   Musculoskeletal: Negative for arthritis, back pain, falls, gout, joint pain, joint swelling, muscle cramps, muscle weakness and myalgias.   Gastrointestinal: Negative for bloating, abdominal pain, constipation, diarrhea, dysphagia, melena, nausea and vomiting.   Neurological: Negative for excessive daytime sleepiness, dizziness, headaches, light-headedness, loss of balance, numbness, paresthesias, seizures, vertigo and weakness.   Psychiatric/Behavioral: Negative for depression, memory loss and substance abuse. The patient is not nervous/anxious.        Allergies   Allergen Reactions   • Loratadine Other (See Comments)     Emotional side effects.          Current Outpatient Medications:   •  amiodarone (PACERONE) 200 MG tablet, Take 0.5 tablets by mouth Daily., Disp: 90 tablet, Rfl: 3  •  aspirin 81 MG chewable tablet, Chew 81 mg Daily., Disp: , Rfl:   •  atenolol (TENORMIN) 25 MG tablet, Take 1 tablet by mouth Daily., Disp: 180 tablet, Rfl: 3  •  atorvastatin (LIPITOR) 40 MG tablet, Take 1 tablet by mouth Every Night., Disp: 90 tablet, Rfl: 3  •  glucose blood test strip, Test blood sugar Twice daily, Disp: 200 each, Rfl: 12  •  glucose monitor monitoring kit, 1 each As Needed (Diabetes 2)., Disp: 1 each, Rfl: 0  •  Lancets misc, 1 each 3 (Three) Times a Day Before Meals., Disp: 200 each, Rfl: 5  •  metFORMIN (GLUCOPHAGE) 1000 MG tablet, Take 1,000 mg by mouth 2 (Two) Times a Day  "With Meals., Disp: , Rfl:   •  nitroglycerin (Nitrostat) 0.4 MG SL tablet, Place 1 tablet under the tongue Every 5 (Five) Minutes As Needed for Chest Pain. Take no more than 3 doses in 15 minutes., Disp: 30 tablet, Rfl: 1  •  omeprazole (priLOSEC) 40 MG capsule, Take 1 capsule by mouth 3 (Three) Times a Week if Needed (Heartburn)., Disp: 90 capsule, Rfl: 3  •  phenazopyridine (PYRIDIUM) 100 MG tablet, Take 1 tablet by mouth 3 (Three) Times a Day As Needed for Bladder Spasms., Disp: 12 tablet, Rfl: 0  •  tamsulosin (FLOMAX) 0.4 MG capsule 24 hr capsule, Take 1 capsule by mouth Daily., Disp: 30 capsule, Rfl: 0        Objective:     Vitals:    02/08/23 1312   BP: 130/70   Pulse: 64   Resp: 16   SpO2: 97%   Weight: 69.9 kg (154 lb)   Height: 172.7 cm (68\")     Body mass index is 23.42 kg/m².      Vitals reviewed.   Constitutional:       General: Not in acute distress.     Appearance: Well-developed and not in distress.   Eyes:      General:         Right eye: No discharge.         Left eye: No discharge.      Conjunctiva/sclera: Conjunctivae normal.   HENT:      Head: Normocephalic and atraumatic.      Right Ear: External ear normal.      Left Ear: External ear normal.      Nose: Nose normal.   Neck:      Thyroid: No thyromegaly.      Vascular: No JVD.      Trachea: No tracheal deviation.      Lymphadenopathy: No cervical adenopathy.   Pulmonary:      Effort: Pulmonary effort is normal. No respiratory distress.      Breath sounds: Normal breath sounds. No wheezing. No rales.   Chest:      Chest wall: Not tender to palpatation.   Cardiovascular:      Normal rate. Regular rhythm.      No gallop.   Pulses:     Intact distal pulses.   Edema:     Peripheral edema absent.   Abdominal:      General: There is no distension.      Palpations: Abdomen is soft.      Tenderness: There is no abdominal tenderness.   Musculoskeletal: Normal range of motion.         General: No tenderness or deformity.      Cervical back: Normal range " of motion and neck supple. Skin:     General: Skin is warm and dry.      Findings: No erythema or rash.   Neurological:      Mental Status: Alert and oriented to person, place, and time.      Coordination: Coordination normal.   Psychiatric:         Attention and Perception: Attention normal.         Behavior: Behavior normal.         Thought Content: Thought content normal.         Cognition and Memory: Cognition normal.         Judgment: Judgment normal.               ECG 12 Lead    Date/Time: 2/8/2023 1:21 PM  Performed by: Avis Metz APRN  Authorized by: Avis Metz APRN   Comparison: compared with previous ECG from 11/1/2022  Similar to previous ECG  Rhythm: sinus rhythm  Rate: normal  Conduction: conduction normal  ST Segments: ST segments normal  T Waves: T waves normal  QRS axis: right    Clinical impression: non-specific ECG              Assessment:       Diagnosis Plan   1. Coronary artery disease involving coronary bypass graft of native heart without angina pectoris        2. S/P CABG x 3        3. S/P AVR (aortic valve replacement)        4. AF (paroxysmal atrial fibrillation) ([facility])        5. Primary hypertension        6. Mixed hyperlipidemia        7. Carotid atherosclerosis, bilateral               Plan:       1/2/3.  He is status post three-vessel bypass in August 2022.  He has preserved left ventricular systolic function.  He is on aspirin and atorvastatin.    He has finished cardiac rehab and feels good.     4.  He is status post tissue aortic valve replacement in August 2022 for severe aortic stenosis.    He did have an echo in January 2023 that showed that his valve was working wonderfully.  EF was within normal limits.     5.  He had recurrent atrial fibrillation in the postoperative period.  He was discharged on amiodarone and atenolol.  He had a ZIO that showed no further atrial fibrillation and 1 5 beat run of SVT.  Per Dr. Ball's last office visit, if there was no further evidence of  A-fib, patient could transition from warfarin over to Eliquis.  He was originally discharged on warfarin because of the valve replacement.  The question remains is whether he requires lifelong anticoagulation or not.  He does remain on atenolol.  He is also on aspirin 81 mg daily    6.  Hypertension- controlled     7.  He had 16-49% bilateral carotid stenosis in August 2022.  This will be repeated in August 2023.    No evidence of atrial fibrillation on 14-day monitor.  Per Dr. Ball may stop amiodarone.  He does remain on beta-blocker.  He is on warfarin for anticoagulation.  Note to Dr. Ball to see whether or not she felt he could still switch from warfarin over to Eliquis.    Patient wanted to know if he could use ED medication.  He did have a prescription after surgery for nitroglycerin sublingual but he states he never got that filled.  I did have a long discussion with him about if he ever needed to take nitroglycerin sublingual he could not take any ED medication for 48 to 72 hours.  He voiced understanding.  He is going to address with Dr. Arvizu at next appointment.     RTO in 3 to 4 months with LOUIE    2/8/2023-labs reported incorrectly and I have taken those out of his note.  Also per Dr. Ball okay to transition from warfarin to Eliquis 5 mg twice daily.  I did talk to his wife as patient had not returned home yet.  I told her as we discussed earlier in our visit that this was the plan.  I sent a note to the warfarin clinic as well.      As always, it has been a pleasure to participate in your patient's care. Thank you.       Sincerely,       DEV Mars      Current Outpatient Medications:   •  aspirin 81 MG chewable tablet, Chew 81 mg Daily., Disp: , Rfl:   •  atenolol (TENORMIN) 25 MG tablet, Take 1 tablet by mouth Daily., Disp: 180 tablet, Rfl: 3  •  atorvastatin (LIPITOR) 40 MG tablet, Take 1 tablet by mouth Every Night., Disp: 90 tablet, Rfl: 3  •  glucose blood test strip, Test blood sugar Twice  daily, Disp: 200 each, Rfl: 12  •  glucose monitor monitoring kit, 1 each As Needed (Diabetes 2)., Disp: 1 each, Rfl: 0  •  Lancets misc, 1 each 3 (Three) Times a Day Before Meals., Disp: 200 each, Rfl: 5  •  metFORMIN (GLUCOPHAGE) 1000 MG tablet, Take 1,000 mg by mouth 2 (Two) Times a Day With Meals., Disp: , Rfl:   •  nitroglycerin (Nitrostat) 0.4 MG SL tablet, Place 1 tablet under the tongue Every 5 (Five) Minutes As Needed for Chest Pain. Take no more than 3 doses in 15 minutes., Disp: 30 tablet, Rfl: 1  •  omeprazole (priLOSEC) 40 MG capsule, Take 1 capsule by mouth 3 (Three) Times a Week if Needed (Heartburn)., Disp: 90 capsule, Rfl: 3  •  phenazopyridine (PYRIDIUM) 100 MG tablet, Take 1 tablet by mouth 3 (Three) Times a Day As Needed for Bladder Spasms., Disp: 12 tablet, Rfl: 0  •  tamsulosin (FLOMAX) 0.4 MG capsule 24 hr capsule, Take 1 capsule by mouth Daily., Disp: 30 capsule, Rfl: 0  •  warfarin (COUMADIN) 4 MG tablet, Take 4 mg by mouth Daily. As directed, Disp: , Rfl:       Dictated utilizing Dragon dictation

## 2023-02-08 NOTE — TELEPHONE ENCOUNTER
Attempted to contact Mr. Dobbs without success. His spouse indicates he is out running errands and will not return home until after our office closes. I will call again tomorrow morning to discuss plan for transitioning from warfarin to apixaban.

## 2023-02-09 ENCOUNTER — TELEPHONE (OUTPATIENT)
Dept: PHARMACY | Facility: HOSPITAL | Age: 83
End: 2023-02-09
Payer: MEDICARE

## 2023-02-09 NOTE — TELEPHONE ENCOUNTER
Spoke with Mr. Dobbs by phone. He is agreeable to alerting clinic once he has picked up apixaban from pharmacy. He will continue warfarin for now and visit the outpatient lab on 2/13/23 as previously instructed (see 2/6/23 Anticoagulation Visit).

## 2023-02-10 ENCOUNTER — TELEPHONE (OUTPATIENT)
Dept: CARDIOLOGY | Facility: CLINIC | Age: 83
End: 2023-02-10
Payer: MEDICARE

## 2023-02-10 NOTE — TELEPHONE ENCOUNTER
I spoke with the patient about his Eliqius prescription . Pt says $350 is what he has to pay after the insurance company paid its portion. Pt states that he can not afford the amount and he is looking for a cheaper option.  Patient verbalized correctly.

## 2023-02-10 NOTE — TELEPHONE ENCOUNTER
Pharmacy sent fax stating that the Eliquis is $350 copayment and wanted to know if there was anything else that he could take?    Santhosh

## 2023-02-13 ENCOUNTER — ANTICOAGULATION VISIT (OUTPATIENT)
Dept: PHARMACY | Facility: HOSPITAL | Age: 83
End: 2023-02-13
Payer: MEDICARE

## 2023-02-13 ENCOUNTER — LAB (OUTPATIENT)
Dept: LAB | Facility: HOSPITAL | Age: 83
End: 2023-02-13
Payer: MEDICARE

## 2023-02-13 DIAGNOSIS — I48.0 AF (PAROXYSMAL ATRIAL FIBRILLATION): Primary | ICD-10-CM

## 2023-02-13 DIAGNOSIS — I48.0 AF (PAROXYSMAL ATRIAL FIBRILLATION): ICD-10-CM

## 2023-02-13 LAB
INR PPP: 2.07 (ref 0.9–1.1)
PROTHROMBIN TIME: 23.7 SECONDS (ref 12.1–15)

## 2023-02-13 PROCEDURE — 85610 PROTHROMBIN TIME: CPT

## 2023-02-13 PROCEDURE — 36415 COLL VENOUS BLD VENIPUNCTURE: CPT

## 2023-02-13 NOTE — PROGRESS NOTES
Anticoagulation Clinic Progress Note    Anticoagulation Summary  As of 2023    INR goal:  2.0-3.0   TTR:  37.5 % (5.7 mo)   INR used for dosin.07 (2023)   Warfarin maintenance plan:  4 mg every Tue, Thu; 6 mg all other days; Starting 2023   Weekly warfarin total:  38 mg   No change documented:  [place], PharmD   Plan last modified:  Moisés Morales, PharmD (2022)   Next INR check:  2023   Target end date:      Indications    AF (paroxysmal atrial fibrillation) ([facility]) [I48.0]             Anticoagulation Episode Summary     INR check location:      Preferred lab:      Send INR reminders to:   [facility]    Comments:  s/p AVR and CABG * [facility]*      Anticoagulation Care Providers     Provider Role Specialty Phone number    Helena Humphries APRN Referring Cardiology [phone]          Clinic Interview:  Patient Findings     Negatives:  Signs/symptoms of thrombosis, Signs/symptoms of bleeding,   Laboratory test error suspected, Change in health, Change in alcohol use,   Change in activity, Upcoming invasive procedure, Emergency department   visit, Upcoming dental procedure, Missed doses, Extra doses, Change in   medications, Change in diet/appetite, Hospital admission, Bruising, Other   complaints    Comments:  Eliquis was too expensive so patient decided to continue on   warfarin.      Clinical Outcomes     Negatives:  Major bleeding event, Thromboembolic event,   Anticoagulation-related hospital admission, Anticoagulation-related ED   visit, Anticoagulation-related fatality    Comments:  Eliquis was too expensive so patient decided to continue on   warfarin.        INR History:  Anticoagulation Monitoring 2023   INR 1.08 1.59 2.07   INR Date 2023   INR Goal 2.0-3.0 2.0-3.0 2.0-3.0   Trend Same Same Same   Last Week Total 18 mg 42 mg 38 mg   Next Week Total 42 mg 38 mg 38 mg   Sun 6 mg 6 mg 6 mg    Mon 6 mg 6 mg 6 mg   Tue 6 mg (1/31) 4 mg 4 mg   Wed 6 mg 6 mg 6 mg   Thu 6 mg (2/2) 4 mg 4 mg   Fri 6 mg 6 mg 6 mg   Sat 6 mg 6 mg 6 mg   Visit Report - - -   Some recent data might be hidden       Plan:  1. INR is Therapeutic today- see above in Anticoagulation Summary.   Will instruct Woo FRANCO Dobbs to Continue their warfarin regimen- see above in Anticoagulation Summary.  2. Follow up in 1 weeks  3 They have been instructed to call if any changes in medications, doses, concerns, etc. Patient expresses understanding and has no further questions at this time.    [place], PharmD

## 2023-02-20 ENCOUNTER — LAB (OUTPATIENT)
Dept: LAB | Facility: HOSPITAL | Age: 83
End: 2023-02-20
Payer: MEDICARE

## 2023-02-20 ENCOUNTER — ANTICOAGULATION VISIT (OUTPATIENT)
Dept: PHARMACY | Facility: HOSPITAL | Age: 83
End: 2023-02-20
Payer: MEDICARE

## 2023-02-20 DIAGNOSIS — I48.0 AF (PAROXYSMAL ATRIAL FIBRILLATION): ICD-10-CM

## 2023-02-20 DIAGNOSIS — Z95.2 S/P AVR (AORTIC VALVE REPLACEMENT): ICD-10-CM

## 2023-02-20 DIAGNOSIS — I48.0 AF (PAROXYSMAL ATRIAL FIBRILLATION): Primary | ICD-10-CM

## 2023-02-20 DIAGNOSIS — Z95.1 S/P CABG (CORONARY ARTERY BYPASS GRAFT): ICD-10-CM

## 2023-02-20 LAB
INR PPP: 1.91 (ref 0.9–1.1)
PROTHROMBIN TIME: 22.3 SECONDS (ref 12.1–15)

## 2023-02-20 PROCEDURE — 85610 PROTHROMBIN TIME: CPT

## 2023-02-20 NOTE — PROGRESS NOTES
Anticoagulation Clinic Progress Note    Anticoagulation Summary  As of 2023    INR goal:  2.0-3.0   TTR:  37.8 % (6 mo)   INR used for dosin.91 (2023)   Warfarin maintenance plan:  4 mg every Thu; 6 mg all other days; Starting 2023   Weekly warfarin total:  40 mg   Plan last modified:  [place], PharmD (2023)   Next INR check:  2023   Target end date:      Indications    AF (paroxysmal atrial fibrillation) ([facility]) [I48.0]             Anticoagulation Episode Summary     INR check location:      Preferred lab:      Send INR reminders to:   [facility]    Comments:  s/p AVR and CABG * [facility]*      Anticoagulation Care Providers     Provider Role Specialty Phone number    Helena Humphries APRN Referring Cardiology [phone]          Clinic Interview:  Patient Findings     Negatives:  Signs/symptoms of thrombosis, Signs/symptoms of bleeding,   Laboratory test error suspected, Change in health, Change in alcohol use,   Change in activity, Upcoming invasive procedure, Emergency department   visit, Upcoming dental procedure, Missed doses, Extra doses, Change in   medications, Change in diet/appetite, Hospital admission, Bruising, Other   complaints      Clinical Outcomes     Negatives:  Major bleeding event, Thromboembolic event,   Anticoagulation-related hospital admission, Anticoagulation-related ED   visit, Anticoagulation-related fatality        INR History:  Anticoagulation Monitoring 2023   INR 1.59 2.07 1.91   INR Date 2023   INR Goal 2.0-3.0 2.0-3.0 2.0-3.0   Trend Same Same Up   Last Week Total 42 mg 38 mg 38 mg   Next Week Total 38 mg 38 mg 40 mg   Sun 6 mg 6 mg 6 mg   Mon 6 mg 6 mg 6 mg   Tue 4 mg 4 mg 6 mg ()   Wed 6 mg 6 mg 6 mg   Thu 4 mg 4 mg 4 mg   Fri 6 mg 6 mg 6 mg   Sat 6 mg 6 mg 6 mg   Visit Report - - -   Some recent data might be hidden       Plan:  1. INR is Subtherapeutic  today- see above in Anticoagulation Summary.   Will instruct Woo Dobbs to Change their warfarin regimen- see above in Anticoagulation Summary.  Increase weekly dose to 4 mg Thursday only and 6 mg on al other days until next INR.  2. Follow up in 1 weeks  3. Spoke to patient's wife, Melina. They have been instructed to call if any changes in medications, doses, concerns, etc. Patient expresses understanding and has no further questions at this time.    [place], PharmD

## 2023-02-27 ENCOUNTER — LAB (OUTPATIENT)
Dept: LAB | Facility: HOSPITAL | Age: 83
End: 2023-02-27
Payer: MEDICARE

## 2023-02-27 ENCOUNTER — ANTICOAGULATION VISIT (OUTPATIENT)
Dept: PHARMACY | Facility: HOSPITAL | Age: 83
End: 2023-02-27
Payer: MEDICARE

## 2023-02-27 DIAGNOSIS — I48.0 AF (PAROXYSMAL ATRIAL FIBRILLATION): Primary | ICD-10-CM

## 2023-02-27 DIAGNOSIS — I48.0 AF (PAROXYSMAL ATRIAL FIBRILLATION): ICD-10-CM

## 2023-02-27 LAB
INR PPP: 2.03 (ref 0.9–1.1)
PROTHROMBIN TIME: 23.3 SECONDS (ref 12.1–15)

## 2023-02-27 PROCEDURE — 36415 COLL VENOUS BLD VENIPUNCTURE: CPT

## 2023-02-27 PROCEDURE — 85610 PROTHROMBIN TIME: CPT

## 2023-02-27 NOTE — PROGRESS NOTES
Anticoagulation Clinic Progress Note    Anticoagulation Summary  As of 2023    INR goal:  2.0-3.0   TTR:  37.3 % (6.2 mo)   INR used for dosin.03 (2023)   Warfarin maintenance plan:  4 mg every Thu; 6 mg all other days; Starting 2023   Weekly warfarin total:  40 mg   No change documented:  Key Rock PharmD   Plan last modified:  Key Rock PharmD (2023)   Next INR check:  3/13/2023   Target end date:      Indications    AF (paroxysmal atrial fibrillation) ([facility]) [I48.0]             Anticoagulation Episode Summary     INR check location:      Preferred lab:      Send INR reminders to:   [facility]    Comments:  s/p AVR and CABG * [facility]*      Anticoagulation Care Providers     Provider Role Specialty Phone number    YandelHelena APRN Referring Cardiology [phone]          Clinic Interview:  Patient Findings     Negatives:  Signs/symptoms of thrombosis, Signs/symptoms of bleeding,   Laboratory test error suspected, Change in health, Change in alcohol use,   Change in activity, Upcoming invasive procedure, Emergency department   visit, Upcoming dental procedure, Missed doses, Extra doses, Change in   medications, Change in diet/appetite, Hospital admission, Bruising, Other   complaints      Clinical Outcomes     Negatives:  Major bleeding event, Thromboembolic event,   Anticoagulation-related hospital admission, Anticoagulation-related ED   visit, Anticoagulation-related fatality        INR History:  Anticoagulation Monitoring 2023   INR 2.07 1.91 2.03   INR Date 2023   INR Goal 2.0-3.0 2.0-3.0 2.0-3.0   Trend Same Up Same   Last Week Total 38 mg 38 mg 40 mg   Next Week Total 38 mg 40 mg 40 mg   Sun 6 mg 6 mg 6 mg   Mon 6 mg 6 mg 6 mg   Tue 4 mg 6 mg () 6 mg   Wed 6 mg 6 mg 6 mg   Thu 4 mg 4 mg 4 mg   Fri 6 mg 6 mg 6 mg   Sat 6 mg 6 mg 6 mg   Visit Report - - -   Some recent data  might be hidden       Plan:  1. INR is Therapeutic today- see above in Anticoagulation Summary.   Will instruct Woo Dobbs to Continue their warfarin regimen- see above in Anticoagulation Summary.  2. Follow up in 2 weeks  3. Left HIPAA compliant voicemail with instructions. They have been instructed to call if any changes in medications, doses, concerns, etc. Patient expresses understanding and has no further questions at this time.    [place], PharmD

## 2023-03-08 NOTE — ANESTHESIA PROCEDURE NOTES
Airway  Date/Time: 8/8/2022 6:51 AM  Airway not difficult    General Information and Staff    Patient location during procedure: OR  Anesthesiologist: Asael Rosenberg MD    Indications and Patient Condition    Preoxygenated: yes  Mask difficulty assessment: 2 - vent by mask + OA or adjuvant +/- NMBA    Final Airway Details  Final airway type: endotracheal airway      Successful airway: ETT  Cuffed: yes   Successful intubation technique: direct laryngoscopy  Facilitating devices/methods: intubating stylet  Endotracheal tube insertion site: oral  Blade: Rodriguez  Blade size: 3  ETT size (mm): 8.0  Cormack-Lehane Classification: grade IIb - view of arytenoids or posterior of glottis only  Placement verified by: capnometry   Measured from: teeth  ETT/EBT  to teeth (cm): 23  Number of attempts at approach: 1  Assessment: lips, teeth, and gum same as pre-op and atraumatic intubation               H/o Atrial flutter on Eliquis & Toprol   - S/p EKG showed Atrial Flutter w/ variable AV block. Similar to the one in Jan 2023.   - Stable  - C/w Eliquis & Toprol

## 2023-03-09 ENCOUNTER — HOSPITAL ENCOUNTER (EMERGENCY)
Facility: HOSPITAL | Age: 83
Discharge: HOME | End: 2023-03-09
Attending: EMERGENCY MEDICINE | Admitting: EMERGENCY MEDICINE
Payer: MEDICARE

## 2023-03-09 VITALS
DIASTOLIC BLOOD PRESSURE: 82 MMHG | HEART RATE: 73 BPM | WEIGHT: 157 LBS | HEIGHT: 68 IN | SYSTOLIC BLOOD PRESSURE: 173 MMHG | OXYGEN SATURATION: 96 % | BODY MASS INDEX: 23.79 KG/M2 | RESPIRATION RATE: 16 BRPM | TEMPERATURE: 98 F

## 2023-03-09 DIAGNOSIS — R04.0 EPISTAXIS: Primary | ICD-10-CM

## 2023-03-09 LAB
BASOPHILS # BLD AUTO: 0.02 10*3/MM3 (ref 0–0.2)
BASOPHILS NFR BLD AUTO: 0.3 % (ref 0–1.5)
DEPRECATED RDW RBC AUTO: 48.4 FL (ref 37–54)
EOSINOPHIL # BLD AUTO: 0.29 10*3/MM3 (ref 0–0.4)
EOSINOPHIL NFR BLD AUTO: 4.3 % (ref 0.3–6.2)
ERYTHROCYTE [DISTWIDTH] IN BLOOD BY AUTOMATED COUNT: 15 % (ref 12.3–15.4)
HCT VFR BLD AUTO: 37.3 % (ref 37.5–51)
HGB BLD-MCNC: 12 G/DL (ref 13–17.7)
IMM GRANULOCYTES # BLD AUTO: 0.01 10*3/MM3 (ref 0–0.05)
IMM GRANULOCYTES NFR BLD AUTO: 0.1 % (ref 0–0.5)
INR PPP: 1.62 (ref 0.9–1.1)
LYMPHOCYTES # BLD AUTO: 1.6 10*3/MM3 (ref 0.7–3.1)
LYMPHOCYTES NFR BLD AUTO: 23.8 % (ref 19.6–45.3)
MCH RBC QN AUTO: 28.3 PG (ref 26.6–33)
MCHC RBC AUTO-ENTMCNC: 32.2 G/DL (ref 31.5–35.7)
MCV RBC AUTO: 88 FL (ref 79–97)
MONOCYTES # BLD AUTO: 0.58 10*3/MM3 (ref 0.1–0.9)
MONOCYTES NFR BLD AUTO: 8.6 % (ref 5–12)
NEUTROPHILS NFR BLD AUTO: 4.21 10*3/MM3 (ref 1.7–7)
NEUTROPHILS NFR BLD AUTO: 62.9 % (ref 42.7–76)
PLATELET # BLD AUTO: 193 10*3/MM3 (ref 140–450)
PMV BLD AUTO: 9.6 FL (ref 6–12)
PROTHROMBIN TIME: 19.5 SECONDS (ref 12.1–15)
RBC # BLD AUTO: 4.24 10*6/MM3 (ref 4.14–5.8)
WBC NRBC COR # BLD: 6.71 10*3/MM3 (ref 3.4–10.8)

## 2023-03-09 PROCEDURE — 85025 COMPLETE CBC W/AUTO DIFF WBC: CPT | Performed by: EMERGENCY MEDICINE

## 2023-03-09 PROCEDURE — 85610 PROTHROMBIN TIME: CPT | Performed by: EMERGENCY MEDICINE

## 2023-03-09 PROCEDURE — 36415 COLL VENOUS BLD VENIPUNCTURE: CPT

## 2023-03-09 PROCEDURE — 99282 EMERGENCY DEPT VISIT SF MDM: CPT

## 2023-03-09 RX ORDER — CEFDINIR 300 MG/1
300 CAPSULE ORAL 2 TIMES DAILY
Qty: 20 CAPSULE | Refills: 0 | Status: SHIPPED | OUTPATIENT
Start: 2023-03-09

## 2023-03-09 NOTE — ED PROVIDER NOTES
Subjective     Nose Bleed  Location:  R nare  Timing:  Constant  Progression:  Waxing and waning  Chronicity:  New  Context: anticoagulants    Relieved by:  Nothing  Worsened by:  Nothing  Associated symptoms: blood in oropharynx    Associated symptoms: no dizziness, no fever, no headaches and no syncope        Review of Systems   Constitutional: Negative for fever.   HENT: Positive for nosebleeds.    Cardiovascular: Negative for syncope.   Neurological: Negative for dizziness and headaches.   All other systems reviewed and are negative.      Past Medical History:   Diagnosis Date   • Aortic stenosis     8/2022: tissue AVR (25mm MDT Avalus bovine pericardial)   • Asthma    • BPH (benign prostatic hyperplasia)    • CAD (coronary artery disease)     7/2022: 95% prox/50% distal LAD, 80% D1, 30-40% Cx, diffuse dz RCA. CABG x 3 8/2022: LIMA-LAD, SVG-D1, SVG-RCA   • Carotid atherosclerosis, bilateral     8/2022: 16-49% bilaterally   • Colon polyp    • GERD (gastroesophageal reflux disease)    • Hyperlipidemia    • Hypertension    • Paroxysmal atrial fibrillation (HCC)    • Type 2 diabetes mellitus (HCC)    • Warfarin anticoagulation        Allergies   Allergen Reactions   • Loratadine Other (See Comments)     Emotional side effects.        Past Surgical History:   Procedure Laterality Date   • CARDIAC CATHETERIZATION N/A 7/14/2022    Procedure: Coronary angiography;  Surgeon: Uinque Calabrese MD;  Location:  [facility];  Service: Cardiovascular;  Laterality: N/A;   • CARDIAC CATHETERIZATION N/A 7/14/2022    Procedure: Left heart cath with simultaneous LV/Ao pressures;  Surgeon: Unique Calabrese MD;  Location:  [facility];  Service: Cardiovascular;  Laterality: N/A;   • CARDIAC CATHETERIZATION N/A 7/14/2022    Procedure: Right Heart Cath;  Surgeon: Unique Calabrese MD;  Location:  [facility];  Service: Cardiovascular;  Laterality: N/A;   • COLONOSCOPY     • CORONARY ARTERY  BYPASS GRAFT WITH AORTIC VALVE REPAIR/REPLACEMENT N/A 8/8/2022    Procedure: KEVIN STERNOTOMY CORONARY ARTERY BYPASS GRAFT TIMES 3 USING LEFT INTERNAL MAMMARY ARTERY AND LEFT GREATER SAPHENOUS VEIN GRAFT PER ENDOSCOPIC VEIN HARVESTING, AORTIC VALVE REPLACEMENT AND PRP;  Surgeon: Jr Gentry Courtney MD;  Location: [facility] CV[facility];  Service: Cardiothoracic;  Laterality: N/A;   • TRANSURETHRAL RESECTION OF BLADDER TUMOR N/A 1/23/2023    Procedure: TRANSURETHRAL RESECTION OF SMALL BLADDER TUMOR;  Surgeon: Nimesh Arvizu MD;  Location:  [facility];  Service: Urology;  Laterality: N/A;   • VASECTOMY         Family History   Problem Relation Age of Onset   • Colon cancer Neg Hx    • Colon polyps Neg Hx    • Malig Hyperthermia Neg Hx        Social History     Socioeconomic History   • Marital status:    • Number of children: 3   Tobacco Use   • Smoking status: Never   • Smokeless tobacco: Never   • Tobacco comments:     caffeine use: 1 -2 cups daily.    Vaping Use   • Vaping Use: Never used   Substance and Sexual Activity   • Alcohol use: No   • Drug use: No   • Sexual activity: Defer           Objective   Physical Exam  Vitals and nursing note reviewed.   Constitutional:       Appearance: Normal appearance. He is not ill-appearing or diaphoretic.   HENT:      Head: Normocephalic and atraumatic.      Nose:      Comments: Rt nare bleeding, no source viz     Mouth/Throat:      Mouth: Mucous membranes are moist.      Comments: Post op bleeding/clot  Eyes:      Extraocular Movements: Extraocular movements intact.      Conjunctiva/sclera: Conjunctivae normal.      Pupils: Pupils are equal, round, and reactive to light.   Cardiovascular:      Rate and Rhythm: Normal rate and regular rhythm.   Pulmonary:      Effort: Pulmonary effort is normal.      Breath sounds: Normal breath sounds. No wheezing or rhonchi.   Musculoskeletal:      Cervical back: Normal range of motion and neck supple. No tenderness.   Skin:     General:  Skin is warm and dry.      Capillary Refill: Capillary refill takes less than 2 seconds.      Coloration: Skin is not pale.      Findings: No erythema.   Neurological:      General: No focal deficit present.      Mental Status: He is alert and oriented to person, place, and time. Mental status is at baseline.   Psychiatric:         Mood and Affect: Mood normal.         Behavior: Behavior normal.         Procedures           ED Course  ED Course as of 03/09/23 1141   Thu Mar 09, 2023   1105 Rt nare bleeding, guaze packing, no source identified anteriorly, baloon tampon placed, bleeding controlled [BC]   1139 Reeval, appears well, vss, bleeding controlled, ok with plan to f/u 2 days for baloon removal [BC]      ED Course User Index  [BC] Eulalio Marley MD                                           [facility]    Final diagnoses:   Epistaxis       ED Disposition  ED Disposition     ED Disposition   Discharge    Condition   Stable    Comment   --             [facility] Emergency Department  [address]  [place] [id number] [phone]  In 2 days  For wound re-check         Medication List      New Prescriptions    cefdinir 300 MG capsule  Commonly known as: OMNICEF  Take 1 capsule by mouth 2 (Two) Times a Day.           Where to Get Your Medications      These medications were sent to [id number] - [place] - [address] - [phone]  - [phone] FX  [address] [place] [address]    Phone: [phone]   · cefdinir 300 MG capsule          Eulalio Marley MD  03/09/23 1141

## 2023-03-10 ENCOUNTER — ANTICOAGULATION VISIT (OUTPATIENT)
Dept: PHARMACY | Facility: HOSPITAL | Age: 83
End: 2023-03-10
Payer: MEDICARE

## 2023-03-10 DIAGNOSIS — I48.0 AF (PAROXYSMAL ATRIAL FIBRILLATION): Primary | ICD-10-CM

## 2023-03-10 NOTE — PROGRESS NOTES
Anticoagulation Clinic Progress Note    Anticoagulation Summary  As of 3/10/2023    INR goal:  2.0-3.0   TTR:  35.8 % (6.5 mo)   INR used for dosin.62 (3/9/2023)   Warfarin maintenance plan:  4 mg every Thu; 6 mg all other days; Starting 3/10/2023   Weekly warfarin total:  40 mg   No change documented:  Key Rock PharmD   Plan last modified:  Key Rock PharmD (2023)   Next INR check:  3/13/2023   Target end date:      Indications    AF (paroxysmal atrial fibrillation) ([facility]) [I48.0]             Anticoagulation Episode Summary     INR check location:      Preferred lab:      Send INR reminders to:   [facility]    Comments:  s/p AVR and CABG * [facility]*      Anticoagulation Care Providers     Provider Role Specialty Phone number    Helena Humphries DEV VIEIRA Referring Cardiology [phone]          Clinic Interview:  Patient Findings     Positives:  Emergency department visit, Change in medications    Negatives:  Signs/symptoms of thrombosis, Signs/symptoms of bleeding,   Laboratory test error suspected, Change in health, Change in alcohol use,   Change in activity, Upcoming invasive procedure, Upcoming dental   procedure, Missed doses, Extra doses, Change in diet/appetite, Hospital   admission, Bruising, Other complaints    Comments:  Ed visit on 3/9 for severe nosebleed that required a balloon   to be placed to stop the bleed.  Started on cefdinir.      Clinical Outcomes     Negatives:  Major bleeding event, Thromboembolic event,   Anticoagulation-related hospital admission, Anticoagulation-related ED   visit, Anticoagulation-related fatality    Comments:  Ed visit on 3/9 for severe nosebleed that required a balloon   to be placed to stop the bleed.  Started on cefdinir.        INR History:  Anticoagulation Monitoring 2023 2023 3/10/2023   INR 1.91 2.03 1.62   INR Date 2023 2023 3/9/2023   INR Goal 2.0-3.0 2.0-3.0 2.0-3.0   Trend Up Same  Same   Last Week Total 38 mg 40 mg 40 mg   Next Week Total 40 mg 40 mg 40 mg   Sun 6 mg 6 mg 6 mg   Mon 6 mg 6 mg -   Tue 6 mg (2/21) 6 mg -   Wed 6 mg 6 mg -   Thu 4 mg 4 mg -   Fri 6 mg 6 mg 6 mg   Sat 6 mg 6 mg 6 mg   Visit Report - - -   Some recent data might be hidden       Plan:  1. INR is Subtherapeutic today- see above in Anticoagulation Summary.   Will instruct Woo Dobbs to Continue their warfarin regimen- see above in Anticoagulation Summary.  Resumed normal warfarin dose until INR check on 3/13.  2. Follow up in 3 days.   3. They have been instructed to call if any changes in medications, doses, concerns, etc. Patient expresses understanding and has no further questions at this time.    [place], PharmD   Acute Cough    WHAT YOU NEED TO KNOW:    An acute cough can last up to 3 weeks. Common causes of an acute cough include a cold, allergies, or a lung infection.     DISCHARGE INSTRUCTIONS:    Return to the emergency department if:     You have trouble breathing or feel short of breath.      You cough up blood, or you see blood in your mucus.       You faint or feel weak or dizzy.       You have chest pain when you cough or take a deep breath.       You have new wheezing.     Contact your healthcare provider if:     You have a fever.       Your cough lasts longer than 4 weeks.       Your symptoms do not improve with treatment.       You have questions or concerns about your condition or care.     Medicines:     Medicines may be needed to stop the cough, decrease swelling in your airways, or help open your airways. Medicine may also be given to help you cough up mucus. Ask your healthcare provider what over-the-counter medicines you can take. If you have an infection caused by bacteria, you may need antibiotics.       Take your medicine as directed. Contact your healthcare provider if you think your medicine is not helping or if you have side effects. Tell him or her if you are allergic to any medicine. Keep a list of the medicines, vitamins, and herbs you take. Include the amounts, and when and why you take them. Bring the list or the pill bottles to follow-up visits. Carry your medicine list with you in case of an emergency.    Manage your symptoms:     Do not smoke and stay away from others who smoke. Nicotine and other chemicals in cigarettes and cigars can cause lung damage and make your cough worse. Ask your healthcare provider for information if you currently smoke and need help to quit. E-cigarettes or smokeless tobacco still contain nicotine. Talk to your healthcare provider before you use these products.       Drink extra liquids as directed. Liquids will help thin and loosen mucus so you can cough it up. Liquids will also help prevent dehydration. Examples of good liquids to drink include water, fruit juice, and broth. Do not drink liquids that contain caffeine. Caffeine can increase your risk for dehydration. Ask your healthcare provider how much liquid to drink each day.       Rest as directed. Do not do activities that make your cough worse, such as exercise.       Use a humidifier or vaporizer. Use a cool mist humidifier or a vaporizer to increase air moisture in your home. This may make it easier for you to breathe and help decrease your cough.       Eat 2 to 5 mL of honey 2 times each day. Honey can help thin mucus and decrease your cough.       Use cough drops or lozenges. These can help decrease throat irritation and your cough.     Follow up with your healthcare provider as directed: Write down your questions so you remember to ask them during your visits.

## 2023-03-11 ENCOUNTER — HOSPITAL ENCOUNTER (EMERGENCY)
Facility: HOSPITAL | Age: 83
Discharge: HOME | End: 2023-03-11
Attending: EMERGENCY MEDICINE | Admitting: EMERGENCY MEDICINE
Payer: MEDICARE

## 2023-03-11 VITALS
OXYGEN SATURATION: 97 % | TEMPERATURE: 98.5 F | BODY MASS INDEX: 25.33 KG/M2 | WEIGHT: 152 LBS | HEIGHT: 65 IN | DIASTOLIC BLOOD PRESSURE: 86 MMHG | HEART RATE: 72 BPM | RESPIRATION RATE: 16 BRPM | SYSTOLIC BLOOD PRESSURE: 147 MMHG

## 2023-03-11 DIAGNOSIS — Z48.00 ENCOUNTER FOR REMOVAL OF NASAL PACKING: Primary | ICD-10-CM

## 2023-03-11 PROCEDURE — 99202 OFFICE O/P NEW SF 15 MIN: CPT

## 2023-03-11 NOTE — ED PROVIDER NOTES
Subjective   History of Present Illness  82-year-old man past medical history significant for anticoagulation on Coumadin secondary coronary artery disease presents emergency room for nasal packing removal from epistaxis from 2 days ago.  Patient states he has been taking the antibiotics as directed and has had no problems since the day of the incident.  Patient denies fever continued epistaxis cough sore throat headache or visual changes.        Review of Systems   Constitutional: Negative for activity change, appetite change, chills, diaphoresis, fatigue and fever.   HENT: Negative for congestion, sinus pressure, sneezing and sore throat.    Eyes: Negative for photophobia and visual disturbance.   Respiratory: Negative for cough and shortness of breath.    Cardiovascular: Negative for chest pain, palpitations and leg swelling.   Gastrointestinal: Negative for abdominal distention, abdominal pain, diarrhea, nausea and vomiting.   Genitourinary: Negative for dysuria and flank pain.   Musculoskeletal: Negative for arthralgias, back pain and myalgias.   Skin: Negative for rash.   Neurological: Negative for dizziness, weakness and headaches.   Psychiatric/Behavioral: Negative for behavioral problems and confusion.       Past Medical History:   Diagnosis Date   • Aortic stenosis     8/2022: tissue AVR (25mm MDT Avalus bovine pericardial)   • Asthma    • BPH (benign prostatic hyperplasia)    • CAD (coronary artery disease)     7/2022: 95% prox/50% distal LAD, 80% D1, 30-40% Cx, diffuse dz RCA. CABG x 3 8/2022: LIMA-LAD, SVG-D1, SVG-RCA   • Carotid atherosclerosis, bilateral     8/2022: 16-49% bilaterally   • Colon polyp    • GERD (gastroesophageal reflux disease)    • Hyperlipidemia    • Hypertension    • Paroxysmal atrial fibrillation (HCC)    • Type 2 diabetes mellitus (HCC)    • Warfarin anticoagulation        Allergies   Allergen Reactions   • Loratadine Other (See Comments)     Emotional side effects.        Past  Surgical History:   Procedure Laterality Date   • CARDIAC CATHETERIZATION N/A 7/14/2022    Procedure: Coronary angiography;  Surgeon: Unique Calabrese MD;  Location: [facility] CATH INVASIVE LOCATION;  Service: Cardiovascular;  Laterality: N/A;   • CARDIAC CATHETERIZATION N/A 7/14/2022    Procedure: Left heart cath with simultaneous LV/Ao pressures;  Surgeon: Unique Calabrese MD;  [facility]IVE [facility];  Service: Cardiovascular;  Laterality: N/A;   • CARDIAC CATHETERIZATION N/A 7/14/2022    Procedure: Right Heart Cath;  Surgeon: Unique Calabrese MD;  Location:  [facility];  Service: Cardiovascular;  Laterality: N/A;   • COLONOSCOPY     • CORONARY ARTERY BYPASS GRAFT WITH AORTIC VALVE REPAIR/REPLACEMENT N/A 8/8/2022    Procedure: KEVIN STERNOTOMY CORONARY ARTERY BYPASS GRAFT TIMES 3 USING LEFT INTERNAL MAMMARY ARTERY AND LEFT GREATER SAPHENOUS VEIN GRAFT PER ENDOSCOPIC VEIN HARVESTING, AORTIC VALVE REPLACEMENT AND PRP;  Surgeon: Jr Gentry Courtney MD;  Location: [facility] CVOR;  Service: Cardiothoracic;  Laterality: N/A;   • TRANSURETHRAL RESECTION OF BLADDER TUMOR N/A 1/23/2023    Procedure: TRANSURETHRAL RESECTION OF SMALL BLADDER TUMOR;  Surgeon: Nimesh Arvizu MD;  Location: [facility];  Service: Urology;  Laterality: N/A;   • VASECTOMY         Family History   Problem Relation Age of Onset   • Colon cancer Neg Hx    • Colon polyps Neg Hx    • Malig Hyperthermia Neg Hx        Social History     Socioeconomic History   • Marital status:    • Number of children: 3   Tobacco Use   • Smoking status: Never   • Smokeless tobacco: Never   • Tobacco comments:     caffeine use: 1 -2 cups daily.    Vaping Use   • Vaping Use: Never used   Substance and Sexual Activity   • Alcohol use: No   • Drug use: No   • Sexual activity: Defer           Objective   Physical Exam  Vitals and nursing note reviewed.   Constitutional:       General: He is not in acute distress.     Appearance: Normal  appearance. He is not toxic-appearing or diaphoretic.   HENT:      Head: Normocephalic and atraumatic.      Nose:      Comments: Patient has posterior nasal packing in place.  Bulbs appear inflated appropriately.  No epistaxis noted in bilateral nares or oropharynx     Mouth/Throat:      Mouth: Mucous membranes are moist.   Eyes:      Conjunctiva/sclera: Conjunctivae normal.   Cardiovascular:      Rate and Rhythm: Normal rate.      Pulses: Normal pulses.   Pulmonary:      Effort: Pulmonary effort is normal. No respiratory distress.      Breath sounds: No wheezing or rales.   Abdominal:      General: Abdomen is flat. There is no distension.      Tenderness: There is no abdominal tenderness.   Musculoskeletal:         General: No swelling or signs of injury. Normal range of motion.      Cervical back: Normal range of motion and neck supple.   Skin:     General: Skin is warm.      Findings: No rash.   Neurological:      Mental Status: He is alert and oriented to person, place, and time.   Psychiatric:         Mood and Affect: Mood normal.         Behavior: Behavior normal.         Epistaxis Management    Date/Time: 3/11/2023 3:29 PM  Performed by: Eddy Gan MD  Authorized by: Eddy Gan MD                  ED Course  ED Course as of 03/11/23 1532   Sat Mar 11, 2023   1529 Balloon was deflated and packing was removed without difficulty.  No epistaxis noted and no complications.  Patient states he feels much better after packing removed.  Patient instructed to continue take antibiotics until completed.  If patient continues to have repeat epistaxis he can return emergency room for acute treatment however was also consulted to follow-up with ENT for visualization.  Patient states he understands agrees with plan. [BH]      ED Course User Index  [BH] Eddy Gan MD                                           Medical Decision Making  My differential diagnosis for epistaxis includes but is not  limited to nasal trauma including nasal fracture or midface fracture, basilar skull fracture, sinusitis, bleeding disorders, alcohol abuse, seasonal allergies, substance abuse, nasal dryness, nose picking, hypertension, chemical irritants, foreign body, nasal sprays, ITP, leukemia, nasal polyps, pregnancy, ITP and hereditary hemorrhagic telangiectasia        Final diagnoses:   Encounter for removal of nasal packing       ED Disposition  ED Disposition     ED Disposition   Discharge    Condition   Stable    Comment   --             Agustín Ivan MD  [address] [facility] [address] [phone]    Schedule an appointment as soon as possible for a visit   As needed    [facility] Emergency Department  [address] [facility] [address] [phone]  Go to   As needed         Medication List      No changes were made to your prescriptions during this visit.          Eddy Gan MD  03/11/23 6215

## 2023-03-13 ENCOUNTER — LAB (OUTPATIENT)
Dept: LAB | Facility: HOSPITAL | Age: 83
End: 2023-03-13
Payer: MEDICARE

## 2023-03-13 ENCOUNTER — ANTICOAGULATION VISIT (OUTPATIENT)
Dept: PHARMACY | Facility: HOSPITAL | Age: 83
End: 2023-03-13
Payer: MEDICARE

## 2023-03-13 DIAGNOSIS — I48.0 AF (PAROXYSMAL ATRIAL FIBRILLATION): ICD-10-CM

## 2023-03-13 DIAGNOSIS — I48.0 AF (PAROXYSMAL ATRIAL FIBRILLATION): Primary | ICD-10-CM

## 2023-03-13 LAB
INR PPP: 1.48 (ref 0.9–1.1)
PROTHROMBIN TIME: 18.2 SECONDS (ref 12.1–15)

## 2023-03-13 PROCEDURE — 85610 PROTHROMBIN TIME: CPT

## 2023-03-13 PROCEDURE — 36415 COLL VENOUS BLD VENIPUNCTURE: CPT

## 2023-03-13 NOTE — PROGRESS NOTES
Anticoagulation Clinic Progress Note    Anticoagulation Summary  As of 3/13/2023    INR goal:  2.0-3.0   TTR:  35.1 % (6.7 mo)   INR used for dosin.48 (3/13/2023)   Warfarin maintenance plan:  6 mg every day; Starting 3/13/2023   Weekly warfarin total:  42 mg   Plan last modified:  [place], PharmD (3/13/2023)   Next INR check:  3/20/2023   Target end date:      Indications    AF (paroxysmal atrial fibrillation) (HCC) [I48.0]             Anticoagulation Episode Summary     INR check location:      Preferred lab:      Send INR reminders to:   [facility]    Comments:  s/p AVR and CABG * [facility]*      Anticoagulation Care Providers     Provider Role Specialty Phone number    Helena Humphries DARIEL, APRN Referring Cardiology [phone]          Clinic Interview:  Melina reports the packing from his nose was removed without incident and he is feeling a lot better.        INR History:  Anticoagulation Monitoring 2023 3/10/2023 3/13/2023   INR 2.03 1.62 1.48   INR Date 2023 3/9/2023 3/13/2023   INR Goal 2.0-3.0 2.0-3.0 2.0-3.0   Trend Same Same Up   Last Week Total 40 mg 40 mg 40 mg   Next Week Total 40 mg 40 mg 44 mg   Sun 6 mg 6 mg 6 mg   Mon 6 mg - 8 mg (3/13)   Tue 6 mg - 6 mg   Wed 6 mg - 6 mg   Thu 4 mg - 6 mg   Fri 6 mg 6 mg 6 mg   Sat 6 mg 6 mg 6 mg   Visit Report - - -   Some recent data might be hidden       Plan:  1. INR is Subtherapeutic today- see above in Anticoagulation Summary.   Will instruct Woo Dobbs to Change their warfarin regimen- see above in Anticoagulation Summary.  2. Follow up in 1 weeks  3. Spoke to patient's spouse, Melina. They have been instructed to call if any changes in medications, doses, concerns, etc. Patient expresses understanding and has no further questions at this time.    [place], PharmD

## 2023-03-20 ENCOUNTER — ANTICOAGULATION VISIT (OUTPATIENT)
Dept: PHARMACY | Facility: HOSPITAL | Age: 83
End: 2023-03-20
Payer: MEDICARE

## 2023-03-20 ENCOUNTER — LAB (OUTPATIENT)
Dept: LAB | Facility: HOSPITAL | Age: 83
End: 2023-03-20
Payer: MEDICARE

## 2023-03-20 DIAGNOSIS — I48.0 AF (PAROXYSMAL ATRIAL FIBRILLATION): Primary | ICD-10-CM

## 2023-03-20 DIAGNOSIS — I48.0 AF (PAROXYSMAL ATRIAL FIBRILLATION): ICD-10-CM

## 2023-03-20 LAB
INR PPP: 1.62 (ref 0.9–1.1)
PROTHROMBIN TIME: 19.5 SECONDS (ref 12.1–15)

## 2023-03-20 PROCEDURE — 36415 COLL VENOUS BLD VENIPUNCTURE: CPT

## 2023-03-20 PROCEDURE — 85610 PROTHROMBIN TIME: CPT

## 2023-03-20 NOTE — PROGRESS NOTES
Anticoagulation Clinic Progress Note    Anticoagulation Summary  As of 3/20/2023    INR goal:  2.0-3.0   TTR:  33.9 % (6.9 mo)   INR used for dosin.62 (3/20/2023)   Warfarin maintenance plan:  8 mg every Tue, Fri; 6 mg all other days; Starting 3/20/2023   Weekly warfarin total:  46 mg   Plan last modified:  Moisés Morales, PharmD (3/20/2023)   Next INR check:  3/27/2023   Target end date:      Indications    AF (paroxysmal atrial fibrillation) ([facility]) [I48.0]             Anticoagulation Episode Summary     INR check location:      Preferred lab:      Send INR reminders to:   [facility]    Comments:  s/p AVR and CABG * [facility]*      Anticoagulation Care Providers     Provider Role Specialty Phone number    CarilisaHelena, DEV Referring Cardiology [phone]          Clinic Interview:  Patient Findings     Positives:  Change in medications, Other complaints    Negatives:  Signs/symptoms of thrombosis, Signs/symptoms of bleeding,   Laboratory test error suspected, Change in health, Change in alcohol use,   Change in activity, Upcoming invasive procedure, Emergency department   visit, Upcoming dental procedure, Missed doses, Extra doses, Change in   diet/appetite, Hospital admission, Bruising    Comments:  Reports he takes warfarin in the morning. Reports he took 8 mg   on Tues last week rather than Mon. Reports he will finish course of   cefdinir tomorrow.       Clinical Outcomes     Negatives:  Major bleeding event, Thromboembolic event,   Anticoagulation-related hospital admission, Anticoagulation-related ED   visit, Anticoagulation-related fatality    Comments:  Reports he takes warfarin in the morning. Reports he took 8 mg   on Tues last week rather than Mon. Reports he will finish course of   cefdinir tomorrow.         INR History:  Anticoagulation Monitoring 3/10/2023 3/13/2023 3/20/2023   INR 1.62 1.48 1.62   INR Date 3/9/2023 3/13/2023 3/20/2023   INR Goal 2.0-3.0  2.0-3.0 2.0-3.0   Trend Same Up Up   Last Week Total 40 mg 40 mg 44 mg   Next Week Total 40 mg 44 mg 46 mg   Sun 6 mg 6 mg 6 mg   Mon - 8 mg (3/13) 6 mg   Tue - 6 mg 8 mg   Wed - 6 mg 6 mg   Thu - 6 mg 6 mg   Fri 6 mg 6 mg 8 mg   Sat 6 mg 6 mg 6 mg   Visit Report - - -   Some recent data might be hidden       Plan:  1. INR is Subtherapeutic today- see above in Anticoagulation Summary.   Will instruct Woo Dobbs to Increase their warfarin regimen to 8 mg Tues/Fri, 6 mg all other days - see above in Anticoagulation Summary.  2. Follow up in 1 week.  3. They have been instructed to call if any changes in medications, doses, concerns, etc. Patient expresses understanding and has no further questions at this time.    Moisés Morales, PharmD

## 2023-03-27 ENCOUNTER — LAB (OUTPATIENT)
Dept: LAB | Facility: HOSPITAL | Age: 83
End: 2023-03-27
Payer: MEDICARE

## 2023-03-27 ENCOUNTER — ANTICOAGULATION VISIT (OUTPATIENT)
Dept: PHARMACY | Facility: HOSPITAL | Age: 83
End: 2023-03-27
Payer: MEDICARE

## 2023-03-27 DIAGNOSIS — I48.0 AF (PAROXYSMAL ATRIAL FIBRILLATION): Primary | ICD-10-CM

## 2023-03-27 DIAGNOSIS — I48.0 AF (PAROXYSMAL ATRIAL FIBRILLATION): ICD-10-CM

## 2023-03-27 LAB
INR PPP: 1.59 (ref 0.9–1.1)
PROTHROMBIN TIME: 19.2 SECONDS (ref 12.1–15)

## 2023-03-27 PROCEDURE — 85610 PROTHROMBIN TIME: CPT

## 2023-03-27 PROCEDURE — 36415 COLL VENOUS BLD VENIPUNCTURE: CPT

## 2023-03-27 NOTE — PROGRESS NOTES
Anticoagulation Clinic Progress Note    Anticoagulation Summary  As of 3/27/2023    INR goal:  2.0-3.0   TTR:  32.8 % (7.1 mo)   INR used for dosin.59 (3/27/2023)   Warfarin maintenance plan:  8 mg every Mon, Wed, Fri; 6 mg all other days; Starting 3/27/2023   Weekly warfarin total:  48 mg   Plan last modified:  [place], PharmD (3/27/2023)   Next INR check:  4/3/2023   Target end date:      Indications    AF (paroxysmal atrial fibrillation) (HCC) [I48.0]             Anticoagulation Episode Summary     INR check location:      Preferred lab:      Send INR reminders to:   [facility]    Comments:  s/p AVR and CABG * [facility]*      Anticoagulation Care Providers     Provider Role Specialty Phone number    Helena Humphries APRN Referring Cardiology [phone]          Clinic Interview:  Patient Findings     Negatives:  Signs/symptoms of thrombosis, Signs/symptoms of bleeding,   Laboratory test error suspected, Change in health, Change in alcohol use,   Change in activity, Upcoming invasive procedure, Emergency department   visit, Upcoming dental procedure, Missed doses, Extra doses, Change in   medications, Change in diet/appetite, Hospital admission, Bruising, Other   complaints      Clinical Outcomes     Negatives:  Major bleeding event, Thromboembolic event,   Anticoagulation-related hospital admission, Anticoagulation-related ED   visit, Anticoagulation-related fatality        INR History:  Anticoagulation Monitoring 3/13/2023 3/20/2023 3/27/2023   INR 1.48 1.62 1.59   INR Date 3/13/2023 3/20/2023 3/27/2023   INR Goal 2.0-3.0 2.0-3.0 2.0-3.0   Trend Up Up Up   Last Week Total 40 mg 44 mg 46 mg   Next Week Total 44 mg 46 mg 48 mg   Sun 6 mg 6 mg 6 mg   Mon 8 mg (3/13) 6 mg 8 mg (3/27)   Tue 6 mg 8 mg 6 mg (3/28)   Wed 6 mg 6 mg 8 mg (3/29)   Thu 6 mg 6 mg 6 mg   Fri 6 mg 8 mg 8 mg   Sat 6 mg 6 mg 6 mg   Visit Report - - -   Some recent data might be hidden        Plan:  1. INR is Subtherapeutic today- see above in Anticoagulation Summary.   Will instruct Woo Dobbs to Change their warfarin regimen- see above in Anticoagulation Summary.  Increase to 8 mg Monday/Wednesday/Friday and 6 mg on all other days.   2. Follow up in 1 weeks  3. They have been instructed to call if any changes in medications, doses, concerns, etc. Patient expresses understanding and has no further questions at this time.    [place], PharmD

## 2023-04-03 ENCOUNTER — ANTICOAGULATION VISIT (OUTPATIENT)
Dept: PHARMACY | Facility: HOSPITAL | Age: 83
End: 2023-04-03
Payer: MEDICARE

## 2023-04-03 ENCOUNTER — LAB (OUTPATIENT)
Dept: LAB | Facility: HOSPITAL | Age: 83
End: 2023-04-03
Payer: MEDICARE

## 2023-04-03 DIAGNOSIS — I48.0 AF (PAROXYSMAL ATRIAL FIBRILLATION): ICD-10-CM

## 2023-04-03 DIAGNOSIS — I48.0 AF (PAROXYSMAL ATRIAL FIBRILLATION): Primary | ICD-10-CM

## 2023-04-03 LAB
INR PPP: 2.23 (ref 0.9–1.1)
PROTHROMBIN TIME: 25.1 SECONDS (ref 12.1–15)

## 2023-04-03 PROCEDURE — 85610 PROTHROMBIN TIME: CPT

## 2023-04-03 PROCEDURE — 36415 COLL VENOUS BLD VENIPUNCTURE: CPT

## 2023-04-03 RX ORDER — WARFARIN SODIUM 4 MG/1
TABLET ORAL
Qty: 155 TABLET | Refills: 0 | Status: SHIPPED | OUTPATIENT
Start: 2023-04-03

## 2023-04-03 RX ORDER — LEVOTHYROXINE SODIUM 0.05 MG/1
50 TABLET ORAL DAILY
COMMUNITY
Start: 2023-02-21

## 2023-04-03 NOTE — PROGRESS NOTES
Anticoagulation Clinic Progress Note    Anticoagulation Summary  As of 4/3/2023    INR goal:  2.0-3.0   TTR:  32.9 % (7.4 mo)   INR used for dosin.23 (4/3/2023)   Warfarin maintenance plan:  6 mg every Tue, Fri; 8 mg all other days; Starting 4/3/2023   Weekly warfarin total:  52 mg   Plan last modified:  Elizabeth Lopez, PharmD (4/3/2023)   Next INR check:  4/10/2023   Target end date:      Indications    AF (paroxysmal atrial fibrillation) [I48.0]             Anticoagulation Episode Summary     INR check location:      Preferred lab:      Send INR reminders to:   [facility]    Comments:  s/p AVR and CABG * [facility]*      Anticoagulation Care Providers     Provider Role Specialty Phone number    Helena Humphries APRN Referring Cardiology [phone]          Clinic Interview:  Patient Findings     Negatives:  Signs/symptoms of thrombosis, Signs/symptoms of bleeding,   Laboratory test error suspected, Change in health, Change in alcohol use,   Change in activity, Upcoming invasive procedure, Emergency department   visit, Upcoming dental procedure, Missed doses, Extra doses, Change in   medications, Change in diet/appetite, Hospital admission, Bruising, Other   complaints      Clinical Outcomes     Negatives:  Major bleeding event, Thromboembolic event,   Anticoagulation-related hospital admission, Anticoagulation-related ED   visit, Anticoagulation-related fatality        INR History:  Anticoagulation Monitoring 3/20/2023 3/27/2023 4/3/2023   INR 1.62 1.59 2.23   INR Date 3/20/2023 3/27/2023 4/3/2023   INR Goal 2.0-3.0 2.0-3.0 2.0-3.0   Trend Up Up Up   Last Week Total 44 mg 46 mg 52 mg   Next Week Total 46 mg 48 mg 52 mg   Sun 6 mg 6 mg 8 mg   Mon 6 mg 8 mg (3/27) 8 mg   Tue 8 mg 6 mg (3/28) 6 mg   Wed 6 mg 8 mg (3/29) 8 mg   Thu 6 mg 6 mg 8 mg   Fri 8 mg 8 mg 6 mg   Sat 6 mg 6 mg 8 mg   Visit Report - - -   Some recent data might be hidden       Plan:  1. INR is Therapeutic  today- see above in Anticoagulation Summary.   Will instruct Woo Dobbs to Continue their warfarin regimen- see above in Anticoagulation Summary.  2. Follow up in 1 weeks  3. They have been instructed to call if any changes in medications, doses, concerns, etc. Patient expresses understanding and has no further questions at this time.    Elizabeth Lopez, PharmD

## 2023-04-10 ENCOUNTER — ANTICOAGULATION VISIT (OUTPATIENT)
Dept: PHARMACY | Facility: HOSPITAL | Age: 83
End: 2023-04-10
Payer: MEDICARE

## 2023-04-10 ENCOUNTER — LAB (OUTPATIENT)
Dept: LAB | Facility: HOSPITAL | Age: 83
End: 2023-04-10
Payer: MEDICARE

## 2023-04-10 DIAGNOSIS — I48.0 AF (PAROXYSMAL ATRIAL FIBRILLATION): Primary | ICD-10-CM

## 2023-04-10 DIAGNOSIS — I48.0 AF (PAROXYSMAL ATRIAL FIBRILLATION): ICD-10-CM

## 2023-04-10 LAB
INR PPP: 1.66 (ref 0.9–1.1)
PROTHROMBIN TIME: 19.9 SECONDS (ref 12.1–15)

## 2023-04-10 PROCEDURE — 36415 COLL VENOUS BLD VENIPUNCTURE: CPT

## 2023-04-10 PROCEDURE — 85610 PROTHROMBIN TIME: CPT

## 2023-04-10 NOTE — PROGRESS NOTES
Anticoagulation Clinic Progress Note    Anticoagulation Summary  As of 4/10/2023    INR goal:  2.0-3.0   TTR:  33.1 % (7.6 mo)   INR used for dosin.66 (4/10/2023)   Warfarin maintenance plan:  6 mg every Tue, Fri; 8 mg all other days; Starting 4/10/2023   Weekly warfarin total:  52 mg   Plan last modified:  iMya Gan RPH (4/10/2023)   Next INR check:  2023   Target end date:      Indications    AF (paroxysmal atrial fibrillation) [I48.0]             Anticoagulation Episode Summary     INR check location:      Preferred lab:      Send INR reminders to:   [facility]    Comments:  s/p AVR and CABG * [facility]*      Anticoagulation Care Providers     Provider Role Specialty Phone number    Helena Humphries APRN Referring Cardiology [phone]          Clinic Interview:  Patient Findings     Negatives:  Signs/symptoms of thrombosis, Signs/symptoms of bleeding,   Laboratory test error suspected, Change in health, Change in alcohol use,   Change in activity, Upcoming invasive procedure, Emergency department   visit, Upcoming dental procedure, Missed doses, Extra doses, Change in   medications, Change in diet/appetite, Hospital admission, Bruising, Other   complaints      Clinical Outcomes     Negatives:  Major bleeding event, Thromboembolic event,   Anticoagulation-related hospital admission, Anticoagulation-related ED   visit, Anticoagulation-related fatality        INR History:  Anticoagulation Monitoring 3/27/2023 4/3/2023 4/10/2023   INR 1.59 2.23 1.66   INR Date 3/27/2023 4/3/2023 4/10/2023   INR Goal 2.0-3.0 2.0-3.0 2.0-3.0   Trend Up Up Same   Last Week Total 46 mg 52 mg 52 mg   Next Week Total 48 mg 52 mg 54 mg   Sun 6 mg 8 mg 8 mg   Mon 8 mg (3/27) 8 mg 8 mg   Tue 6 mg (3/28) 6 mg 8 mg ()   Wed 8 mg (3/29) 8 mg 8 mg   Thu 6 mg 8 mg 8 mg   Fri 8 mg 6 mg 6 mg   Sat 6 mg 8 mg 8 mg   Visit Report - - -   Some recent data might be hidden       Plan:  1. INR is  Subtherapeutic today- see above in Anticoagulation Summary.   Will instruct Woo Dobbs to Increase their warfarin regimen- see above in Anticoagulation Summary.  reports he has been taking 6 mg on wed/fri and 6 AOD, trial on 6 mg on fri, 8 AOD, rck 1 week   2. Follow up in 1 weeks  3.  They have been instructed to call if any changes in medications, doses, concerns, etc. Patient expresses understanding and has no further questions at this time.    Miya Gan, [facility]

## 2023-04-17 ENCOUNTER — ANTICOAGULATION VISIT (OUTPATIENT)
Dept: PHARMACY | Facility: HOSPITAL | Age: 83
End: 2023-04-17
Payer: MEDICARE

## 2023-04-17 ENCOUNTER — LAB (OUTPATIENT)
Dept: LAB | Facility: HOSPITAL | Age: 83
End: 2023-04-17
Payer: MEDICARE

## 2023-04-17 DIAGNOSIS — I48.0 AF (PAROXYSMAL ATRIAL FIBRILLATION): Primary | ICD-10-CM

## 2023-04-17 DIAGNOSIS — I48.0 AF (PAROXYSMAL ATRIAL FIBRILLATION): ICD-10-CM

## 2023-04-17 LAB
INR PPP: 2.39 (ref 0.9–1.1)
PROTHROMBIN TIME: 26.5 SECONDS (ref 12.1–15)

## 2023-04-17 PROCEDURE — 36415 COLL VENOUS BLD VENIPUNCTURE: CPT

## 2023-04-17 PROCEDURE — 85610 PROTHROMBIN TIME: CPT

## 2023-04-17 NOTE — PROGRESS NOTES
Anticoagulation Clinic Progress Note    Anticoagulation Summary  As of 2023    INR goal:  2.0-3.0   TTR:  33.8 % (7.8 mo)   INR used for dosin.39 (2023)   Warfarin maintenance plan:  6 mg every Fri; 8 mg all other days; Starting 2023   Weekly warfarin total:  54 mg   Plan last modified:  Miya Gan RPH (2023)   Next INR check:  2023   Target end date:      Indications    AF (paroxysmal atrial fibrillation) [I48.0]             Anticoagulation Episode Summary     INR check location:      Preferred lab:      Send INR reminders to:   [facility]    Comments:  s/p AVR and CABG * [facility]*      Anticoagulation Care Providers     Provider Role Specialty Phone number    Helena Humphries APRN Referring Cardiology [phone]          Clinic Interview:  No pertinent clinical findings have been reported.    INR History:      Latest Ref Rng & Units 3/27/2023     2:36 PM 4/3/2023     2:01 PM 4/3/2023     3:20 PM 4/10/2023    12:22 PM 4/10/2023     2:56 PM 2023     1:06 PM 2023     1:25 PM   Anticoagulation Monitoring   INR  1.59  2.23  1.66  2.39   INR Date  3/27/2023  4/3/2023  4/10/2023  2023   INR Goal  2.0-3.0  2.0-3.0  2.0-3.0  2.0-3.0   Trend  Up  Up  Same  Up   Last Week Total  46 mg  52 mg  52 mg  54 mg   Next Week Total  48 mg  52 mg  54 mg  54 mg   Sun  6 mg  8 mg  8 mg  8 mg   Mon  8 mg (3/27)  8 mg  8 mg  8 mg   Tue  6 mg (3/28)  6 mg  8 mg ()  8 mg   Wed  8 mg (3/29)  8 mg  8 mg  8 mg   Thu  6 mg  8 mg  8 mg  8 mg   Fri  8 mg  6 mg  6 mg  6 mg   Sat  6 mg  8 mg  8 mg  8 mg   Historical INR 0.90 - 1.10  2.23    1.66    2.39          Plan:  1. INR is therapeutic today- see above in Anticoagulation Summary.    Woo Dobbs to continue their warfarin regimen- see above in Anticoagulation Summary.  2. Follow up in 2 weeks  3. They have been instructed to call if any changes in medications, doses, concerns, etc. Patient expresses  understanding and has no further questions at this time.    Miya Gan, RP

## 2023-05-02 ENCOUNTER — ANTICOAGULATION VISIT (OUTPATIENT)
Dept: PHARMACY | Facility: HOSPITAL | Age: 83
End: 2023-05-02
Payer: MEDICARE

## 2023-05-02 ENCOUNTER — LAB (OUTPATIENT)
Dept: LAB | Facility: HOSPITAL | Age: 83
End: 2023-05-02
Payer: MEDICARE

## 2023-05-02 DIAGNOSIS — I48.0 AF (PAROXYSMAL ATRIAL FIBRILLATION): ICD-10-CM

## 2023-05-02 DIAGNOSIS — I48.0 AF (PAROXYSMAL ATRIAL FIBRILLATION): Primary | ICD-10-CM

## 2023-05-02 LAB
INR PPP: 1.75 (ref 0.9–1.1)
PROTHROMBIN TIME: 20.4 SECONDS (ref 12.1–15)

## 2023-05-02 PROCEDURE — 85610 PROTHROMBIN TIME: CPT

## 2023-05-02 PROCEDURE — 36415 COLL VENOUS BLD VENIPUNCTURE: CPT

## 2023-05-02 NOTE — PROGRESS NOTES
Anticoagulation Clinic Progress Note    Anticoagulation Summary  As of 2023    INR goal:  2.0-3.0   TTR:  35.4 % (8.3 mo)   INR used for dosin.75 (2023)   Warfarin maintenance plan:  8 mg every day; Starting 2023   Weekly warfarin total:  56 mg   Plan last modified:  [place], PharmD (2023)   Next INR check:  2023   Target end date:      Indications    AF (paroxysmal atrial fibrillation) [I48.0]             Anticoagulation Episode Summary     INR check location:      Preferred lab:      Send INR reminders to:   [facility]    Comments:  s/p AVR and CABG *[facility]*      Anticoagulation Care Providers     Provider Role Specialty Phone number    Helena Humphries, DEV Referring Cardiology [phone]          Clinic Interview:  Patient Findings     Negatives:  Signs/symptoms of thrombosis, Signs/symptoms of bleeding,   Laboratory test error suspected, Change in health, Change in alcohol use,   Change in activity, Upcoming invasive procedure, Emergency department   visit, Upcoming dental procedure, Missed doses, Extra doses, Change in   medications, Change in diet/appetite, Hospital admission, Bruising, Other   complaints      Clinical Outcomes     Negatives:  Major bleeding event, Thromboembolic event,   Anticoagulation-related hospital admission, Anticoagulation-related ED   visit, Anticoagulation-related fatality        INR History:      Latest Ref Rng & Units 4/3/2023     3:20 PM 4/10/2023    12:22 PM 4/10/2023     2:56 PM 2023     1:06 PM 2023     1:25 PM 2023     2:12 PM 2023     2:46 PM   Anticoagulation Monitoring   INR  2.23  1.66  2.39  1.75   INR Date  4/3/2023  4/10/2023  2023  2023   INR Goal  2.0-3.0  2.0-3.0  2.0-3.0  2.0-3.0   Trend  Up  Same  Up  Up   Last Week Total  52 mg  52 mg  54 mg  54 mg   Next Week Total  52 mg  54 mg  54 mg  56 mg   Sun  8 mg  8 mg  8 mg  8 mg   Mon  8 mg  8 mg  8 mg  8 mg   Tu  6  mg  8 mg (4/11)  8 mg  8 mg   Wed  8 mg  8 mg  8 mg  8 mg   Thu  8 mg  8 mg  8 mg  8 mg   Fri  6 mg  6 mg  6 mg  8 mg   Sat  8 mg  8 mg  8 mg  8 mg   Historical INR 0.90 - 1.10  1.66    2.39    1.75          Plan:  1. INR is Subtherapeutic today- see above in Anticoagulation Summary.   Will instruct Woo Dobbs to Change their warfarin regimen- see above in Anticoagulation Summary.  2. Follow up in 2 weeks  3. They have been instructed to call if any changes in medications, doses, concerns, etc. Patient expresses understanding and has no further questions at this time.    [place], PharmD

## 2023-05-05 NOTE — PROGRESS NOTES
RM:________     PCP: Agustín Ivan MD    : 1940  AGE: 82 y.o.  EST PATIENT   REASON FOR VISIT/  CC:    BP Readings from Last 3 Encounters:   23 147/86   23 173/82   23 130/70        WT: ____________ BP: __________L __________R HR______    CHEST PAIN: _____________    SOA: _____________PALPS: _______________     LIGHTHEADED: ___________FATIGUE: ________________ EDEMA __________    ALLERGIES:Loratadine SMOKING HISTORY:  Social History     Tobacco Use   • Smoking status: Never   • Smokeless tobacco: Never   • Tobacco comments:     caffeine use: 1 -2 cups daily.    Vaping Use   • Vaping Use: Never used   Substance Use Topics   • Alcohol use: No   • Drug use: No     CAFFEINE USE_________________  ALCOHOL ______________________    Below is the patient's most recent value for Albumin, ALT, AST, BUN, Calcium, Chloride, Cholesterol, CO2, Creatinine, GFR, Glucose, HDL, Hematocrit, Hemoglobin, Hemoglobin A1C, LDL, Magnesium, Phosphorus, Platelets, Potassium, PSA, Sodium, Triglycerides, TSH and WBC.   Lab Results   Component Value Date    ALBUMIN 4.60 2022    ALT 15 2022    AST 16 2022    BUN 16 2022    CALCIUM 9.3 2022     2022    CHOL 124 2022    CO2 25.7 2022    CREATININE 0.99 2022    HDL 34 (L) 2022    HCT 37.3 (L) 2023    HGB 12.0 (L) 2023    HGBA1C 8.20 (H) 2022    LDL 68 2022    MG 2.1 2022    PHOS 5.0 (H) 2022     2023    K 5.2 2022    PSA 1.190 10/15/2020     2022    TRIG 124 2022    TSH 3.030 2019    WBC 6.71 2023          NEW DIAGNOSIS/ SURGERY/ HOSP OR ED VISITS: ______________________    __________________________________________________________________      RECENT LABS OR DIAGNOSTIC TESTING:  _____________________________    __________________________________________________________________      ASSESSMENT/ PLAN:  _______________________________________________    __________________________________________________________________

## 2023-05-15 ENCOUNTER — LAB (OUTPATIENT)
Dept: LAB | Facility: HOSPITAL | Age: 83
End: 2023-05-15
Payer: MEDICARE

## 2023-05-15 ENCOUNTER — ANTICOAGULATION VISIT (OUTPATIENT)
Dept: PHARMACY | Facility: HOSPITAL | Age: 83
End: 2023-05-15
Payer: MEDICARE

## 2023-05-15 DIAGNOSIS — I48.0 AF (PAROXYSMAL ATRIAL FIBRILLATION): ICD-10-CM

## 2023-05-15 DIAGNOSIS — I48.0 AF (PAROXYSMAL ATRIAL FIBRILLATION): Primary | ICD-10-CM

## 2023-05-15 LAB
INR PPP: 1.92 (ref 0.9–1.1)
PROTHROMBIN TIME: 21.7 SECONDS (ref 12.1–15)

## 2023-05-15 PROCEDURE — 85610 PROTHROMBIN TIME: CPT

## 2023-05-15 PROCEDURE — 36415 COLL VENOUS BLD VENIPUNCTURE: CPT

## 2023-05-15 NOTE — PROGRESS NOTES
Anticoagulation Clinic Progress Note    Anticoagulation Summary  As of 5/15/2023    INR goal:  2.0-3.0   TTR:  33.6 % (8.8 mo)   INR used for dosin.92 (5/15/2023)   Warfarin maintenance plan:  8 mg every day; Starting 5/15/2023   Weekly warfarin total:  56 mg   Plan last modified:  [place], PharmD (2023)   Next INR check:  2023   Target end date:      Indications    AF (paroxysmal atrial fibrillation) [I48.0]             Anticoagulation Episode Summary     INR check location:      Preferred lab:      Send INR reminders to:   [facility]    Comments:  s/p AVR and CABG *[facility]*      Anticoagulation Care Providers     Provider Role Specialty Phone number    Helena Humphries, DEV Referring Cardiology [phone]          Clinic Interview:  Patient Findings     Negatives:  Signs/symptoms of thrombosis, Signs/symptoms of bleeding,   Laboratory test error suspected, Change in health, Change in alcohol use,   Change in activity, Upcoming invasive procedure, Emergency department   visit, Upcoming dental procedure, Missed doses, Extra doses, Change in   medications, Change in diet/appetite, Hospital admission, Bruising, Other   complaints      Clinical Outcomes     Negatives:  Major bleeding event, Thromboembolic event,   Anticoagulation-related hospital admission, Anticoagulation-related ED   visit, Anticoagulation-related fatality        INR History:      Latest Ref Rng & Units 4/10/2023     2:56 PM 2023     1:06 PM 2023     1:25 PM 2023     2:12 PM 2023     2:46 PM 5/15/2023    12:30 PM 5/15/2023     1:18 PM   Anticoagulation Monitoring   INR  1.66  2.39  1.75  1.92   INR Date  4/10/2023  2023  2023  5/15/2023   INR Goal  2.0-3.0  2.0-3.0  2.0-3.0  2.0-3.0   Trend  Same  Up  Up  Same   Last Week Total  52 mg  54 mg  54 mg  56 mg   Next Week Total  54 mg  54 mg  56 mg  58 mg   Sun  8 mg  8 mg  8 mg  8 mg   Mon  8 mg  8 mg  8 mg  10 mg  (5/15); Otherwise 8 mg   Tue  8 mg (4/11)  8 mg  8 mg  8 mg   Wed  8 mg  8 mg  8 mg  8 mg   Thu  8 mg  8 mg  8 mg  8 mg   Fri  6 mg  6 mg  8 mg  8 mg   Sat  8 mg  8 mg  8 mg  8 mg   Historical INR 0.90 - 1.10  2.39    1.75    1.92          Plan:  1. INR is Subtherapeutic today- see above in Anticoagulation Summary.   Will instruct Woo Dobbs to Change their warfarin regimen (10 mg today, then resume 8 mg daily) - see above in Anticoagulation Summary.  2. Follow up in 2 weeks.  3. They have been instructed to call if any changes in medications, doses, concerns, etc. Patient expresses understanding and has no further questions at this time.    Moisés Morales, PharmD

## 2023-05-23 ENCOUNTER — OFFICE VISIT (OUTPATIENT)
Dept: CARDIOLOGY | Facility: CLINIC | Age: 83
End: 2023-05-23
Payer: MEDICARE

## 2023-05-23 VITALS
BODY MASS INDEX: 24.78 KG/M2 | SYSTOLIC BLOOD PRESSURE: 116 MMHG | HEART RATE: 58 BPM | WEIGHT: 148.7 LBS | DIASTOLIC BLOOD PRESSURE: 70 MMHG | HEIGHT: 65 IN

## 2023-05-23 DIAGNOSIS — I25.810 CORONARY ARTERY DISEASE INVOLVING CORONARY BYPASS GRAFT OF NATIVE HEART WITHOUT ANGINA PECTORIS: Primary | ICD-10-CM

## 2023-05-23 DIAGNOSIS — I10 PRIMARY HYPERTENSION: ICD-10-CM

## 2023-05-23 DIAGNOSIS — I48.0 AF (PAROXYSMAL ATRIAL FIBRILLATION): ICD-10-CM

## 2023-05-23 DIAGNOSIS — I65.23 CAROTID ATHEROSCLEROSIS, BILATERAL: ICD-10-CM

## 2023-05-23 DIAGNOSIS — Z95.3 HISTORY OF AORTIC VALVE REPLACEMENT WITH BIOPROSTHETIC VALVE: ICD-10-CM

## 2023-05-23 DIAGNOSIS — E78.2 MIXED HYPERLIPIDEMIA: ICD-10-CM

## 2023-05-23 NOTE — PROGRESS NOTES
Date of Office Visit: 23  Encounter Provider: Khurram Ball MD  Place of Service: [facility] CARDIOLOGY  Patient Name: Woo Dobbs  :1940    Chief Complaint   Patient presents with   • Coronary Artery Disease   :     HPI:     Mr. Dobbs is 82 y.o. and presents today to follow up. I have reviewed prior notes and there are no changes except for any new updates described below. I have also reviewed any information entered into the medical record by the patient or by ancillary staff.     He was seen in ; his PCP had noted a murmur. An echo showed aortic valve calcification without stenosis. He was then lost to follow up. He was referred to us in July; he reported progressive dyspnea. An echo showed normal LVSF and mod-severe AS. A stress test showed LAD ischemia.  Coronary angiography revealed severe disease. He underwent CABG x 3 and tissue AVR in 2022.  He had recurrent postoperative atrial fibrillation in recovery; he was discharged on amiodarone and warfarin. We eventually stopped amiodarone.     An echo in 2023 showed normal LVSF and normal AVR function. He's doing well. He has no cardiac limitations and no bleeding. He denies chest pain, SOA, palpitations, lightheadedness, or syncope.    Past Medical History:   Diagnosis Date   • Aortic stenosis     2022: tissue AVR (25mm MDT Avalus bovine pericardial)   • Asthma    • BPH (benign prostatic hyperplasia)    • CAD (coronary artery disease)     2022: 95% prox/50% distal LAD, 80% D1, 30-40% Cx, diffuse dz RCA. CABG x 3 2022: LIMA-LAD, SVG-D1, SVG-RCA   • Carotid atherosclerosis, bilateral     2022: 16-49% bilaterally   • Colon polyp    • GERD (gastroesophageal reflux disease)    • Hyperlipidemia    • Hypertension    • Paroxysmal atrial fibrillation    • Type 2 diabetes mellitus    • Warfarin anticoagulation        Past Surgical History:   Procedure Laterality Date   • CARDIAC CATHETERIZATION N/A  7/14/2022    Procedure: Coronary angiography;  Surgeon: Unique Calabrese MD;  Location:  [facility];  Service: Cardiovascular;  Laterality: N/A;   • CARDIAC CATHETERIZATION N/A 7/14/2022    Procedure: Left heart cath with simultaneous LV/Ao pressures;  Surgeon: Unique Calabrese MD;  Location:  [facility];  Service: Cardiovascular;  Laterality: N/A;   • CARDIAC CATHETERIZATION N/A 7/14/2022    Procedure: Right Heart Cath;  Surgeon: Unique Calabrese MD;  Location:  [facility];  Service: Cardiovascular;  Laterality: N/A;   • COLONOSCOPY     • CORONARY ARTERY BYPASS GRAFT WITH AORTIC VALVE REPAIR/REPLACEMENT N/A 8/8/2022    Procedure: KEVIN STERNOTOMY CORONARY ARTERY BYPASS GRAFT TIMES 3 USING LEFT INTERNAL MAMMARY ARTERY AND LEFT GREATER SAPHENOUS VEIN GRAFT PER ENDOSCOPIC VEIN HARVESTING, AORTIC VALVE REPLACEMENT AND PRP;  Surgeon: Jr Gentry Courtney MD;  Location: [facility] CVOR;  Service: Cardiothoracic;  Laterality: N/A;   • TRANSURETHRAL RESECTION OF BLADDER TUMOR N/A 1/23/2023    Procedure: TRANSURETHRAL RESECTION OF SMALL BLADDER TUMOR;  Surgeon: Nimesh Arvizu MD;  Location: [facility];  Service: Urology;  Laterality: N/A;   • VASECTOMY         Social History     Socioeconomic History   • Marital status:    • Number of children: 3   Tobacco Use   • Smoking status: Never   • Smokeless tobacco: Never   • Tobacco comments:     caffeine use: 1 -2 cups daily.    Vaping Use   • Vaping Use: Never used   Substance and Sexual Activity   • Alcohol use: No   • Drug use: No   • Sexual activity: Defer       Family History   Problem Relation Age of Onset   • Colon cancer Neg Hx    • Colon polyps Neg Hx    • Malig Hyperthermia Neg Hx        Review of Systems   Constitutional: Positive for malaise/fatigue.   All other systems reviewed and are negative.      Allergies   Allergen Reactions   • Loratadine Other (See Comments)     Emotional side effects.          Current  "Outpatient Medications:   •  aspirin 81 MG chewable tablet, Chew 1 tablet Daily., Disp: , Rfl:   •  atenolol (TENORMIN) 25 MG tablet, Take 1 tablet by mouth Daily., Disp: 180 tablet, Rfl: 3  •  atorvastatin (LIPITOR) 40 MG tablet, Take 1 tablet by mouth Every Night., Disp: 90 tablet, Rfl: 3  •  glucose blood test strip, Test blood sugar Twice daily, Disp: 200 each, Rfl: 12  •  glucose monitor monitoring kit, 1 each As Needed (Diabetes 2)., Disp: 1 each, Rfl: 0  •  Lancets misc, 1 each 3 (Three) Times a Day Before Meals., Disp: 200 each, Rfl: 5  •  levothyroxine (SYNTHROID, LEVOTHROID) 50 MCG tablet, Take 1 tablet by mouth Daily., Disp: , Rfl:   •  metFORMIN (GLUCOPHAGE) 1000 MG tablet, Take 1 tablet by mouth 2 (Two) Times a Day With Meals., Disp: , Rfl:   •  nitroglycerin (Nitrostat) 0.4 MG SL tablet, Place 1 tablet under the tongue Every 5 (Five) Minutes As Needed for Chest Pain. Take no more than 3 doses in 15 minutes., Disp: 30 tablet, Rfl: 1  •  omeprazole (priLOSEC) 40 MG capsule, Take 1 capsule by mouth 3 (Three) Times a Week if Needed (Heartburn)., Disp: 90 capsule, Rfl: 3  •  tamsulosin (FLOMAX) 0.4 MG capsule 24 hr capsule, Take 1 capsule by mouth Daily., Disp: 30 capsule, Rfl: 0  •  warfarin (COUMADIN) 4 MG tablet, TAKE TWO TABLETS BY MOUTH ON MON, WED, FRI AND TAKE ONE AND ONE-HALF OF A TABLET BY MOUTH ALL OTHER DAYS OR AS DIRECTED, Disp: 155 tablet, Rfl: 0      Objective:     Vitals:    05/23/23 1405   BP: 116/70   BP Location: Left arm   Pulse: 58   Weight: 67.4 kg (148 lb 11.2 oz)   Height: 165.1 cm (65\")     Body mass index is 24.74 kg/m².    Vitals reviewed.   Constitutional:       Appearance: Healthy appearance. Well-developed and not in distress.   Eyes:      Conjunctiva/sclera: Conjunctivae normal.   HENT:      Head: Normocephalic.      Nose: Nose normal.    Mouth/Throat:      Pharynx: Oropharynx is clear.   Neck:      Vascular: No JVD. JVD normal.      Lymphadenopathy: No cervical adenopathy. "   Pulmonary:      Effort: Pulmonary effort is normal.      Breath sounds: Normal breath sounds.   Cardiovascular:      Normal rate. Regular rhythm.      Murmurs: There is a grade 1/6 systolic murmur.   Pulses:     Intact distal pulses.   Edema:     Peripheral edema absent.   Abdominal:      Palpations: Abdomen is soft.      Tenderness: There is no abdominal tenderness.   Musculoskeletal: Normal range of motion.      Cervical back: Normal range of motion. Skin:     General: Skin is warm and dry.      Findings: No rash.   Neurological:      General: No focal deficit present.      Mental Status: Oriented to person, place, and time.      Cranial Nerves: No cranial nerve deficit.   Psychiatric:         Behavior: Behavior normal.         Thought Content: Thought content normal.         Judgment: Judgment normal.           ECG 12 Lead    Date/Time: 5/23/2023 2:32 PM  Performed by: Khurram Ball MD  Authorized by: Khurram Ball MD   Comparison: compared with previous ECG   Similar to previous ECG  Rhythm: sinus rhythm  Conduction: conduction normal  ST Segments: ST segments normal  T Waves: T waves normal  QRS axis: normal  Other: no other findings    Clinical impression: normal ECG              Assessment:       Diagnosis Plan   1. Coronary artery disease involving coronary bypass graft of native heart without angina pectoris  ECG 12 Lead      2. Mixed hyperlipidemia        3. History of aortic valve replacement with bioprosthetic valve        4. AF (paroxysmal atrial fibrillation)        5. Primary hypertension        6. Carotid atherosclerosis, bilateral               Plan:       1/2.  He is status post three-vessel bypass in August 2022.  He has preserved left ventricular systolic function.  He is on aspirin and atorvastatin.     3. His AVR was working well in January 2023; we'll repeat an echo in 2026.     4. He had recurrent atrial fibrillation in the postoperative period. He was discharged on amiodarone and  atenolol. I stopped amiodarone due to fatigue and low HR. He should remain on warfarin for now as his CHADSVASc = 4.     5. His BP is well controlled.     6.  He had 16-49% bilateral carotid stenosis in August 2022.  This will be repeated in 2024.     Sincerely,       Khurram Ball MD

## 2023-05-29 ENCOUNTER — LAB (OUTPATIENT)
Dept: LAB | Facility: HOSPITAL | Age: 83
End: 2023-05-29

## 2023-05-29 DIAGNOSIS — I48.0 AF (PAROXYSMAL ATRIAL FIBRILLATION): ICD-10-CM

## 2023-05-29 LAB
INR PPP: 3.23 (ref 0.9–1.1)
PROTHROMBIN TIME: 32.3 SECONDS (ref 12.1–15)

## 2023-05-29 PROCEDURE — 36415 COLL VENOUS BLD VENIPUNCTURE: CPT

## 2023-05-29 PROCEDURE — 85610 PROTHROMBIN TIME: CPT

## 2023-05-30 ENCOUNTER — ANTICOAGULATION VISIT (OUTPATIENT)
Dept: PHARMACY | Facility: HOSPITAL | Age: 83
End: 2023-05-30

## 2023-05-30 DIAGNOSIS — I48.0 AF (PAROXYSMAL ATRIAL FIBRILLATION): Primary | ICD-10-CM

## 2023-05-30 NOTE — PROGRESS NOTES
Anticoagulation Clinic Progress Note    Anticoagulation Summary  As of 5/30/2023    INR goal:  2.0-3.0   TTR:  35.8 % (9.2 mo)   INR used for dosing:  3.23 (5/29/2023)   Warfarin maintenance plan:  8 mg every day; Starting 5/30/2023   Weekly warfarin total:  56 mg   Plan last modified:  [place], PharmD (5/2/2023)   Next INR check:  6/13/2023   Target end date:      Indications    AF (paroxysmal atrial fibrillation) [I48.0]             Anticoagulation Episode Summary     INR check location:      Preferred lab:      Send INR reminders to:   [facility]    Comments:  s/p AVR and CABG *[facility]*      Anticoagulation Care Providers     Provider Role Specialty Phone number    Helena Humphries, DEV Referring Cardiology [phone]          Clinic Interview:  Patient Findings     Negatives:  Signs/symptoms of thrombosis, Signs/symptoms of bleeding,   Laboratory test error suspected, Change in health, Change in alcohol use,   Change in activity, Upcoming invasive procedure, Emergency department   visit, Upcoming dental procedure, Missed doses, Extra doses, Change in   medications, Change in diet/appetite, Hospital admission, Bruising, Other   complaints      Clinical Outcomes     Negatives:  Major bleeding event, Thromboembolic event,   Anticoagulation-related hospital admission, Anticoagulation-related ED   visit, Anticoagulation-related fatality        INR History:      Latest Ref Rng & Units 4/17/2023     1:25 PM 5/2/2023     2:12 PM 5/2/2023     2:46 PM 5/15/2023    12:30 PM 5/15/2023     1:18 PM 5/29/2023     1:42 PM 5/30/2023    10:12 AM   Anticoagulation Monitoring   INR  2.39  1.75  1.92  3.23   INR Date  4/17/2023  5/2/2023  5/15/2023  5/29/2023   INR Goal  2.0-3.0  2.0-3.0  2.0-3.0  2.0-3.0   Trend  Up  Up  Same  Same   Last Week Total  54 mg  54 mg  56 mg  56 mg   Next Week Total  54 mg  56 mg  58 mg  52 mg   Sun  8 mg  8 mg  8 mg  8 mg   Mon  8 mg  8 mg  10 mg (5/15);  Otherwise 8 mg  8 mg   Tue  8 mg  8 mg  8 mg  4 mg (5/30); Otherwise 8 mg   Wed  8 mg  8 mg  8 mg  8 mg   Thu  8 mg  8 mg  8 mg  8 mg   Fri  6 mg  8 mg  8 mg  8 mg   Sat  8 mg  8 mg  8 mg  8 mg   Historical INR 0.90 - 1.10  1.75    1.92    3.23          Plan:  1. INR is Supratherapeutic today- see above in Anticoagulation Summary.   Will instruct Woo Dobbs to Change their warfarin regimen- see above in Anticoagulation Summary.  2. Follow up in 2 weeks  3.  They have been instructed to call if any changes in medications, doses, concerns, etc. Patient expresses understanding and has no further questions at this time.    [place], PharmD

## 2023-06-12 ENCOUNTER — ANTICOAGULATION VISIT (OUTPATIENT)
Dept: PHARMACY | Facility: HOSPITAL | Age: 83
End: 2023-06-12
Payer: MEDICARE

## 2023-06-12 ENCOUNTER — LAB (OUTPATIENT)
Dept: LAB | Facility: HOSPITAL | Age: 83
End: 2023-06-12
Payer: MEDICARE

## 2023-06-12 DIAGNOSIS — I48.0 AF (PAROXYSMAL ATRIAL FIBRILLATION): ICD-10-CM

## 2023-06-12 DIAGNOSIS — I48.0 AF (PAROXYSMAL ATRIAL FIBRILLATION): Primary | ICD-10-CM

## 2023-06-12 LAB
INR PPP: 1.91 (ref 0.9–1.1)
PROTHROMBIN TIME: 21.7 SECONDS (ref 12.1–15)

## 2023-06-12 PROCEDURE — 85610 PROTHROMBIN TIME: CPT

## 2023-06-12 PROCEDURE — 36415 COLL VENOUS BLD VENIPUNCTURE: CPT

## 2023-06-12 NOTE — PROGRESS NOTES
Anticoagulation Clinic Progress Note    Anticoagulation Summary  As of 2023    INR goal:  2.0-3.0   TTR:  37.7 % (9.7 mo)   INR used for dosin.91 (2023)   Warfarin maintenance plan:  8 mg every day; Starting 2023   Weekly warfarin total:  56 mg   No change documented:  Moisés Morales, PharmD   Plan last modified:  [place], PharmD (2023)   Next INR check:  2023   Target end date:      Indications    AF (paroxysmal atrial fibrillation) [I48.0]             Anticoagulation Episode Summary     INR check location:      Preferred lab:      Send INR reminders to:   [facility]    Comments:  s/p AVR and CABG * [facility]*      Anticoagulation Care Providers     Provider Role Specialty Phone number    Helena Humphries APRN Referring Cardiology [phone]          Clinic Interview:  Patient Findings     Negatives:  Signs/symptoms of thrombosis, Signs/symptoms of bleeding,   Laboratory test error suspected, Change in health, Change in alcohol use,   Change in activity, Upcoming invasive procedure, Emergency department   visit, Upcoming dental procedure, Missed doses, Extra doses, Change in   medications, Change in diet/appetite, Hospital admission, Bruising, Other   complaints      Clinical Outcomes     Negatives:  Major bleeding event, Thromboembolic event,   Anticoagulation-related hospital admission, Anticoagulation-related ED   visit, Anticoagulation-related fatality        INR History:      Latest Ref Rng & Units 2023     2:46 PM 5/15/2023    12:30 PM 5/15/2023     1:18 PM 2023     1:42 PM 2023    10:12 AM 2023     2:36 PM 2023     3:35 PM   Anticoagulation Monitoring   INR  1.75  1.92  3.23  1.91   INR Date  2023  5/15/2023  2023  2023   INR Goal  2.0-3.0  2.0-3.0  2.0-3.0  2.0-3.0   Trend  Up  Same  Same  Same   Last Week Total  54 mg  56 mg  56 mg  56 mg   Next Week Total  56 mg  58 mg  52 mg  56 mg   Sun  8 mg  8  mg  8 mg  8 mg   Mon  8 mg  10 mg (5/15); Otherwise 8 mg  8 mg  8 mg   Tue  8 mg  8 mg  4 mg (5/30); Otherwise 8 mg  8 mg   Wed  8 mg  8 mg  8 mg  8 mg   Thu  8 mg  8 mg  8 mg  8 mg   Fri  8 mg  8 mg  8 mg  8 mg   Sat  8 mg  8 mg  8 mg  8 mg   Historical INR 0.90 - 1.10  1.92   3.23   1.91         Plan:  1. INR is Subtherapeutic today- see above in Anticoagulation Summary.   Will instruct Woo Dobbs to Continue their warfarin regimen- see above in Anticoagulation Summary.  2. Follow up in 2 weeks.  3. They have been instructed to call if any changes in medications, doses, concerns, etc. Patient expresses understanding and has no further questions at this time.    Moisés Morales, PharmD

## 2023-07-24 ENCOUNTER — LAB (OUTPATIENT)
Dept: LAB | Facility: HOSPITAL | Age: 83
End: 2023-07-24
Payer: MEDICARE

## 2023-07-24 DIAGNOSIS — I48.0 AF (PAROXYSMAL ATRIAL FIBRILLATION): ICD-10-CM

## 2023-07-24 LAB
INR PPP: 1.19 (ref 0.9–1.1)
PROTHROMBIN TIME: 15.1 SECONDS (ref 12.1–15)

## 2023-07-24 PROCEDURE — 85610 PROTHROMBIN TIME: CPT

## 2023-07-24 PROCEDURE — 36415 COLL VENOUS BLD VENIPUNCTURE: CPT

## 2023-07-25 ENCOUNTER — ANTICOAGULATION VISIT (OUTPATIENT)
Dept: PHARMACY | Facility: HOSPITAL | Age: 83
End: 2023-07-25
Payer: MEDICARE

## 2023-07-25 DIAGNOSIS — I48.0 AF (PAROXYSMAL ATRIAL FIBRILLATION): Primary | ICD-10-CM

## 2023-07-25 NOTE — PROGRESS NOTES
Anticoagulation Clinic Progress Note    Anticoagulation Summary  As of 2023      INR goal:  2.0-3.0   TTR:  41.0 % (11.1 mo)   INR used for dosin.19 (2023)   Warfarin maintenance plan:  8 mg every day; Starting 2023   Weekly warfarin total:  56 mg   Plan last modified:  [place], PharmD (2023)   Next INR check:  2023   Target end date:      Indications    AF (paroxysmal atrial fibrillation) [I48.0]                 Anticoagulation Episode Summary       INR check location:      Preferred lab:      Send INR reminders to:   [facility]    Comments:  s/p AVR and CABG *[facility]*          Anticoagulation Care Providers       Provider Role Specialty Phone number    Helena Humphries, APRN Referring Cardiology [phone]            Clinic Interview:  Patient Findings     Negatives:  Signs/symptoms of thrombosis, Signs/symptoms of bleeding,   Laboratory test error suspected, Change in health, Change in alcohol use,   Change in activity, Upcoming invasive procedure, Emergency department   visit, Upcoming dental procedure, Missed doses, Extra doses, Change in   medications, Change in diet/appetite, Hospital admission, Bruising, Other   complaints      Clinical Outcomes     Negatives:  Major bleeding event, Thromboembolic event,   Anticoagulation-related hospital admission, Anticoagulation-related ED   visit, Anticoagulation-related fatality        INR History:      Latest Ref Rng & Units 7/10/2023     1:36 PM 2023     2:35 PM 2023     3:32 PM 2023     3:07 PM 2023     3:37 PM 2023     4:40 PM 2023     9:11 AM   Anticoagulation Monitoring   INR    3.29  3.46  1.19   INR Date    2023   INR Goal    2.0-3.0  2.0-3.0  2.0-3.0   Trend    Same  Same  Same   Last Week Total    56 mg  52 mg  44 mg   Next Week Total    52 mg  44 mg  60 mg   Sun    8 mg  8 mg  8 mg   Mon    -  -  8 mg   Tue    -  8 mg  12 mg  (7/25)   Wed    -  Hold (7/19)  8 mg   Thu    8 mg  4 mg (7/20)  8 mg   Fri    8 mg  8 mg  8 mg   Sat    4 mg (7/15)  8 mg  8 mg   Historical INR 0.90 - 1.10 2.92  3.29   3.46   1.19         Plan:  1. INR is Subtherapeutic today- see above in Anticoagulation Summary.   Will instruct Woo Dobbs to Change their warfarin regimen- see above in Anticoagulation Summary.  2. Follow up in 1 weeks  3. They have been instructed to call if any changes in medications, doses, concerns, etc. Patient expresses understanding and has no further questions at this time.    [place], PharmD

## 2023-07-31 ENCOUNTER — LAB (OUTPATIENT)
Dept: LAB | Facility: HOSPITAL | Age: 83
End: 2023-07-31
Payer: MEDICARE

## 2023-07-31 ENCOUNTER — ANTICOAGULATION VISIT (OUTPATIENT)
Dept: PHARMACY | Facility: HOSPITAL | Age: 83
End: 2023-07-31
Payer: MEDICARE

## 2023-07-31 DIAGNOSIS — I48.0 AF (PAROXYSMAL ATRIAL FIBRILLATION): ICD-10-CM

## 2023-07-31 DIAGNOSIS — I48.0 AF (PAROXYSMAL ATRIAL FIBRILLATION): Primary | ICD-10-CM

## 2023-07-31 LAB
INR PPP: 2.7 (ref 0.9–1.1)
PROTHROMBIN TIME: 28.2 SECONDS (ref 12.1–15)

## 2023-07-31 PROCEDURE — 85610 PROTHROMBIN TIME: CPT

## 2023-07-31 PROCEDURE — 36415 COLL VENOUS BLD VENIPUNCTURE: CPT

## 2023-08-14 ENCOUNTER — LAB (OUTPATIENT)
Dept: LAB | Facility: HOSPITAL | Age: 83
End: 2023-08-14
Payer: MEDICARE

## 2023-08-14 ENCOUNTER — ANTICOAGULATION VISIT (OUTPATIENT)
Dept: PHARMACY | Facility: HOSPITAL | Age: 83
End: 2023-08-14
Payer: MEDICARE

## 2023-08-14 DIAGNOSIS — I48.0 AF (PAROXYSMAL ATRIAL FIBRILLATION): ICD-10-CM

## 2023-08-14 DIAGNOSIS — I48.0 AF (PAROXYSMAL ATRIAL FIBRILLATION): Primary | ICD-10-CM

## 2023-08-14 LAB
INR PPP: 3.45 (ref 0.9–1.1)
PROTHROMBIN TIME: 33.9 SECONDS (ref 12.1–15)

## 2023-08-14 PROCEDURE — 85610 PROTHROMBIN TIME: CPT

## 2023-08-14 PROCEDURE — 36415 COLL VENOUS BLD VENIPUNCTURE: CPT

## 2023-08-14 NOTE — PROGRESS NOTES
Anticoagulation Clinic Progress Note    Anticoagulation Summary  As of 8/14/2023      INR goal:  2.0-3.0   TTR:  41.1 % (11.8 mo)   INR used for dosing:  3.45 (8/14/2023)   Warfarin maintenance plan:  8 mg every day   Weekly warfarin total:  56 mg   Plan last modified:  [place], PharmD (5/2/2023)   Next INR check:  8/28/2023   Target end date:      Indications    AF (paroxysmal atrial fibrillation) [I48.0]                 Anticoagulation Episode Summary       INR check location:      Preferred lab:      Send INR reminders to:   [facility] POOL    Comments:  s/p AVR and CABG *[facility]*          Anticoagulation Care Providers       Provider Role Specialty Phone number    Helena Humphries, APRN Referring Cardiology [phone]            Clinic Interview:  Patient Findings     Positives:  Change in medications, Bruising, Other complaints    Negatives:  Signs/symptoms of thrombosis, Signs/symptoms of bleeding,   Laboratory test error suspected, Change in health, Change in alcohol use,   Change in activity, Upcoming invasive procedure, Emergency department   visit, Upcoming dental procedure, Missed doses, Extra doses, Change in   diet/appetite, Hospital admission    Comments:  Reports more bruising than usual around his ankles while   working in the garden. Reports he started taking fruit/veggie vitamins ~5   days ago. Reports he has already taken today's dose of warfarin.       Clinical Outcomes     Negatives:  Major bleeding event, Thromboembolic event,   Anticoagulation-related hospital admission, Anticoagulation-related ED   visit, Anticoagulation-related fatality    Comments:  Reports more bruising than usual around his ankles while   working in the garden. Reports he started taking fruit/veggie vitamins ~5   days ago. Reports he has already taken today's dose of warfarin.         INR History:      Latest Ref Rng & Units 7/18/2023     3:37 PM 7/24/2023     4:40 PM 7/25/2023      9:11 AM 7/31/2023     1:15 PM 7/31/2023     2:38 PM 8/14/2023     1:17 PM 8/14/2023     1:34 PM   Anticoagulation Monitoring   INR  3.46  1.19  2.70  3.45   INR Date  7/18/2023 7/24/2023 7/31/2023 8/14/2023   INR Goal  2.0-3.0  2.0-3.0  2.0-3.0  2.0-3.0   Trend  Same  Same  Same  Same   Last Week Total  52 mg  44 mg  60 mg  56 mg   Next Week Total  44 mg  60 mg  56 mg  52 mg   Sun  8 mg  8 mg  8 mg  8 mg   Mon  -  8 mg  8 mg  8 mg   Tue  8 mg  12 mg (7/25)  8 mg  4 mg (8/15); Otherwise 8 mg   Wed  Hold (7/19)  8 mg  8 mg  8 mg   Thu  4 mg (7/20)  8 mg  8 mg  8 mg   Fri  8 mg  8 mg  8 mg  8 mg   Sat  8 mg  8 mg  8 mg  8 mg   Historical INR 0.90 - 1.10  1.19   2.70   3.45         Plan:  1. INR is Supratherapeutic today- see above in Anticoagulation Summary.   Will instruct Woo FRANCO Dobbs to Change their warfarin regimen (4 mg tomorrow, then resume 8 mg daily) - see above in Anticoagulation Summary.  2. Follow up in 2 weeks.  3. They have been instructed to call if any changes in medications, doses, concerns, etc. Patient expresses understanding and has no further questions at this time.    Moisés Morales, PharmD

## 2023-08-28 ENCOUNTER — ANTICOAGULATION VISIT (OUTPATIENT)
Dept: PHARMACY | Facility: HOSPITAL | Age: 83
End: 2023-08-28
Payer: MEDICARE

## 2023-08-28 ENCOUNTER — LAB (OUTPATIENT)
Dept: LAB | Facility: HOSPITAL | Age: 83
End: 2023-08-28
Payer: MEDICARE

## 2023-08-28 DIAGNOSIS — I48.0 AF (PAROXYSMAL ATRIAL FIBRILLATION): ICD-10-CM

## 2023-08-28 DIAGNOSIS — I48.0 AF (PAROXYSMAL ATRIAL FIBRILLATION): Primary | ICD-10-CM

## 2023-08-28 LAB
INR PPP: 2.41 (ref 0.9–1.1)
PROTHROMBIN TIME: 25.8 SECONDS (ref 12.1–15)

## 2023-08-28 PROCEDURE — 36415 COLL VENOUS BLD VENIPUNCTURE: CPT

## 2023-08-28 PROCEDURE — 85610 PROTHROMBIN TIME: CPT

## 2023-08-29 NOTE — PROGRESS NOTES
Anticoagulation Clinic Progress Note    Anticoagulation Summary  As of 2023      INR goal:  2.0-3.0   TTR:  41.7 % (1 y)   INR used for dosin.41 (2023)   Warfarin maintenance plan:  8 mg every day   Weekly warfarin total:  56 mg   No change documented:  [place] PharmD   Plan last modified:  Key Rock PharmD (2023)   Next INR check:  2023   Target end date:      Indications    AF (paroxysmal atrial fibrillation) [I48.0]                 Anticoagulation Episode Summary       INR check location:      Preferred lab:      Send INR reminders to:   [facility]    Comments:  s/p AVR and CABG *[facility]*          Anticoagulation Care Providers       Provider Role Specialty Phone number    Helena Humphries, DEV Referring Cardiology [phone]            Clinic Interview:  Patient Findings     Negatives:  Signs/symptoms of thrombosis, Signs/symptoms of bleeding,   Laboratory test error suspected, Change in health, Change in alcohol use,   Change in activity, Upcoming invasive procedure, Emergency department   visit, Upcoming dental procedure, Missed doses, Extra doses, Change in   medications, Change in diet/appetite, Hospital admission, Bruising, Other   complaints      Clinical Outcomes     Negatives:  Major bleeding event, Thromboembolic event,   Anticoagulation-related hospital admission, Anticoagulation-related ED   visit, Anticoagulation-related fatality        INR History:      Latest Ref Rng & Units 2023     9:11 AM 2023     1:15 PM 2023     2:38 PM 2023     1:17 PM 2023     1:34 PM 2023     3:06 PM 2023     3:31 PM   Anticoagulation Monitoring   INR  1.19  2.70  3.45  2.41   INR Date  2023   INR Goal  2.0-3.0  2.0-3.0  2.0-3.0  2.0-3.0   Trend  Same  Same  Same  Same   Last Week Total  44 mg  60 mg  56 mg  56 mg   Next Week Total  60 mg  56 mg  52 mg  56 mg   Sun  8 mg  8  mg  8 mg  8 mg   Mon  8 mg  8 mg  8 mg  8 mg   Tue  12 mg (7/25)  8 mg  4 mg (8/15); Otherwise 8 mg  8 mg   Wed  8 mg  8 mg  8 mg  8 mg   Thu  8 mg  8 mg  8 mg  8 mg   Fri  8 mg  8 mg  8 mg  8 mg   Sat  8 mg  8 mg  8 mg  8 mg   Historical INR 0.90 - 1.10  2.70   3.45   2.41         Plan:  1. INR is Therapeutic today- see above in Anticoagulation Summary.   Will instruct Woo Dobbs to Continue their warfarin regimen- see above in Anticoagulation Summary.  2. Follow up in 2 weeks  3. They have been instructed to call if any changes in medications, doses, concerns, etc. Patient expresses understanding and has no further questions at this time.    [place], PharmD

## 2023-09-11 ENCOUNTER — LAB (OUTPATIENT)
Dept: LAB | Facility: HOSPITAL | Age: 83
End: 2023-09-11
Payer: MEDICARE

## 2023-09-11 ENCOUNTER — ANTICOAGULATION VISIT (OUTPATIENT)
Dept: PHARMACY | Facility: HOSPITAL | Age: 83
End: 2023-09-11
Payer: MEDICARE

## 2023-09-11 DIAGNOSIS — I48.0 AF (PAROXYSMAL ATRIAL FIBRILLATION): ICD-10-CM

## 2023-09-11 DIAGNOSIS — I48.0 AF (PAROXYSMAL ATRIAL FIBRILLATION): Primary | ICD-10-CM

## 2023-09-11 LAB
INR PPP: 2.25 (ref 0.9–1.1)
PROTHROMBIN TIME: 24.6 SECONDS (ref 12.1–15)

## 2023-09-11 PROCEDURE — 36415 COLL VENOUS BLD VENIPUNCTURE: CPT

## 2023-09-11 PROCEDURE — 85610 PROTHROMBIN TIME: CPT

## 2023-09-11 NOTE — PROGRESS NOTES
Anticoagulation Clinic Progress Note    Anticoagulation Summary  As of 2023      INR goal:  2.0-3.0   TTR:  43.8 % (1 y)   INR used for dosin.25 (2023)   Warfarin maintenance plan:  8 mg every day   Weekly warfarin total:  56 mg   No change documented:  Moisés Morales, PharmD   Plan last modified:  [place], PharmD (2023)   Next INR check:  10/9/2023   Target end date:      Indications    AF (paroxysmal atrial fibrillation) [I48.0]                 Anticoagulation Episode Summary       INR check location:      Preferred lab:      Send INR reminders to:   [facility]    Comments:  s/p AVR and CABG *[facility]*          Anticoagulation Care Providers       Provider Role Specialty Phone number    Helena Humphries, DEV Referring Cardiology [phone]            Clinic Interview:  Patient Findings     Negatives:  Signs/symptoms of thrombosis, Signs/symptoms of bleeding,   Laboratory test error suspected, Change in health, Change in alcohol use,   Change in activity, Upcoming invasive procedure, Emergency department   visit, Upcoming dental procedure, Missed doses, Extra doses, Change in   medications, Change in diet/appetite, Hospital admission, Bruising, Other   complaints      Clinical Outcomes     Negatives:  Major bleeding event, Thromboembolic event,   Anticoagulation-related hospital admission, Anticoagulation-related ED   visit, Anticoagulation-related fatality        INR History:      Latest Ref Rng & Units 2023     2:38 PM 2023     1:17 PM 2023     1:34 PM 2023     3:06 PM 2023     3:31 PM 2023    12:09 PM 2023     2:22 PM   Anticoagulation Monitoring   INR  2.70  3.45  2.41  2.25   INR Date  2023   INR Goal  2.0-3.0  2.0-3.0  2.0-3.0  2.0-3.0   Trend  Same  Same  Same  Same   Last Week Total  60 mg  56 mg  56 mg  56 mg   Next Week Total  56 mg  52 mg  56 mg  56 mg   Sun  8 mg  8 mg   8 mg  8 mg   Mon  8 mg  8 mg  8 mg  8 mg   Tue  8 mg  4 mg (8/15); Otherwise 8 mg  8 mg  8 mg   Wed  8 mg  8 mg  8 mg  8 mg   Thu  8 mg  8 mg  8 mg  8 mg   Fri  8 mg  8 mg  8 mg  8 mg   Sat  8 mg  8 mg  8 mg  8 mg   Historical INR 0.90 - 1.10  3.45   2.41   2.25         Plan:  1. INR is Therapeutic today- see above in Anticoagulation Summary.   Will instruct Woo Dobbs to Continue their warfarin regimen- see above in Anticoagulation Summary.  2. Follow up in 4 weeks.  3. They have been instructed to call if any changes in medications, doses, concerns, etc. Patient expresses understanding and has no further questions at this time.    Moisés Morales, PharmD

## 2023-09-25 RX ORDER — WARFARIN SODIUM 4 MG/1
TABLET ORAL
Qty: 180 TABLET | Refills: 1 | Status: SHIPPED | OUTPATIENT
Start: 2023-09-25

## 2023-09-27 ENCOUNTER — TELEPHONE (OUTPATIENT)
Dept: CARDIOLOGY | Facility: CLINIC | Age: 83
End: 2023-09-27
Payer: MEDICARE

## 2023-09-27 RX ORDER — ATENOLOL 25 MG/1
25 TABLET ORAL 2 TIMES DAILY
Qty: 180 TABLET | Refills: 3 | Status: SHIPPED | OUTPATIENT
Start: 2023-09-27

## 2023-09-27 NOTE — TELEPHONE ENCOUNTER
Pt called the office requesting a refill on his atenolol.  He's been taking atenolol 25 mg BID, and tells me that the bottle says to take it BID.  The prescription we sent in was for QD.    He's asking if we can refill this and send it to the [facility] on file for him.    Thank you,    Salena Viveros RN  Triage [facility]  09/27/23 13:41 EDT

## 2023-09-27 NOTE — TELEPHONE ENCOUNTER
Notified pt that the prescription was sent in.    Thank you,    Salena Viveros RN  Triage [facility]  09/27/23 14:15 EDT

## 2023-10-09 ENCOUNTER — ANTICOAGULATION VISIT (OUTPATIENT)
Dept: PHARMACY | Facility: HOSPITAL | Age: 83
End: 2023-10-09
Payer: MEDICARE

## 2023-10-09 ENCOUNTER — LAB (OUTPATIENT)
Dept: LAB | Facility: HOSPITAL | Age: 83
End: 2023-10-09
Payer: MEDICARE

## 2023-10-09 DIAGNOSIS — I48.0 AF (PAROXYSMAL ATRIAL FIBRILLATION): Primary | ICD-10-CM

## 2023-10-09 DIAGNOSIS — I48.0 AF (PAROXYSMAL ATRIAL FIBRILLATION): ICD-10-CM

## 2023-10-09 LAB
INR PPP: 3.68 (ref 0.9–1.1)
PROTHROMBIN TIME: 35.7 SECONDS (ref 12.1–15)

## 2023-10-09 PROCEDURE — 85610 PROTHROMBIN TIME: CPT

## 2023-10-09 PROCEDURE — 36415 COLL VENOUS BLD VENIPUNCTURE: CPT

## 2023-10-10 NOTE — PROGRESS NOTES
Anticoagulation Clinic Progress Note    Anticoagulation Summary  As of 10/9/2023      INR goal:  2.0-3.0   TTR:  44.4% (1.1 y)   INR used for dosing:  3.68 (10/9/2023)   Warfarin maintenance plan:  8 mg every day   Weekly warfarin total:  56 mg   Plan last modified:  [place], PharmD (5/2/2023)   Next INR check:  10/23/2023   Target end date:      Indications    AF (paroxysmal atrial fibrillation) [I48.0]                 Anticoagulation Episode Summary       INR check location:      Preferred lab:      Send INR reminders to:   [facility]    Comments:  s/p AVR and CABG *[facility]*          Anticoagulation Care Providers       Provider Role Specialty Phone number    Helena Humphries, APRN Referring Cardiology [phone]            Clinic Interview:  Patient Findings     Negatives:  Signs/symptoms of thrombosis, Signs/symptoms of bleeding,   Laboratory test error suspected, Change in health, Change in alcohol use,   Change in activity, Upcoming invasive procedure, Emergency department   visit, Upcoming dental procedure, Missed doses, Extra doses, Change in   medications, Change in diet/appetite, Hospital admission, Bruising, Other   complaints      Clinical Outcomes     Negatives:  Major bleeding event, Thromboembolic event,   Anticoagulation-related hospital admission, Anticoagulation-related ED   visit, Anticoagulation-related fatality        INR History:      Latest Ref Rng & Units 8/14/2023     1:34 PM 8/28/2023     3:06 PM 8/28/2023     3:31 PM 9/11/2023    12:09 PM 9/11/2023     2:22 PM 10/9/2023     3:01 PM 10/9/2023     3:20 PM   Anticoagulation Monitoring   INR  3.45  2.41  2.25  3.68   INR Date  8/14/2023  8/28/2023  9/11/2023  10/9/2023   INR Goal  2.0-3.0  2.0-3.0  2.0-3.0  2.0-3.0   Trend  Same  Same  Same  Same   Last Week Total  56 mg  56 mg  56 mg  56 mg   Next Week Total  52 mg  56 mg  56 mg  52 mg   Sun  8 mg  8 mg  8 mg  8 mg   Mon  8 mg  8 mg  8 mg  4 mg  (10/9); Otherwise 8 mg   Tue  4 mg (8/15); Otherwise 8 mg  8 mg  8 mg  8 mg   Wed  8 mg  8 mg  8 mg  8 mg   Thu  8 mg  8 mg  8 mg  8 mg   Fri  8 mg  8 mg  8 mg  8 mg   Sat  8 mg  8 mg  8 mg  8 mg   Historical INR 0.90 - 1.10  2.41   2.25   3.68         Plan:  1. INR is Supratherapeutic today- see above in Anticoagulation Summary.   Will instruct Woo Dobbs to Change their warfarin regimen- see above in Anticoagulation Summary.  partial to 4 mg then resume and recheck in 2 weeks   2. Follow up in 2 weeks  3. They have been instructed to call if any changes in medications, doses, concerns, etc. Patient expresses understanding and has no further questions at this time.    Miya Gan [facility]

## 2023-10-23 ENCOUNTER — LAB (OUTPATIENT)
Dept: LAB | Facility: HOSPITAL | Age: 83
End: 2023-10-23
Payer: MEDICARE

## 2023-10-23 DIAGNOSIS — I48.0 AF (PAROXYSMAL ATRIAL FIBRILLATION): ICD-10-CM

## 2023-10-23 LAB
INR PPP: 2.37 (ref 0.9–1.1)
PROTHROMBIN TIME: 25.5 SECONDS (ref 12.1–15)

## 2023-10-23 PROCEDURE — 36415 COLL VENOUS BLD VENIPUNCTURE: CPT

## 2023-10-23 PROCEDURE — 85610 PROTHROMBIN TIME: CPT

## 2023-10-24 ENCOUNTER — ANTICOAGULATION VISIT (OUTPATIENT)
Dept: PHARMACY | Facility: HOSPITAL | Age: 83
End: 2023-10-24
Payer: MEDICARE

## 2023-10-24 DIAGNOSIS — I48.0 AF (PAROXYSMAL ATRIAL FIBRILLATION): Primary | ICD-10-CM

## 2023-10-24 NOTE — PROGRESS NOTES
Anticoagulation Clinic Progress Note    Anticoagulation Summary  As of 10/24/2023      INR goal:  2.0-3.0   TTR:  44.5% (1.2 y)   INR used for dosin.37 (10/23/2023)   Warfarin maintenance plan:  8 mg every day   Weekly warfarin total:  56 mg   No change documented:  Miya Gan, SHANNON   Plan last modified:  [place], PharmD (2023)   Next INR check:  2023   Target end date:      Indications    AF (paroxysmal atrial fibrillation) [I48.0]                 Anticoagulation Episode Summary       INR check location:      Preferred lab:      Send INR reminders to:   [facility]    Comments:  s/p AVR and CABG *[facility]*          Anticoagulation Care Providers       Provider Role Specialty Phone number    Helena Humphries, DEV Referring Cardiology [phone]            Clinic Interview:  No pertinent clinical findings have been reported.    INR History:      Latest Ref Rng & Units 2023     3:31 PM 2023    12:09 PM 2023     2:22 PM 10/9/2023     3:01 PM 10/9/2023     3:20 PM 10/23/2023     3:40 PM 10/24/2023     8:38 AM   Anticoagulation Monitoring   INR  2.41  2.25  3.68  2.37   INR Date  2023  2023  10/9/2023  10/23/2023   INR Goal  2.0-3.0  2.0-3.0  2.0-3.0  2.0-3.0   Trend  Same  Same  Same  Same   Last Week Total  56 mg  56 mg  56 mg  56 mg   Next Week Total  56 mg  56 mg  52 mg  56 mg   Sun  8 mg  8 mg  8 mg  8 mg   Mon  8 mg  8 mg  4 mg (10/9); Otherwise 8 mg  8 mg   Tue  8 mg  8 mg  8 mg  8 mg   Wed  8 mg  8 mg  8 mg  8 mg   Thu  8 mg  8 mg  8 mg  8 mg   Fri  8 mg  8 mg  8 mg  8 mg   Sat  8 mg  8 mg  8 mg  8 mg   Historical INR 0.90 - 1.10  2.25   3.68   2.37         Plan:  1. INR is therapeutic today- see above in Anticoagulation Summary.    Woo Dobbs to continue their warfarin regimen- see above in Anticoagulation Summary.  2. Follow up in 2 weeks  3. They have been instructed to call if any changes in medications, doses,  concerns, etc. Patient expresses understanding and has no further questions at this time.    Miya Gan, [facility]

## 2023-10-26 ENCOUNTER — TELEPHONE (OUTPATIENT)
Dept: CARDIOLOGY | Facility: CLINIC | Age: 83
End: 2023-10-26
Payer: MEDICARE

## 2023-10-26 RX ORDER — ATORVASTATIN CALCIUM 40 MG/1
40 TABLET, FILM COATED ORAL NIGHTLY
Qty: 90 TABLET | Refills: 0 | Status: SHIPPED | OUTPATIENT
Start: 2023-10-26

## 2023-10-26 NOTE — TELEPHONE ENCOUNTER
Pharmacy is calling for refills on atorvastatin.  Please call PCP office and obtain last lipid panel and CMP or AST/ALT.  Thank you

## 2023-11-05 ENCOUNTER — APPOINTMENT (OUTPATIENT)
Dept: CT IMAGING | Facility: HOSPITAL | Age: 83
End: 2023-11-05
Payer: MEDICARE

## 2023-11-05 ENCOUNTER — HOSPITAL ENCOUNTER (EMERGENCY)
Facility: HOSPITAL | Age: 83
Discharge: HOME | End: 2023-11-05
Attending: EMERGENCY MEDICINE | Admitting: EMERGENCY MEDICINE
Payer: MEDICARE

## 2023-11-05 VITALS
HEART RATE: 65 BPM | SYSTOLIC BLOOD PRESSURE: 165 MMHG | HEIGHT: 70 IN | BODY MASS INDEX: 21.47 KG/M2 | TEMPERATURE: 98.1 F | OXYGEN SATURATION: 97 % | RESPIRATION RATE: 16 BRPM | DIASTOLIC BLOOD PRESSURE: 80 MMHG | WEIGHT: 150 LBS

## 2023-11-05 DIAGNOSIS — S39.011A STRAIN OF MUSCLE OF RIGHT GROIN REGION: Primary | ICD-10-CM

## 2023-11-05 LAB
INR PPP: 3.33 (ref 0.9–1.1)
PROTHROMBIN TIME: 33 SECONDS (ref 12.1–15)

## 2023-11-05 PROCEDURE — 36415 COLL VENOUS BLD VENIPUNCTURE: CPT

## 2023-11-05 PROCEDURE — 85610 PROTHROMBIN TIME: CPT | Performed by: EMERGENCY MEDICINE

## 2023-11-05 PROCEDURE — 99284 EMERGENCY DEPT VISIT MOD MDM: CPT

## 2023-11-05 PROCEDURE — 72192 CT PELVIS W/O DYE: CPT

## 2023-11-05 NOTE — DISCHARGE INSTRUCTIONS
Ice the affected area for 20 minutes every 2- 3 hours while awake for 2 to 3 days.  Take Tylenol as needed as directed for pain.  Do not take your hydrocodone and Tylenol together.  Follow-up with Dr. Ivan this week.  Turn to the emergency department there is increased pain, worse anyway at all.  Rest.  No heavy lifting.

## 2023-11-05 NOTE — ED PROVIDER NOTES
Subjective   History of Present Illness    Chief complaint: Groin pain    Location: Right inguinal area    Quality/Severity: Moderate    Timing/Onset/Duration: Started after lifting flowerpots yesterday    Modifying Factors: Hurts to move    Associated Symptoms: No fever or chills.  No burning on urination.  No redness rash or bruising.    Narrative: This 82-year-old white male presents with right inguinal pain after doing lifting yesterday.  The patient does have a history of fluid in his scrotum.  He does not think that it is not much different in size, his wife feels like maybe is a bit bigger.        PCP:  Agustín Ivan MD    Review of Systems   Genitourinary:         Right inguinal pain         Past Medical History:   Diagnosis Date   • Aortic stenosis     8/2022: tissue AVR (25mm MDT Avalus bovine pericardial)   • Asthma    • BPH (benign prostatic hyperplasia)    • CAD (coronary artery disease)     7/2022: 95% prox/50% distal LAD, 80% D1, 30-40% Cx, diffuse dz RCA. CABG x 3 8/2022: LIMA-LAD, SVG-D1, SVG-RCA   • Carotid atherosclerosis, bilateral     8/2022: 16-49% bilaterally   • Colon polyp    • GERD (gastroesophageal reflux disease)    • Hyperlipidemia    • Hypertension    • Paroxysmal atrial fibrillation    • Type 2 diabetes mellitus    • Warfarin anticoagulation        Allergies   Allergen Reactions   • Loratadine Other (See Comments)     Emotional side effects.        Past Surgical History:   Procedure Laterality Date   • CARDIAC CATHETERIZATION N/A 7/14/2022    Procedure: Coronary angiography;  Surgeon: Unique Calabrese MD;  Location:  [facility];  Service: Cardiovascular;  Laterality: N/A;   • CARDIAC CATHETERIZATION N/A 7/14/2022    Procedure: Left heart cath with simultaneous LV/Ao pressures;  Surgeon: Unique Calabrese MD;  Location:  [facility];  Service: Cardiovascular;  Laterality: N/A;   • CARDIAC CATHETERIZATION N/A 7/14/2022    Procedure: Right Heart Cath;   Surgeon: Unique Calabrese MD;  Location: [facility] CATH INVASIVE LOCATION;  Service: Cardiovascular;  Laterality: N/A;   • COLONOSCOPY     • CORONARY ARTERY BYPASS GRAFT WITH AORTIC VALVE REPAIR/REPLACEMENT N/A 8/8/2022    Procedure: KEVIN STERNOTOMY CORONARY ARTERY BYPASS GRAFT TIMES 3 USING LEFT INTERNAL MAMMARY ARTERY AND LEFT GREATER SAPHENOUS VEIN GRAFT PER ENDOSCOPIC VEIN HARVESTING, AORTIC VALVE REPLACEMENT AND PRP;  Surgeon: Jr Gentry Courtney MD;  Location: [facility] CVOR;  Service: Cardiothoracic;  Laterality: N/A;   • TRANSURETHRAL RESECTION OF BLADDER TUMOR N/A 1/23/2023    Procedure: TRANSURETHRAL RESECTION OF SMALL BLADDER TUMOR;  Surgeon: Nimesh Arvizu MD;  Location:  [facility] OR;  Service: Urology;  Laterality: N/A;   • VASECTOMY         Family History   Problem Relation Age of Onset   • Colon cancer Neg Hx    • Colon polyps Neg Hx    • Malig Hyperthermia Neg Hx        Social History     Socioeconomic History   • Marital status:    • Number of children: 3   Tobacco Use   • Smoking status: Never   • Smokeless tobacco: Never   • Tobacco comments:     caffeine use: 1 -2 cups daily.    Vaping Use   • Vaping Use: Never used   Substance and Sexual Activity   • Alcohol use: No   • Drug use: No   • Sexual activity: Defer           Objective   Physical Exam  Vitals reviewed: The temperature is 98.1 °F, pulse 66, respirations 18, /82, room air pulse ox 97%..   Constitutional:       Appearance: Normal appearance.   Genitourinary:     Comments: Tenderness upon palpation of the right inguinal canal.  The scrotum is swollen.  There is no erythema.  There is no urethral discharge.  Neurological:      Mental Status: He is alert.         Procedures           ED Course  ED Course as of 11/05/23 1620   Sun Nov 05, 2023   1518 The INR is 3.33.  The PT is 33. [RC]      ED Course User Index  [RC] Shar Way MD      15:15 EST, 11/05/23: Patient's diagnosis of right groin strain was discussed with him.   Patient should ice the affected area for 20 minutes every 2-3 hours while he is awake.  The patient should take Tylenol as needed as directed for pain.  The patient should not take the Tylenol if he is taking the hydrocodone with acetaminophen in it.  The patient should follow-up with Dr. Ivan next week.  He should return to the emergency department if there is increased pain, worse in any way at all.  Patient should avoid heavy lifting for a week.  All the patient questions were answered he will be discharged in good condition.                                       Medical Decision Making      Final diagnoses:   Strain of muscle of right groin region       ED Disposition  ED Disposition       None            No follow-up provider specified.       Medication List      No changes were made to your prescriptions during this visit.            Shar Way MD  11/05/23 0192

## 2023-11-06 ENCOUNTER — ANTICOAGULATION VISIT (OUTPATIENT)
Dept: PHARMACY | Facility: HOSPITAL | Age: 83
End: 2023-11-06
Payer: MEDICARE

## 2023-11-06 DIAGNOSIS — I48.0 AF (PAROXYSMAL ATRIAL FIBRILLATION): Primary | ICD-10-CM

## 2023-11-06 NOTE — PROGRESS NOTES
Anticoagulation Clinic Progress Note    Anticoagulation Summary  As of 11/6/2023      INR goal:  2.0-3.0   TTR:  45.1% (1.2 y)   INR used for dosing:  3.33 (11/5/2023)   Warfarin maintenance plan:  8 mg every day   Weekly warfarin total:  56 mg   Plan last modified:  [place], PharmD (5/2/2023)   Next INR check:  11/20/2023   Target end date:      Indications    AF (paroxysmal atrial fibrillation) [I48.0]                 Anticoagulation Episode Summary       INR check location:      Preferred lab:      Send INR reminders to:   [facility]    Comments:  s/p AVR and CABG *[facility]*          Anticoagulation Care Providers       Provider Role Specialty Phone number    Helena Humprhies, APRN Referring Cardiology [phone]            Clinic Interview:  Patient Findings     Positives:  Change in health, Emergency department visit, Change in   medications    Negatives:  Signs/symptoms of thrombosis, Signs/symptoms of bleeding,   Laboratory test error suspected, Change in alcohol use, Change in   activity, Upcoming invasive procedure, Upcoming dental procedure, Missed   doses, Extra doses, Change in diet/appetite, Hospital admission, Bruising,   Other complaints    Comments:  Reports he has a right groin strain that occurred on 11/1/23.   ED visit yesterday for evaluation. He has been taking APAP 700 mg every 8   hours prn pain.      Clinical Outcomes     Negatives:  Major bleeding event, Thromboembolic event,   Anticoagulation-related hospital admission, Anticoagulation-related ED   visit, Anticoagulation-related fatality    Comments:  Reports he has a right groin strain that occurred on 11/1/23.   ED visit yesterday for evaluation. He has been taking APAP 700 mg every 8   hours prn pain.        INR History:      Latest Ref Rng & Units 9/11/2023     2:22 PM 10/9/2023     3:01 PM 10/9/2023     3:20 PM 10/23/2023     3:40 PM 10/24/2023     8:38 AM 11/5/2023     1:06 PM 11/6/2023      2:39 PM   Anticoagulation Monitoring   INR  2.25  3.68  2.37  3.33   INR Date  9/11/2023  10/9/2023  10/23/2023  11/5/2023   INR Goal  2.0-3.0  2.0-3.0  2.0-3.0  2.0-3.0   Trend  Same  Same  Same  Same   Last Week Total  56 mg  56 mg  56 mg  56 mg   Next Week Total  56 mg  52 mg  56 mg  52 mg   Sun  8 mg  8 mg  8 mg  8 mg   Mon  8 mg  4 mg (10/9); Otherwise 8 mg  8 mg  8 mg   Tue  8 mg  8 mg  8 mg  4 mg (11/7); Otherwise 8 mg   Wed  8 mg  8 mg  8 mg  8 mg   Thu  8 mg  8 mg  8 mg  8 mg   Fri  8 mg  8 mg  8 mg  8 mg   Sat  8 mg  8 mg  8 mg  8 mg   Historical INR 0.90 - 1.10  3.68   2.37   3.33         Plan:  1. INR is Supratherapeutic today- see above in Anticoagulation Summary.   Will instruct Woo Dobbs to Change their warfarin regimen (4 mg today, then resume 8 mg daily) - see above in Anticoagulation Summary.  2. Follow up in 2 weeks.  3. They have been instructed to call if any changes in medications, doses, concerns, etc. Patient expresses understanding and has no further questions at this time.    Moisés Morales, PharmD

## 2023-11-20 ENCOUNTER — ANTICOAGULATION VISIT (OUTPATIENT)
Dept: PHARMACY | Facility: HOSPITAL | Age: 83
End: 2023-11-20
Payer: MEDICARE

## 2023-11-20 ENCOUNTER — LAB (OUTPATIENT)
Dept: LAB | Facility: HOSPITAL | Age: 83
End: 2023-11-20
Payer: MEDICARE

## 2023-11-20 DIAGNOSIS — I48.0 AF (PAROXYSMAL ATRIAL FIBRILLATION): Primary | ICD-10-CM

## 2023-11-20 DIAGNOSIS — I48.0 AF (PAROXYSMAL ATRIAL FIBRILLATION): ICD-10-CM

## 2023-11-20 LAB
INR PPP: 2.64 (ref 0.9–1.1)
PROTHROMBIN TIME: 27.7 SECONDS (ref 12.1–15)

## 2023-11-20 PROCEDURE — 85610 PROTHROMBIN TIME: CPT

## 2023-11-20 PROCEDURE — 36415 COLL VENOUS BLD VENIPUNCTURE: CPT

## 2023-11-21 NOTE — PROGRESS NOTES
Anticoagulation Clinic Progress Note    Anticoagulation Summary  As of 2023      INR goal:  2.0-3.0   TTR:  45.3% (1.2 y)   INR used for dosin.64 (2023)   Warfarin maintenance plan:  8 mg every day   Weekly warfarin total:  56 mg   No change documented:  Moisés Morales, PharmD   Plan last modified:  [place], Linda (2023)   Next INR check:  2023   Target end date:      Indications    AF (paroxysmal atrial fibrillation) [I48.0]                 Anticoagulation Episode Summary       INR check location:      Preferred lab:      Send INR reminders to:   [facility]    Comments:  s/p AVR and CABG *[facility]*          Anticoagulation Care Providers       Provider Role Specialty Phone number    Helena Humphries, DEV Referring Cardiology [phone]            Clinic Interview:  Patient Findings     Positives:  Change in health, Change in medications    Negatives:  Signs/symptoms of thrombosis, Signs/symptoms of bleeding,   Laboratory test error suspected, Change in alcohol use, Change in   activity, Upcoming invasive procedure, Emergency department visit,   Upcoming dental procedure, Missed doses, Extra doses, Change in   diet/appetite, Hospital admission, Bruising, Other complaints    Comments:  Reports improvement in groin strain; however, reports recent   back pain that resulted in significantly reduced mobility. Yesterday, he   received injections for pain relief along spine, and he was prescribed   10-day prednisone taper (60 mg x 2 days, 40 mg x 2 days, 20 mg x 2 days,   10 mg x 2 days, then 5 mg x 2 days).       Clinical Outcomes     Negatives:  Major bleeding event, Thromboembolic event,   Anticoagulation-related hospital admission, Anticoagulation-related ED   visit, Anticoagulation-related fatality    Comments:  Reports improvement in groin strain; however, reports recent   back pain that resulted in significantly reduced mobility. Yesterday, he    received injections for pain relief along spine, and he was prescribed   10-day prednisone taper (60 mg x 2 days, 40 mg x 2 days, 20 mg x 2 days,   10 mg x 2 days, then 5 mg x 2 days).         INR History:      Latest Ref Rng & Units 10/9/2023     3:20 PM 10/23/2023     3:40 PM 10/24/2023     8:38 AM 11/5/2023     1:06 PM 11/6/2023     2:39 PM 11/20/2023     2:47 PM 11/20/2023     3:17 PM   Anticoagulation Monitoring   INR  3.68  2.37  3.33  2.64   INR Date  10/9/2023  10/23/2023  11/5/2023  11/20/2023   INR Goal  2.0-3.0  2.0-3.0  2.0-3.0  2.0-3.0   Trend  Same  Same  Same  Same   Last Week Total  56 mg  56 mg  56 mg  56 mg   Next Week Total  52 mg  56 mg  52 mg  56 mg   Sun  8 mg  8 mg  8 mg  8 mg   Mon  4 mg (10/9); Otherwise 8 mg  8 mg  8 mg  8 mg   Tue  8 mg  8 mg  4 mg (11/7); Otherwise 8 mg  8 mg   Wed  8 mg  8 mg  8 mg  8 mg   Thu  8 mg  8 mg  8 mg  8 mg   Fri  8 mg  8 mg  8 mg  8 mg   Sat  8 mg  8 mg  8 mg  8 mg   Historical INR 0.90 - 1.10  2.37   3.33   2.64         Plan:  1. INR is Therapeutic today- see above in Anticoagulation Summary.   Will instruct Woo Dobbs to Continue their warfarin regimen- see above in Anticoagulation Summary.  2. Follow up in 1 week to assess interaction between warfarin and prednisone.  3. They have been instructed to call if any changes in medications, doses, concerns, etc. Patient expresses understanding and has no further questions at this time.    Moisés Morales, PharmD

## 2023-11-27 ENCOUNTER — TELEPHONE (OUTPATIENT)
Dept: CARDIOLOGY | Facility: CLINIC | Age: 83
End: 2023-11-27
Payer: MEDICARE

## 2023-11-27 ENCOUNTER — LAB (OUTPATIENT)
Dept: LAB | Facility: HOSPITAL | Age: 83
End: 2023-11-27
Payer: MEDICARE

## 2023-11-27 DIAGNOSIS — I48.0 AF (PAROXYSMAL ATRIAL FIBRILLATION): ICD-10-CM

## 2023-11-27 LAB
INR PPP: 4.29 (ref 0.9–1.1)
PROTHROMBIN TIME: 40.1 SECONDS (ref 12.1–15)

## 2023-11-27 PROCEDURE — 36415 COLL VENOUS BLD VENIPUNCTURE: CPT

## 2023-11-27 PROCEDURE — 85610 PROTHROMBIN TIME: CPT

## 2023-11-27 NOTE — TELEPHONE ENCOUNTER
[facility] called to report a critical INR of 4.29. Please advise. Thank you!    Maia Moon RN  Triage [facility]

## 2023-11-28 ENCOUNTER — ANTICOAGULATION VISIT (OUTPATIENT)
Dept: PHARMACY | Facility: HOSPITAL | Age: 83
End: 2023-11-28
Payer: MEDICARE

## 2023-11-28 DIAGNOSIS — I48.0 AF (PAROXYSMAL ATRIAL FIBRILLATION): Primary | ICD-10-CM

## 2023-11-28 NOTE — PROGRESS NOTES
Anticoagulation Clinic Progress Note    Anticoagulation Summary  As of 2023      INR goal:  2.0-3.0   TTR:  45.0% (1.3 y)   INR used for dosin.29 (2023)   Warfarin maintenance plan:  8 mg every day   Weekly warfarin total:  56 mg   Plan last modified:  [place], PharmD (2023)   Next INR check:  2023   Target end date:      Indications    AF (paroxysmal atrial fibrillation) [I48.0]                 Anticoagulation Episode Summary       INR check location:      Preferred lab:      Send INR reminders to:   [facility]    Comments:  s/p AVR and CABG *[facility]*          Anticoagulation Care Providers       Provider Role Specialty Phone number    Helena Humphries, APRN Referring Cardiology [phone]            Clinic Interview:  Patient Findings     Positives:  Change in medications    Negatives:  Signs/symptoms of thrombosis, Signs/symptoms of bleeding,   Laboratory test error suspected, Change in health, Change in alcohol use,   Change in activity, Upcoming invasive procedure, Emergency department   visit, Upcoming dental procedure, Missed doses, Extra doses, Change in   diet/appetite, Hospital admission, Bruising, Other complaints    Comments:   Patient reports his back pain has resolved. He reports he   only took the prednisone until Wednesday and then discontinued.       Clinical Outcomes     Negatives:  Major bleeding event, Thromboembolic event,   Anticoagulation-related hospital admission, Anticoagulation-related ED   visit, Anticoagulation-related fatality    Comments:   Patient reports his back pain has resolved. He reports he   only took the prednisone until Wednesday and then discontinued.         INR History:      Latest Ref Rng & Units 10/24/2023     8:38 AM 2023     1:06 PM 2023     2:39 PM 2023     2:47 PM 2023     3:17 PM 2023     3:43 PM 2023     9:30 AM   Anticoagulation Monitoring   INR  2.37  3.33   2.64  4.29   INR Date  10/23/2023  11/5/2023  11/20/2023  11/27/2023   INR Goal  2.0-3.0  2.0-3.0  2.0-3.0  2.0-3.0   Trend  Same  Same  Same  Same   Last Week Total  56 mg  56 mg  56 mg  56 mg   Next Week Total  56 mg  52 mg  56 mg  48 mg   Sun  8 mg  8 mg  8 mg  8 mg   Mon  8 mg  8 mg  8 mg  8 mg   Tue  8 mg  4 mg (11/7); Otherwise 8 mg  8 mg  Hold (11/28); Otherwise 8 mg   Wed  8 mg  8 mg  8 mg  8 mg   Thu  8 mg  8 mg  8 mg  8 mg   Fri  8 mg  8 mg  8 mg  8 mg   Sat  8 mg  8 mg  8 mg  8 mg   Historical INR 0.90 - 1.10  3.33   2.64   4.29         Plan:  1. INR is Supratherapeutic today- see above in Anticoagulation Summary.   Will instruct Woo Dobbs to Change their warfarin regimen- see above in Anticoagulation Summary.  Hold and recheck in 2 weeks   2. Follow up in 2 weeks  3. They have been instructed to call if any changes in medications, doses, concerns, etc. Patient expresses understanding and has no further questions at this time.    Miya Gan [facility]

## 2023-12-11 ENCOUNTER — ANTICOAGULATION VISIT (OUTPATIENT)
Dept: PHARMACY | Facility: HOSPITAL | Age: 83
End: 2023-12-11
Payer: MEDICARE

## 2023-12-11 ENCOUNTER — LAB (OUTPATIENT)
Dept: LAB | Facility: HOSPITAL | Age: 83
End: 2023-12-11
Payer: MEDICARE

## 2023-12-11 DIAGNOSIS — I48.0 AF (PAROXYSMAL ATRIAL FIBRILLATION): Primary | ICD-10-CM

## 2023-12-11 DIAGNOSIS — I48.0 AF (PAROXYSMAL ATRIAL FIBRILLATION): ICD-10-CM

## 2023-12-11 LAB
INR PPP: 3.04 (ref 0.9–1.1)
PROTHROMBIN TIME: 30.9 SECONDS (ref 12.1–15)

## 2023-12-11 PROCEDURE — 85610 PROTHROMBIN TIME: CPT

## 2023-12-11 PROCEDURE — 36415 COLL VENOUS BLD VENIPUNCTURE: CPT

## 2023-12-12 NOTE — PROGRESS NOTES
Anticoagulation Clinic Progress Note    Anticoagulation Summary  As of 12/11/2023      INR goal:  2.0-3.0   TTR:  43.7% (1.3 y)   INR used for dosing:  3.04 (12/11/2023)   Warfarin maintenance plan:  8 mg every day   Weekly warfarin total:  56 mg   No change documented:  Moisés Morales, PharmD   Plan last modified:  [place], PharmD (5/2/2023)   Next INR check:  12/26/2023   Target end date:      Indications    AF (paroxysmal atrial fibrillation) [I48.0]                 Anticoagulation Episode Summary       INR check location:      Preferred lab:      Send INR reminders to:   [facility] CLINICAL POOL    Comments:  s/p AVR and CABG *[facility]*          Anticoagulation Care Providers       Provider Role Specialty Phone number    Helena Humphries, DEV Referring Cardiology [phone]            Clinic Interview:  Patient Findings     Negatives:  Signs/symptoms of thrombosis, Signs/symptoms of bleeding,   Laboratory test error suspected, Change in health, Change in alcohol use,   Change in activity, Upcoming invasive procedure, Emergency department   visit, Upcoming dental procedure, Missed doses, Extra doses, Change in   medications, Change in diet/appetite, Hospital admission, Bruising, Other   complaints      Clinical Outcomes     Negatives:  Major bleeding event, Thromboembolic event,   Anticoagulation-related hospital admission, Anticoagulation-related ED   visit, Anticoagulation-related fatality        INR History:      Latest Ref Rng & Units 11/6/2023     2:39 PM 11/20/2023     2:47 PM 11/20/2023     3:17 PM 11/27/2023     3:43 PM 11/28/2023     9:30 AM 12/11/2023     1:01 PM 12/11/2023     1:40 PM   Anticoagulation Monitoring   INR  3.33  2.64  4.29  3.04   INR Date  11/5/2023 11/20/2023 11/27/2023 12/11/2023   INR Goal  2.0-3.0  2.0-3.0  2.0-3.0  2.0-3.0   Trend  Same  Same  Same  Same   Last Week Total  56 mg  56 mg  56 mg  56 mg   Next Week Total  52 mg  56 mg  48 mg  56 mg    Sun  8 mg  8 mg  8 mg  8 mg   Mon  8 mg  8 mg  8 mg  8 mg   Tue  4 mg (11/7); Otherwise 8 mg  8 mg  Hold (11/28); Otherwise 8 mg  8 mg   Wed  8 mg  8 mg  8 mg  8 mg   Thu  8 mg  8 mg  8 mg  8 mg   Fri  8 mg  8 mg  8 mg  8 mg   Sat  8 mg  8 mg  8 mg  8 mg   Historical INR 0.90 - 1.10  2.64   4.29   3.04         Plan:  1. INR is Supratherapeutic today- see above in Anticoagulation Summary. Recent supratherapeutic INRs likely related to steroid injections and PO steroid course.   Will instruct Woo Dobbs to Continue their warfarin regimen of 8 mg daily for now - see above in Anticoagulation Summary.  2. Follow up in 2 weeks. If INR remains supratherapeutic will plan for reduction in total weekly dose.   3. They have been instructed to call if any changes in medications, doses, concerns, etc. Patient expresses understanding and has no further questions at this time.    Moisés Morales, PharmD

## 2023-12-26 ENCOUNTER — ANTICOAGULATION VISIT (OUTPATIENT)
Dept: PHARMACY | Facility: HOSPITAL | Age: 83
End: 2023-12-26
Payer: MEDICARE

## 2023-12-26 ENCOUNTER — LAB (OUTPATIENT)
Dept: LAB | Facility: HOSPITAL | Age: 83
End: 2023-12-26
Payer: MEDICARE

## 2023-12-26 DIAGNOSIS — I48.0 AF (PAROXYSMAL ATRIAL FIBRILLATION): Primary | ICD-10-CM

## 2023-12-26 DIAGNOSIS — I48.0 AF (PAROXYSMAL ATRIAL FIBRILLATION): ICD-10-CM

## 2023-12-26 LAB
INR PPP: 2.11 (ref 0.9–1.1)
PROTHROMBIN TIME: 23.4 SECONDS (ref 12.1–15)

## 2023-12-26 PROCEDURE — 85610 PROTHROMBIN TIME: CPT

## 2023-12-26 PROCEDURE — 36415 COLL VENOUS BLD VENIPUNCTURE: CPT

## 2023-12-27 NOTE — PROGRESS NOTES
Anticoagulation Clinic Progress Note    Anticoagulation Summary  As of 2023      INR goal:  2.0-3.0   TTR:  45.3% (1.3 y)   INR used for dosin.11 (2023)   Warfarin maintenance plan:  8 mg every day   Weekly warfarin total:  56 mg   No change documented:  Miya Gan, SHANNON   Plan last modified:  [place], PharmD (2023)   Next INR check:  2024   Target end date:      Indications    AF (paroxysmal atrial fibrillation) [I48.0]                 Anticoagulation Episode Summary       INR check location:      Preferred lab:      Send INR reminders to:   [facility] CLINICAL POOL    Comments:  s/p AVR and CABG *[facility]*          Anticoagulation Care Providers       Provider Role Specialty Phone number    Helena Humphries, DEV Referring Cardiology [phone]            Clinic Interview:  No pertinent clinical findings have been reported.    INR History:      Latest Ref Rng & Units 2023     3:17 PM 2023     3:43 PM 2023     9:30 AM 2023     1:01 PM 2023     1:40 PM 2023     3:04 PM 2023     4:01 PM   Anticoagulation Monitoring   INR  2.64  4.29  3.04  2.11   INR Date  2023   INR Goal  2.0-3.0  2.0-3.0  2.0-3.0  2.0-3.0   Trend  Same  Same  Same  Same   Last Week Total  56 mg  56 mg  56 mg  56 mg   Next Week Total  56 mg  48 mg  56 mg  56 mg   Sun  8 mg  8 mg  8 mg  8 mg   Mon  8 mg  8 mg  8 mg  8 mg   Tue  8 mg  Hold (); Otherwise 8 mg  8 mg  8 mg   Wed  8 mg  8 mg  8 mg  8 mg   Thu  8 mg  8 mg  8 mg  8 mg   Fri  8 mg  8 mg  8 mg  8 mg   Sat  8 mg  8 mg  8 mg  8 mg   Historical INR 0.90 - 1.10  4.29   3.04   2.11         Plan:  1. INR is therapeutic today- see above in Anticoagulation Summary.    Woo Dobbs to continue their warfarin regimen- see above in Anticoagulation Summary.  Upcoming bladder procedure to be scheduled. Reports he has not been instructed to hold warfarin at  this time however, he will know more after his apt after the 1st of January. Advised patient to reach out regarding any warfarin holding instructions  2. Follow up in 2 weeks  3. They have been instructed to call if any changes in medications, doses, concerns, etc. Patient expresses understanding and has no further questions at this time.    [facility], located within [facility]   5

## 2024-01-15 ENCOUNTER — TELEPHONE (OUTPATIENT)
Dept: PHARMACY | Facility: HOSPITAL | Age: 84
End: 2024-01-15
Payer: MEDICARE

## 2024-01-15 ENCOUNTER — LAB (OUTPATIENT)
Dept: LAB | Facility: HOSPITAL | Age: 84
End: 2024-01-15
Payer: MEDICARE

## 2024-01-15 ENCOUNTER — ANTICOAGULATION VISIT (OUTPATIENT)
Dept: PHARMACY | Facility: HOSPITAL | Age: 84
End: 2024-01-15
Payer: MEDICARE

## 2024-01-15 DIAGNOSIS — I48.0 AF (PAROXYSMAL ATRIAL FIBRILLATION): ICD-10-CM

## 2024-01-15 DIAGNOSIS — I48.0 AF (PAROXYSMAL ATRIAL FIBRILLATION): Primary | ICD-10-CM

## 2024-01-15 LAB
INR PPP: 3.9 (ref 0.9–1.1)
PROTHROMBIN TIME: 37.3 SECONDS (ref 12.1–15)

## 2024-01-15 PROCEDURE — 85610 PROTHROMBIN TIME: CPT

## 2024-01-15 PROCEDURE — 36415 COLL VENOUS BLD VENIPUNCTURE: CPT

## 2024-01-15 NOTE — TELEPHONE ENCOUNTER
Dr. Ball,     Mr. Dobbs is prescribed warfarin for atrial fibrillation; CHADS-VASc score is 4. He is scheduled for cystoscopy with bladder biopsy on 2/5/24. Urology has reportedly requested that he hold warfarin 5 days prior.     Will plan to proceed with holding warfarin 5 days prior to procedure without enoxaparin bridge unless otherwise directed.     Thank you!  Moisés

## 2024-01-15 NOTE — PROGRESS NOTES
Anticoagulation Clinic Progress Note    Anticoagulation Summary  As of 1/15/2024      INR goal:  2.0-3.0   TTR:  45.4% (1.4 y)   INR used for dosing:  3.90 (1/15/2024)   Warfarin maintenance plan:  8 mg every day   Weekly warfarin total:  56 mg   Plan last modified:  [place], PharmD (5/2/2023)   Next INR check:  1/29/2024   Target end date:      Indications    AF (paroxysmal atrial fibrillation) [I48.0]                 Anticoagulation Episode Summary       INR check location:      Preferred lab:      Send INR reminders to:  [facility] CLINICAL POOL    Comments:  s/p AVR and CABG *[facility]*          Anticoagulation Care Providers       Provider Role Specialty Phone number    Helena Humphries, APRN Referring Cardiology [phone]    Khurram Ball MD Referring Cardiology [phone]            Clinic Interview:  Patient Findings     Positives:  Change in health, Upcoming invasive procedure, Change in   medications, Other complaints    Negatives:  Signs/symptoms of thrombosis, Signs/symptoms of bleeding,   Laboratory test error suspected, Change in alcohol use, Change in   activity, Emergency department visit, Upcoming dental procedure, Missed   doses, Extra doses, Change in diet/appetite, Hospital admission, Bruising    Comments:  Spoke with Melina (spouse). Reports general aches and pains   recently that has resulted in more APAP use than usual. Reports he has   already taken today's dose of warfarin. Cystoscopy with bladder biopsy is   planned for 2/5/24. Reports Urology instructed him to hold warfarin 5 days   prior.       Clinical Outcomes     Negatives:  Major bleeding event, Thromboembolic event,   Anticoagulation-related hospital admission, Anticoagulation-related ED   visit, Anticoagulation-related fatality    Comments:  Spoke with Melina (spouse). Reports general aches and pains   recently that has resulted in more APAP use than usual. Reports he has   already taken today's  dose of warfarin. Cystoscopy with bladder biopsy is   planned for 2/5/24. Reports Urology instructed him to hold warfarin 5 days   prior.         INR History:      Latest Ref Rng & Units 11/28/2023     9:30 AM 12/11/2023     1:01 PM 12/11/2023     1:40 PM 12/26/2023     3:04 PM 12/26/2023     4:01 PM 1/15/2024     2:22 PM 1/15/2024     3:21 PM   Anticoagulation Monitoring   INR  4.29  3.04  2.11  3.90   INR Date  11/27/2023  12/11/2023  12/26/2023  1/15/2024   INR Goal  2.0-3.0  2.0-3.0  2.0-3.0  2.0-3.0   Trend  Same  Same  Same  Same   Last Week Total  56 mg  56 mg  56 mg  56 mg   Next Week Total  48 mg  56 mg  56 mg  48 mg   Sun  8 mg  8 mg  8 mg  8 mg   Mon  8 mg  8 mg  8 mg  8 mg   Tue  Hold (11/28); Otherwise 8 mg  8 mg  8 mg  Hold (1/16); Otherwise 8 mg   Wed  8 mg  8 mg  8 mg  8 mg   Thu  8 mg  8 mg  8 mg  8 mg   Fri  8 mg  8 mg  8 mg  8 mg   Sat  8 mg  8 mg  8 mg  8 mg   Historical INR 0.90 - 1.10  3.04   2.11   3.90         Plan:  1. INR is Supratherapeutic today- see above in Anticoagulation Summary.   Will instruct Woo Dobbs to Change their warfarin regimen (HOLD tomorrow, then resume 8 mg daily) - see above in Anticoagulation Summary.  2. Follow up in 2 weeks.   3. They have been instructed to call if any changes in medications, doses, concerns, etc. Patient expresses understanding and has no further questions at this time.    Moisés Morales, PharmD

## 2024-01-29 ENCOUNTER — LAB (OUTPATIENT)
Dept: LAB | Facility: HOSPITAL | Age: 84
End: 2024-01-29
Payer: MEDICARE

## 2024-01-29 ENCOUNTER — ANTICOAGULATION VISIT (OUTPATIENT)
Dept: PHARMACY | Facility: HOSPITAL | Age: 84
End: 2024-01-29
Payer: MEDICARE

## 2024-01-29 ENCOUNTER — PRE-ADMISSION TESTING (OUTPATIENT)
Dept: PREADMISSION TESTING | Facility: HOSPITAL | Age: 84
End: 2024-01-29
Payer: MEDICARE

## 2024-01-29 VITALS
RESPIRATION RATE: 16 BRPM | DIASTOLIC BLOOD PRESSURE: 84 MMHG | WEIGHT: 151 LBS | OXYGEN SATURATION: 96 % | HEIGHT: 67 IN | SYSTOLIC BLOOD PRESSURE: 130 MMHG | HEART RATE: 62 BPM | BODY MASS INDEX: 23.7 KG/M2

## 2024-01-29 DIAGNOSIS — I48.0 AF (PAROXYSMAL ATRIAL FIBRILLATION): Primary | ICD-10-CM

## 2024-01-29 DIAGNOSIS — I48.0 AF (PAROXYSMAL ATRIAL FIBRILLATION): ICD-10-CM

## 2024-01-29 LAB
ANION GAP SERPL CALCULATED.3IONS-SCNC: 6.1 MMOL/L (ref 5–15)
BUN SERPL-MCNC: 21 MG/DL (ref 8–23)
BUN/CREAT SERPL: 18.3 (ref 7–25)
CALCIUM SPEC-SCNC: 8.8 MG/DL (ref 8.6–10.5)
CHLORIDE SERPL-SCNC: 97 MMOL/L (ref 98–107)
CO2 SERPL-SCNC: 27.9 MMOL/L (ref 22–29)
CREAT SERPL-MCNC: 1.15 MG/DL (ref 0.76–1.27)
DEPRECATED RDW RBC AUTO: 51.5 FL (ref 37–54)
EGFRCR SERPLBLD CKD-EPI 2021: 63.1 ML/MIN/1.73
ERYTHROCYTE [DISTWIDTH] IN BLOOD BY AUTOMATED COUNT: 16 % (ref 12.3–15.4)
GLUCOSE SERPL-MCNC: 292 MG/DL (ref 65–99)
HBA1C MFR BLD: 8.3 % (ref 4.8–5.6)
HCT VFR BLD AUTO: 38.9 % (ref 37.5–51)
HGB BLD-MCNC: 12.3 G/DL (ref 13–17.7)
INR PPP: 2.22 (ref 0.9–1.1)
MCH RBC QN AUTO: 28.3 PG (ref 26.6–33)
MCHC RBC AUTO-ENTMCNC: 31.6 G/DL (ref 31.5–35.7)
MCV RBC AUTO: 89.4 FL (ref 79–97)
PLATELET # BLD AUTO: 191 10*3/MM3 (ref 140–450)
PMV BLD AUTO: 9.7 FL (ref 6–12)
POTASSIUM SERPL-SCNC: 4.6 MMOL/L (ref 3.5–5.2)
PROTHROMBIN TIME: 24.3 SECONDS (ref 12.1–15)
QT INTERVAL: 363 MS
QTC INTERVAL: 395 MS
RBC # BLD AUTO: 4.35 10*6/MM3 (ref 4.14–5.8)
SODIUM SERPL-SCNC: 131 MMOL/L (ref 136–145)
WBC NRBC COR # BLD AUTO: 7.95 10*3/MM3 (ref 3.4–10.8)

## 2024-01-29 PROCEDURE — 93010 ELECTROCARDIOGRAM REPORT: CPT | Performed by: INTERNAL MEDICINE

## 2024-01-29 PROCEDURE — 85027 COMPLETE CBC AUTOMATED: CPT | Performed by: UROLOGY

## 2024-01-29 PROCEDURE — 93005 ELECTROCARDIOGRAM TRACING: CPT

## 2024-01-29 PROCEDURE — 85610 PROTHROMBIN TIME: CPT

## 2024-01-29 PROCEDURE — 36415 COLL VENOUS BLD VENIPUNCTURE: CPT

## 2024-01-29 PROCEDURE — 80048 BASIC METABOLIC PNL TOTAL CA: CPT | Performed by: UROLOGY

## 2024-01-29 PROCEDURE — 83036 HEMOGLOBIN GLYCOSYLATED A1C: CPT | Performed by: UROLOGY

## 2024-01-29 NOTE — PROGRESS NOTES
Anticoagulation Clinic Progress Note    Anticoagulation Summary  As of 2024      INR goal:  2.0-3.0   TTR:  45.5% (1.4 y)   INR used for dosin.22 (2024)   Warfarin maintenance plan:  8 mg every day   Weekly warfarin total:  56 mg   Plan last modified:  [place], PharmD (2023)   Next INR check:  2024   Target end date:      Indications    AF (paroxysmal atrial fibrillation) [I48.0]                 Anticoagulation Episode Summary       INR check location:      Preferred lab:      [facility]    Comments:  s/p AVR and CABG *[facility]*          Anticoagulation Care Providers       Provider Role Specialty Phone number    Helena Humphries, APRN Referring Cardiology [phone]    Khurram Ball MD Referring Cardiology [phone]            Clinic Interview:  Patient Findings     Positives:  Change in health, Upcoming invasive procedure    Negatives:  Signs/symptoms of thrombosis, Signs/symptoms of bleeding,   Laboratory test error suspected, Change in alcohol use, Change in   activity, Emergency department visit, Upcoming dental procedure, Missed   doses, Extra doses, Change in medications, Change in diet/appetite,   Hospital admission, Bruising, Other complaints    Comments:  Reports he has mild cold symptoms. Cystoscopy with bladder   biopsy is planned for 24; reports Urology instructed him to hold   warfarin 5 days prior.       Clinical Outcomes     Negatives:  Major bleeding event, Thromboembolic event,   Anticoagulation-related hospital admission, Anticoagulation-related ED   visit, Anticoagulation-related fatality    Comments:  Reports he has mild cold symptoms. Cystoscopy with bladder   biopsy is planned for 24; reports Urology instructed him to hold   warfarin 5 days prior.         INR History:      Latest Ref Rng & Units 2023     1:40 PM 2023     3:04 PM 2023     4:01 PM 1/15/2024     2:22 PM  1/15/2024     3:21 PM 1/29/2024    11:05 AM 1/29/2024    11:58 AM   Anticoagulation Monitoring   INR  3.04  2.11  3.90  2.22   INR Date  12/11/2023  12/26/2023  1/15/2024  1/29/2024   INR Goal  2.0-3.0  2.0-3.0  2.0-3.0  2.0-3.0   Trend  Same  Same  Same  Same   Last Week Total  56 mg  56 mg  56 mg  56 mg   Next Week Total  56 mg  56 mg  48 mg  16 mg   Sun  8 mg  8 mg  8 mg  Hold (2/4); Otherwise 8 mg   Mon  8 mg  8 mg  8 mg  12 mg (2/5); Otherwise 8 mg   Tue  8 mg  8 mg  Hold (1/16); Otherwise 8 mg  12 mg (2/6); Otherwise 8 mg   Wed  8 mg  8 mg  8 mg  Hold (1/31); Otherwise 8 mg   Thu  8 mg  8 mg  8 mg  Hold (2/1); Otherwise 8 mg   Fri  8 mg  8 mg  8 mg  Hold (2/2); Otherwise 8 mg   Sat  8 mg  8 mg  8 mg  Hold (2/3); Otherwise 8 mg   Historical INR 0.90 - 1.10  2.11   3.90   2.22         Plan:  1. INR is Therapeutic today- see above in Anticoagulation Summary.   Will instruct Woo Dobbs to Change their warfarin regimen as directed surrounding procedure - see above in Anticoagulation Summary.  2. Follow up in 2 weeks.   3. They have been instructed to call if any changes in medications, doses, concerns, etc. Patient expresses understanding and has no further questions at this time.    Moisés Morales, PharmD

## 2024-01-29 NOTE — DISCHARGE INSTRUCTIONS
PRE-ADMISSION TESTING INSTRUCTIONS FOR ADULTS    Take these medications the morning of surgery with a small sip of water:  Atenolol, Levothyroxine      Do not take any insulin or diabetes medications the morning of surgery.      No aspirin, advil, aleve, ibuprofen, naproxen, diet pills, decongestants, or herbal/vitamins for a week prior to surgery.       Tylenol/Acetaminophen is okay to take if needed.    General Instructions:    DO NOT EAT SOLID FOOD AFTER MIDNIGHT THE NIGHT BEFORE SURGERY. No gum, mints, or hard candy after midnight the night before surgery.  You may drink clear liquids the day of surgery up until 2 hours before your arrival time.  Clear liquids are liquids you can see through. Nothing RED in color.    Plain water    Sports drinks      Gelatin (Jell-O)  Fruit juices without pulp such as white grape juice and apple juice  Popsicles that contain no fruit or yogurt  Tea or coffee (no cream or milk added)    It is beneficial for you to have a clear drink that contains carbohydrates 2 hours before your arrival time.  We suggest a 20 ounce bottle of Gatorade or Powerade for non-diabetic patients or a 20 ounce bottle of Gatorade Zero or Powerade Zero for diabetic patients.     Patients who avoid smoking, chewing tobacco and alcohol for 4 weeks prior to surgery have a reduced risk of post-operative complications.  If at all possible, quit smoking as many days before surgery as you can.    Do not smoke, use chewing tobacco or drink alcohol the day of surgery    Bring your C-PAP/ BI-PAP machine if you use one.  Wear clean comfortable clothes.  Do not wear contact lenses, lotion, deodorant, or make-up.  Bring a case for your glasses if applicable. You may brush your teeth the morning of surgery.  You may wear dentures/partials, do not put adhesive/glue on them.  Leave all other jewelry and valuables at home.      Preventing a Surgical Site Infection:    Shower the night before and on the morning of surgery  using the chlorhexidine soap you were given.  Use a clean washcloth with the soap.  Place clean sheets on your bed after showering the night before surgery. Do not use the CHG soap on your hair, face, or private areas. Wash your body gently for five (5) minutes. Do not scrub your skin.  Dry with a clean towel and dress in clean clothing.  Do not shave the surgical area for 10 days-2 weeks prior to surgery  because the razor can irritate skin and make it easier to develop an infection.  Make sure you, your family, and all healthcare providers clean their hands with soap and water or an alcohol based hand  before caring for you or your wound.      Day of surgery:    Your surgeon’s office will advise you of your arrival time for the day of surgery.    Upon arrival, a Pre-op nurse and Anesthesia provider will review your health history, obtain vital signs, and answer questions you may have. The anesthesia provider will also discuss the type of anesthesia that will be needed for your procedure, which may include general anesthesia. The only belongings needed at this time will be your home medications and if applicable your C-PAP/BI-PAP machine.  If you are staying overnight your family can leave the rest of your belongings in the car and bring them to your room later.  A Pre-op nurse will start an IV and you may receive medication in preparation for surgery, including something to help you relax.  Your family will be able to see you in the Pre-op area.  While you are in surgery your family should notify the waiting room  if they leave the waiting room area and provide a contact phone number.    IF you have any questions, you can call the Pre-Admission Department at [phone] or your surgeon's office.  Notify your surgeon if  you become sick, have a fever, productive cough, or cannot be here the day of surgery    Please be aware that surgery does come with discomfort.  We want to make every  effort to control your discomfort so please discuss any uncontrolled symptoms with your nurse.   Your doctor will most likely have prescribed pain medications.      If you are going home after surgery, you will receive individualized written care instructions before being discharged.  A responsible adult (over the age of 18) must drive you to and from the hospital on the day of your surgery and stay with you for 24 hours after anesthesia.    If you are staying overnight following surgery, you will be transported to your hospital room following the recovery period.  [facility] has all private rooms.    You may receive a survey regarding the care you received. Your feedback is very important and will be used to collect the necessary data to help us to continue to provide excellent care.     Deductibles and co-payments are collected on the day of service. Please be prepared to pay the required co-pay, deductible or deposit on the day of service as defined by your plan.

## 2024-01-29 NOTE — PAT
Pt here for PAT visit.  Pre-op tests completed, chg soap given, and instructions reviewed.  Instructed clears until 2 hrs prior to arrival time, voiced understanding.Instructed to hold Coumadin x5 days per Dr Ball's note

## 2024-02-12 ENCOUNTER — LAB (OUTPATIENT)
Dept: LAB | Facility: HOSPITAL | Age: 84
End: 2024-02-12
Payer: MEDICARE

## 2024-02-12 DIAGNOSIS — I48.0 AF (PAROXYSMAL ATRIAL FIBRILLATION): ICD-10-CM

## 2024-02-12 LAB
INR PPP: 1.67 (ref 0.9–1.1)
PROTHROMBIN TIME: 19.5 SECONDS (ref 12.1–15)

## 2024-02-12 PROCEDURE — 85610 PROTHROMBIN TIME: CPT

## 2024-02-12 PROCEDURE — 36415 COLL VENOUS BLD VENIPUNCTURE: CPT

## 2024-02-13 ENCOUNTER — ANTICOAGULATION VISIT (OUTPATIENT)
Dept: PHARMACY | Facility: HOSPITAL | Age: 84
End: 2024-02-13
Payer: MEDICARE

## 2024-02-13 DIAGNOSIS — I48.0 AF (PAROXYSMAL ATRIAL FIBRILLATION): Primary | ICD-10-CM

## 2024-02-14 RX ORDER — ATORVASTATIN CALCIUM 40 MG/1
40 TABLET, FILM COATED ORAL NIGHTLY
Qty: 90 TABLET | Refills: 0 | Status: SHIPPED | OUTPATIENT
Start: 2024-02-14

## 2024-02-23 ENCOUNTER — TRANSCRIBE ORDERS (OUTPATIENT)
Dept: ADMINISTRATIVE | Facility: HOSPITAL | Age: 84
End: 2024-02-23
Payer: MEDICARE

## 2024-02-23 ENCOUNTER — HOSPITAL ENCOUNTER (OUTPATIENT)
Dept: GENERAL RADIOLOGY | Facility: HOSPITAL | Age: 84
Discharge: HOME | End: 2024-02-23
Payer: MEDICARE

## 2024-02-23 DIAGNOSIS — M79.642 LEFT HAND PAIN: ICD-10-CM

## 2024-02-23 DIAGNOSIS — M25.532 WRIST PAIN, LEFT: ICD-10-CM

## 2024-02-23 DIAGNOSIS — M25.532 WRIST PAIN, LEFT: Primary | ICD-10-CM

## 2024-02-23 PROCEDURE — 73110 X-RAY EXAM OF WRIST: CPT

## 2024-02-23 PROCEDURE — 73130 X-RAY EXAM OF HAND: CPT

## 2024-02-26 ENCOUNTER — LAB (OUTPATIENT)
Dept: LAB | Facility: HOSPITAL | Age: 84
End: 2024-02-26
Payer: MEDICARE

## 2024-02-26 DIAGNOSIS — I48.0 AF (PAROXYSMAL ATRIAL FIBRILLATION): ICD-10-CM

## 2024-02-26 LAB
INR PPP: 3.15 (ref 0.9–1.1)
PROTHROMBIN TIME: 31.7 SECONDS (ref 12.1–15)

## 2024-02-26 PROCEDURE — 85610 PROTHROMBIN TIME: CPT

## 2024-02-26 PROCEDURE — 36415 COLL VENOUS BLD VENIPUNCTURE: CPT

## 2024-02-27 ENCOUNTER — ANTICOAGULATION VISIT (OUTPATIENT)
Dept: PHARMACY | Facility: HOSPITAL | Age: 84
End: 2024-02-27
Payer: MEDICARE

## 2024-02-27 DIAGNOSIS — I48.0 AF (PAROXYSMAL ATRIAL FIBRILLATION): Primary | ICD-10-CM

## 2024-02-27 NOTE — PROGRESS NOTES
Anticoagulation Clinic Progress Note    Anticoagulation Summary  As of 2/27/2024      INR goal:  2.0-3.0   TTR:  45.9% (1.5 y)   INR used for dosing:  3.15 (2/26/2024)   Warfarin maintenance plan:  8 mg every day   Weekly warfarin total:  56 mg   Plan last modified:  Miya Gan RPH (2/27/2024)   Next INR check:  3/11/2024   Target end date:      Indications    AF (paroxysmal atrial fibrillation) [I48.0]                 Anticoagulation Episode Summary       INR check location:      Preferred lab:      [facility]:   [facility]    Comments:  s/p AVR and CABG *[facility]*          Anticoagulation Care Providers       Provider Role Specialty Phone number    Helena Humphries, APRN Referring Cardiology [phone]    Khurram Ball MD Referring Cardiology [phone]            Clinic Interview:  Patient Findings     Negatives:  Signs/symptoms of thrombosis, Signs/symptoms of bleeding,   Laboratory test error suspected, Change in health, Change in alcohol use,   Change in activity, Upcoming invasive procedure, Emergency department   visit, Upcoming dental procedure, Missed doses, Extra doses, Change in   medications, Change in diet/appetite, Hospital admission, Bruising, Other   complaints      Clinical Outcomes     Negatives:  Major bleeding event, Thromboembolic event,   Anticoagulation-related hospital admission, Anticoagulation-related ED   visit, Anticoagulation-related fatality        INR History:      Latest Ref Rng & Units 1/15/2024     3:21 PM 1/29/2024    11:05 AM 1/29/2024    11:58 AM 2/12/2024     2:53 PM 2/13/2024     9:43 AM 2/26/2024     3:41 PM 2/27/2024     9:40 AM   Anticoagulation Monitoring   INR  3.90  2.22  1.67  3.15   INR Date  1/15/2024  1/29/2024  2/12/2024  2/26/2024   INR Goal  2.0-3.0  2.0-3.0  2.0-3.0  2.0-3.0   Trend  Same  Same  Same  Same   Last Week Total  56 mg  56 mg  56 mg  56 mg   Next Week Total  48 mg  16 mg  60 mg  52 mg   Sun  8  mg  Hold (2/4); Otherwise 8 mg  8 mg  8 mg   Mon  8 mg  12 mg (2/5); Otherwise 8 mg  8 mg  8 mg   Tue  Hold (1/16); Otherwise 8 mg  12 mg (2/6); Otherwise 8 mg  12 mg (2/13); Otherwise 8 mg  4 mg (2/27); Otherwise 8 mg   Wed  8 mg  Hold (1/31); Otherwise 8 mg  8 mg  8 mg   Thu  8 mg  Hold (2/1); Otherwise 8 mg  8 mg  8 mg   Fri  8 mg  Hold (2/2); Otherwise 8 mg  8 mg  8 mg   Sat  8 mg  Hold (2/3); Otherwise 8 mg  8 mg  8 mg   Historical INR 0.90 - 1.10  2.22   1.67   3.15         Plan:  1. INR is Supratherapeutic today- see above in Anticoagulation Summary.   Will instruct Woo Dobbs to Change their warfarin regimen- see above in Anticoagulation Summary.   Increase in pain due to being dragged by the horse- patient was seen by ED, decrease in greens  Partial to 4 mg today then resume 8 mg daily, rck 2 weeks   2. Follow up in 2 weeks  3. They have been instructed to call if any changes in medications, doses, concerns, etc. Patient expresses understanding and has no further questions at this time.    Miya Gan RP

## 2024-02-28 RX ORDER — WARFARIN SODIUM 4 MG/1
TABLET ORAL
Qty: 180 TABLET | Refills: 1 | Status: SHIPPED | OUTPATIENT
Start: 2024-02-28

## 2024-02-28 NOTE — TELEPHONE ENCOUNTER
Warfarin refill sent to [facility] per faxed renewal request.

## 2024-03-11 ENCOUNTER — ANTICOAGULATION VISIT (OUTPATIENT)
Dept: PHARMACY | Facility: HOSPITAL | Age: 84
End: 2024-03-11
Payer: MEDICARE

## 2024-03-11 ENCOUNTER — LAB (OUTPATIENT)
Dept: LAB | Facility: HOSPITAL | Age: 84
End: 2024-03-11
Payer: MEDICARE

## 2024-03-11 DIAGNOSIS — I48.0 AF (PAROXYSMAL ATRIAL FIBRILLATION): Primary | ICD-10-CM

## 2024-03-11 DIAGNOSIS — I48.0 AF (PAROXYSMAL ATRIAL FIBRILLATION): ICD-10-CM

## 2024-03-11 LAB
INR PPP: 3.22 (ref 0.9–1.1)
PROTHROMBIN TIME: 32.2 SECONDS (ref 12.1–15)

## 2024-03-11 PROCEDURE — 85610 PROTHROMBIN TIME: CPT

## 2024-03-11 PROCEDURE — 36415 COLL VENOUS BLD VENIPUNCTURE: CPT

## 2024-03-12 NOTE — PROGRESS NOTES
Anticoagulation Clinic Progress Note    Anticoagulation Summary  As of 3/11/2024      INR goal:  2.0-3.0   TTR:  44.8% (1.5 y)   INR used for dosing:  3.22 (3/11/2024)   Warfarin maintenance plan:  8 mg every day   Weekly warfarin total:  56 mg   Plan last modified:  Miya Gan RPH (2/27/2024)   Next INR check:  3/22/2024   Target end date:      Indications    AF (paroxysmal atrial fibrillation) [I48.0]                 Anticoagulation Episode Summary       INR check location:      Preferred lab:      [facility]:   [facility]    Comments:  s/p AVR and CABG *[facility]*          Anticoagulation Care Providers       Provider Role Specialty Phone number    Helena Humphries, APRN Referring Cardiology [phone]    Khurram Ball MD Referring Cardiology [phone]            Clinic Interview:  Patient Findings     Positives:  Upcoming invasive procedure    Negatives:  Signs/symptoms of thrombosis, Signs/symptoms of bleeding,   Laboratory test error suspected, Change in health, Change in alcohol use,   Change in activity, Emergency department visit, Upcoming dental procedure,   Missed doses, Extra doses, Change in medications, Change in diet/appetite,   Hospital admission, Bruising, Other complaints    Comments:  Cytoscopy on 3/18, begins holding warfarin on Wednesday 3/13.         Clinical Outcomes     Negatives:  Major bleeding event, Thromboembolic event,   Anticoagulation-related hospital admission, Anticoagulation-related ED   visit, Anticoagulation-related fatality    Comments:  Cytoscopy on 3/18, begins holding warfarin on Wednesday 3/13.           INR History:      Latest Ref Rng & Units 1/29/2024    11:58 AM 2/12/2024     2:53 PM 2/13/2024     9:43 AM 2/26/2024     3:41 PM 2/27/2024     9:40 AM 3/11/2024    11:52 AM 3/11/2024     1:36 PM   Anticoagulation Monitoring   INR  2.22  1.67  3.15  3.22   INR Date  1/29/2024  2/12/2024  2/26/2024  3/11/2024   INR Goal   2.0-3.0  2.0-3.0  2.0-3.0  2.0-3.0   Trend  Same  Same  Same  Same   Last Week Total  56 mg  56 mg  56 mg  56 mg   Next Week Total  16 mg  60 mg  52 mg  16 mg   Sun  Hold (2/4); Otherwise 8 mg  8 mg  8 mg  Hold (3/17)   Mon  12 mg (2/5); Otherwise 8 mg  8 mg  8 mg  12 mg (3/18); Otherwise 8 mg   Tue  12 mg (2/6); Otherwise 8 mg  12 mg (2/13); Otherwise 8 mg  4 mg (2/27); Otherwise 8 mg  12 mg (3/19); Otherwise 8 mg   Wed  Hold (1/31); Otherwise 8 mg  8 mg  8 mg  Hold (3/13); Otherwise 8 mg   Thu  Hold (2/1); Otherwise 8 mg  8 mg  8 mg  Hold (3/14); Otherwise 8 mg   Fri  Hold (2/2); Otherwise 8 mg  8 mg  8 mg  Hold (3/15)   Sat  Hold (2/3); Otherwise 8 mg  8 mg  8 mg  Hold (3/16)   Historical INR 0.90 - 1.10  1.67   3.15   3.22         Plan:  1. INR is Supratherapeutic today- see above in Anticoagulation Summary.   Will instruct Woo Dobbs to Change their warfarin regimen- see above in Anticoagulation Summary.  2. Follow up in 1.5 weeks  3. They have been instructed to call if any changes in medications, doses, concerns, etc. Patient expresses understanding and has no further questions at this time.    Key Rock, PharmD

## 2024-03-13 ENCOUNTER — PRE-ADMISSION TESTING (OUTPATIENT)
Dept: PREADMISSION TESTING | Facility: HOSPITAL | Age: 84
End: 2024-03-13
Payer: MEDICARE

## 2024-03-13 VITALS
BODY MASS INDEX: 23.79 KG/M2 | WEIGHT: 157 LBS | OXYGEN SATURATION: 97 % | HEIGHT: 68 IN | SYSTOLIC BLOOD PRESSURE: 150 MMHG | HEART RATE: 68 BPM | DIASTOLIC BLOOD PRESSURE: 72 MMHG

## 2024-03-13 LAB
ANION GAP SERPL CALCULATED.3IONS-SCNC: 10 MMOL/L (ref 5–15)
BUN SERPL-MCNC: 18 MG/DL (ref 8–23)
BUN/CREAT SERPL: 18 (ref 7–25)
CALCIUM SPEC-SCNC: 8.2 MG/DL (ref 8.6–10.5)
CHLORIDE SERPL-SCNC: 102 MMOL/L (ref 98–107)
CO2 SERPL-SCNC: 23 MMOL/L (ref 22–29)
CREAT SERPL-MCNC: 1 MG/DL (ref 0.76–1.27)
DEPRECATED RDW RBC AUTO: 53.5 FL (ref 37–54)
EGFRCR SERPLBLD CKD-EPI 2021: 74.7 ML/MIN/1.73
ERYTHROCYTE [DISTWIDTH] IN BLOOD BY AUTOMATED COUNT: 16.3 % (ref 12.3–15.4)
GLUCOSE SERPL-MCNC: 219 MG/DL (ref 65–99)
HCT VFR BLD AUTO: 33.5 % (ref 37.5–51)
HGB BLD-MCNC: 10.8 G/DL (ref 13–17.7)
MCH RBC QN AUTO: 29.2 PG (ref 26.6–33)
MCHC RBC AUTO-ENTMCNC: 32.2 G/DL (ref 31.5–35.7)
MCV RBC AUTO: 90.5 FL (ref 79–97)
PLATELET # BLD AUTO: 281 10*3/MM3 (ref 140–450)
PMV BLD AUTO: 9.6 FL (ref 6–12)
POTASSIUM SERPL-SCNC: 4.2 MMOL/L (ref 3.5–5.2)
RBC # BLD AUTO: 3.7 10*6/MM3 (ref 4.14–5.8)
SODIUM SERPL-SCNC: 135 MMOL/L (ref 136–145)
WBC NRBC COR # BLD AUTO: 6.26 10*3/MM3 (ref 3.4–10.8)

## 2024-03-13 PROCEDURE — 36415 COLL VENOUS BLD VENIPUNCTURE: CPT

## 2024-03-13 PROCEDURE — 80048 BASIC METABOLIC PNL TOTAL CA: CPT | Performed by: UROLOGY

## 2024-03-13 PROCEDURE — 85027 COMPLETE CBC AUTOMATED: CPT | Performed by: UROLOGY

## 2024-03-13 RX ORDER — LANOLIN ALCOHOL/MO/W.PET/CERES
325 CREAM (GRAM) TOPICAL
COMMUNITY
Start: 2024-02-20

## 2024-03-13 NOTE — PAT
Pt here for PAT visit.  Pre-op tests completed, chg soap given, and instructions reviewed.  Instructed clears until 2 hrs prior to arrival time, voiced understanding. Last Dose of Coumadin 3/12 per Dr Ball and anticoagulation clinic

## 2024-03-13 NOTE — DISCHARGE INSTRUCTIONS
PRE-ADMISSION TESTING INSTRUCTIONS FOR ADULTS    Take these medications the morning of surgery with a small sip of water:  Levothyroxine, Atenolol,  Omeprazole      Do not take any insulin or diabetes medications the morning of surgery.      No aspirin, advil, aleve, ibuprofen, naproxen, diet pills, decongestants, or herbal/vitamins for a week prior to surgery.       Tylenol/Acetaminophen is okay to take if needed.    General Instructions:    DO NOT EAT SOLID FOOD AFTER MIDNIGHT THE NIGHT BEFORE SURGERY. No gum, mints, or hard candy after midnight the night before surgery.  You may drink clear liquids the day of surgery up until 2 hours before your arrival time.  Clear liquids are liquids you can see through. Nothing RED in color.    Plain water    Sports drinks      Gelatin (Jell-O)  Fruit juices without pulp such as white grape juice and apple juice  Popsicles that contain no fruit or yogurt  Tea or coffee (no cream or milk added)    It is beneficial for you to have a clear drink that contains carbohydrates 2 hours before your arrival time.  We suggest a 20 ounce bottle of Gatorade or Powerade for non-diabetic patients or a 20 ounce bottle of Gatorade Zero or Powerade Zero for diabetic patients.     Patients who avoid smoking, chewing tobacco and alcohol for 4 weeks prior to surgery have a reduced risk of post-operative complications.  If at all possible, quit smoking as many days before surgery as you can.    Do not smoke, use chewing tobacco or drink alcohol the day of surgery    Bring your C-PAP/ BI-PAP machine if you use one.  Wear clean comfortable clothes.  Do not wear contact lenses, lotion, deodorant, or make-up.  Bring a case for your glasses if applicable. You may brush your teeth the morning of surgery.  You may wear dentures/partials, do not put adhesive/glue on them.  Leave all other jewelry and valuables at home.      Preventing a Surgical Site Infection:    Shower the night before and on the morning  of surgery using the chlorhexidine soap you were given.  Use a clean washcloth with the soap.  Place clean sheets on your bed after showering the night before surgery. Do not use the CHG soap on your hair, face, or private areas. Wash your body gently for five (5) minutes. Do not scrub your skin.  Dry with a clean towel and dress in clean clothing.  Do not shave the surgical area for 10 days-2 weeks prior to surgery  because the razor can irritate skin and make it easier to develop an infection.  Make sure you, your family, and all healthcare providers clean their hands with soap and water or an alcohol based hand  before caring for you or your wound.      Day of surgery:    Your surgeon’s office will advise you of your arrival time for the day of surgery.    Upon arrival, a Pre-op nurse and Anesthesia provider will review your health history, obtain vital signs, and answer questions you may have. The anesthesia provider will also discuss the type of anesthesia that will be needed for your procedure, which may include general anesthesia. The only belongings needed at this time will be your home medications and if applicable your C-PAP/BI-PAP machine.  If you are staying overnight your family can leave the rest of your belongings in the car and bring them to your room later.  A Pre-op nurse will start an IV and you may receive medication in preparation for surgery, including something to help you relax.  Your family will be able to see you in the Pre-op area.  While you are in surgery your family should notify the waiting room  if they leave the waiting room area and provide a contact phone number.    IF you have any questions, you can call the Pre-Admission Department at [phone] or your surgeon's office.  Notify your surgeon if  you become sick, have a fever, productive cough, or cannot be here the day of surgery    Please be aware that surgery does come with discomfort.  We want to make  every effort to control your discomfort so please discuss any uncontrolled symptoms with your nurse.   Your doctor will most likely have prescribed pain medications.      If you are going home after surgery, you will receive individualized written care instructions before being discharged.  A responsible adult (over the age of 18) must drive you to and from the hospital on the day of your surgery and stay with you for 24 hours after anesthesia.    If you are staying overnight following surgery, you will be transported to your hospital room following the recovery period.  [facility] has all private rooms.    You may receive a survey regarding the care you received. Your feedback is very important and will be used to collect the necessary data to help us to continue to provide excellent care.     Deductibles and co-payments are collected on the day of service. Please be prepared to pay the required co-pay, deductible or deposit on the day of service as defined by your plan.

## 2024-03-15 ENCOUNTER — ANESTHESIA EVENT (OUTPATIENT)
Dept: PERIOP | Facility: HOSPITAL | Age: 84
End: 2024-03-15
Payer: MEDICARE

## 2024-03-15 ENCOUNTER — ANTICOAGULATION VISIT (OUTPATIENT)
Dept: PHARMACY | Facility: HOSPITAL | Age: 84
End: 2024-03-15
Payer: MEDICARE

## 2024-03-15 ENCOUNTER — LAB (OUTPATIENT)
Dept: LAB | Facility: HOSPITAL | Age: 84
End: 2024-03-15
Payer: MEDICARE

## 2024-03-15 DIAGNOSIS — I48.0 AF (PAROXYSMAL ATRIAL FIBRILLATION): Primary | ICD-10-CM

## 2024-03-15 DIAGNOSIS — I48.0 AF (PAROXYSMAL ATRIAL FIBRILLATION): ICD-10-CM

## 2024-03-15 LAB
INR PPP: 1.38 (ref 0.9–1.1)
PROTHROMBIN TIME: 16.9 SECONDS (ref 12.1–15)

## 2024-03-15 PROCEDURE — 85610 PROTHROMBIN TIME: CPT

## 2024-03-15 PROCEDURE — 36415 COLL VENOUS BLD VENIPUNCTURE: CPT

## 2024-03-15 NOTE — H&P
[facility] History [facility] Physical    Patient Care Team:  Agustín Ivan MD as PCP - General (Family Medicine)  Jr Gentry Courtney MD as Surgeon (Cardiothoracic Surgery)    Chief complaint abnormal urine    Subjective     Patient is a 83 y.o. male presents with Gray history of hematuria with a TCC of the left hemitrigone cystoscopy is negative however cytology has been positive he is undergo further evaluation no gross hematuria      Review of Systems   Pertinent items are noted in HPI    Past Medical History:   Diagnosis Date    Aortic stenosis     8/2022: tissue AVR (25mm MDT Avalus bovine pericardial)    Asthma     BPH (benign prostatic hyperplasia)     CAD (coronary artery disease)     7/2022: 95% prox/50% distal LAD, 80% D1, 30-40% Cx, diffuse dz RCA. CABG x 3 8/2022: LIMA-LAD, SVG-D1, SVG-RCA    Carotid atherosclerosis, bilateral     8/2022: 16-49% bilaterally    Colon polyp     Disease of thyroid gland     GERD (gastroesophageal reflux disease)     Hyperlipidemia     Hypertension     Paroxysmal atrial fibrillation     Type 2 diabetes mellitus     Warfarin anticoagulation      Past Surgical History:   Procedure Laterality Date    CARDIAC CATHETERIZATION N/A 7/14/2022    Procedure: Coronary angiography;  Surgeon: Unique Calabrese MD;  Location:  [facility];  Service: Cardiovascular;  Laterality: N/A;    CARDIAC CATHETERIZATION N/A 7/14/2022    Procedure: Left heart cath with simultaneous LV/Ao pressures;  Surgeon: Unique Calabrese MD;  Location:  [facility];  Service: Cardiovascular;  Laterality: N/A;    CARDIAC CATHETERIZATION N/A 7/14/2022    Procedure: Right Heart Cath;  Surgeon: Unique Calabrese MD;  Location: [facility];  Service: Cardiovascular;  Laterality: N/A;    COLONOSCOPY      CORONARY ARTERY BYPASS GRAFT WITH AORTIC VALVE REPAIR/REPLACEMENT N/A 8/8/2022    Procedure: KEVIN STERNOTOMY CORONARY ARTERY BYPASS GRAFT TIMES 3 USING LEFT  INTERNAL MAMMARY ARTERY AND LEFT GREATER SAPHENOUS VEIN GRAFT PER ENDOSCOPIC VEIN HARVESTING, AORTIC VALVE REPLACEMENT AND PRP;  Surgeon: Jr Gentry Courtney MD;  Location: [facility];  Service: Cardiothoracic;  Laterality: N/A;    TRANSURETHRAL RESECTION OF BLADDER TUMOR N/A 1/23/2023    Procedure: TRANSURETHRAL RESECTION OF SMALL BLADDER TUMOR;  Surgeon: Nimesh Arvizu MD;  Location:  [facility];  Service: Urology;  Laterality: N/A;    VASECTOMY       Family History   Problem Relation Age of Onset    Colon cancer Neg Hx     Colon polyps Neg Hx     Malig Hyperthermia Neg Hx      Social History     Tobacco Use    Smoking status: Never    Smokeless tobacco: Never    Tobacco comments:     caffeine use: 1 -2 cups daily.    Vaping Use    Vaping status: Never Used   Substance Use Topics    Alcohol use: No    Drug use: No       Meds:  No medications prior to admission.       Allergies:  Loratadine    Debilities:  None    Objective     Vital Signs     No intake or output data in the 24 hours ending 03/15/24 1611       Physical Exam:      General Appearance:  Alert, cooperative, in no acute distress   Head:  Normocephalic, without obvious abnormality, atraumatic   Eyes:  Lids and lashes normal, conjunctivae and sclerae normal, no icterus, no pallor, corneas clear   Ears:  Ears appear intact with no abnormalities noted   Throat:     Neck:  No adenopathy, supple, trachea midline, no thyromegaly, no JVD   Back:  No kyphosis present, no scoliosis present, no skin lesions, erythema or scars, no tenderness to percussion or palpation, range of motion normal   Lungs:  respirations regular, even and unlabored    Heart:  Regular rhythm and normal rate   Chest Wall:  No abnormalities observed   Abdomen:  no masses, no organomegaly, soft non-tender, non-distended, no guarding, no rebound tenderness   Rectal:  No masses prostate is palpably normal nontender   Extremities:     Pulses:     Skin:     Lymph nodes:     Neurologic:                                                                                Results Review:    I reviewed the patient's new clinical results.  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     Nimesh Arvizu MD  03/15/24  16:11 EDT

## 2024-03-18 ENCOUNTER — APPOINTMENT (OUTPATIENT)
Dept: GENERAL RADIOLOGY | Facility: HOSPITAL | Age: 84
End: 2024-03-18
Payer: MEDICARE

## 2024-03-18 ENCOUNTER — HOSPITAL ENCOUNTER (OUTPATIENT)
Facility: HOSPITAL | Age: 84
Setting detail: HOSPITAL OUTPATIENT SURGERY
Discharge: HOME | End: 2024-03-18
Attending: UROLOGY | Admitting: UROLOGY
Payer: MEDICARE

## 2024-03-18 ENCOUNTER — ANESTHESIA (OUTPATIENT)
Dept: PERIOP | Facility: HOSPITAL | Age: 84
End: 2024-03-18
Payer: MEDICARE

## 2024-03-18 VITALS
HEART RATE: 51 BPM | OXYGEN SATURATION: 96 % | TEMPERATURE: 97.6 F | DIASTOLIC BLOOD PRESSURE: 75 MMHG | RESPIRATION RATE: 18 BRPM | BODY MASS INDEX: 23.81 KG/M2 | WEIGHT: 156.6 LBS | SYSTOLIC BLOOD PRESSURE: 157 MMHG

## 2024-03-18 DIAGNOSIS — C67.4 CANCER OF POSTERIOR WALL OF URINARY BLADDER: ICD-10-CM

## 2024-03-18 DIAGNOSIS — R82.89 ABNORMAL URINE CYTOLOGY: Primary | ICD-10-CM

## 2024-03-18 LAB — GLUCOSE BLDC GLUCOMTR-MCNC: 144 MG/DL (ref 70–130)

## 2024-03-18 PROCEDURE — 25010000002 ONDANSETRON PER 1 MG: Performed by: NURSE ANESTHETIST, CERTIFIED REGISTERED

## 2024-03-18 PROCEDURE — 74420 UROGRAPHY RTRGR +-KUB: CPT

## 2024-03-18 PROCEDURE — 88305 TISSUE EXAM BY PATHOLOGIST: CPT | Performed by: UROLOGY

## 2024-03-18 PROCEDURE — 25010000002 DEXAMETHASONE PER 1 MG: Performed by: NURSE ANESTHETIST, CERTIFIED REGISTERED

## 2024-03-18 PROCEDURE — 25010000002 PROPOFOL 10 MG/ML EMULSION: Performed by: NURSE ANESTHETIST, CERTIFIED REGISTERED

## 2024-03-18 PROCEDURE — 82948 REAGENT STRIP/BLOOD GLUCOSE: CPT

## 2024-03-18 PROCEDURE — 25510000001 IOPAMIDOL 61 % SOLUTION: Performed by: UROLOGY

## 2024-03-18 PROCEDURE — 88112 CYTOPATH CELL ENHANCE TECH: CPT | Performed by: UROLOGY

## 2024-03-18 PROCEDURE — 25810000003 LACTATED RINGERS PER 1000 ML: Performed by: NURSE ANESTHETIST, CERTIFIED REGISTERED

## 2024-03-18 PROCEDURE — 25010000002 FENTANYL CITRATE (PF) 50 MCG/ML SOLUTION: Performed by: NURSE ANESTHETIST, CERTIFIED REGISTERED

## 2024-03-18 PROCEDURE — C1758 CATHETER, URETERAL: HCPCS | Performed by: UROLOGY

## 2024-03-18 PROCEDURE — 25010000002 MIDAZOLAM PER 1MG: Performed by: NURSE ANESTHETIST, CERTIFIED REGISTERED

## 2024-03-18 PROCEDURE — C1769 GUIDE WIRE: HCPCS | Performed by: UROLOGY

## 2024-03-18 PROCEDURE — 25010000002 CEFAZOLIN PER 500 MG: Performed by: UROLOGY

## 2024-03-18 RX ORDER — LIDOCAINE HYDROCHLORIDE 20 MG/ML
INJECTION, SOLUTION INFILTRATION; PERINEURAL AS NEEDED
Status: DISCONTINUED | OUTPATIENT
Start: 2024-03-18 | End: 2024-03-18 | Stop reason: SURG

## 2024-03-18 RX ORDER — FAMOTIDINE 10 MG/ML
20 INJECTION, SOLUTION INTRAVENOUS
Status: COMPLETED | OUTPATIENT
Start: 2024-03-18 | End: 2024-03-18

## 2024-03-18 RX ORDER — FENTANYL CITRATE 50 UG/ML
INJECTION, SOLUTION INTRAMUSCULAR; INTRAVENOUS AS NEEDED
Status: DISCONTINUED | OUTPATIENT
Start: 2024-03-18 | End: 2024-03-18 | Stop reason: SURG

## 2024-03-18 RX ORDER — ONDANSETRON 2 MG/ML
4 INJECTION INTRAMUSCULAR; INTRAVENOUS ONCE AS NEEDED
Status: COMPLETED | OUTPATIENT
Start: 2024-03-18 | End: 2024-03-18

## 2024-03-18 RX ORDER — SODIUM CHLORIDE 0.9 % (FLUSH) 0.9 %
10 SYRINGE (ML) INJECTION AS NEEDED
Status: DISCONTINUED | OUTPATIENT
Start: 2024-03-18 | End: 2024-03-18 | Stop reason: HOSPADM

## 2024-03-18 RX ORDER — MIDAZOLAM HYDROCHLORIDE 2 MG/2ML
0.5 INJECTION, SOLUTION INTRAMUSCULAR; INTRAVENOUS
Status: DISCONTINUED | OUTPATIENT
Start: 2024-03-18 | End: 2024-03-18 | Stop reason: SDUPTHER

## 2024-03-18 RX ORDER — SODIUM CHLORIDE, SODIUM LACTATE, POTASSIUM CHLORIDE, CALCIUM CHLORIDE 600; 310; 30; 20 MG/100ML; MG/100ML; MG/100ML; MG/100ML
9 INJECTION, SOLUTION INTRAVENOUS CONTINUOUS PRN
Status: DISCONTINUED | OUTPATIENT
Start: 2024-03-18 | End: 2024-03-18 | Stop reason: HOSPADM

## 2024-03-18 RX ORDER — MAGNESIUM HYDROXIDE 1200 MG/15ML
LIQUID ORAL AS NEEDED
Status: DISCONTINUED | OUTPATIENT
Start: 2024-03-18 | End: 2024-03-18 | Stop reason: HOSPADM

## 2024-03-18 RX ORDER — SODIUM CHLORIDE 9 MG/ML
40 INJECTION, SOLUTION INTRAVENOUS AS NEEDED
Status: DISCONTINUED | OUTPATIENT
Start: 2024-03-18 | End: 2024-03-18 | Stop reason: HOSPADM

## 2024-03-18 RX ORDER — SODIUM CHLORIDE 0.9 % (FLUSH) 0.9 %
10 SYRINGE (ML) INJECTION EVERY 12 HOURS SCHEDULED
Status: DISCONTINUED | OUTPATIENT
Start: 2024-03-18 | End: 2024-03-18 | Stop reason: HOSPADM

## 2024-03-18 RX ORDER — LIDOCAINE HYDROCHLORIDE 20 MG/ML
JELLY TOPICAL AS NEEDED
Status: DISCONTINUED | OUTPATIENT
Start: 2024-03-18 | End: 2024-03-18 | Stop reason: HOSPADM

## 2024-03-18 RX ORDER — ONDANSETRON 2 MG/ML
4 INJECTION INTRAMUSCULAR; INTRAVENOUS ONCE AS NEEDED
Status: DISCONTINUED | OUTPATIENT
Start: 2024-03-18 | End: 2024-03-18 | Stop reason: SDUPTHER

## 2024-03-18 RX ORDER — PROPOFOL 10 MG/ML
VIAL (ML) INTRAVENOUS AS NEEDED
Status: DISCONTINUED | OUTPATIENT
Start: 2024-03-18 | End: 2024-03-18 | Stop reason: SURG

## 2024-03-18 RX ORDER — EPHEDRINE SULFATE 50 MG/ML
INJECTION INTRAVENOUS AS NEEDED
Status: DISCONTINUED | OUTPATIENT
Start: 2024-03-18 | End: 2024-03-18 | Stop reason: SURG

## 2024-03-18 RX ORDER — DEXAMETHASONE SODIUM PHOSPHATE 4 MG/ML
4 INJECTION, SOLUTION INTRA-ARTICULAR; INTRALESIONAL; INTRAMUSCULAR; INTRAVENOUS; SOFT TISSUE ONCE AS NEEDED
Status: COMPLETED | OUTPATIENT
Start: 2024-03-18 | End: 2024-03-18

## 2024-03-18 RX ORDER — MIDAZOLAM HYDROCHLORIDE 2 MG/2ML
0.5 INJECTION, SOLUTION INTRAMUSCULAR; INTRAVENOUS
Status: DISCONTINUED | OUTPATIENT
Start: 2024-03-18 | End: 2024-03-18 | Stop reason: HOSPADM

## 2024-03-18 RX ADMIN — MIDAZOLAM HYDROCHLORIDE 0.5 MG: 1 INJECTION, SOLUTION INTRAMUSCULAR; INTRAVENOUS at 12:55

## 2024-03-18 RX ADMIN — LIDOCAINE HYDROCHLORIDE 100 MG: 20 INJECTION, SOLUTION INFILTRATION; PERINEURAL at 13:10

## 2024-03-18 RX ADMIN — SODIUM CHLORIDE, POTASSIUM CHLORIDE, SODIUM LACTATE AND CALCIUM CHLORIDE 9 ML/HR: 600; 310; 30; 20 INJECTION, SOLUTION INTRAVENOUS at 11:29

## 2024-03-18 RX ADMIN — CEFAZOLIN 2000 MG: 2 INJECTION, POWDER, FOR SOLUTION INTRAVENOUS at 13:10

## 2024-03-18 RX ADMIN — FAMOTIDINE 20 MG: 10 INJECTION, SOLUTION INTRAVENOUS at 11:30

## 2024-03-18 RX ADMIN — FENTANYL CITRATE 25 MCG: 50 INJECTION, SOLUTION INTRAMUSCULAR; INTRAVENOUS at 13:25

## 2024-03-18 RX ADMIN — PROPOFOL 200 MG: 10 INJECTION, EMULSION INTRAVENOUS at 13:10

## 2024-03-18 RX ADMIN — ONDANSETRON 4 MG: 2 INJECTION INTRAMUSCULAR; INTRAVENOUS at 11:30

## 2024-03-18 RX ADMIN — EPHEDRINE SULFATE 5 MG: 50 INJECTION INTRAVENOUS at 13:32

## 2024-03-18 RX ADMIN — DEXAMETHASONE SODIUM PHOSPHATE 4 MG: 4 INJECTION, SOLUTION INTRAMUSCULAR; INTRAVENOUS at 11:30

## 2024-03-18 NOTE — ANESTHESIA PROCEDURE NOTES
Airway  Urgency: elective    Date/Time: 3/18/2024 1:12 PM  Airway not difficult    General Information and Staff    Patient location during procedure: OR    Indications and Patient Condition  Indications for airway management: airway protection    Preoxygenated: yes  Mask difficulty assessment: 1 - vent by mask    Final Airway Details  Final airway type: supraglottic airway      Successful airway: unique  Size 4     Number of attempts at approach: 1  Assessment: lips, teeth, and gum same as pre-op

## 2024-03-18 NOTE — BRIEF OP NOTE
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    A Ureter, Right Urine NON-GYNECOLOGIC CYTOLOGY   Nimesh Arvizu MD 3/18/24 1340     B Urinary Bladder Tissue TISSUE PATHOLOGY EXAM   Nimesh Arvizu MD 3/18/24 1331     Description: posterior bladder wall    C Ureter, Left Urine NON-GYNECOLOGIC CYTOLOGY   Nimesh Arvizu MD 3/18/24 1340                   Drains:   Urethral Catheter Non-latex;Straight-tip 18 Fr. (Active)       Findings: Normal retrograde pyelogram cytologies taken bladder showing bilateral and middle lobe obstruction no signs of recurrence in the bladder with mild trabeculations no hyperemia bladder tumors or involvement of the prostatic urethra urine cytologies taken from right and left upper tracts        Complications:           Nimesh Arvizu MD     Date: 3/18/2024  Time: 13:57 EDT

## 2024-03-18 NOTE — OP NOTE
Preoperative diagnosis history of bladder cancer with positive urinary cytology underlying BPH    Postoperative diagnosis mild bladder trabeculations bilateral and middle lobe prostate obstruction normal retrograde pyelograms with mild J hooking consistent with prostatic enlargement    Operative procedure cystoscopy bilateral retrograde pyelograms with interpretation and ureteral cytologies by raya bladder biopsy    Surgeon [facility] General    Procedure note Gray is an 83-year-old gentleman with above-mentioned history and findings most recent cystoscopic examination was negative except urine sent showed positive cells consistent with recurrence of transitional cell carcinoma he is undergo further evaluation he has known mildly symptomatic prostatic obstruction on Flomax    Timeout was taken COVID precautions allergies reviewed after under general anesthesia was placed very carefully in a modified dorsolithotomy position antibiotics have been given    He was prepped and draped sterile fashion genitalia 2% intraurethral lidocaine jelly is administered scope advanced bladder urethra was normal prostatic channel self as well 5 cm in length obstructing bilateral middle lobes the bladder was adequately emptied friable middle lobe mild trabeculations no discrete tumors were seen ureteral orifice ease were displaced as mentioned posteriorly ureteral E flux clear left then right urine barbotage with saline cytologies were taken with retrograde pyelogram showing no hydronephrosis and excellent emptying on each side and no filling defects    Biopsy was taken of the posterior bladder wall the only area that showed any possibility of erythema this area is then subsequent cauterized previous tumors have been resected from the trigone the trigone despite being displaced posteriorly appears to be normal there is no shaggy rug appearance to suggest CIS or flat TCC    The bladder is a partially filled the  patient's procedure well was sent to the recovery in satisfactory condition the findings to be discussed with his wife    Disposition voiding trial before discharge will call me if there is any problems with incomplete emptying no prescriptions written by me he is remain off anticoagulation therapy till urine is clear for 24 hours my office a call for follow-up for the results of the biopsies in about a week's time continuation of the Flomax

## 2024-03-18 NOTE — ANESTHESIA PREPROCEDURE EVALUATION
Anesthesia Evaluation     Patient summary reviewed and Nursing notes reviewed   no history of anesthetic complications:   NPO Solid Status: > 8 hours  NPO Liquid Status: > 2 hours           Airway   Mallampati: II  TM distance: >3 FB  Neck ROM: full  No difficulty expected  Dental          Pulmonary - normal exam    breath sounds clear to auscultation  (+) asthma (no inhaler use for years),recent URI (3 weeks ago) resolved  (-) not a smoker  Cardiovascular   Exercise tolerance: good (4-7 METS)    ECG reviewed  PT is on anticoagulation therapy  Patient on routine beta blocker and Beta blocker given within 24 hours of surgery  Rhythm: regular    (+) hypertension well controlled less than 2 medications, valvular problems/murmurs (resolved with replacement 8/22) AS, CAD, CABG >6 Months, dysrhythmias Paroxysmal Atrial Fib, OTTO (mild), hyperlipidemia,  carotid artery disease (16-49%) carotid bilateral  (-) past MI, angina    ROS comment: ECG 12 Lead Date/Time: 11/1/2022 3:34 PM     Comparison: compared with previous ECG   Similar to previous ECG   Rhythm: sinus bradycardia   Conduction: conduction normal   T Waves: T waves normal   QRS axis: normal   Other findings: left ventricular hypertrophy with strain     TTE 1/11/22  Left ventricular systolic function is normal. Calculated left ventricular EF = 61.6%  ·  Left ventricular diastolic function is consistent with (grade II w/high LAP) pseudonormalization.  ·  Aortic valve area is 1.5 cm2.  ·  Peak velocity of the flow distal to the aortic valve is 232.6 cm/s. Aortic valve mean pressure gradient is 12 mmHg.  ·  There is a bioprosthetic aortic valve present.  ·  Estimated right ventricular systolic pressure from tricuspid regurgitation is normal (<35 mmHg).      Neuro/Psych- negative ROS  GI/Hepatic/Renal/Endo    (+) GERD well controlled, diabetes mellitus type 2 well controlled, thyroid problem hypothyroidism    ROS Comment: BPH with urinary hesitancy    Musculoskeletal    Arthralgias: left hip pain with excess movement.  Abdominal  - normal exam    Abdomen: soft.   Substance History - negative use     OB/GYN          Other   arthritis,     ROS/Med Hx Other: Recent steroids with COVID 1 month ago                    Anesthesia Plan    ASA 3     general     intravenous induction     Anesthetic plan, risks, benefits, and alternatives have been provided, discussed and informed consent has been obtained with: patient and spouse/significant other.    Use of blood products discussed with patient and spouse/significant other  Consented to blood products.    Plan discussed with CRNA.        CODE STATUS:

## 2024-03-18 NOTE — INTERVAL H&P NOTE
H&P reviewed. The patient was examined and there are no changes to the H&P.      /88 (BP Location: Right arm, Patient Position: Sitting)   Pulse 54   Temp 97.5 °F (36.4 °C) (Oral)   Resp 14   Wt 71 kg (156 lb 9.6 oz)   SpO2 97%   BMI 23.81 kg/m²

## 2024-03-18 NOTE — ANESTHESIA POSTPROCEDURE EVALUATION
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        Post Anesthesia Care and Evaluation    Patient location during evaluation: bedside  Patient participation: complete - patient participated  Level of consciousness: awake and alert  Pain score: 2  Pain management: adequate    Airway patency: patent  Anesthetic complications: No anesthetic complications  PONV Status: none  Cardiovascular status: acceptable  Respiratory status: acceptable  Hydration status: acceptable

## 2024-03-20 LAB
CYTO UR: NORMAL
LAB AP CASE REPORT: NORMAL
LAB AP CASE REPORT: NORMAL
PATH REPORT.FINAL DX SPEC: NORMAL
PATH REPORT.FINAL DX SPEC: NORMAL
PATH REPORT.GROSS SPEC: NORMAL
PATH REPORT.GROSS SPEC: NORMAL

## 2024-03-25 ENCOUNTER — LAB (OUTPATIENT)
Dept: LAB | Facility: HOSPITAL | Age: 84
End: 2024-03-25
Payer: MEDICARE

## 2024-03-25 DIAGNOSIS — I48.0 AF (PAROXYSMAL ATRIAL FIBRILLATION): ICD-10-CM

## 2024-03-25 LAB
INR PPP: 2.53 (ref 0.9–1.1)
PROTHROMBIN TIME: 26.8 SECONDS (ref 12.1–15)

## 2024-03-25 PROCEDURE — 36415 COLL VENOUS BLD VENIPUNCTURE: CPT

## 2024-03-25 PROCEDURE — 85610 PROTHROMBIN TIME: CPT

## 2024-03-26 ENCOUNTER — ANTICOAGULATION VISIT (OUTPATIENT)
Dept: PHARMACY | Facility: HOSPITAL | Age: 84
End: 2024-03-26
Payer: MEDICARE

## 2024-03-26 DIAGNOSIS — I48.0 AF (PAROXYSMAL ATRIAL FIBRILLATION): Primary | ICD-10-CM

## 2024-03-26 NOTE — PROGRESS NOTES
Anticoagulation Clinic Progress Note    Anticoagulation Summary  As of 3/26/2024      INR goal:  2.0-3.0   TTR:  44.9% (1.6 y)   INR used for dosin.53 (3/25/2024)   Warfarin maintenance plan:  8 mg every day   Weekly warfarin total:  56 mg   No change documented:  [place], PharmD   Plan last modified:  Miya Gan RPH (2024)   Next INR check:  2024   Target end date:      Indications    AF (paroxysmal atrial fibrillation) [I48.0]                 Anticoagulation Episode Summary       INR check location:      Preferred lab:      Send INR reminders to:   [facility]    Comments:  s/p AVR and CABG *[facility]*          Anticoagulation Care Providers       Provider Role Specialty Phone number    Helena Humphries, DEV Referring Cardiology [phone]    Khurram Ball MD Referring Cardiology [phone]            Clinic Interview:  Patient Findings     Negatives:  Signs/symptoms of thrombosis, Signs/symptoms of bleeding,   Laboratory test error suspected, Change in health, Change in alcohol use,   Change in activity, Upcoming invasive procedure, Emergency department   visit, Upcoming dental procedure, Missed doses, Extra doses, Change in   medications, Change in diet/appetite, Hospital admission, Bruising, Other   complaints      Clinical Outcomes     Negatives:  Major bleeding event, Thromboembolic event,   Anticoagulation-related hospital admission, Anticoagulation-related ED   visit, Anticoagulation-related fatality        INR History:      Latest Ref Rng & Units 3/11/2024    11:52 AM 3/11/2024     1:36 PM 3/15/2024     2:20 PM 3/15/2024     2:27 PM 3/15/2024     3:00 PM 3/25/2024     3:46 PM 3/26/2024     9:17 AM   Anticoagulation Monitoring   INR   3.22     2.53   INR Date   3/11/2024     3/25/2024   INR Goal   2.0-3.0 2.0-3.0  2.0-3.0  2.0-3.0   Trend   Same     Same   Last Week Total   56 mg     60 mg   Next Week Total   16 mg     56 mg   Sun   Hold  (3/17)     8 mg   Mon   12 mg (3/18); Otherwise 8 mg     8 mg   Tue   12 mg (3/19); Otherwise 8 mg     8 mg   Wed   Hold (3/13); Otherwise 8 mg     8 mg   Thu   Hold (3/14); Otherwise 8 mg     8 mg   Fri   Hold (3/15)     8 mg   Sat   Hold (3/16)     8 mg   Historical INR 0.90 - 1.10 3.22    1.38   2.53         Plan:  1. INR is Therapeutic today- see above in Anticoagulation Summary.   Will instruct Woo Dobbs to Continue their warfarin regimen- see above in Anticoagulation Summary.  2. Follow up in 2 weeks  3. They have been instructed to call if any changes in medications, doses, concerns, etc. Patient expresses understanding and has no further questions at this time.    [place], PharmD

## 2024-04-08 ENCOUNTER — ANTICOAGULATION VISIT (OUTPATIENT)
Dept: PHARMACY | Facility: HOSPITAL | Age: 84
End: 2024-04-08
Payer: MEDICARE

## 2024-04-08 ENCOUNTER — LAB (OUTPATIENT)
Dept: LAB | Facility: HOSPITAL | Age: 84
End: 2024-04-08
Payer: MEDICARE

## 2024-04-08 DIAGNOSIS — I48.0 AF (PAROXYSMAL ATRIAL FIBRILLATION): Primary | ICD-10-CM

## 2024-04-08 DIAGNOSIS — I48.0 AF (PAROXYSMAL ATRIAL FIBRILLATION): ICD-10-CM

## 2024-04-08 LAB
INR PPP: 2.69 (ref 0.9–1.1)
PROTHROMBIN TIME: 28.1 SECONDS (ref 12.1–15)

## 2024-04-08 PROCEDURE — 85610 PROTHROMBIN TIME: CPT

## 2024-04-08 PROCEDURE — 36415 COLL VENOUS BLD VENIPUNCTURE: CPT

## 2024-04-09 NOTE — PROGRESS NOTES
Anticoagulation Clinic Progress Note    Anticoagulation Summary  As of 2024      INR goal:  2.0-3.0   TTR:  46.1% (1.6 y)   INR used for dosin.69 (2024)   Warfarin maintenance plan:  8 mg every day   Weekly warfarin total:  56 mg   No change documented:  [place], PharmD   Plan last modified:  Miya Gan RPH (2024)   Next INR check:  2024   Target end date:      Indications    AF (paroxysmal atrial fibrillation) [I48.0]                 Anticoagulation Episode Summary       INR check location:      Preferred lab:      Send INR reminders to:   [facility]    Comments:  s/p AVR and CABG *[facility]*          Anticoagulation Care Providers       Provider Role Specialty Phone number    Helena Humphries, DEV Referring Cardiology [phone]    Khurram Ball MD Referring Cardiology [phone]            Clinic Interview:  Patient Findings     Negatives:  Signs/symptoms of thrombosis, Signs/symptoms of bleeding,   Laboratory test error suspected, Change in health, Change in alcohol use,   Change in activity, Upcoming invasive procedure, Emergency department   visit, Upcoming dental procedure, Missed doses, Extra doses, Change in   medications, Change in diet/appetite, Hospital admission, Bruising, Other   complaints      Clinical Outcomes     Negatives:  Major bleeding event, Thromboembolic event,   Anticoagulation-related hospital admission, Anticoagulation-related ED   visit, Anticoagulation-related fatality        INR History:      Latest Ref Rng & Units 3/15/2024     2:20 PM 3/15/2024     2:27 PM 3/15/2024     3:00 PM 3/25/2024     3:46 PM 3/26/2024     9:17 AM 2024    11:23 AM 2024    11:53 AM   Anticoagulation Monitoring   INR      2.53  2.69   INR Date      3/25/2024  2024   INR Goal  2.0-3.0  2.0-3.0  2.0-3.0  2.0-3.0   Trend      Same  Same   Last Week Total      60 mg  56 mg   Next Week Total      56 mg  56 mg   Sun      8 mg  8  mg   Mon      8 mg  8 mg   Tue      8 mg  8 mg   Wed      8 mg  8 mg   Thu      8 mg  8 mg   Fri      8 mg  8 mg   Sat      8 mg  8 mg   Historical INR 0.90 - 1.10  1.38   2.53   2.69         Plan:  1. INR is Therapeutic today- see above in Anticoagulation Summary.   Will instruct Woo Dobbs to Continue their warfarin regimen- see above in Anticoagulation Summary.  2. Follow up in 2 weeks  3. They have been instructed to call if any changes in medications, doses, concerns, etc. Patient expresses understanding and has no further questions at this time.    [place], PharmD

## 2024-04-22 ENCOUNTER — LAB (OUTPATIENT)
Dept: LAB | Facility: HOSPITAL | Age: 84
End: 2024-04-22
Payer: MEDICARE

## 2024-04-22 ENCOUNTER — ANTICOAGULATION VISIT (OUTPATIENT)
Dept: PHARMACY | Facility: HOSPITAL | Age: 84
End: 2024-04-22
Payer: MEDICARE

## 2024-04-22 DIAGNOSIS — I48.0 AF (PAROXYSMAL ATRIAL FIBRILLATION): Primary | ICD-10-CM

## 2024-04-22 DIAGNOSIS — I48.0 AF (PAROXYSMAL ATRIAL FIBRILLATION): ICD-10-CM

## 2024-04-22 LAB
INR PPP: 2.07 (ref 0.9–1.1)
PROTHROMBIN TIME: 23 SECONDS (ref 12.1–15)

## 2024-04-22 PROCEDURE — 85610 PROTHROMBIN TIME: CPT

## 2024-04-22 PROCEDURE — 36415 COLL VENOUS BLD VENIPUNCTURE: CPT

## 2024-04-23 NOTE — PROGRESS NOTES
Anticoagulation Clinic Progress Note    Anticoagulation Summary  As of 2024      INR goal:  2.0-3.0   TTR:  47.4% (1.7 y)   INR used for dosin.07 (2024)   Warfarin maintenance plan:  8 mg every day   Weekly warfarin total:  56 mg   No change documented:  Moisés Morales, PharmD   Plan last modified:  Miya Gan RPH (2024)   Next INR check:  2024   Target end date:      Indications    AF (paroxysmal atrial fibrillation) [I48.0]                 Anticoagulation Episode Summary       INR check location:      Preferred lab:      Send INR reminders to:   [facility] POOL    Comments:  s/p AVR and CABG [facility]          Anticoagulation Care Providers       Provider Role Specialty Phone number    Helena Humphries, DEV Referring Cardiology [phone]    Khurram Ball MD Referring Cardiology [phone]            Clinic Interview:  Patient Findings     Negatives:  Signs/symptoms of thrombosis, Signs/symptoms of bleeding,   Laboratory test error suspected, Change in health, Change in alcohol use,   Change in activity, Upcoming invasive procedure, Emergency department   visit, Upcoming dental procedure, Missed doses, Extra doses, Change in   medications, Change in diet/appetite, Hospital admission, Bruising, Other   complaints      Clinical Outcomes     Negatives:  Major bleeding event, Thromboembolic event,   Anticoagulation-related hospital admission, Anticoagulation-related ED   visit, Anticoagulation-related fatality        INR History:      Latest Ref Rng & Units 3/15/2024     3:00 PM 3/25/2024     3:46 PM 3/26/2024     9:17 AM 2024    11:23 AM 2024    11:53 AM 2024     3:29 PM 2024     3:53 PM   Anticoagulation Monitoring   INR    2.53  2.69  2.07   INR Date    3/25/2024  2024  2024   INR Goal  2.0-3.0  2.0-3.0  2.0-3.0  2.0-3.0   Trend    Same  Same  Same   Last Week Total    60 mg  56 mg  56 mg   Next Week Total    56 mg  56 mg   56 mg   Sun    8 mg  8 mg  8 mg   Mon    8 mg  8 mg  8 mg   Tue    8 mg  8 mg  8 mg   Wed    8 mg  8 mg  8 mg   Thu    8 mg  8 mg  8 mg   Fri    8 mg  8 mg  8 mg   Sat    8 mg  8 mg  8 mg   Historical INR 0.90 - 1.10  2.53   2.69   2.07         Plan:  1. INR is Therapeutic today- see above in Anticoagulation Summary.   Will instruct Woo Dobbs to Continue their warfarin regimen- see above in Anticoagulation Summary.  2. Follow up in 4 weeks.  3. They have been instructed to call if any changes in medications, doses, concerns, etc. Patient expresses understanding and has no further questions at this time.    Moisés Mroales, PharmD

## 2024-05-15 RX ORDER — ATORVASTATIN CALCIUM 40 MG/1
40 TABLET, FILM COATED ORAL NIGHTLY
Qty: 90 TABLET | Refills: 0 | Status: SHIPPED | OUTPATIENT
Start: 2024-05-15

## 2024-05-20 ENCOUNTER — LAB (OUTPATIENT)
Dept: LAB | Facility: HOSPITAL | Age: 84
End: 2024-05-20
Payer: MEDICARE

## 2024-05-20 ENCOUNTER — ANTICOAGULATION VISIT (OUTPATIENT)
Dept: PHARMACY | Facility: HOSPITAL | Age: 84
End: 2024-05-20
Payer: MEDICARE

## 2024-05-20 DIAGNOSIS — I48.0 AF (PAROXYSMAL ATRIAL FIBRILLATION): Primary | ICD-10-CM

## 2024-05-20 DIAGNOSIS — I48.0 AF (PAROXYSMAL ATRIAL FIBRILLATION): ICD-10-CM

## 2024-05-20 LAB
INR PPP: 2.74 (ref 0.9–1.1)
PROTHROMBIN TIME: 28.5 SECONDS (ref 12.1–15)

## 2024-05-20 PROCEDURE — 36415 COLL VENOUS BLD VENIPUNCTURE: CPT

## 2024-05-20 PROCEDURE — 85610 PROTHROMBIN TIME: CPT

## 2024-05-20 NOTE — PROGRESS NOTES
Anticoagulation Clinic Progress Note    Anticoagulation Summary  As of 2024      INR goal:  2.0-3.0   TTR:  49.7% (1.7 y)   INR used for dosin.74 (2024)   Warfarin maintenance plan:  8 mg every day   Weekly warfarin total:  56 mg   No change documented:  [place], PharmD   Plan last modified:  Miya Gan RPH (2024)   Next INR check:  2024   Target end date:      Indications    AF (paroxysmal atrial fibrillation) [I48.0]                 Anticoagulation Episode Summary       INR check location:      Preferred lab:      [facility]   [facility]    Comments:  s/p AVR and CABG *[facility]*          Anticoagulation Care Providers       Provider Role Specialty Phone number    Helena Humphries, DEV Referring Cardiology [phone]    Khurram Ball MD Referring Cardiology [phone]            Clinic Interview:  No pertinent clinical findings have been reported.    INR History:      Latest Ref Rng & Units 3/26/2024     9:17 AM 2024    11:23 AM 2024    11:53 AM 2024     3:29 PM 2024     3:53 PM 2024     2:43 PM 2024     3:13 PM   Anticoagulation Monitoring   INR  2.53  2.69  2.07  2.74   INR Date  3/25/2024  2024  2024  2024   INR Goal  2.0-3.0  2.0-3.0  2.0-3.0  2.0-3.0   Trend  Same  Same  Same  Same   Last Week Total  60 mg  56 mg  56 mg  56 mg   Next Week Total  56 mg  56 mg  56 mg  56 mg   Sun  8 mg  8 mg  8 mg  8 mg   Mon  8 mg  8 mg  8 mg  8 mg   Tue  8 mg  8 mg  8 mg  8 mg   Wed  8 mg  8 mg  8 mg  8 mg   Thu  8 mg  8 mg  8 mg  8 mg   Fri  8 mg  8 mg  8 mg  8 mg   Sat  8 mg  8 mg  8 mg  8 mg   Historical INR 0.90 - 1.10  2.69   2.07   2.74         Plan:  1. INR is therapeutic today- see above in Anticoagulation Summary.    Woo Dobbs to continue their warfarin regimen- see above in Anticoagulation Summary.  2. Follow up in 4 weeks  3. Secure voicemail with instructions and follow up.  They have been instructed to call if any changes in medications, doses, concerns, etc. Patient expresses understanding and has no further questions at this time.    [place], PharmD

## 2024-06-17 ENCOUNTER — LAB (OUTPATIENT)
Dept: LAB | Facility: HOSPITAL | Age: 84
End: 2024-06-17
Payer: MEDICARE

## 2024-06-17 DIAGNOSIS — I48.0 AF (PAROXYSMAL ATRIAL FIBRILLATION): ICD-10-CM

## 2024-06-17 LAB
INR PPP: 2.71 (ref 0.9–1.1)
PROTHROMBIN TIME: 28.2 SECONDS (ref 12.1–15)

## 2024-06-17 PROCEDURE — 36415 COLL VENOUS BLD VENIPUNCTURE: CPT

## 2024-06-17 PROCEDURE — 85610 PROTHROMBIN TIME: CPT

## 2024-06-18 ENCOUNTER — ANTICOAGULATION VISIT (OUTPATIENT)
Dept: PHARMACY | Facility: HOSPITAL | Age: 84
End: 2024-06-18
Payer: MEDICARE

## 2024-06-18 DIAGNOSIS — I48.0 AF (PAROXYSMAL ATRIAL FIBRILLATION): Primary | ICD-10-CM

## 2024-06-18 NOTE — PROGRESS NOTES
Anticoagulation Clinic Progress Note    Anticoagulation Summary  As of 2024      INR goal:  2.0-3.0   TTR:  51.9% (1.8 y)   INR used for dosin.71 (2024)   Warfarin maintenance plan:  8 mg every day   Weekly warfarin total:  56 mg   No change documented:  Miya Gan RPH   Plan last modified:  Miya Gan RPH (2024)   Next INR check:  7/15/2024   Target end date:      Indications    AF (paroxysmal atrial fibrillation) [I48.0]                 Anticoagulation Episode Summary       INR check location:      Preferred lab:      Send INR reminders to:   [facility]IN[facility] POOL    Comments:  s/p AVR and CABG [facility]*          Anticoagulation Care Providers       Provider Role Specialty Phone number    Helena Humphries, DEV Referring Cardiology [phone]    Khurram Ball MD Referring Cardiology [phone]            Clinic Interview:  Patient Findings     Negatives:  Signs/symptoms of thrombosis, Signs/symptoms of bleeding,   Laboratory test error suspected, Change in health, Change in alcohol use,   Change in activity, Upcoming invasive procedure, Emergency department   visit, Upcoming dental procedure, Missed doses, Extra doses, Change in   medications, Change in diet/appetite, Hospital admission, Bruising, Other   complaints      Clinical Outcomes     Negatives:  Major bleeding event, Thromboembolic event,   Anticoagulation-related hospital admission, Anticoagulation-related ED   visit, Anticoagulation-related fatality        INR History:      Latest Ref Rng & Units 2024    11:53 AM 2024     3:29 PM 2024     3:53 PM 2024     2:43 PM 2024     3:13 PM 2024     4:25 PM 2024     8:12 AM   Anticoagulation Monitoring   INR  2.69  2.07  2.74  2.71   INR Date  2024   INR Goal  2.0-3.0  2.0-3.0  2.0-3.0  2.0-3.0   Trend  Same  Same  Same  Same   Last Week Total  56 mg  56 mg  56 mg  56 mg   Next Week  Total  56 mg  56 mg  56 mg  56 mg   Sun  8 mg  8 mg  8 mg  8 mg   Mon  8 mg  8 mg  8 mg  8 mg   Tue  8 mg  8 mg  8 mg  8 mg   Wed  8 mg  8 mg  8 mg  8 mg   Thu  8 mg  8 mg  8 mg  8 mg   Fri  8 mg  8 mg  8 mg  8 mg   Sat  8 mg  8 mg  8 mg  8 mg   Historical INR 0.90 - 1.10  2.07   2.74   2.71         Plan:  1. INR is Therapeutic today- see above in Anticoagulation Summary.   Will instruct Woo Dobbs to Continue their warfarin regimen- see above in Anticoagulation Summary.  2. Follow up in 4 weeks  3. They have been instructed to call if any changes in medications, doses, concerns, etc. Patient expresses understanding and has no further questions at this time.    Miya Gan [facility]

## 2024-07-08 ENCOUNTER — TELEPHONE (OUTPATIENT)
Dept: CARDIOLOGY | Facility: CLINIC | Age: 84
End: 2024-07-08
Payer: MEDICARE

## 2024-07-08 NOTE — TELEPHONE ENCOUNTER
LOUIE pt- LOV 5/2023.      Patient is calling because he has had weakness and constant mild chest pain across his upper chest just below his throat for a few months.  He also notes increased SOA when bending over.  His SOA with exertion is at baseline.  He notes that when he walks his dogs the pain is not any worse.  He has a hard time describing the pain further.  I suggested some adjectives for him to describe it, but he is not able to do so.  He denies swelling and palpitations.      He saw his PCP on Friday and had labs drawn and pt reports MD said they were unremarkable.  He was also taken off metformin.  Labs are in CareEverywhere. Pt states that his BP on Friday was SBP 130s/unsure of DBP.  HR 60-70.    He would like to see Dr. Ball at .  I have scheduled him to be seen tomorrow 7/9.  I asked him to bring an updated list of medications with dosage and frequency.  ER for new or worsening symptoms in the meantime.  Patient verbalizes understanding and agrees with plan.    Gianna Wolfe RN  [facility] Triage  07/08/24 15:45 EDT

## 2024-07-08 NOTE — PROGRESS NOTES
RM:________     PCP: Agustín Ivan MD    : 1940  AGE: 83 y.o.  EST PATIENT     REASON FOR VISIT/  CC:        BP Readings from Last 3 Encounters:   24 157/75   24 150/72   24 130/84      Wt Readings from Last 3 Encounters:   24 71 kg (156 lb 9.6 oz)   24 71.2 kg (157 lb)   24 68.5 kg (151 lb)        WT: ____________ BP: __________L __________R HR______    CHEST PAIN: _____________    SOA: _____________PALPS: _______________     LIGHTHEADED: ___________FATIGUE: ________________ EDEMA __________    ALLERGIES:Loratadine SMOKING HISTORY:  Social History     Tobacco Use    Smoking status: Never    Smokeless tobacco: Never    Tobacco comments:     caffeine use: 1 -2 cups daily.    Vaping Use    Vaping status: Never Used   Substance Use Topics    Alcohol use: No    Drug use: No     CAFFEINE USE_________________  ALCOHOL ______________________

## 2024-07-09 ENCOUNTER — OFFICE VISIT (OUTPATIENT)
Dept: CARDIOLOGY | Facility: CLINIC | Age: 84
End: 2024-07-09
Payer: MEDICARE

## 2024-07-09 VITALS
SYSTOLIC BLOOD PRESSURE: 146 MMHG | OXYGEN SATURATION: 98 % | DIASTOLIC BLOOD PRESSURE: 74 MMHG | HEIGHT: 68 IN | RESPIRATION RATE: 20 BRPM | HEART RATE: 60 BPM | WEIGHT: 153.2 LBS | BODY MASS INDEX: 23.22 KG/M2

## 2024-07-09 DIAGNOSIS — R06.02 SHORTNESS OF BREATH: ICD-10-CM

## 2024-07-09 DIAGNOSIS — R07.2 PRECORDIAL PAIN: Primary | ICD-10-CM

## 2024-07-09 DIAGNOSIS — I48.0 AF (PAROXYSMAL ATRIAL FIBRILLATION): ICD-10-CM

## 2024-07-09 DIAGNOSIS — I10 PRIMARY HYPERTENSION: ICD-10-CM

## 2024-07-09 DIAGNOSIS — E78.2 MIXED HYPERLIPIDEMIA: ICD-10-CM

## 2024-07-09 DIAGNOSIS — R40.0 DAYTIME SLEEPINESS: ICD-10-CM

## 2024-07-09 DIAGNOSIS — I25.810 CORONARY ARTERY DISEASE INVOLVING CORONARY BYPASS GRAFT OF NATIVE HEART, UNSPECIFIED WHETHER ANGINA PRESENT: ICD-10-CM

## 2024-07-09 DIAGNOSIS — Z95.3 HISTORY OF AORTIC VALVE REPLACEMENT WITH BIOPROSTHETIC VALVE: ICD-10-CM

## 2024-07-09 DIAGNOSIS — R53.82 CHRONIC FATIGUE: ICD-10-CM

## 2024-07-09 PROCEDURE — 99214 OFFICE O/P EST MOD 30 MIN: CPT | Performed by: INTERNAL MEDICINE

## 2024-07-09 PROCEDURE — 3078F DIAST BP <80 MM HG: CPT | Performed by: INTERNAL MEDICINE

## 2024-07-09 PROCEDURE — 1160F RVW MEDS BY RX/DR IN RCRD: CPT | Performed by: INTERNAL MEDICINE

## 2024-07-09 PROCEDURE — 3077F SYST BP >= 140 MM HG: CPT | Performed by: INTERNAL MEDICINE

## 2024-07-09 PROCEDURE — 1159F MED LIST DOCD IN RCRD: CPT | Performed by: INTERNAL MEDICINE

## 2024-07-09 PROCEDURE — 93000 ELECTROCARDIOGRAM COMPLETE: CPT | Performed by: INTERNAL MEDICINE

## 2024-07-09 RX ORDER — ASPIRIN 81 MG/1
81 TABLET ORAL DAILY
COMMUNITY

## 2024-07-09 RX ORDER — WARFARIN SODIUM 4 MG/1
TABLET ORAL EVERY 24 HOURS
COMMUNITY

## 2024-07-09 RX ORDER — INDOMETHACIN 50 MG/1
50 CAPSULE ORAL 2 TIMES DAILY WITH MEALS
COMMUNITY
Start: 2024-06-18

## 2024-07-09 RX ORDER — LOSARTAN POTASSIUM 50 MG/1
50 TABLET ORAL DAILY
COMMUNITY

## 2024-07-09 NOTE — PROGRESS NOTES
RM:________     PCP: Agustín Ivan MD    : 1940  AGE: 83 y.o.  EST PATIENT   REASON FOR VISIT/  CC:    Wt Readings from Last 3 Encounters:   24 71 kg (156 lb 9.6 oz)   24 71.2 kg (157 lb)   24 68.5 kg (151 lb)      BP Readings from Last 3 Encounters:   24 157/75   24 150/72   24 130/84      WT: ____________ BP: __________L __________R HR______    ALLERGIES:Loratadine SMOKING HISTORY:  Social History     Tobacco Use    Smoking status: Never    Smokeless tobacco: Never    Tobacco comments:     caffeine use: 1 -2 cups daily.    Vaping Use    Vaping status: Never Used   Substance Use Topics    Alcohol use: No    Drug use: No     CAFFEINE USE_________________  ALCOHOL ______________________    Below is the patient's most recent value for Albumin, ALT, AST, BUN, Calcium, Chloride, Cholesterol, CO2, Creatinine, GFR, Glucose, HDL, Hematocrit, Hemoglobin, Hemoglobin A1C, LDL, Magnesium, Phosphorus, Platelets, Potassium, PSA, Sodium, Triglycerides, TSH and WBC.   Lab Results   Component Value Date    ALBUMIN 4.60 2022    ALT 15 2022    AST 16 2022    BUN 18 2024    CALCIUM 8.2 (L) 2024     2024    CHOL 124 2022    CO2 23.0 2024    CREATININE 1.00 2024    HDL 34 (L) 2022    HCT 33.5 (L) 2024    HGB 10.8 (L) 2024    HGBA1C 8.30 (H) 2024    LDL 68 2022    MG 2.1 2022    PHOS 5.0 (H) 2022     2024    K 4.2 2024    PSA 1.190 10/15/2020     (L) 2024    TRIG 124 2022    TSH 3.030 2019    WBC 6.26 2024          NEW DIAGNOSIS/ SURGERY/ HOSP OR ED VISITS: ______________________    __________________________________________________________________      RECENT LABS OR DIAGNOSTIC TESTING:  _____________________________    __________________________________________________________________      ASSESSMENT/ PLAN:  _______________________________________________    __________________________________________________________________

## 2024-07-09 NOTE — PROGRESS NOTES
Date of Office Visit: 24  Encounter Provider: Khurram Ball MD  Place of Service: [facility] CARDIOLOGY  Patient Name: Woo Dobbs  :1940    Chief Complaint   Patient presents with    Follow-up    Chest Pain     Discomfort    Weakness - Generalized     HPI:     Mr. Dobbs is 83 y.o. and presents today for a sick visit. I have reviewed prior notes and there are no changes except for any new updates described below. I have also reviewed any information entered into the medical record by the patient or by ancillary staff.     He was initially evaluated in ; his PCP had noted a murmur. An echo showed aortic valve calcification without stenosis. He was then lost to follow up. He was referred back to us in ; he reported progressive dyspnea. An echo showed normal LVSF and mod-severe AS. A stress test showed LAD ischemia.  Coronary angiography revealed severe disease. He underwent CABG x 3 and tissue AVR in 2022.  He had recurrent postoperative atrial fibrillation in recovery; he was discharged on amiodarone and warfarin. We eventually stopped amiodarone.     An echo in 2023 showed normal LVSF and normal AVR function. When he was seen in the office last, he was doing well. He missed his follow up appointment with us in May 2024.     He reports ongoing chronic fatigue for months.  His wife says that he snores very loudly.  He says he does not fall asleep until 3:00 in the morning and he definitely gets less than 8 hours of sleep per night.  He says he feels like he could sleep all day.  He is chronically short of breath.  He has chronic pain across the upper chest bilaterally across the clavicles that is nonexertional.  It is present at all times, 24 hours/day, and nothing makes it worse.  He denies lightheadedness or syncope.  He denies palpitations.    I reviewed recent labs. His Hgb is 12. His Cr is 1.4.     Past Medical History:   Diagnosis Date     Aortic stenosis     8/2022: tissue AVR (25mm MDT Avalus bovine pericardial)    Asthma     BPH (benign prostatic hyperplasia)     CAD (coronary artery disease)     7/2022: 95% prox/50% distal LAD, 80% D1, 30-40% Cx, diffuse dz RCA. CABG x 3 8/2022: LIMA-LAD, SVG-D1, SVG-RCA    Carotid atherosclerosis, bilateral     8/2022: 16-49% bilaterally    Colon polyp     GERD (gastroesophageal reflux disease)     Hyperlipidemia     Hypertension     Hypothyroidism     Paroxysmal atrial fibrillation     Type 2 diabetes mellitus     Warfarin anticoagulation        Past Surgical History:   Procedure Laterality Date    CARDIAC CATHETERIZATION N/A 7/14/2022    Procedure: Coronary angiography;  Surgeon: Unique Calabrese MD;  Location: [facility] CATH INVASIVE LOCATION;  Service: Cardiovascular;  Laterality: N/A;    CARDIAC CATHETERIZATION N/A 7/14/2022    Procedure: Left heart cath with simultaneous LV/Ao pressures;  Surgeon: Unique Calabrese MD;  [facility];  Service: Cardiovascular;  Laterality: N/A;    CARDIAC CATHETERIZATION N/A 7/14/2022    Procedure: Right Heart Cath;  Surgeon: Unique Calabrese MD;  Location: [facility] CATH INVASIVE LOCATION;  Service: Cardiovascular;  Laterality: N/A;    COLONOSCOPY      CORONARY ARTERY BYPASS GRAFT WITH AORTIC VALVE REPAIR/REPLACEMENT N/A 8/8/2022    Procedure: KEVIN STERNOTOMY CORONARY ARTERY BYPASS GRAFT TIMES 3 USING LEFT INTERNAL MAMMARY ARTERY AND LEFT GREATER SAPHENOUS VEIN GRAFT PER ENDOSCOPIC VEIN HARVESTING, AORTIC VALVE REPLACEMENT AND PRP;  Surgeon: Jr Gentry Courtney MD;  Location: [facility] CVOR;  Service: Cardiothoracic;  Laterality: N/A;    CYSTOSCOPY BLADDER BIOPSY N/A 3/18/2024    Procedure: CYSTOSCOPY BLADDER BIOPSY;  Surgeon: Nimesh Arvizu MD;  Location: [facility] OR;  Service: Urology;  Laterality: N/A;    CYSTOSCOPY RETROGRADE PYELOGRAM Bilateral 3/18/2024    Procedure: bilateral retrogrades and cytology;  Surgeon: Nimesh Arvizu MD;  Location: [facility]  OR;  Service: Urology;  Laterality: Bilateral;    TRANSURETHRAL RESECTION OF BLADDER TUMOR N/A 1/23/2023    Procedure: TRANSURETHRAL RESECTION OF SMALL BLADDER TUMOR;  Surgeon: Nimesh Arvizu MD;  Location: [facility] OR;  Service: Urology;  Laterality: N/A;    VASECTOMY         Social History     Socioeconomic History    Marital status:     Number of children: 3   Tobacco Use    Smoking status: Never    Smokeless tobacco: Never    Tobacco comments:     caffeine use: 1 -2 cups daily.    Vaping Use    Vaping status: Never Used   Substance and Sexual Activity    Alcohol use: No    Drug use: No    Sexual activity: Defer       Family History   Problem Relation Age of Onset    Colon cancer Neg Hx     Colon polyps Neg Hx     Malig Hyperthermia Neg Hx        Review of Systems   Constitutional: Positive for malaise/fatigue.   HENT:  Positive for hearing loss.    Cardiovascular:  Positive for chest pain.   Respiratory:  Positive for shortness of breath and sleep disturbances due to breathing.    Musculoskeletal:  Positive for arthritis, gout, joint pain, joint swelling and myalgias.   Neurological:  Positive for excessive daytime sleepiness.   All other systems reviewed and are negative.      Allergies   Allergen Reactions    Loratadine Other (See Comments)     Emotional side effects.          Current Outpatient Medications:     aspirin 81 MG EC tablet, Take 1 tablet by mouth Daily., Disp: , Rfl:     atenolol (TENORMIN) 25 MG tablet, Take 1 tablet by mouth 2 (Two) Times a Day., Disp: 180 tablet, Rfl: 3    atorvastatin (LIPITOR) 40 MG tablet, TAKE 1 TABLET BY MOUTH ONCE DAILY AT NIGHT, Disp: 90 tablet, Rfl: 0    ferrous sulfate 325 (65 FE) MG EC tablet, Take 1 tablet by mouth Daily With Breakfast., Disp: , Rfl:     glucose blood test strip, Test blood sugar Twice daily, Disp: 200 each, Rfl: 12    glucose monitor monitoring kit, 1 each As Needed (Diabetes 2)., Disp: 1 each, Rfl: 0    indomethacin (INDOCIN) 50 MG  "capsule, Take 1 capsule by mouth 2 (Two) Times a Day With Meals., Disp: , Rfl:     Lancets misc, 1 each 3 (Three) Times a Day Before Meals., Disp: 200 each, Rfl: 5    levothyroxine (SYNTHROID, LEVOTHROID) 50 MCG tablet, Take 1 tablet by mouth Daily., Disp: , Rfl:     losartan (COZAAR) 50 MG tablet, Take 1 tablet by mouth Daily., Disp: , Rfl:     nitroglycerin (Nitrostat) 0.4 MG SL tablet, Place 1 tablet under the tongue Every 5 (Five) Minutes As Needed for Chest Pain. Take no more than 3 doses in 15 minutes., Disp: 30 tablet, Rfl: 1    omeprazole (priLOSEC) 40 MG capsule, Take 1 capsule by mouth 3 (Three) Times a Week if Needed (Heartburn)., Disp: 90 capsule, Rfl: 3    tamsulosin (FLOMAX) 0.4 MG capsule 24 hr capsule, Take 1 capsule by mouth Daily., Disp: 30 capsule, Rfl: 0    warfarin (COUMADIN) 4 MG tablet, Daily., Disp: , Rfl:     metFORMIN (GLUCOPHAGE) 1000 MG tablet, Take 1 tablet by mouth 2 (Two) Times a Day With Meals. (Patient not taking: Reported on 7/9/2024), Disp: , Rfl:       Objective:     Vitals:    07/09/24 1344   BP: 146/74   BP Location: Right arm   Patient Position: Sitting   Cuff Size: Adult   Pulse: 60   Resp: 20   SpO2: 98%   Weight: 69.5 kg (153 lb 3.2 oz)   Height: 172.7 cm (68\")     Body mass index is 23.29 kg/m².    Vitals reviewed.   Constitutional:       Appearance: Healthy appearance. Well-developed.   Eyes:      Conjunctiva/sclera: Conjunctivae normal.   HENT:      Head: Normocephalic.      Nose: Nose normal.   Neck:      Thyroid: Thyroid normal.      Vascular: No JVD. JVD normal.      Lymphadenopathy: No cervical adenopathy.   Pulmonary:      Effort: Pulmonary effort is normal.      Breath sounds: Normal breath sounds.   Cardiovascular:      Normal rate. Regular rhythm.      Murmurs: There is a grade 1/6 systolic murmur.   Pulses:     Intact distal pulses.   Edema:     Peripheral edema absent.   Abdominal:      Palpations: Abdomen is soft.      Tenderness: There is no abdominal " tenderness.   Musculoskeletal: Normal range of motion.      Cervical back: Normal range of motion. Skin:     General: Skin is warm and dry.   Neurological:      General: No focal deficit present.      Mental Status: Oriented to person, place, and time.      Cranial Nerves: No cranial nerve deficit.   Psychiatric:         Behavior: Behavior normal.         Thought Content: Thought content normal.         Judgment: Judgment normal.         ECG 12 Lead    Date/Time: 7/9/2024 2:11 PM  Performed by: Khurram Ball MD    Authorized by: Khurram Ball MD  Comparison: compared with previous ECG   Similar to previous ECG  Rhythm: sinus rhythm  Conduction: conduction normal  ST Segments: ST segments normal  T Waves: T waves normal  QRS axis: normal  Other findings: left ventricular hypertrophy    Clinical impression: abnormal EKG          Assessment:       Diagnosis Plan   1. Precordial pain  ECG 12 Lead    Adult Transthoracic Echo Complete W/ Cont if Necessary Per Protocol    Stress Test With Myocardial Perfusion One Day      2. Shortness of breath        3. Chronic fatigue  Ambulatory Referral to Sleep Medicine      4. Daytime sleepiness  Ambulatory Referral to Sleep Medicine      5. Coronary artery disease involving coronary bypass graft of native heart, unspecified whether angina present        6. Mixed hyperlipidemia        7. History of aortic valve replacement with bioprosthetic valve        8. AF (paroxysmal atrial fibrillation)        9. Primary hypertension             Plan:     1/2/3/4.  His chest pain is noncardiac.  It is present at all moments of the day and has been like this for weeks if not months.  It is across both clavicles.  He does report generalized fatigue and shortness of breath, which could potentially be cardiac.  I have recommended an echocardiogram and a perfusion stress test given his known history of coronary disease and his history of aortic valve replacement.  I have also referred him to sleep  medicine as he is describing symptoms that could be very suggestive of sleep apnea or other forms of disrupted sleep.    5/6.  As above, I do not feel that the pain that he is describing is due to his heart.  However, we are getting a perfusion stress test due to his dyspnea.  He is on aspirin and atorvastatin.    7. His AVR was working well in January 2023. I am repeating an echo, as above.     8. He had recurrent atrial fibrillation in the postoperative period. He was discharged on amiodarone and atenolol. I stopped amiodarone due to fatigue and low HR. He should remain on warfarin for now as his CHADSVASc = 5.     9.  His blood pressure is suboptimally controlled.  However, it is not excessively high and I am not making any changes until I have the results of his cardiac testing.    Sincerely,       Khurram Ball MD

## 2024-07-15 ENCOUNTER — LAB (OUTPATIENT)
Dept: LAB | Facility: HOSPITAL | Age: 84
End: 2024-07-15
Payer: MEDICARE

## 2024-07-15 DIAGNOSIS — I48.0 AF (PAROXYSMAL ATRIAL FIBRILLATION): ICD-10-CM

## 2024-07-15 LAB
INR PPP: 3.76 (ref 0.9–1.1)
PROTHROMBIN TIME: 36.3 SECONDS (ref 12.1–15)

## 2024-07-15 PROCEDURE — 36415 COLL VENOUS BLD VENIPUNCTURE: CPT

## 2024-07-15 PROCEDURE — 85610 PROTHROMBIN TIME: CPT

## 2024-07-16 ENCOUNTER — ANTICOAGULATION VISIT (OUTPATIENT)
Dept: PHARMACY | Facility: HOSPITAL | Age: 84
End: 2024-07-16
Payer: MEDICARE

## 2024-07-16 DIAGNOSIS — I48.0 AF (PAROXYSMAL ATRIAL FIBRILLATION): Primary | ICD-10-CM

## 2024-07-16 NOTE — PROGRESS NOTES
"Anticoagulation Clinic Progress Note    Anticoagulation Summary  As of 7/16/2024      INR goal:  2.0-3.0   TTR:  50.9% (1.9 y)   INR used for dosing:  3.76 (7/15/2024)   Warfarin maintenance plan:  8 mg every day   Weekly warfarin total:  56 mg   Plan last modified:  Miya Gan RPH (2/27/2024)   Next INR check:  7/29/2024   Target end date:      Indications    AF (paroxysmal atrial fibrillation) [I48.0]                 Anticoagulation Episode Summary       INR check location:      Preferred lab:      Send INR reminders to:  [facility] CLINICAL POOL    Comments:  s/p AVR and CABG *[facility]*          Anticoagulation Care Providers       Provider Role Specialty Phone number    Helena Humphries, APRN Referring Cardiology [phone]    Khurram Ball MD Referring Cardiology [phone]            Clinic Interview:  Patient Findings     Negatives:  Signs/symptoms of thrombosis, Signs/symptoms of bleeding,   Laboratory test error suspected, Change in health, Change in alcohol use,   Change in activity, Upcoming invasive procedure, Emergency department   visit, Upcoming dental procedure, Missed doses, Extra doses, Change in   medications, Change in diet/appetite, Hospital admission, Bruising, Other   complaints    Comments:  S/w patient's wife, Melina who says patient has just \"not   been feeling well.\" She says he no longer on Metformin and taking   glipizide instead but patient gets tired easily.  No other specific   symptoms provided.       Clinical Outcomes     Negatives:  Major bleeding event, Thromboembolic event,   Anticoagulation-related hospital admission, Anticoagulation-related ED   visit, Anticoagulation-related fatality    Comments:  S/w patient's wife, Melina who says patient has just \"not   been feeling well.\" She says he no longer on Metformin and taking   glipizide instead but patient gets tired easily.  No other specific   symptoms provided.         INR History:      " Latest Ref Rng & Units 4/22/2024     3:53 PM 5/20/2024     2:43 PM 5/20/2024     3:13 PM 6/17/2024     4:25 PM 6/18/2024     8:12 AM 7/15/2024     3:42 PM 7/16/2024     8:45 AM   Anticoagulation Monitoring   INR  2.07  2.74  2.71  3.76   INR Date  4/22/2024  5/20/2024  6/17/2024  7/15/2024   INR Goal  2.0-3.0  2.0-3.0  2.0-3.0  2.0-3.0   Trend  Same  Same  Same  Same   Last Week Total  56 mg  56 mg  56 mg  56 mg   Next Week Total  56 mg  56 mg  56 mg  48 mg   Sun  8 mg  8 mg  8 mg  8 mg   Mon  8 mg  8 mg  8 mg  8 mg   Tue  8 mg  8 mg  8 mg  Hold (7/16); Otherwise 8 mg   Wed  8 mg  8 mg  8 mg  8 mg   Thu  8 mg  8 mg  8 mg  8 mg   Fri  8 mg  8 mg  8 mg  8 mg   Sat  8 mg  8 mg  8 mg  8 mg   Historical INR 0.90 - 1.10  2.74   2.71   3.76         Plan:  1. INR is Supratherapeutic today- see above in Anticoagulation Summary.   Will instruct Woo Dobbs to Change their warfarin regimen- see above in Anticoagulation Summary.  2. Follow up in 2 weeks  3. They have been instructed to call if any changes in medications, doses, concerns, etc. Patient expresses understanding and has no further questions at this time.    [place], PharmD

## 2024-07-26 ENCOUNTER — HOSPITAL ENCOUNTER (OUTPATIENT)
Dept: CARDIOLOGY | Facility: HOSPITAL | Age: 84
Discharge: HOME | End: 2024-07-26
Payer: MEDICARE

## 2024-07-26 ENCOUNTER — HOSPITAL ENCOUNTER (OUTPATIENT)
Dept: NUCLEAR MEDICINE | Facility: HOSPITAL | Age: 84
Discharge: HOME | End: 2024-07-26
Payer: MEDICARE

## 2024-07-26 ENCOUNTER — TELEPHONE (OUTPATIENT)
Dept: CARDIOLOGY | Facility: CLINIC | Age: 84
End: 2024-07-26
Payer: MEDICARE

## 2024-07-26 VITALS
DIASTOLIC BLOOD PRESSURE: 90 MMHG | HEART RATE: 65 BPM | BODY MASS INDEX: 23.05 KG/M2 | SYSTOLIC BLOOD PRESSURE: 149 MMHG | HEIGHT: 68 IN | WEIGHT: 152.12 LBS

## 2024-07-26 DIAGNOSIS — R07.2 PRECORDIAL PAIN: ICD-10-CM

## 2024-07-26 LAB
AORTIC DIMENSIONLESS INDEX: 0.5 (DI)
BH CV ECHO LEFT VENTRICLE GLOBAL LONGITUDINAL STRAIN: -21.6 %
BH CV ECHO MEAS - AO MAX PG: 22.5 MMHG
BH CV ECHO MEAS - AO MEAN PG: 13 MMHG
BH CV ECHO MEAS - AO V2 MAX: 237 CM/SEC
BH CV ECHO MEAS - AO V2 VTI: 52.4 CM
BH CV ECHO MEAS - AVA(I,D): 1.9 CM2
BH CV ECHO MEAS - EDV(CUBED): 97.3 ML
BH CV ECHO MEAS - EDV(MOD-SP2): 48 ML
BH CV ECHO MEAS - EDV(MOD-SP4): 104 ML
BH CV ECHO MEAS - EF(MOD-BP): 70 %
BH CV ECHO MEAS - EF(MOD-SP2): 60.4 %
BH CV ECHO MEAS - EF(MOD-SP4): 74 %
BH CV ECHO MEAS - ESV(CUBED): 19.7 ML
BH CV ECHO MEAS - ESV(MOD-SP2): 19 ML
BH CV ECHO MEAS - ESV(MOD-SP4): 27 ML
BH CV ECHO MEAS - FS: 41.3 %
BH CV ECHO MEAS - IVS/LVPW: 1.12 CM
BH CV ECHO MEAS - IVSD: 1.4 CM
BH CV ECHO MEAS - LAT PEAK E' VEL: 8.9 CM/SEC
BH CV ECHO MEAS - LV DIASTOLIC VOL/BSA (35-75): 57.3 CM2
BH CV ECHO MEAS - LV MASS(C)D: 236.7 GRAMS
BH CV ECHO MEAS - LV MAX PG: 6.2 MMHG
BH CV ECHO MEAS - LV MEAN PG: 3 MMHG
BH CV ECHO MEAS - LV SYSTOLIC VOL/BSA (12-30): 14.9 CM2
BH CV ECHO MEAS - LV V1 MAX: 124 CM/SEC
BH CV ECHO MEAS - LV V1 VTI: 28.5 CM
BH CV ECHO MEAS - LVIDD: 4.6 CM
BH CV ECHO MEAS - LVIDS: 2.7 CM
BH CV ECHO MEAS - LVOT AREA: 3.5 CM2
BH CV ECHO MEAS - LVOT DIAM: 2.11 CM
BH CV ECHO MEAS - LVPWD: 1.25 CM
BH CV ECHO MEAS - MED PEAK E' VEL: 5.2 CM/SEC
BH CV ECHO MEAS - MV A DUR: 0.19 SEC
BH CV ECHO MEAS - MV A MAX VEL: 125 CM/SEC
BH CV ECHO MEAS - MV DEC SLOPE: 454.7 CM/SEC2
BH CV ECHO MEAS - MV DEC TIME: 0.24 SEC
BH CV ECHO MEAS - MV E MAX VEL: 122 CM/SEC
BH CV ECHO MEAS - MV E/A: 0.98
BH CV ECHO MEAS - MV MAX PG: 7.3 MMHG
BH CV ECHO MEAS - MV MEAN PG: 2.49 MMHG
BH CV ECHO MEAS - MV P1/2T: 80.9 MSEC
BH CV ECHO MEAS - MV V2 VTI: 37.6 CM
BH CV ECHO MEAS - MVA(P1/2T): 2.7 CM2
BH CV ECHO MEAS - MVA(VTI): 2.6 CM2
BH CV ECHO MEAS - PA ACC TIME: 0.15 SEC
BH CV ECHO MEAS - PA V2 MAX: 106 CM/SEC
BH CV ECHO MEAS - PULM DIAS VEL: 37.4 CM/SEC
BH CV ECHO MEAS - PULM S/D: 1.59
BH CV ECHO MEAS - PULM SYS VEL: 59.6 CM/SEC
BH CV ECHO MEAS - QP/QS: 1.03
BH CV ECHO MEAS - RAP SYSTOLE: 3 MMHG
BH CV ECHO MEAS - RV MAX PG: 2.3 MMHG
BH CV ECHO MEAS - RV V1 MAX: 75.8 CM/SEC
BH CV ECHO MEAS - RV V1 VTI: 19.9 CM
BH CV ECHO MEAS - RVOT DIAM: 2.6 CM
BH CV ECHO MEAS - RVSP: 32 MMHG
BH CV ECHO MEAS - SV(LVOT): 99.4 ML
BH CV ECHO MEAS - SV(MOD-SP2): 29 ML
BH CV ECHO MEAS - SV(MOD-SP4): 77 ML
BH CV ECHO MEAS - SV(RVOT): 102.3 ML
BH CV ECHO MEAS - SVI(LVOT): 54.8 ML/M2
BH CV ECHO MEAS - SVI(MOD-SP2): 16 ML/M2
BH CV ECHO MEAS - SVI(MOD-SP4): 42.4 ML/M2
BH CV ECHO MEAS - TR MAX PG: 29.4 MMHG
BH CV ECHO MEAS - TR MAX VEL: 271.1 CM/SEC
BH CV ECHO MEASUREMENTS AVERAGE E/E' RATIO: 17.3
BH CV ECHO SHUNT ASSESSMENT PERFORMED (HIDDEN SCRIPTING): 1
BH CV REST NUCLEAR ISOTOPE DOSE: 11 MCI
BH CV STRESS BP STAGE 1: NORMAL
BH CV STRESS DURATION MIN STAGE 1: 4
BH CV STRESS DURATION SEC STAGE 1: 0
BH CV STRESS GRADE STAGE 1: 0
BH CV STRESS HR STAGE 1: 107
BH CV STRESS METS STAGE 1: 2.3
BH CV STRESS NUCLEAR ISOTOPE DOSE: 33.4 MCI
BH CV STRESS PROTOCOL 1: NORMAL
BH CV STRESS RECOVERY BP: NORMAL MMHG
BH CV STRESS RECOVERY HR: 77 BPM
BH CV STRESS SPEED STAGE 1: 1.7
BH CV STRESS STAGE 1: 1
BH CV XLRA - RV BASE: 3 CM
BH CV XLRA - RV LENGTH: 6.7 CM
BH CV XLRA - RV MID: 2.6 CM
BH CV XLRA - TDI S': 9.4 CM/SEC
LEFT ATRIUM VOLUME INDEX: 28.4 ML/M2
LV EF NUC BP: 68 %
MAXIMAL PREDICTED HEART RATE: 137 BPM
SINUS: 3.1 CM
STRESS BASELINE BP: NORMAL MMHG
STRESS BASELINE HR: 65 BPM
STRESS O2 SAT REST: 97 %
STRESS POST ESTIMATED WORKLOAD: 2.3 METS
STRESS POST EXERCISE DUR MIN: 4 MIN
STRESS POST EXERCISE DUR SEC: 0 SEC
STRESS POST O2 SAT PEAK: 97 %
STRESS POST PEAK BP: NORMAL MMHG
STRESS TARGET HR: 116 BPM

## 2024-07-26 PROCEDURE — 93356 MYOCRD STRAIN IMG SPCKL TRCK: CPT

## 2024-07-26 PROCEDURE — 78452 HT MUSCLE IMAGE SPECT MULT: CPT

## 2024-07-26 PROCEDURE — 25010000002 REGADENOSON 0.4 MG/5ML SOLUTION: Performed by: INTERNAL MEDICINE

## 2024-07-26 PROCEDURE — A9500 TC99M SESTAMIBI: HCPCS | Performed by: INTERNAL MEDICINE

## 2024-07-26 PROCEDURE — 0 TECHNETIUM SESTAMIBI: Performed by: INTERNAL MEDICINE

## 2024-07-26 PROCEDURE — 25510000001 PERFLUTREN (DEFINITY) 8.476 MG IN SODIUM CHLORIDE (PF) 0.9 % 10 ML INJECTION: Performed by: INTERNAL MEDICINE

## 2024-07-26 PROCEDURE — 93017 CV STRESS TEST TRACING ONLY: CPT

## 2024-07-26 PROCEDURE — 93306 TTE W/DOPPLER COMPLETE: CPT

## 2024-07-26 RX ORDER — REGADENOSON 0.08 MG/ML
0.4 INJECTION, SOLUTION INTRAVENOUS
Status: COMPLETED | OUTPATIENT
Start: 2024-07-26 | End: 2024-07-26

## 2024-07-26 RX ADMIN — TECHNETIUM TC 99M SESTAMIBI 1 DOSE: 1 INJECTION INTRAVENOUS at 08:48

## 2024-07-26 RX ADMIN — REGADENOSON 0.4 MG: 0.08 INJECTION, SOLUTION INTRAVENOUS at 08:48

## 2024-07-26 RX ADMIN — SODIUM CHLORIDE 2 ML: 9 INJECTION INTRAMUSCULAR; INTRAVENOUS; SUBCUTANEOUS at 09:55

## 2024-07-26 RX ADMIN — TECHNETIUM TC 99M SESTAMIBI 1 DOSE: 1 INJECTION INTRAVENOUS at 06:53

## 2024-07-26 NOTE — TELEPHONE ENCOUNTER
"Woo Dobbs called with follow up question.    He reports Dr. Ball called and reviewed his test results with his wife, but he has a question.  Patient reports he has no energy to do anything and that he would like to feel better.  He cannot work in his garden.  He stated that he has \"stuff to do\" but \"no energy to do it\".  Patient is asking what could be causing this since his testing looks ok?    He reports his BP at home is usually in the \"130's/70's\".      Please let me know how you would like to proceed.    Thank you,  Aleja BAIRD RN  Triage Nurse [facility] [place] [facility]   14:11 EDT        "

## 2024-07-26 NOTE — TELEPHONE ENCOUNTER
Reviewed Dr. Ball's message with Woo Dobbs and he verbalized understanding of recommendations.    Thank you,  Aleja BAIRD RN  [place]riage Nurse [facility] [place]   14:35 EDT

## 2024-07-31 RX ORDER — WARFARIN SODIUM 4 MG/1
TABLET ORAL
Qty: 180 TABLET | Refills: 1 | Status: SHIPPED | OUTPATIENT
Start: 2024-07-31

## 2024-08-19 ENCOUNTER — LAB (OUTPATIENT)
Dept: LAB | Facility: HOSPITAL | Age: 84
End: 2024-08-19
Payer: MEDICARE

## 2024-08-19 ENCOUNTER — ANTICOAGULATION VISIT (OUTPATIENT)
Dept: PHARMACY | Facility: HOSPITAL | Age: 84
End: 2024-08-19
Payer: MEDICARE

## 2024-08-19 DIAGNOSIS — I48.0 AF (PAROXYSMAL ATRIAL FIBRILLATION): ICD-10-CM

## 2024-08-19 DIAGNOSIS — I48.0 AF (PAROXYSMAL ATRIAL FIBRILLATION): Primary | ICD-10-CM

## 2024-08-19 LAB
INR PPP: 2.56 (ref 0.9–1.1)
PROTHROMBIN TIME: 28.2 SECONDS (ref 12.1–15)

## 2024-08-19 PROCEDURE — 36415 COLL VENOUS BLD VENIPUNCTURE: CPT

## 2024-08-19 PROCEDURE — 85610 PROTHROMBIN TIME: CPT

## 2024-08-19 NOTE — PROGRESS NOTES
Anticoagulation Clinic Progress Note    Anticoagulation Summary  As of 2024      INR goal:  2.0-3.0   TTR:  50.2% (2 y)   INR used for dosin.56 (2024)   Warfarin maintenance plan:  8 mg every day   Weekly warfarin total:  56 mg   No change documented:  [place], PharmD   Plan last modified:  Miya Gan RPH (2024)   Next INR check:  2024   Target end date:      Indications    AF (paroxysmal atrial fibrillation) [I48.0]                 Anticoagulation Episode Summary       INR check location:      Preferred lab:      Send INR reminders to:   [facility]IN[facility] POOL    Comments:  s/p AVR and CABG *[facility]*          Anticoagulation Care Providers       Provider Role Specialty Phone number    Helena Humphries, DEV Referring Cardiology [phone]    Khurram Ball MD Referring Cardiology [phone]            Clinic Interview:  Patient Findings     Negatives:  Signs/symptoms of thrombosis, Signs/symptoms of bleeding,   Laboratory test error suspected, Change in health, Change in alcohol use,   Change in activity, Upcoming invasive procedure, Emergency department   visit, Upcoming dental procedure, Missed doses, Extra doses, Change in   medications, Change in diet/appetite, Hospital admission, Bruising, Other   complaints      Clinical Outcomes     Negatives:  Major bleeding event, Thromboembolic event,   Anticoagulation-related hospital admission, Anticoagulation-related ED   visit, Anticoagulation-related fatality        INR History:      Latest Ref Rng & Units 2024     3:13 PM 2024     4:25 PM 2024     8:12 AM 7/15/2024     3:42 PM 2024     8:45 AM 2024    12:36 PM 2024     1:35 PM   Anticoagulation Monitoring   INR  2.74  2.71  3.76  2.56   INR Date  2024  2024  7/15/2024  2024   INR Goal  2.0-3.0  2.0-3.0  2.0-3.0  2.0-3.0   Trend  Same  Same  Same  Same   Last Week Total  56 mg  56 mg  56 mg  56 mg   Next  Week Total  56 mg  56 mg  48 mg  56 mg   Sun  8 mg  8 mg  8 mg  8 mg   Mon  8 mg  8 mg  8 mg  8 mg   Tue  8 mg  8 mg  Hold (7/16); Otherwise 8 mg  8 mg   Wed  8 mg  8 mg  8 mg  8 mg   Thu  8 mg  8 mg  8 mg  8 mg   Fri  8 mg  8 mg  8 mg  8 mg   Sat  8 mg  8 mg  8 mg  8 mg   Historical INR 0.90 - 1.10  2.71   3.76   2.56         Plan:  1. INR is Therapeutic today- see above in Anticoagulation Summary.   Will instruct Woo Dobbs to Continue their warfarin regimen- see above in Anticoagulation Summary.  2. Follow up in 2 weeks  3. HIPAA compliant voicemail with instructions and follow up. They have been instructed to call if any changes in medications, doses, concerns, etc. Patient expresses understanding and has no further questions at this time.    [place], PharmD

## 2024-08-20 ENCOUNTER — TELEPHONE (OUTPATIENT)
Dept: PHARMACY | Facility: HOSPITAL | Age: 84
End: 2024-08-20
Payer: MEDICARE

## 2024-09-03 ENCOUNTER — ANTICOAGULATION VISIT (OUTPATIENT)
Dept: PHARMACY | Facility: HOSPITAL | Age: 84
End: 2024-09-03
Payer: MEDICARE

## 2024-09-03 ENCOUNTER — LAB (OUTPATIENT)
Dept: LAB | Facility: HOSPITAL | Age: 84
End: 2024-09-03
Payer: MEDICARE

## 2024-09-03 DIAGNOSIS — I48.0 AF (PAROXYSMAL ATRIAL FIBRILLATION): Primary | ICD-10-CM

## 2024-09-03 DIAGNOSIS — I48.0 AF (PAROXYSMAL ATRIAL FIBRILLATION): ICD-10-CM

## 2024-09-03 LAB
INR PPP: 2.38 (ref 0.9–1.1)
PROTHROMBIN TIME: 26.6 SECONDS (ref 12.1–15)

## 2024-09-03 PROCEDURE — 85610 PROTHROMBIN TIME: CPT

## 2024-09-03 PROCEDURE — 36415 COLL VENOUS BLD VENIPUNCTURE: CPT

## 2024-09-03 NOTE — PROGRESS NOTES
Anticoagulation Clinic Progress Note    Anticoagulation Summary  As of 9/3/2024      INR goal:  2.0-3.0   TTR:  51.2% (2 y)   INR used for dosin.38 (9/3/2024)   Warfarin maintenance plan:  8 mg every day   Weekly warfarin total:  56 mg   No change documented:  [place], PharmD   Plan last modified:  Miya Gan RPH (2024)   Next INR check:  10/1/2024   Target end date:      Indications    AF (paroxysmal atrial fibrillation) [I48.0]                 Anticoagulation Episode Summary       INR check location:      Preferred lab:      Send INR reminders to:   [facility]INICAL POOL    Comments:  s/p AVR and CABG *[facility]*          Anticoagulation Care Providers       Provider Role Specialty Phone number    Helena Humphries, DEV Referring Cardiology [phone]    Khurram Ball MD Referring Cardiology [phone]            Clinic Interview:  Patient Findings     Negatives:  Signs/symptoms of thrombosis, Signs/symptoms of bleeding,   Laboratory test error suspected, Change in health, Change in alcohol use,   Change in activity, Upcoming invasive procedure, Emergency department   visit, Upcoming dental procedure, Missed doses, Extra doses, Change in   medications, Change in diet/appetite, Hospital admission, Bruising, Other   complaints      Clinical Outcomes     Negatives:  Major bleeding event, Thromboembolic event,   Anticoagulation-related hospital admission, Anticoagulation-related ED   visit, Anticoagulation-related fatality        INR History:      Latest Ref Rng & Units 2024     8:12 AM 7/15/2024     3:42 PM 2024     8:45 AM 2024    12:36 PM 2024     1:35 PM 9/3/2024     2:40 PM 9/3/2024     3:15 PM   Anticoagulation Monitoring   INR  2.71  3.76  2.56  2.38   INR Date  2024  7/15/2024  2024  9/3/2024   INR Goal  2.0-3.0  2.0-3.0  2.0-3.0  2.0-3.0   Trend  Same  Same  Same  Same   Last Week Total  56 mg  56 mg  56 mg  56 mg   Next Week  Total  56 mg  48 mg  56 mg  56 mg   Sun  8 mg  8 mg  8 mg  8 mg   Mon  8 mg  8 mg  8 mg  8 mg   Tue  8 mg  Hold (7/16); Otherwise 8 mg  8 mg  8 mg   Wed  8 mg  8 mg  8 mg  8 mg   Thu  8 mg  8 mg  8 mg  8 mg   Fri  8 mg  8 mg  8 mg  8 mg   Sat  8 mg  8 mg  8 mg  8 mg   Historical INR 0.90 - 1.10  3.76   2.56   2.38         Plan:  1. INR is Therapeutic today- see above in Anticoagulation Summary.   Will instruct Woo Dobbs to Continue their warfarin regimen- see above in Anticoagulation Summary.  2. Follow up in 4 weeks  3. HIPAA compliant voicemail with instructions and follow up plan. They have been instructed to call if any changes in medications, doses, concerns, etc. Patient expresses understanding and has no further questions at this time.    [place], PharmD

## 2024-09-19 ENCOUNTER — OFFICE VISIT (OUTPATIENT)
Dept: SLEEP MEDICINE | Facility: HOSPITAL | Age: 84
End: 2024-09-19
Payer: MEDICARE

## 2024-09-19 VITALS
WEIGHT: 160 LBS | OXYGEN SATURATION: 98 % | BODY MASS INDEX: 24.25 KG/M2 | SYSTOLIC BLOOD PRESSURE: 145 MMHG | DIASTOLIC BLOOD PRESSURE: 74 MMHG | HEIGHT: 68 IN | HEART RATE: 54 BPM

## 2024-09-19 DIAGNOSIS — R06.89 ADVENTITIOUS BREATH SOUNDS: ICD-10-CM

## 2024-09-19 DIAGNOSIS — R29.818 SUSPECTED SLEEP APNEA: Primary | ICD-10-CM

## 2024-09-19 DIAGNOSIS — Z72.821 INADEQUATE SLEEP HYGIENE: ICD-10-CM

## 2024-09-19 DIAGNOSIS — R06.09 CHRONIC DYSPNEA: ICD-10-CM

## 2024-09-19 DIAGNOSIS — R53.83 OTHER FATIGUE: ICD-10-CM

## 2024-09-19 DIAGNOSIS — I10 ESSENTIAL HYPERTENSION: ICD-10-CM

## 2024-09-19 DIAGNOSIS — R06.83 SNORING: ICD-10-CM

## 2024-09-19 DIAGNOSIS — G47.30 SLEEP APNEA, UNSPECIFIED TYPE: ICD-10-CM

## 2024-09-19 DIAGNOSIS — I25.810 CORONARY ARTERY DISEASE INVOLVING CORONARY BYPASS GRAFT OF NATIVE HEART WITHOUT ANGINA PECTORIS: ICD-10-CM

## 2024-09-19 DIAGNOSIS — R06.81 WITNESSED EPISODE OF APNEA: ICD-10-CM

## 2024-09-19 PROCEDURE — G0463 HOSPITAL OUTPT CLINIC VISIT: HCPCS

## 2024-09-28 ENCOUNTER — APPOINTMENT (OUTPATIENT)
Dept: GENERAL RADIOLOGY | Facility: HOSPITAL | Age: 84
End: 2024-09-28
Payer: MEDICARE

## 2024-09-28 ENCOUNTER — APPOINTMENT (OUTPATIENT)
Dept: CT IMAGING | Facility: HOSPITAL | Age: 84
End: 2024-09-28
Payer: MEDICARE

## 2024-09-28 ENCOUNTER — HOSPITAL ENCOUNTER (INPATIENT)
Facility: HOSPITAL | Age: 84
LOS: 1 days | Discharge: HOME HEALTH | End: 2024-09-30
Attending: STUDENT IN AN ORGANIZED HEALTH CARE EDUCATION/TRAINING PROGRAM | Admitting: FAMILY MEDICINE
Payer: MEDICARE

## 2024-09-28 DIAGNOSIS — R79.89 TROPONIN LEVEL ELEVATED: ICD-10-CM

## 2024-09-28 DIAGNOSIS — N17.9 AKI (ACUTE KIDNEY INJURY): ICD-10-CM

## 2024-09-28 DIAGNOSIS — S42.032A CLOSED DISPLACED FRACTURE OF ACROMIAL END OF LEFT CLAVICLE, INITIAL ENCOUNTER: ICD-10-CM

## 2024-09-28 DIAGNOSIS — S22.030A COMPRESSION FRACTURE OF T3 VERTEBRA, INITIAL ENCOUNTER: ICD-10-CM
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
## 2024-09-28 DIAGNOSIS — S09.90XA INJURY OF HEAD, INITIAL ENCOUNTER: ICD-10-CM

## 2024-09-28 LAB
ALBUMIN SERPL-MCNC: 3.9 G/DL (ref 3.5–5.2)
ALBUMIN/GLOB SERPL: 1 G/DL
ALP SERPL-CCNC: 103 U/L (ref 39–117)
ALT SERPL W P-5'-P-CCNC: 45 U/L (ref 1–41)
ANION GAP SERPL CALCULATED.3IONS-SCNC: 11.7 MMOL/L (ref 5–15)
ANION GAP SERPL CALCULATED.3IONS-SCNC: 8.5 MMOL/L (ref 5–15)
AST SERPL-CCNC: 71 U/L (ref 1–40)
B PARAPERT DNA SPEC QL NAA+PROBE: NOT DETECTED
B PERT DNA SPEC QL NAA+PROBE: NOT DETECTED
BACTERIA UR QL AUTO: ABNORMAL /HPF
BASOPHILS # BLD AUTO: 0.01 10*3/MM3 (ref 0–0.2)
BASOPHILS # BLD AUTO: 0.02 10*3/MM3 (ref 0–0.2)
BASOPHILS NFR BLD AUTO: 0.1 % (ref 0–1.5)
BASOPHILS NFR BLD AUTO: 0.3 % (ref 0–1.5)
BILIRUB SERPL-MCNC: 0.7 MG/DL (ref 0–1.2)
BILIRUB UR QL STRIP: NEGATIVE
BUN SERPL-MCNC: 20 MG/DL (ref 8–23)
BUN SERPL-MCNC: 21 MG/DL (ref 8–23)
BUN/CREAT SERPL: 13.4 (ref 7–25)
BUN/CREAT SERPL: 15.2 (ref 7–25)
C PNEUM DNA NPH QL NAA+NON-PROBE: NOT DETECTED
CALCIUM SPEC-SCNC: 8.6 MG/DL (ref 8.6–10.5)
CALCIUM SPEC-SCNC: 9 MG/DL (ref 8.6–10.5)
CHLORIDE SERPL-SCNC: 101 MMOL/L (ref 98–107)
CHLORIDE SERPL-SCNC: 98 MMOL/L (ref 98–107)
CLARITY UR: CLEAR
CO2 SERPL-SCNC: 23.3 MMOL/L (ref 22–29)
CO2 SERPL-SCNC: 23.5 MMOL/L (ref 22–29)
COLOR UR: YELLOW
CREAT SERPL-MCNC: 1.32 MG/DL (ref 0.76–1.27)
CREAT SERPL-MCNC: 1.57 MG/DL (ref 0.76–1.27)
D-LACTATE SERPL-SCNC: 1 MMOL/L (ref 0.5–2)
D-LACTATE SERPL-SCNC: 2.4 MMOL/L (ref 0.5–2)
DEPRECATED RDW RBC AUTO: 46.7 FL (ref 37–54)
DEPRECATED RDW RBC AUTO: 48.2 FL (ref 37–54)
EGFRCR SERPLBLD CKD-EPI 2021: 43.5 ML/MIN/1.73
EGFRCR SERPLBLD CKD-EPI 2021: 53.5 ML/MIN/1.73
EOSINOPHIL # BLD AUTO: 0 10*3/MM3 (ref 0–0.4)
EOSINOPHIL # BLD AUTO: 0.01 10*3/MM3 (ref 0–0.4)
EOSINOPHIL NFR BLD AUTO: 0 % (ref 0.3–6.2)
EOSINOPHIL NFR BLD AUTO: 0.1 % (ref 0.3–6.2)
ERYTHROCYTE [DISTWIDTH] IN BLOOD BY AUTOMATED COUNT: 14.2 % (ref 12.3–15.4)
ERYTHROCYTE [DISTWIDTH] IN BLOOD BY AUTOMATED COUNT: 14.4 % (ref 12.3–15.4)
FINE GRAN CASTS URNS QL MICRO: ABNORMAL /LPF
FLUAV RNA RESP QL NAA+PROBE: NOT DETECTED
FLUAV SUBTYP SPEC NAA+PROBE: NOT DETECTED
FLUBV RNA ISLT QL NAA+PROBE: NOT DETECTED
FLUBV RNA RESP QL NAA+PROBE: NOT DETECTED
GEN 5 2HR TROPONIN T REFLEX: 36 NG/L
GLOBULIN UR ELPH-MCNC: 3.9 GM/DL
GLUCOSE BLDC GLUCOMTR-MCNC: 168 MG/DL (ref 70–130)
GLUCOSE BLDC GLUCOMTR-MCNC: 241 MG/DL (ref 70–130)
GLUCOSE BLDC GLUCOMTR-MCNC: 95 MG/DL (ref 70–130)
GLUCOSE SERPL-MCNC: 163 MG/DL (ref 65–99)
GLUCOSE SERPL-MCNC: 167 MG/DL (ref 65–99)
GLUCOSE UR STRIP-MCNC: NEGATIVE MG/DL
HADV DNA SPEC NAA+PROBE: NOT DETECTED
HBA1C MFR BLD: 6.84 % (ref 4.8–5.6)
HCOV 229E RNA SPEC QL NAA+PROBE: NOT DETECTED
HCOV HKU1 RNA SPEC QL NAA+PROBE: NOT DETECTED
HCOV NL63 RNA SPEC QL NAA+PROBE: NOT DETECTED
HCOV OC43 RNA SPEC QL NAA+PROBE: NOT DETECTED
HCT VFR BLD AUTO: 36.2 % (ref 37.5–51)
HCT VFR BLD AUTO: 39.3 % (ref 37.5–51)
HGB BLD-MCNC: 11.7 G/DL (ref 13–17.7)
HGB BLD-MCNC: 13 G/DL (ref 13–17.7)
HGB UR QL STRIP.AUTO: ABNORMAL
HMPV RNA NPH QL NAA+NON-PROBE: NOT DETECTED
HPIV1 RNA ISLT QL NAA+PROBE: NOT DETECTED
HPIV2 RNA SPEC QL NAA+PROBE: NOT DETECTED
HPIV3 RNA NPH QL NAA+PROBE: NOT DETECTED
HPIV4 P GENE NPH QL NAA+PROBE: NOT DETECTED
HYALINE CASTS UR QL AUTO: ABNORMAL /LPF
IMM GRANULOCYTES # BLD AUTO: 0.01 10*3/MM3 (ref 0–0.05)
IMM GRANULOCYTES # BLD AUTO: 0.07 10*3/MM3 (ref 0–0.05)
IMM GRANULOCYTES NFR BLD AUTO: 0.1 % (ref 0–0.5)
IMM GRANULOCYTES NFR BLD AUTO: 0.9 % (ref 0–0.5)
INR PPP: 1.31 (ref 0.9–1.1)
KETONES UR QL STRIP: NEGATIVE
LEUKOCYTE ESTERASE UR QL STRIP.AUTO: NEGATIVE
LYMPHOCYTES # BLD AUTO: 0.77 10*3/MM3 (ref 0.7–3.1)
LYMPHOCYTES # BLD AUTO: 0.83 10*3/MM3 (ref 0.7–3.1)
LYMPHOCYTES NFR BLD AUTO: 10 % (ref 19.6–45.3)
LYMPHOCYTES NFR BLD AUTO: 10.2 % (ref 19.6–45.3)
M PNEUMO IGG SER IA-ACNC: NOT DETECTED
MCH RBC QN AUTO: 29.4 PG (ref 26.6–33)
MCH RBC QN AUTO: 29.6 PG (ref 26.6–33)
MCHC RBC AUTO-ENTMCNC: 32.3 G/DL (ref 31.5–35.7)
MCHC RBC AUTO-ENTMCNC: 33.1 G/DL (ref 31.5–35.7)
MCV RBC AUTO: 89.5 FL (ref 79–97)
MCV RBC AUTO: 91 FL (ref 79–97)
MONOCYTES # BLD AUTO: 0.51 10*3/MM3 (ref 0.1–0.9)
MONOCYTES # BLD AUTO: 0.55 10*3/MM3 (ref 0.1–0.9)
MONOCYTES NFR BLD AUTO: 6.1 % (ref 5–12)
MONOCYTES NFR BLD AUTO: 7.3 % (ref 5–12)
NEUTROPHILS NFR BLD AUTO: 6.14 10*3/MM3 (ref 1.7–7)
NEUTROPHILS NFR BLD AUTO: 6.93 10*3/MM3 (ref 1.7–7)
NEUTROPHILS NFR BLD AUTO: 81.3 % (ref 42.7–76)
NEUTROPHILS NFR BLD AUTO: 83.6 % (ref 42.7–76)
NITRITE UR QL STRIP: NEGATIVE
NRBC BLD AUTO-RTO: 0 /100 WBC (ref 0–0.2)
NT-PROBNP SERPL-MCNC: 814 PG/ML (ref 0–1800)
PH UR STRIP.AUTO: 5.5 [PH] (ref 4.5–8)
PLATELET # BLD AUTO: 137 10*3/MM3 (ref 140–450)
PLATELET # BLD AUTO: 142 10*3/MM3 (ref 140–450)
PMV BLD AUTO: 9.6 FL (ref 6–12)
PMV BLD AUTO: 9.9 FL (ref 6–12)
POTASSIUM SERPL-SCNC: 4.5 MMOL/L (ref 3.5–5.2)
POTASSIUM SERPL-SCNC: 5 MMOL/L (ref 3.5–5.2)
PROCALCITONIN SERPL-MCNC: 0.16 NG/ML (ref 0–0.25)
PROT SERPL-MCNC: 7.8 G/DL (ref 6–8.5)
PROT UR QL STRIP: ABNORMAL
PROTHROMBIN TIME: 16.8 SECONDS (ref 12.1–15)
QT INTERVAL: 364 MS
QTC INTERVAL: 399 MS
RBC # BLD AUTO: 3.98 10*6/MM3 (ref 4.14–5.8)
RBC # BLD AUTO: 4.39 10*6/MM3 (ref 4.14–5.8)
RBC # UR STRIP: ABNORMAL /HPF
REF LAB TEST METHOD: ABNORMAL
RHINOVIRUS RNA SPEC NAA+PROBE: NOT DETECTED
RSV RNA NPH QL NAA+NON-PROBE: NOT DETECTED
RSV RNA RESP QL NAA+PROBE: NOT DETECTED
SARS-COV-2 RNA NPH QL NAA+NON-PROBE: NOT DETECTED
SARS-COV-2 RNA RESP QL NAA+PROBE: NOT DETECTED
SODIUM SERPL-SCNC: 133 MMOL/L (ref 136–145)
SODIUM SERPL-SCNC: 133 MMOL/L (ref 136–145)
SP GR UR STRIP: 1.02 (ref 1–1.03)
SQUAMOUS #/AREA URNS HPF: ABNORMAL /HPF
TROPONIN T DELTA: -4 NG/L
TROPONIN T SERPL HS-MCNC: 24 NG/L
TROPONIN T SERPL HS-MCNC: 39 NG/L
TROPONIN T SERPL HS-MCNC: 40 NG/L
UROBILINOGEN UR QL STRIP: ABNORMAL
WBC # UR STRIP: ABNORMAL /HPF
WBC NRBC COR # BLD AUTO: 7.55 10*3/MM3 (ref 3.4–10.8)
WBC NRBC COR # BLD AUTO: 8.3 10*3/MM3 (ref 3.4–10.8)

## 2024-09-28 PROCEDURE — 83880 ASSAY OF NATRIURETIC PEPTIDE: CPT | Performed by: STUDENT IN AN ORGANIZED HEALTH CARE EDUCATION/TRAINING PROGRAM

## 2024-09-28 PROCEDURE — 25010000002 MORPHINE PER 10 MG: Performed by: STUDENT IN AN ORGANIZED HEALTH CARE EDUCATION/TRAINING PROGRAM

## 2024-09-28 PROCEDURE — 63710000001 INSULIN LISPRO (HUMAN) PER 5 UNITS: Performed by: FAMILY MEDICINE

## 2024-09-28 PROCEDURE — 71045 X-RAY EXAM CHEST 1 VIEW: CPT

## 2024-09-28 PROCEDURE — 72125 CT NECK SPINE W/O DYE: CPT

## 2024-09-28 PROCEDURE — 73552 X-RAY EXAM OF FEMUR 2/>: CPT

## 2024-09-28 PROCEDURE — G0378 HOSPITAL OBSERVATION PER HR: HCPCS

## 2024-09-28 PROCEDURE — 25510000001 IOPAMIDOL PER 1 ML: Performed by: STUDENT IN AN ORGANIZED HEALTH CARE EDUCATION/TRAINING PROGRAM

## 2024-09-28 PROCEDURE — 97161 PT EVAL LOW COMPLEX 20 MIN: CPT | Performed by: PHYSICAL THERAPIST

## 2024-09-28 PROCEDURE — 25010000002 KETOROLAC TROMETHAMINE PER 15 MG: Performed by: HOSPITALIST

## 2024-09-28 PROCEDURE — 82948 REAGENT STRIP/BLOOD GLUCOSE: CPT

## 2024-09-28 PROCEDURE — 87040 BLOOD CULTURE FOR BACTERIA: CPT | Performed by: STUDENT IN AN ORGANIZED HEALTH CARE EDUCATION/TRAINING PROGRAM

## 2024-09-28 PROCEDURE — 93010 ELECTROCARDIOGRAM REPORT: CPT | Performed by: INTERNAL MEDICINE

## 2024-09-28 PROCEDURE — 99285 EMERGENCY DEPT VISIT HI MDM: CPT | Performed by: STUDENT IN AN ORGANIZED HEALTH CARE EDUCATION/TRAINING PROGRAM

## 2024-09-28 PROCEDURE — 99223 1ST HOSP IP/OBS HIGH 75: CPT | Performed by: FAMILY MEDICINE

## 2024-09-28 PROCEDURE — 87637 SARSCOV2&INF A&B&RSV AMP PRB: CPT | Performed by: STUDENT IN AN ORGANIZED HEALTH CARE EDUCATION/TRAINING PROGRAM

## 2024-09-28 PROCEDURE — 83036 HEMOGLOBIN GLYCOSYLATED A1C: CPT | Performed by: FAMILY MEDICINE

## 2024-09-28 PROCEDURE — 85025 COMPLETE CBC W/AUTO DIFF WBC: CPT | Performed by: HOSPITALIST

## 2024-09-28 PROCEDURE — 25010000002 PIPERACILLIN SOD-TAZOBACTAM PER 1 G: Performed by: HOSPITALIST

## 2024-09-28 PROCEDURE — 81001 URINALYSIS AUTO W/SCOPE: CPT | Performed by: STUDENT IN AN ORGANIZED HEALTH CARE EDUCATION/TRAINING PROGRAM

## 2024-09-28 PROCEDURE — 90472 IMMUNIZATION ADMIN EACH ADD: CPT | Performed by: STUDENT IN AN ORGANIZED HEALTH CARE EDUCATION/TRAINING PROGRAM

## 2024-09-28 PROCEDURE — 80053 COMPREHEN METABOLIC PANEL: CPT | Performed by: STUDENT IN AN ORGANIZED HEALTH CARE EDUCATION/TRAINING PROGRAM

## 2024-09-28 PROCEDURE — 70450 CT HEAD/BRAIN W/O DYE: CPT

## 2024-09-28 PROCEDURE — 25810000003 SODIUM CHLORIDE 0.9 % SOLUTION: Performed by: FAMILY MEDICINE

## 2024-09-28 PROCEDURE — 93005 ELECTROCARDIOGRAM TRACING: CPT | Performed by: STUDENT IN AN ORGANIZED HEALTH CARE EDUCATION/TRAINING PROGRAM

## 2024-09-28 PROCEDURE — 84145 PROCALCITONIN (PCT): CPT | Performed by: STUDENT IN AN ORGANIZED HEALTH CARE EDUCATION/TRAINING PROGRAM

## 2024-09-28 PROCEDURE — 25810000003 SODIUM CHLORIDE 0.9 % SOLUTION: Performed by: STUDENT IN AN ORGANIZED HEALTH CARE EDUCATION/TRAINING PROGRAM

## 2024-09-28 PROCEDURE — 94799 UNLISTED PULMONARY SVC/PX: CPT

## 2024-09-28 PROCEDURE — 25010000002 TETANUS-DIPHTH-ACELL PERTUSSIS 5-2.5-18.5 LF-MCG/0.5 SUSPENSION PREFILLED SYRINGE: Performed by: STUDENT IN AN ORGANIZED HEALTH CARE EDUCATION/TRAINING PROGRAM

## 2024-09-28 PROCEDURE — 85610 PROTHROMBIN TIME: CPT | Performed by: STUDENT IN AN ORGANIZED HEALTH CARE EDUCATION/TRAINING PROGRAM

## 2024-09-28 PROCEDURE — 84484 ASSAY OF TROPONIN QUANT: CPT | Performed by: FAMILY MEDICINE

## 2024-09-28 PROCEDURE — 74177 CT ABD & PELVIS W/CONTRAST: CPT

## 2024-09-28 PROCEDURE — 85025 COMPLETE CBC W/AUTO DIFF WBC: CPT | Performed by: STUDENT IN AN ORGANIZED HEALTH CARE EDUCATION/TRAINING PROGRAM

## 2024-09-28 PROCEDURE — 71275 CT ANGIOGRAPHY CHEST: CPT

## 2024-09-28 PROCEDURE — 83605 ASSAY OF LACTIC ACID: CPT | Performed by: STUDENT IN AN ORGANIZED HEALTH CARE EDUCATION/TRAINING PROGRAM

## 2024-09-28 PROCEDURE — 25010000002 ONDANSETRON PER 1 MG: Performed by: STUDENT IN AN ORGANIZED HEALTH CARE EDUCATION/TRAINING PROGRAM

## 2024-09-28 PROCEDURE — 84484 ASSAY OF TROPONIN QUANT: CPT | Performed by: STUDENT IN AN ORGANIZED HEALTH CARE EDUCATION/TRAINING PROGRAM

## 2024-09-28 PROCEDURE — 0HQ0XZZ REPAIR SCALP SKIN, EXTERNAL APPROACH: ICD-10-PCS | Performed by: STUDENT IN AN ORGANIZED HEALTH CARE EDUCATION/TRAINING PROGRAM

## 2024-09-28 PROCEDURE — 99285 EMERGENCY DEPT VISIT HI MDM: CPT

## 2024-09-28 PROCEDURE — 90715 TDAP VACCINE 7 YRS/> IM: CPT | Performed by: STUDENT IN AN ORGANIZED HEALTH CARE EDUCATION/TRAINING PROGRAM

## 2024-09-28 PROCEDURE — 72170 X-RAY EXAM OF PELVIS: CPT

## 2024-09-28 PROCEDURE — 0202U NFCT DS 22 TRGT SARS-COV-2: CPT | Performed by: FAMILY MEDICINE

## 2024-09-28 PROCEDURE — 90471 IMMUNIZATION ADMIN: CPT | Performed by: STUDENT IN AN ORGANIZED HEALTH CARE EDUCATION/TRAINING PROGRAM

## 2024-09-28 RX ORDER — SODIUM CHLORIDE 9 MG/ML
40 INJECTION, SOLUTION INTRAVENOUS AS NEEDED
Status: DISCONTINUED | OUTPATIENT
Start: 2024-09-28 | End: 2024-09-30 | Stop reason: HOSPADM

## 2024-09-28 RX ORDER — ATORVASTATIN CALCIUM 40 MG/1
40 TABLET, FILM COATED ORAL NIGHTLY
Status: DISCONTINUED | OUTPATIENT
Start: 2024-09-28 | End: 2024-09-30 | Stop reason: HOSPADM

## 2024-09-28 RX ORDER — AMOXICILLIN 250 MG
2 CAPSULE ORAL 2 TIMES DAILY PRN
Status: DISCONTINUED | OUTPATIENT
Start: 2024-09-28 | End: 2024-09-30 | Stop reason: HOSPADM

## 2024-09-28 RX ORDER — BISACODYL 10 MG
10 SUPPOSITORY, RECTAL RECTAL DAILY PRN
Status: DISCONTINUED | OUTPATIENT
Start: 2024-09-28 | End: 2024-09-30 | Stop reason: HOSPADM

## 2024-09-28 RX ORDER — PANTOPRAZOLE SODIUM 40 MG/1
40 TABLET, DELAYED RELEASE ORAL
Status: DISCONTINUED | OUTPATIENT
Start: 2024-09-28 | End: 2024-09-30 | Stop reason: HOSPADM

## 2024-09-28 RX ORDER — INSULIN LISPRO 100 [IU]/ML
2-7 INJECTION, SOLUTION INTRAVENOUS; SUBCUTANEOUS
Status: DISCONTINUED | OUTPATIENT
Start: 2024-09-28 | End: 2024-09-30 | Stop reason: HOSPADM

## 2024-09-28 RX ORDER — KETOROLAC TROMETHAMINE 30 MG/ML
15 INJECTION, SOLUTION INTRAMUSCULAR; INTRAVENOUS ONCE
Status: COMPLETED | OUTPATIENT
Start: 2024-09-28 | End: 2024-09-28

## 2024-09-28 RX ORDER — ONDANSETRON 2 MG/ML
4 INJECTION INTRAMUSCULAR; INTRAVENOUS EVERY 6 HOURS PRN
Status: DISCONTINUED | OUTPATIENT
Start: 2024-09-28 | End: 2024-09-30 | Stop reason: HOSPADM

## 2024-09-28 RX ORDER — ONDANSETRON 2 MG/ML
4 INJECTION INTRAMUSCULAR; INTRAVENOUS ONCE
Status: COMPLETED | OUTPATIENT
Start: 2024-09-28 | End: 2024-09-28

## 2024-09-28 RX ORDER — TAMSULOSIN HYDROCHLORIDE 0.4 MG/1
0.8 CAPSULE ORAL DAILY
Status: DISCONTINUED | OUTPATIENT
Start: 2024-09-28 | End: 2024-09-30 | Stop reason: HOSPADM

## 2024-09-28 RX ORDER — ATENOLOL 25 MG/1
25 TABLET ORAL 2 TIMES DAILY
Status: DISCONTINUED | OUTPATIENT
Start: 2024-09-28 | End: 2024-09-30 | Stop reason: HOSPADM

## 2024-09-28 RX ORDER — SODIUM CHLORIDE 0.9 % (FLUSH) 0.9 %
10 SYRINGE (ML) INJECTION EVERY 12 HOURS SCHEDULED
Status: DISCONTINUED | OUTPATIENT
Start: 2024-09-28 | End: 2024-09-30 | Stop reason: HOSPADM

## 2024-09-28 RX ORDER — BISACODYL 5 MG/1
5 TABLET, DELAYED RELEASE ORAL DAILY PRN
Status: DISCONTINUED | OUTPATIENT
Start: 2024-09-28 | End: 2024-09-30 | Stop reason: HOSPADM

## 2024-09-28 RX ORDER — ACETAMINOPHEN 500 MG
1000 TABLET ORAL ONCE
Status: COMPLETED | OUTPATIENT
Start: 2024-09-28 | End: 2024-09-28

## 2024-09-28 RX ORDER — HYDROCODONE BITARTRATE AND ACETAMINOPHEN 5; 325 MG/1; MG/1
1 TABLET ORAL EVERY 4 HOURS PRN
Status: DISCONTINUED | OUTPATIENT
Start: 2024-09-28 | End: 2024-09-30 | Stop reason: HOSPADM

## 2024-09-28 RX ORDER — DEXTROSE MONOHYDRATE 25 G/50ML
25 INJECTION, SOLUTION INTRAVENOUS
Status: DISCONTINUED | OUTPATIENT
Start: 2024-09-28 | End: 2024-09-30 | Stop reason: HOSPADM

## 2024-09-28 RX ORDER — LEVOTHYROXINE SODIUM 50 UG/1
50 TABLET ORAL DAILY
Status: DISCONTINUED | OUTPATIENT
Start: 2024-09-28 | End: 2024-09-30 | Stop reason: HOSPADM

## 2024-09-28 RX ORDER — POLYETHYLENE GLYCOL 3350 17 G/17G
17 POWDER, FOR SOLUTION ORAL DAILY PRN
Status: DISCONTINUED | OUTPATIENT
Start: 2024-09-28 | End: 2024-09-30 | Stop reason: HOSPADM

## 2024-09-28 RX ORDER — IOPAMIDOL 755 MG/ML
100 INJECTION, SOLUTION INTRAVASCULAR
Status: COMPLETED | OUTPATIENT
Start: 2024-09-28 | End: 2024-09-28

## 2024-09-28 RX ORDER — ACETAMINOPHEN 325 MG/1
650 TABLET ORAL EVERY 6 HOURS PRN
Status: DISCONTINUED | OUTPATIENT
Start: 2024-09-28 | End: 2024-09-30 | Stop reason: HOSPADM

## 2024-09-28 RX ORDER — NICOTINE POLACRILEX 4 MG
15 LOZENGE BUCCAL
Status: DISCONTINUED | OUTPATIENT
Start: 2024-09-28 | End: 2024-09-30 | Stop reason: HOSPADM

## 2024-09-28 RX ORDER — IBUPROFEN 600 MG/1
1 TABLET ORAL
Status: DISCONTINUED | OUTPATIENT
Start: 2024-09-28 | End: 2024-09-30 | Stop reason: HOSPADM

## 2024-09-28 RX ORDER — SODIUM CHLORIDE 9 MG/ML
75 INJECTION, SOLUTION INTRAVENOUS CONTINUOUS
Status: DISCONTINUED | OUTPATIENT
Start: 2024-09-28 | End: 2024-09-28

## 2024-09-28 RX ORDER — SODIUM CHLORIDE 0.9 % (FLUSH) 0.9 %
10 SYRINGE (ML) INJECTION AS NEEDED
Status: DISCONTINUED | OUTPATIENT
Start: 2024-09-28 | End: 2024-09-30 | Stop reason: HOSPADM

## 2024-09-28 RX ORDER — WARFARIN SODIUM 4 MG/1
8 TABLET ORAL NIGHTLY
Status: DISCONTINUED | OUTPATIENT
Start: 2024-09-28 | End: 2024-09-30 | Stop reason: HOSPADM

## 2024-09-28 RX ORDER — HYDROCODONE BITARTRATE AND ACETAMINOPHEN 5; 325 MG/1; MG/1
1 TABLET ORAL EVERY 6 HOURS PRN
Status: DISCONTINUED | OUTPATIENT
Start: 2024-09-28 | End: 2024-09-28

## 2024-09-28 RX ORDER — ASPIRIN 81 MG/1
81 TABLET ORAL DAILY
Status: DISCONTINUED | OUTPATIENT
Start: 2024-09-28 | End: 2024-09-30 | Stop reason: HOSPADM

## 2024-09-28 RX ORDER — HYDRALAZINE HYDROCHLORIDE 20 MG/ML
10 INJECTION INTRAMUSCULAR; INTRAVENOUS EVERY 4 HOURS PRN
Status: DISCONTINUED | OUTPATIENT
Start: 2024-09-28 | End: 2024-09-30 | Stop reason: HOSPADM

## 2024-09-28 RX ADMIN — IOPAMIDOL 100 ML: 755 INJECTION, SOLUTION INTRAVENOUS at 02:13

## 2024-09-28 RX ADMIN — SODIUM CHLORIDE 500 ML: 9 INJECTION, SOLUTION INTRAVENOUS at 02:13

## 2024-09-28 RX ADMIN — PANTOPRAZOLE SODIUM 40 MG: 40 TABLET, DELAYED RELEASE ORAL at 06:09

## 2024-09-28 RX ADMIN — TETANUS TOXOID, REDUCED DIPHTHERIA TOXOID AND ACELLULAR PERTUSSIS VACCINE, ADSORBED 0.5 ML: 5; 2.5; 8; 8; 2.5 SUSPENSION INTRAMUSCULAR at 02:13

## 2024-09-28 RX ADMIN — TAMSULOSIN HYDROCHLORIDE 0.8 MG: 0.4 CAPSULE ORAL at 08:56

## 2024-09-28 RX ADMIN — IOPAMIDOL 100 ML: 755 INJECTION, SOLUTION INTRAVENOUS at 02:16

## 2024-09-28 RX ADMIN — ACETAMINOPHEN 1000 MG: 500 TABLET ORAL at 02:12

## 2024-09-28 RX ADMIN — SODIUM CHLORIDE 75 ML/HR: 9 INJECTION, SOLUTION INTRAVENOUS at 06:05

## 2024-09-28 RX ADMIN — ATORVASTATIN CALCIUM 40 MG: 40 TABLET, FILM COATED ORAL at 20:21

## 2024-09-28 RX ADMIN — ONDANSETRON 4 MG: 2 INJECTION INTRAMUSCULAR; INTRAVENOUS at 02:12

## 2024-09-28 RX ADMIN — PIPERACILLIN AND TAZOBACTAM 3.38 G: 3; .375 INJECTION, POWDER, FOR SOLUTION INTRAVENOUS at 20:52

## 2024-09-28 RX ADMIN — HYDROCODONE BITARTRATE AND ACETAMINOPHEN 1 TABLET: 5; 325 TABLET ORAL at 09:47

## 2024-09-28 RX ADMIN — INSULIN LISPRO 2 UNITS: 100 INJECTION, SOLUTION INTRAVENOUS; SUBCUTANEOUS at 09:07

## 2024-09-28 RX ADMIN — Medication 10 ML: at 20:22

## 2024-09-28 RX ADMIN — PIPERACILLIN AND TAZOBACTAM 3.38 G: 3; .375 INJECTION, POWDER, FOR SOLUTION INTRAVENOUS at 12:56

## 2024-09-28 RX ADMIN — ACETAMINOPHEN 650 MG: 325 TABLET ORAL at 12:54

## 2024-09-28 RX ADMIN — ASPIRIN 81 MG: 81 TABLET, COATED ORAL at 08:56

## 2024-09-28 RX ADMIN — Medication 10 ML: at 09:46

## 2024-09-28 RX ADMIN — MORPHINE SULFATE 4 MG: 4 INJECTION, SOLUTION INTRAMUSCULAR; INTRAVENOUS at 02:12

## 2024-09-28 RX ADMIN — ATENOLOL 25 MG: 25 TABLET ORAL at 20:21

## 2024-09-28 RX ADMIN — KETOROLAC TROMETHAMINE 15 MG: 30 INJECTION, SOLUTION INTRAMUSCULAR; INTRAVENOUS at 13:56

## 2024-09-28 RX ADMIN — WARFARIN SODIUM 8 MG: 4 TABLET ORAL at 20:21

## 2024-09-28 RX ADMIN — SODIUM CHLORIDE 500 ML: 9 INJECTION, SOLUTION INTRAVENOUS at 03:01

## 2024-09-28 RX ADMIN — ACETAMINOPHEN 650 MG: 325 TABLET ORAL at 20:52

## 2024-09-28 RX ADMIN — LEVOTHYROXINE SODIUM 50 MCG: 50 TABLET ORAL at 08:56

## 2024-09-28 RX ADMIN — ATENOLOL 25 MG: 25 TABLET ORAL at 09:07

## 2024-09-28 RX ADMIN — INSULIN LISPRO 3 UNITS: 100 INJECTION, SOLUTION INTRAVENOUS; SUBCUTANEOUS at 12:54

## 2024-09-28 RX ADMIN — INSULIN LISPRO 2 UNITS: 100 INJECTION, SOLUTION INTRAVENOUS; SUBCUTANEOUS at 20:22

## 2024-09-28 NOTE — H&P
[facility]     PCP: Agustín Ivan MD    CODE status: Full     CHIEF COMPLAINT: Fall into creek    HISTORY OF PRESENT ILLNESS:    82 y/o male with hx of CAD, AS s/p tissue AVR, PAF on coumadin, HTN, DM II, and  hypothyroidism, presents to [facility] ED for diffuse pain and bleeding from the scalp after a fall.  Patient was walking across a bridge around 10 PM today, when he fell into a creek from about 10 feet up.  He had a large scalp laceration and pain on left front and side of his chest as well as his upper left upper shoulder area.  He takes warfarin, and last took a dose yesterday morning.  He denies LOC or feeling bad prior to his fall. He denies chills, nausea, vomiting, chest pressure, SOB, cough, chills, diarrhea, or recent change in appetite.  In the ED, he had a fever, with several readings over 100.8.        Past Medical History:   Diagnosis Date    Aortic stenosis     8/2022: tissue AVR (25mm MDT Avalus bovine pericardial)    Asthma     BPH (benign prostatic hyperplasia)     CAD (coronary artery disease)     7/2022: 95% prox/50% distal LAD, 80% D1, 30-40% Cx, diffuse dz RCA. CABG x 3 8/2022: LIMA-LAD, SVG-D1, SVG-RCA    Carotid atherosclerosis, bilateral     8/2022: 16-49% bilaterally    Colon polyp     GERD (gastroesophageal reflux disease)     Hyperlipidemia     Hypertension     Hypothyroidism     Paroxysmal atrial fibrillation     Type 2 diabetes mellitus     Warfarin anticoagulation      Past Surgical History:   Procedure Laterality Date    CARDIAC CATHETERIZATION N/A 7/14/2022    Procedure: Coronary angiography;  Surgeon: Unique Calabrese MD;  Location:  [facility] C[facility] INVASIVE LOCATION;  Service: Cardiovascular;  Laterality: N/A;    CARDIAC CATHETERIZATION N/A 7/14/2022    Procedure: Left heart cath with simultaneous LV/Ao pressures;  Surgeon: Unique Calabrese MD;  Location:  [facility] CATH INVASIVE LOCATION;  Service: Cardiovascular;  Laterality: N/A;    CARDIAC  CATHETERIZATION N/A 7/14/2022    Procedure: Right Heart Cath;  Surgeon: Unique Calabrese MD;  Location: [facility] CATH INVASIVE LOCATION;  Service: Cardiovascular;  Laterality: N/A;    COLONOSCOPY      CORONARY ARTERY BYPASS GRAFT WITH AORTIC VALVE REPAIR/REPLACEMENT N/A 8/8/2022    Procedure: KEVIN STERNOTOMY CORONARY ARTERY BYPASS GRAFT TIMES 3 USING LEFT INTERNAL MAMMARY ARTERY AND LEFT GREATER SAPHENOUS VEIN GRAFT PER ENDOSCOPIC VEIN HARVESTING, AORTIC VALVE REPLACEMENT AND PRP;  Surgeon: Jr Gentry Courtney MD;  Location: [facility] CVOR;  Service: Cardiothoracic;  Laterality: N/A;    CYSTOSCOPY BLADDER BIOPSY N/A 3/18/2024    Procedure: CYSTOSCOPY BLADDER BIOPSY;  Surgeon: Nimesh Arvizu MD;  Location: [facility] OR;  Service: Urology;  Laterality: N/A;    CYSTOSCOPY RETROGRADE PYELOGRAM Bilateral 3/18/2024    Procedure: bilateral retrogrades and cytology;  Surgeon: Nimesh Arvizu MD;  Location: [facility] OR;  Service: Urology;  Laterality: Bilateral;    TRANSURETHRAL RESECTION OF BLADDER TUMOR N/A 1/23/2023    Procedure: TRANSURETHRAL RESECTION OF SMALL BLADDER TUMOR;  Surgeon: Nimesh Arvizu MD;  Location: [facility] OR;  Service: Urology;  Laterality: N/A;    VASECTOMY       Family History   Problem Relation Age of Onset    Colon cancer Neg Hx     Colon polyps Neg Hx     Malig Hyperthermia Neg Hx      Social History     Tobacco Use    Smoking status: Never    Smokeless tobacco: Never    Tobacco comments:     caffeine use: 1 -2 cups daily.    Vaping Use    Vaping status: Never Used   Substance Use Topics    Alcohol use: No    Drug use: No     Medications Prior to Admission   Medication Sig Dispense Refill Last Dose    aspirin 81 MG EC tablet Take 1 tablet by mouth Daily.       atenolol (TENORMIN) 25 MG tablet Take 1 tablet by mouth 2 (Two) Times a Day. 180 tablet 3     atorvastatin (LIPITOR) 40 MG tablet TAKE 1 TABLET BY MOUTH ONCE DAILY AT NIGHT 90 tablet 0     ferrous sulfate 325 (65 FE) MG EC tablet Take 1 tablet  "by mouth Daily With Breakfast.       glucose blood test strip Test blood sugar Twice daily 200 each 12     glucose monitor monitoring kit 1 each As Needed (Diabetes 2). 1 each 0     indomethacin (INDOCIN) 50 MG capsule Take 1 capsule by mouth 2 (Two) Times a Day With Meals.       Lancets misc 1 each 3 (Three) Times a Day Before Meals. 200 each 5     levothyroxine (SYNTHROID, LEVOTHROID) 50 MCG tablet Take 1 tablet by mouth Daily.       losartan (COZAAR) 50 MG tablet Take 1 tablet by mouth Daily.       metFORMIN (GLUCOPHAGE) 1000 MG tablet Take 1 tablet by mouth 2 (Two) Times a Day With Meals. (Patient not taking: Reported on 7/9/2024)       nitroglycerin (Nitrostat) 0.4 MG SL tablet Place 1 tablet under the tongue Every 5 (Five) Minutes As Needed for Chest Pain. Take no more than 3 doses in 15 minutes. 30 tablet 1     omeprazole (priLOSEC) 40 MG capsule Take 1 capsule by mouth 3 (Three) Times a Week if Needed (Heartburn). 90 capsule 3     tamsulosin (FLOMAX) 0.4 MG capsule 24 hr capsule Take 1 capsule by mouth Daily. 30 capsule 0     warfarin (COUMADIN) 4 MG tablet TAKE 2 TABLETS ONE TIME DAILY OR AS DIRECTED 180 tablet 1      Allergies:  Loratadine    Immunization History   Administered Date(s) Administered    COVID-19 (JOHNATHAN) 03/09/2021    FLUAD TRI 65YR+ 12/23/2019    Fluad Quad 65+ 12/23/2019, 10/15/2020    Fluzone High-Dose 65+yrs 10/12/2021    Pneumococcal Polysaccharide (PPSV23) 10/15/2020    Td (TDVAX) 04/01/1996, 04/01/1996    Td, Not Adsorbed 10/15/2020    Tdap 09/28/2024       REVIEW OF SYSTEMS:  Please see the above history of present illness for pertinent positives and negatives.  The remainder of the patient's systems have been reviewed and are negative.     Vital Signs  Temp:  [100.8 °F (38.2 °C)-101.3 °F (38.5 °C)] 101.2 °F (38.4 °C)  Heart Rate:  [76-89] 81  Resp:  [14-16] 16  BP: (132-166)/(69-91) 154/69  Flowsheet Rows      Flowsheet Row First Filed Value   Admission Height 172.7 cm (68\") " Documented at 09/28/2024 0034   Admission Weight 72.6 kg (160 lb 0.9 oz) Documented at 09/28/2024 0034               Physical Exam:  General: Elderly male; lying in bed; appears fatigued   HENT: Head with dried blood in scalp; large stapled laceration left top scalp   Eyes: Vision is grossly intact. Pupils appear equal and round.   Cardiovascular: RRR, S1-S2: + tenderness to palpation of anterior chest wall   Respiratory: Lungs are diminished at bases; no wheezing   Abdominal/GI: Soft, nontender, bowel sounds present. No rebound or guarding present.   Extremities: No peripheral edema noted.   Musculoskeletal: Spontaneous movement of bilateral upper and lower extremities against gravity noted. No signs of injury or deformity noted.   Skin: Warm and dry.   Psych: Mood and affect are appropriate. Cooperative with exam.   Neuro: No facial asymmetry noted. No focal deficits noted, hearing and vision are grossly intact.         Results Review:    I reviewed the patient's new clinical results.  Lab Results (most recent)       Procedure Component Value Units Date/Time    Blood Culture - Blood, Arm, Right [[id number]] Collected: 09/28/24 0418    Specimen: Blood from Arm, Right Updated: 09/28/24 0424    Blood Culture - Blood, Arm, Left [[id number]] Collected: 09/28/24 0418    Specimen: Blood from Arm, Left Updated: 09/28/24 0423    STAT Lactic Acid, Reflex [[id number]]  (Normal) Collected: 09/28/24 0321    Specimen: Blood Updated: 09/28/24 0342     Lactate 1.0 mmol/L     Urinalysis, Microscopic Only - Urine, Clean Catch [[id number]]  (Abnormal) Collected: 09/28/24 0332    Specimen: Urine, Clean Catch Updated: 09/28/24 0342     RBC, UA 11-20 /HPF      WBC, UA None Seen /HPF      Bacteria, UA None Seen /HPF      Squamous Epithelial Cells, UA 3-6 /HPF      Hyaline Casts, UA None Seen /LPF      Fine Granular Casts, UA 0-2 /LPF      Methodology Manual Light Microscopy    Urinalysis With Microscopic If Indicated (No Culture) - Urine,  Clean Catch [[id number]]  (Abnormal) Collected: 09/28/24 0332    Specimen: Urine, Clean Catch Updated: 09/28/24 0341     Color, UA Yellow     Appearance, UA Clear     pH, UA 5.5     Specific Gravity, UA 1.020     Glucose, UA Negative     Ketones, UA Negative     Bilirubin, UA Negative     Blood, UA Large (3+)     Protein,  mg/dL (2+)     Leuk Esterase, UA Negative     Nitrite, UA Negative     Urobilinogen, UA 1.0 E.U./dL    Single High Sensitivity Troponin T [[id number]]  (Abnormal) Collected: 09/28/24 0321    Specimen: Blood Updated: 09/28/24 0341     HS Troponin T 39 ng/L     Narrative:      High Sensitive Troponin T Reference Range:  <14.0 ng/L- Negative Female for AMI  <22.0 ng/L- Negative Male for AMI  >=14 - Abnormal Female indicating possible myocardial injury.  >=22 - Abnormal Male indicating possible myocardial injury.   Clinicians would have to utilize clinical acumen, EKG, Troponin, and serial changes to determine if it is an Acute Myocardial Infarction or myocardial injury due to an underlying chronic condition.         Single High Sensitivity Troponin T [[id number]]  (Abnormal) Collected: 09/28/24 0045    Specimen: Blood Updated: 09/28/24 0138     HS Troponin T 24 ng/L     Narrative:      High Sensitive Troponin T Reference Range:  <14.0 ng/L- Negative Female for AMI  <22.0 ng/L- Negative Male for AMI  >=14 - Abnormal Female indicating possible myocardial injury.  >=22 - Abnormal Male indicating possible myocardial injury.   Clinicians would have to utilize clinical acumen, EKG, Troponin, and serial changes to determine if it is an Acute Myocardial Infarction or myocardial injury due to an underlying chronic condition.         COVID-19, FLU A/B, RSV PCR 1 HR TAT - Swab, Nasopharynx [[id number]]  (Normal) Collected: 09/28/24 0045    Specimen: Swab from Nasopharynx Updated: 09/28/24 0128     COVID19 Not Detected     Influenza A PCR Not Detected     Influenza B PCR Not Detected     RSV, PCR Not Detected  "   Narrative:      Fact sheet for providers: https://www.fda.gov/media/881321/download    Fact sheet for patients: https://www.fda.gov/media/582954/download    Test performed by PCR.    Protime-INR [[id number]]  (Abnormal) Collected: 09/28/24 0045    Specimen: Blood Updated: 09/28/24 0121     Protime 16.8 Seconds      INR 1.31    Narrative:      Therapeutic Ranges for INR: 2.0-3.0 (PT 20-30)                              2.5-3.5 (PT 25-34)    Procalcitonin [[id number]]  (Normal) Collected: 09/28/24 0045    Specimen: Blood Updated: 09/28/24 0121     Procalcitonin 0.16 ng/mL     Narrative:      As a Marker for Sepsis (Non-Neonates):    1. <0.5 ng/mL represents a low risk of severe sepsis and/or septic shock.  2. >2 ng/mL represents a high risk of severe sepsis and/or septic shock.    As a Marker for Lower Respiratory Tract Infections that require antibiotic therapy:    PCT on Admission    Antibiotic Therapy       6-12 Hrs later    >0.5                Strongly Recommended  >0.25 - <0.5        Recommended   0.1 - 0.25          Discouraged              Remeasure/reassess PCT  <0.1                Strongly Discouraged     Remeasure/reassess PCT    As 28 day mortality risk marker: \"Change in Procalcitonin Result\" (>80% or <=80%) if Day 0 (or Day 1) and Day 4 values are available. Refer to http://www.[employer]-pct-calculator.com    Change in PCT <=80%  A decrease of PCT levels below or equal to 80% defines a positive change in PCT test result representing a higher risk for 28-day all-cause mortality of patients diagnosed with severe sepsis for septic shock.    Change in PCT >80%  A decrease of PCT levels of more than 80% defines a negative change in PCT result representing a lower risk for 28-day all-cause mortality of patients diagnosed with severe sepsis or septic shock.       BNP [[id number]]  (Normal) Collected: 09/28/24 0045    Specimen: Blood Updated: 09/28/24 0119     proBNP 814.0 pg/mL     Narrative:      This assay is used " as an aid in the diagnosis of individuals suspected of having heart failure. It can be used as an aid in the diagnosis of acute decompensated heart failure (ADHF) in patients presenting with signs and symptoms of ADHF to the emergency department (ED). In addition, NT-proBNP of <300 pg/mL indicates ADHF is not likely.    Age Range Result Interpretation  NT-proBNP Concentration (pg/mL:      <50             Positive            >450                   Gray                 300-450                    Negative             <300    50-75           Positive            >900                  Gray                300-900                  Negative            <300      >75             Positive            >1800                  Gray                300-1800                  Negative            <300    Comprehensive Metabolic Panel [[id number]]  (Abnormal) Collected: 09/28/24 0045    Specimen: Blood Updated: 09/28/24 0119     Glucose 167 mg/dL      BUN 21 mg/dL      Creatinine 1.57 mg/dL      Sodium 133 mmol/L      Potassium 4.5 mmol/L      Chloride 98 mmol/L      CO2 23.3 mmol/L      Calcium 9.0 mg/dL      Total Protein 7.8 g/dL      Albumin 3.9 g/dL      ALT (SGPT) 45 U/L      AST (SGOT) 71 U/L      Alkaline Phosphatase 103 U/L      Total Bilirubin 0.7 mg/dL      Globulin 3.9 gm/dL      A/G Ratio 1.0 g/dL      BUN/Creatinine Ratio 13.4     Anion Gap 11.7 mmol/L      eGFR 43.5 mL/min/1.73     Narrative:      GFR Normal >60  Chronic Kidney Disease <60  Kidney Failure <15    The GFR formula is only valid for adults with stable renal function between ages 18 and 70.    Lactic Acid, Plasma [[id number]]  (Abnormal) Collected: 09/28/24 0045    Specimen: Blood Updated: 09/28/24 0114     Lactate 2.4 mmol/L     CBC & Differential [[id number]]  (Abnormal) Collected: 09/28/24 0045    Specimen: Blood Updated: 09/28/24 0052    Narrative:      The following orders were created for panel order CBC & Differential.  Procedure                                Abnormality         Status                     ---------                               -----------         ------                     CBC Auto Differential[[id number]]        Abnormal            Final result                 Please view results for these tests on the individual orders.    CBC Auto Differential [[id number]]  (Abnormal) Collected: 09/28/24 0045    Specimen: Blood Updated: 09/28/24 0052     WBC 7.55 10*3/mm3      RBC 4.39 10*6/mm3      Hemoglobin 13.0 g/dL      Hematocrit 39.3 %      MCV 89.5 fL      MCH 29.6 pg      MCHC 33.1 g/dL      RDW 14.4 %      RDW-SD 46.7 fl      MPV 9.6 fL      Platelets 142 10*3/mm3      Neutrophil % 81.3 %      Lymphocyte % 10.2 %      Monocyte % 7.3 %      Eosinophil % 0.0 %      Basophil % 0.3 %      Immature Grans % 0.9 %      Neutrophils, Absolute 6.14 10*3/mm3      Lymphocytes, Absolute 0.77 10*3/mm3      Monocytes, Absolute 0.55 10*3/mm3      Eosinophils, Absolute 0.00 10*3/mm3      Basophils, Absolute 0.02 10*3/mm3      Immature Grans, Absolute 0.07 10*3/mm3      nRBC 0.0 /100 WBC             Imaging Results (Most Recent)       Procedure Component Value Units Date/Time    XR Femur 2 View Left [[id number]] Collected: 09/28/24 0306     Updated: 09/28/24 0310    Narrative:      XR FEMUR 2 VW LEFT    Date of Exam: 9/28/2024 2:08 AM EDT    Indication: 83yoM, r/o left femur fx    Comparison: 10/29/2021.    Findings:  The left femur appears intact without fracture or dislocation. There are small osteophytes at the left hip. There is mild tricompartmental osteophyte formation at the knee with mild narrowing of the medial and lateral compartment joint spaces.      Impression:      Impression:  1.No acute osseous abnormality.  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        Electronically Signed: Eliseo Titus MD    9/28/2024 3:06 AM EDT    Workstation ID: [id number]    XR Pelvis 1 or 2 View [[id number]] Collected: 09/28/24 0305     Updated: 09/28/24 0309    Narrative:      XR  PELVIS 1 OR 2 VW    Date of Exam: 9/28/2024 2:09 AM EDT    Indication: 83yoM, fell from bridge.    Comparison: None available.    Findings:  Bony pelvis appears intact. No hip fracture. Mild pelvic and trochanteric enthesopathy. Sacroiliac joints, sacral arcuate lines and obturator rings appear intact. Mild DJD at the pubic symphysis.      Impression:      Impression:  No acute osseous abnormality.        Electronically Signed: Eliseo Titus MD    9/28/2024 3:06 AM EDT    Workstation ID: [id number]    XR Chest 1 View [[id number]] Collected: 09/28/24 0304     Updated: 09/28/24 0308    Narrative:      XR CHEST 1 VW    Date of Exam: 9/28/2024 2:07 AM EDT    Indication: 83yoM, fever. desat to 88%. R/o PNA    Comparison: 12/4/2022 and CT chest 9/28/2024.    Findings:  There is evidence of prior median sternotomy. There is a mildly displaced distal left clavicle fracture. There is mild DJD of both shoulders. Heart size is normal. Pulmonary vasculature is within normal limits. Lung parenchyma is clear.      Impression:      Impression:  1.No acute cardiopulmonary abnormality.  2.Mildly displaced distal left clavicle fracture.  3.Prior median sternotomy.        Electronically Signed: Eliseo Titus MD    9/28/2024 3:05 AM EDT    Workstation ID: [id number]    CT Angiogram Chest [[id number]] Collected: 09/28/24 0249     Updated: 09/28/24 0307    Addenda:        There is an acute mildly displaced posterior left 10th rib fracture.    Electronically Signed: Eliseo Titus MD    9/28/2024 3:04 AM EDT    Workstation ID: [id number]  Signed: 09/28/24 0304 by Eliseo Titus MD    Narrative:      CT ANGIOGRAM CHEST    Date of Exam: 9/28/2024 2:00 AM EDT    Indication: 83yoM, fever, desat, fell down 10'. Left sided chest pain. R/o pulm contusion, rib fx, PE.    Comparison: Chest radiograph 9/28/2024.    Technique: CTA of the chest was performed before and after the uneventful intravenous administration of iodinated contrast. Reconstructed  coronal and sagittal images were also obtained. In addition, a 3-D volume rendered image was created for   interpretation. Automated exposure control and iterative reconstruction methods were used.      Findings:  The thyroid, trachea and esophagus appear within normal limits. There is a small hiatal hernia. 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 Mild aortic atherosclerosis. Moderate native coronary artery calcification. No pericardial effusion. No   mediastinal lymphadenopathy. No evidence of pulmonary embolism.    There is mild dependent bibasilar atelectasis. No pneumothorax or pleural effusion. There is no airway obstruction. No suspicious lung nodule.    No acute findings in the limited images of the upper abdomen. There is cholelithiasis without evidence of acute cholecystitis. Partially seen simple left kidney cyst. There is an acute mildly displaced fracture of the distal left clavicle. There is a   slight compression fracture at the superior endplate of T3 with less than 10% loss of height.      Impression:      Impression:  1.No evidence of pulmonary embolism.  2.Acute mildly displaced fracture of the distal left clavicle.  3.Acute mild compression fracture at the superior endplate of T3 with less than 10% loss of height.  4.Mild dependent bibasilar atelectasis.  5.Small hiatal hernia.  6.Cholelithiasis without evidence of acute cholecystitis.        Electronically Signed: Eliseo Titus MD    9/28/2024 2:53 AM EDT    Workstation ID: [id number]    CT Abdomen Pelvis With Contrast [[id number]] Collected: 09/28/24 0259     Updated: 09/28/24 0306    Narrative:      CT ABDOMEN PELVIS W CONTRAST    Date of Exam: 9/28/2024 2:04 AM EDT    Indication: 83yoM on coumadin. Fell and now left hip pain.    Comparison: None available.    Technique: Axial CT images were obtained of the abdomen and pelvis following the uneventful intravenous administration of iodinated contrast. Sagittal and coronal  reconstructions were performed.  Automated exposure control and iterative reconstruction   methods were used.        Findings:  Findings in the chest discussed in a separate report. No acute findings in the superficial soft tissues. There are bilateral scrotal hydroceles partially seen. There appears to be a right-sided scrotal varicocele, as well. No acute osseous abnormality or   destructive bone lesion. There is moderate lumbar spondylosis. Mild osteoarthritis is present at both hips. There is no acute mildly displaced fracture of the left posterior 10th rib. There are hypoplastic L1 ribs. The bony pelvis appears intact. No hip   fracture.    The liver, gallbladder, bile ducts, stomach, duodenum, spleen and adrenal glands appear normal. There is a subcentimeter simple cyst at the mid right kidney. There is a 41 mm simple cyst at the mid left kidney with adjacent subcentimeter cysts. The   ureters appear normal. The prostate gland is enlarged measuring up to 53 mm diameter. Urinary bladder is nondistended. Normal appendix. Small bowel is nondistended. There is mild colonic diverticulosis. Mild aortoiliac atherosclerosis. No aneurysm. No   ascites, pneumoperitoneum or lymphadenopathy. No evidence of solid organ injury.      Impression:      Impression:  1.Acute mildly displaced fracture of the left posterior 10th rib.  2.No acute findings in the abdomen or pelvis.  3.Lumbar spondylosis and mild osteoarthritis at both hips.  4.Prostatomegaly.  5.Bilateral scrotal hydroceles and right-sided varicocele.        Electronically Signed: Eliseo Titus MD    9/28/2024 3:03 AM EDT    Workstation ID: [id number]    CT Cervical Spine Without Contrast [[id number]] Collected: 09/28/24 0230     Updated: 09/28/24 0236    Narrative:        CT CERVICAL SPINE WO CONTRAST    Date of Exam: 9/28/2024 1:58 AM EDT    Indication: 83yoM, on coumadin. Fell off the bridge ~10'. r/o c spine injury.    Comparison: None available.    Technique:  Axial CT images were obtained of the cervical spine without contrast administration.  Sagittal and coronal reconstructions were performed.  Automated exposure control and iterative reconstruction methods were used.      Findings:  7 cervical vertebrae are identified. There is straightening of the cervical lordosis. There is severe narrowing of the C3-C4, C4-C5, C5-6 and C6-C7 discs. There is mild to moderate multilevel marginal and uncovertebral osteophyte formation. There is   moderate right-sided facet arthritis at C2-C3 and severe left-sided facet arthritis at C4-C5. There is moderate narrowing of the spinal canal at C5-C6 and mild narrowing of the spinal canal at C4-C5 and C3-C4. There is mild right neuroforaminal narrowing   at C2-C3, moderate bilateral neuroforaminal narrowing at C3-C4, severe left neuroforaminal narrowing at C4-C5, moderate bilateral neuroforaminal narrowing at C5-C6 and mild bilateral neuroforaminal narrowing at C6-C7. There are mild bilateral carotid   bifurcation calcifications. Vertebral bodies maintain normal height. C1 ring and odontoid process appear intact. Spinous processes appear intact. No evidence of fracture or subluxation. There is asymmetric enlargement of the right internal jugular vein,   which measures up to 4.1 cm diameter. No obvious soft tissue hematoma or soft tissue gas. Thyroid is heterogeneous without discrete lesion. Lung apices are clear.      Impression:      Impression:  1.No acute osseous abnormality.  2.Moderate to severe cervical spondylosis with varying degrees of spinal canal and neuroforaminal narrowing.        Electronically Signed: Eliseo Titus MD    9/28/2024 2:33 AM EDT    Workstation ID: [id number]    CT Head Without Contrast [[id number]] Collected: 09/28/24 0228     Updated: 09/28/24 0233    Narrative:      CT HEAD WO CONTRAST    Date of Exam: 9/28/2024 1:56 AM EDT    Indication: 83yoM, on coumadin. Fell off the bridge ~10'. r/o head  bleed..    Comparison: None available.    Technique: Axial CT images were obtained of the head without contrast administration.  Coronal reconstructions were performed.  Automated exposure control and iterative reconstruction methods were used.      Findings:  There is soft tissue contusion to the right frontal scalp. There is a larger area of contusion at the left parietal scalp along with soft tissue laceration and minor hematoma. The calvarium is intact.  Paranasal sinuses and mastoid air cells appear well   aerated. There is thinning of the orbital lenses bilaterally suggesting prior lens replacement. There is no acute intracranial hemorrhage.  No mass effect or midline shift.  No abnormal extra-axial collections.  Gray-white differentiation is within   normal limits. There is mild patchy white matter hypoattenuation. There is mild generalized parenchymal volume loss congruent with age.      Impression:      Impression:  1.No acute intracranial abnormality.  2.Right frontal and left parietal scalp contusions with laceration and minor hematoma at the left parietal scalp.  3.Mild chronic small vessel ischemic change and mild generalized parenchymal volume loss.        Electronically Signed: Eliseo Titus MD    9/28/2024 2:30 AM EDT    Workstation ID: [id number]          reviewed    ECG/EMG Results (most recent)       Procedure Component Value Units Date/Time    ECG 12 Lead Chest Pain [[id number]] Collected: 09/28/24 0259     Updated: 09/28/24 0301     QT Interval 364 ms      QTC Interval 399 ms     Narrative:      HEART RATE=72  bpm  RR Bqursrda=117  ms  WV Foifgytp=981  ms  P Horizontal Axis=-30  deg  P Front Axis=20  deg  QRSD Interval=92  ms  QT Vohtamfq=944  ms  ALjK=804  ms  QRS Axis=65  deg  T Wave Axis=60  deg  - ABNORMAL ECG -  Sinus rhythm  Inferior infarct, old  Date and Time of Study:2024-09-28 02:59:55          reviewed    Assessment & Plan     FUO  No obvious source  Atelectasis noted on CTA chest, but  no clear infiltrates; UA with blood but no obvious infection; procalcitonin 0.16  Check RVP; await blood cultures  Monitor off Abx for now   Prn Tylenol     Fall  With mildly displaced clavicle fracture and posterior 10th rib fracture  Also with mild acute T3 compression fracture  I personally reviewed trauma scans including CT head, CTA chest, CT abd/pelvis, CT cervical spine  Likely all non-operative   Start pain control with prn Tylenol/P[place]grim  PT evaluation     Elevated troponin  Likely non-MI and due to trauma  Patient denies chest pain  Will trend troponins    TANIA  Mild  Creatinine 1.57; baseline 1  Start gentle IV fluids  Repeat BMP tomorrow AM    PAF   Currently in NSR on my exam  Resume home atenolol and warfarin  INR subtherapeutic at 1.31  Daily INR    HTN  Resume home atenolol; hold losartan due to mild TANIA  Prn IV Hydralazine for systolic >170     DM II  No home oral meds or insulin listed  Accuchecks with SSI  Check Hgb A1c    DVT ppx:  SCDs/warfarin                    Baldomero Dunbar DO  09/28/24  05:15 EDT        At [facility], we believe that sharing information builds trust and better relationships. You are receiving this note because you recently visited [facility]. It is possible you will see health information before a provider has talked with you about it. This kind of information can be easy to misunderstand. To help you fully understand what it means for your health, we urge you to discuss this note with your provider.

## 2024-09-28 NOTE — ED NOTES
Patient ambulated in the ED with a steady gait.    Incentive Spirometer teaching completed. Patient returned correct demonstration. Home use education completed with patient and spouse.

## 2024-09-28 NOTE — SIGNIFICANT NOTE
09/28/24 1342   Clinician Notification   Reason for Communication Evaluate  (fever, tylenol no help)   Clinician Name Johnathansandritagopal   Clinician Role Hospitalist   Method of Communication Secure Chat   Response See orders     Toradol added and given
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
## 2024-09-28 NOTE — CASE MANAGEMENT/SOCIAL WORK
Continued Stay Note  JULIO Crespo     Patient Name: Woo Dobbs  MRN: [id number]  Today's Date: 9/28/2024    Admit Date: 9/28/2024    Plan: Home w/HH   Discharge Plan       Row Name 09/28/24 1627       Plan    Plan Home w/HH    Plan Comments Met with pt, introduced self, role and discussed dc plans, needs.  he confirmed address and PCP.  Pt lives with his wife in a 1 story ranch home.  There are 3 steps to enter.  pt is IADL's and uses no DME.  however this is his 2nd fall recently.  PT recs Home w/HH on dc.  Pt reports he helps with the cooking and cleaning and he drives.  His fpharmacy is [facility] in [place] and he denies issues with medication copays.  He also uses [facility] for 90 day meds.  he denies abuse, issues with buying food or paying for utilities.  His plan is to return home.  his family will drive him.  Will follow. Thanks, Melquiades WARE                   Discharge Codes    No documentation.                       Sabiha Holliday

## 2024-09-28 NOTE — PLAN OF CARE
Goal Outcome Evaluation:  Plan of Care Reviewed With: patient           Outcome Evaluation: PT evaluation:  Patient is agreeable to PT evaluation.  He reports that at baseline he does not use assistive device.  Patient transfers supine/sit with contact guard assist and ambulates 25 feet without assistive device and CGA with sling donned left UE.  Patient exhibits mild unsteadiness.  Patient reports that his spouse drives but he does have to assist with her transfers at times.  He is typically independent with gait and mobility.  Therapist expressed concern regarding patient's unsteadiness, he does not wish to pursue snf or inpatient rehab, but states he has had HH PT in the past and would be agreeable to this upon discharge.      Anticipated Discharge Disposition (PT): home, home with assist, home with home health (will continue to assess for needs)
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
## 2024-09-28 NOTE — PLAN OF CARE
Goal Outcome Evaluation:           Progress: improving  Outcome Evaluation: remains with fever, meds given. iv anbx initiated. fluids discontinued. attempts to wash off blood from head, remains crusted throughout hair. patient up in chair

## 2024-09-28 NOTE — ED NOTES
"Nursing report ED to floor  Woo Dobbs  83 y.o.  male    HPI :   Chief Complaint   Patient presents with    Fall    Head Injury       Admitting doctor:   Baldomero Dunbar DO    Admitting diagnosis:   The primary encounter diagnosis was Fall, initial encounter. Diagnoses of Injury of head, initial encounter, Closed displaced fracture of acromial end of left clavicle, initial encounter, Compression fracture of T3 vertebra, initial encounter, and TANIA (acute kidney injury) were also pertinent to this visit.    Code status:   Current Code Status       Date Active Code Status Order ID Comments User Context       Prior            Allergies:   Loratadine    Isolation:   No active isolations    Intake and Output    Intake/Output Summary (Last 24 hours) at 9/28/2024 0405  Last data filed at 9/28/2024 0357  Gross per 24 hour   Intake 1000 ml   Output --   Net 1000 ml       Weight:       09/28/24  0034   Weight: 72.6 kg (160 lb 0.9 oz)       Most recent vitals:   Vitals:    09/28/24 0034 09/28/24 0040 09/28/24 0202 09/28/24 0321   BP: 132/91  161/71 166/76   BP Location: Right arm  Right arm Right arm   Patient Position: Lying  Lying Lying   Pulse: 89 83 84 76   Resp: 14  14 16   Temp: (!) 101.3 °F (38.5 °C)   (!) 100.8 °F (38.2 °C)   TempSrc: Oral   Oral   SpO2: (!) 88% 92% 91% 92%   Weight: 72.6 kg (160 lb 0.9 oz)      Height: 172.7 cm (68\")          Active LDAs/IV Access:   Lines, Drains & Airways       Active LDAs       Name Placement date Placement time Site Days    Peripheral IV 09/28/24 0053 Right Antecubital 09/28/24  0053  Antecubital  less than 1                    Labs (abnormal labs have a star):   Labs Reviewed   COMPREHENSIVE METABOLIC PANEL - Abnormal; Notable for the following components:       Result Value    Glucose 167 (*)     Creatinine 1.57 (*)     Sodium 133 (*)     ALT (SGPT) 45 (*)     AST (SGOT) 71 (*)     eGFR 43.5 (*)     All other components within normal limits    Narrative:     GFR Normal " >60  Chronic Kidney Disease <60  Kidney Failure <15    The GFR formula is only valid for adults with stable renal function between ages 18 and 70.   PROTIME-INR - Abnormal; Notable for the following components:    Protime 16.8 (*)     INR 1.31 (*)     All other components within normal limits    Narrative:     Therapeutic Ranges for INR: 2.0-3.0 (PT 20-30)                              2.5-3.5 (PT 25-34)   URINALYSIS W/ MICROSCOPIC IF INDICATED (NO CULTURE) - Abnormal; Notable for the following components:    Blood, UA Large (3+) (*)     Protein,  mg/dL (2+) (*)     All other components within normal limits   LACTIC ACID, PLASMA - Abnormal; Notable for the following components:    Lactate 2.4 (*)     All other components within normal limits   CBC WITH AUTO DIFFERENTIAL - Abnormal; Notable for the following components:    Neutrophil % 81.3 (*)     Lymphocyte % 10.2 (*)     Eosinophil % 0.0 (*)     Immature Grans % 0.9 (*)     Immature Grans, Absolute 0.07 (*)     All other components within normal limits   SINGLE HS TROPONIN T - Abnormal; Notable for the following components:    HS Troponin T 24 (*)     All other components within normal limits    Narrative:     High Sensitive Troponin T Reference Range:  <14.0 ng/L- Negative Female for AMI  <22.0 ng/L- Negative Male for AMI  >=14 - Abnormal Female indicating possible myocardial injury.  >=22 - Abnormal Male indicating possible myocardial injury.   Clinicians would have to utilize clinical acumen, EKG, Troponin, and serial changes to determine if it is an Acute Myocardial Infarction or myocardial injury due to an underlying chronic condition.        SINGLE HS TROPONIN T - Abnormal; Notable for the following components:    HS Troponin T 39 (*)     All other components within normal limits    Narrative:     High Sensitive Troponin T Reference Range:  <14.0 ng/L- Negative Female for AMI  <22.0 ng/L- Negative Male for AMI  >=14 - Abnormal Female indicating possible  myocardial injury.  >=22 - Abnormal Male indicating possible myocardial injury.   Clinicians would have to utilize clinical acumen, EKG, Troponin, and serial changes to determine if it is an Acute Myocardial Infarction or myocardial injury due to an underlying chronic condition.        URINALYSIS, MICROSCOPIC ONLY - Abnormal; Notable for the following components:    RBC, UA 11-20 (*)     Squamous Epithelial Cells, UA 3-6 (*)     All other components within normal limits   COVID-19/FLUA&B/RSV, NP SWAB IN TRANSPORT MEDIA 1 HR TAT - Normal    Narrative:     Fact sheet for providers: https://www.fda.gov/media/233454/download    Fact sheet for patients: https://www.fda.gov/media/850149/download    Test performed by PCR.   BNP (IN-HOUSE) - Normal    Narrative:     This assay is used as an aid in the diagnosis of individuals suspected of having heart failure. It can be used as an aid in the diagnosis of acute decompensated heart failure (ADHF) in patients presenting with signs and symptoms of ADHF to the emergency department (ED). In addition, NT-proBNP of <300 pg/mL indicates ADHF is not likely.    Age Range Result Interpretation  NT-proBNP Concentration (pg/mL:      <50             Positive            >450                   Gray                 300-450                    Negative             <300    50-75           Positive            >900                  Gray                300-900                  Negative            <300      >75             Positive            >1800                  Gray                300-1800                  Negative            <300   PROCALCITONIN - Normal    Narrative:     As a Marker for Sepsis (Non-Neonates):    1. <0.5 ng/mL represents a low risk of severe sepsis and/or septic shock.  2. >2 ng/mL represents a high risk of severe sepsis and/or septic shock.    As a Marker for Lower Respiratory Tract Infections that require antibiotic therapy:    PCT on Admission    Antibiotic Therapy        "6-12 Hrs later    >0.5                Strongly Recommended  >0.25 - <0.5        Recommended   0.1 - 0.25          Discouraged              Remeasure/reassess PCT  <0.1                Strongly Discouraged     Remeasure/reassess PCT    As 28 day mortality risk marker: \"Change in Procalcitonin Result\" (>80% or <=80%) if Day 0 (or Day 1) and Day 4 values are available. Refer to http://[email or web address]-pct-[email or web address]    Change in PCT <=80%  A decrease of PCT levels below or equal to 80% defines a positive change in PCT test result representing a higher risk for 28-day all-cause mortality of patients diagnosed with severe sepsis for septic shock.    Change in PCT >80%  A decrease of PCT levels of more than 80% defines a negative change in PCT result representing a lower risk for 28-day all-cause mortality of patients diagnosed with severe sepsis or septic shock.      LACTIC ACID, REFLEX - Normal   BLOOD CULTURE   BLOOD CULTURE   CBC AND DIFFERENTIAL    Narrative:     The following orders were created for panel order CBC & Differential.  Procedure                               Abnormality         Status                     ---------                               -----------         ------                     CBC Auto Differential[[id number]]        Abnormal            Final result                 Please view results for these tests on the individual orders.       Results       Procedure Component Value Ref Range Date/Time    Blood Culture - Blood, [[id number]]     Order Status: Sent Specimen: Blood     Blood Culture - Blood, [[id number]]     Order Status: Sent Specimen: Blood     STAT Lactic Acid, Reflex [[id number]]  (Normal) Collected: 09/28/24 0321    Order Status: Completed Specimen: Blood Updated: 09/28/24 0342     Lactate 1.0 0.5 - 2.0 mmol/L     Urinalysis, Microscopic Only - Urine, Clean Catch [[id number]]  (Abnormal) Collected: 09/28/24 0332    Order Status: Completed Specimen: Urine, Clean Catch Updated: 09/28/24 0342 "     RBC, UA 11-20 None Seen, 0-2 /HPF      WBC, UA None Seen None Seen, 0-2 /HPF      Bacteria, UA None Seen None Seen /HPF      Squamous Epithelial Cells, UA 3-6 None Seen, 0-2 /HPF      Hyaline Casts, UA None Seen None Seen /LPF      Fine Granular Casts, UA 0-2 None Seen /LPF      Methodology Manual Light Microscopy    Urinalysis With Microscopic If Indicated (No Culture) - Urine, Clean Catch [[id number]]  (Abnormal) Collected: 09/28/24 0332    Order Status: Completed Specimen: Urine, Clean Catch Updated: 09/28/24 0341     Color, UA Yellow Yellow, Straw      Appearance, UA Clear Clear      pH, UA 5.5 4.5 - 8.0      Specific Gravity, UA 1.020 1.003 - 1.030      Glucose, UA Negative Negative      Ketones, UA Negative Negative      Bilirubin, UA Negative Negative      Blood, UA Large (3+) Negative      Protein,  mg/dL (2+) Negative      Leuk Esterase, UA Negative Negative      Nitrite, UA Negative Negative      Urobilinogen, UA 1.0 E.U./dL 0.2 - 1.0 E.U./dL     Single High Sensitivity Troponin T [[id number]]  (Abnormal) Collected: 09/28/24 0321    Order Status: Completed Specimen: Blood Updated: 09/28/24 0341     HS Troponin T 39 <22 ng/L     Narrative:      High Sensitive Troponin T Reference Range:  <14.0 ng/L- Negative Female for AMI  <22.0 ng/L- Negative Male for AMI  >=14 - Abnormal Female indicating possible myocardial injury.  >=22 - Abnormal Male indicating possible myocardial injury.   Clinicians would have to utilize clinical acumen, EKG, Troponin, and serial changes to determine if it is an Acute Myocardial Infarction or myocardial injury due to an underlying chronic condition.         Single High Sensitivity Troponin T [[id number]]  (Abnormal) Collected: 09/28/24 0045    Order Status: Completed Specimen: Blood Updated: 09/28/24 0138     HS Troponin T 24 <22 ng/L     Narrative:      High Sensitive Troponin T Reference Range:  <14.0 ng/L- Negative Female for AMI  <22.0 ng/L- Negative Male for AMI  >=14 -  Abnormal Female indicating possible myocardial injury.  >=22 - Abnormal Male indicating possible myocardial injury.   Clinicians would have to utilize clinical acumen, EKG, Troponin, and serial changes to determine if it is an Acute Myocardial Infarction or myocardial injury due to an underlying chronic condition.         COVID-19, FLU A/B, RSV PCR 1 HR TAT - Swab, Nasopharynx [[id number]]  (Normal) Collected: 09/28/24 0045    Order Status: Completed Specimen: Swab from Nasopharynx Updated: 09/28/24 0128     COVID19 Not Detected Not Detected - Ref. Range      Influenza A PCR Not Detected Not Detected      Influenza B PCR Not Detected Not Detected      RSV, PCR Not Detected Not Detected     Narrative:      Fact sheet for providers: https://www.fda.gov/media/440658/download    Fact sheet for patients: https://www.fda.gov/media/661362/download    Test performed by PCR.    Protime-INR [[id number]]  (Abnormal) Collected: 09/28/24 0045    Order Status: Completed Specimen: Blood Updated: 09/28/24 0121     Protime 16.8 12.1 - 15.0 Seconds      INR 1.31 0.90 - 1.10     Narrative:      Therapeutic Ranges for INR: 2.0-3.0 (PT 20-30)                              2.5-3.5 (PT 25-34)    Procalcitonin [[id number]]  (Normal) Collected: 09/28/24 0045    Order Status: Completed Specimen: Blood Updated: 09/28/24 0121     Procalcitonin 0.16 0.00 - 0.25 ng/mL     Narrative:      As a Marker for Sepsis (Non-Neonates):    1. <0.5 ng/mL represents a low risk of severe sepsis and/or septic shock.  2. >2 ng/mL represents a high risk of severe sepsis and/or septic shock.    As a Marker for Lower Respiratory Tract Infections that require antibiotic therapy:    PCT on Admission    Antibiotic Therapy       6-12 Hrs later    >0.5                Strongly Recommended  >0.25 - <0.5        Recommended   0.1 - 0.25          Discouraged              Remeasure/reassess PCT  <0.1                Strongly Discouraged     Remeasure/reassess PCT    As 28 day  "mortality risk marker: \"Change in Procalcitonin Result\" (>80% or <=80%) if Day 0 (or Day 1) and Day 4 values are available. Refer to http://[email or web address]-pct-[email or web address]    Change in PCT <=80%  A decrease of PCT levels below or equal to 80% defines a positive change in PCT test result representing a higher risk for 28-day all-cause mortality of patients diagnosed with severe sepsis for septic shock.    Change in PCT >80%  A decrease of PCT levels of more than 80% defines a negative change in PCT result representing a lower risk for 28-day all-cause mortality of patients diagnosed with severe sepsis or septic shock.       BNP [[id number]]  (Normal) Collected: 09/28/24 0045    Order Status: Completed Specimen: Blood Updated: 09/28/24 0119     proBNP 814.0 0.0 - 1,800.0 pg/mL     Narrative:      This assay is used as an aid in the diagnosis of individuals suspected of having heart failure. It can be used as an aid in the diagnosis of acute decompensated heart failure (ADHF) in patients presenting with signs and symptoms of ADHF to the emergency department (ED). In addition, NT-proBNP of <300 pg/mL indicates ADHF is not likely.    Age Range Result Interpretation  NT-proBNP Concentration (pg/mL:      <50             Positive            >450                   Gray                 300-450                    Negative             <300    50-75           Positive            >900                  Gray                300-900                  Negative            <300      >75             Positive            >1800                  Gray                300-1800                  Negative            <300    Comprehensive Metabolic Panel [[id number]]  (Abnormal) Collected: 09/28/24 0045    Order Status: Completed Specimen: Blood Updated: 09/28/24 0119     Glucose 167 65 - 99 mg/dL      BUN 21 8 - 23 mg/dL      Creatinine 1.57 0.76 - 1.27 mg/dL      Sodium 133 136 - 145 mmol/L      Potassium 4.5 3.5 - 5.2 mmol/L      Chloride 98 98 - 107 " mmol/L      CO2 23.3 22.0 - 29.0 mmol/L      Calcium 9.0 8.6 - 10.5 mg/dL      Total Protein 7.8 6.0 - 8.5 g/dL      Albumin 3.9 3.5 - 5.2 g/dL      ALT (SGPT) 45 1 - 41 U/L      AST (SGOT) 71 1 - 40 U/L      Alkaline Phosphatase 103 39 - 117 U/L      Total Bilirubin 0.7 0.0 - 1.2 mg/dL      Globulin 3.9 gm/dL      A/G Ratio 1.0 g/dL      BUN/Creatinine Ratio 13.4 7.0 - 25.0      Anion Gap 11.7 5.0 - 15.0 mmol/L      eGFR 43.5 >60.0 mL/min/1.73     Narrative:      GFR Normal >60  Chronic Kidney Disease <60  Kidney Failure <15    The GFR formula is only valid for adults with stable renal function between ages 18 and 70.    Lactic Acid, Plasma [[id number]]  (Abnormal) Collected: 09/28/24 0045    Order Status: Completed Specimen: Blood Updated: 09/28/24 0114     Lactate 2.4 0.5 - 2.0 mmol/L     CBC Auto Differential [[id number]]  (Abnormal) Collected: 09/28/24 0045    Order Status: Completed Specimen: Blood Updated: 09/28/24 0052     WBC 7.55 3.40 - 10.80 10*3/mm3      RBC 4.39 4.14 - 5.80 10*6/mm3      Hemoglobin 13.0 13.0 - 17.7 g/dL      Hematocrit 39.3 37.5 - 51.0 %      MCV 89.5 79.0 - 97.0 fL      MCH 29.6 26.6 - 33.0 pg      MCHC 33.1 31.5 - 35.7 g/dL      RDW 14.4 12.3 - 15.4 %      RDW-SD 46.7 37.0 - 54.0 fl      MPV 9.6 6.0 - 12.0 fL      Platelets 142 140 - 450 10*3/mm3      Neutrophil % 81.3 42.7 - 76.0 %      Lymphocyte % 10.2 19.6 - 45.3 %      Monocyte % 7.3 5.0 - 12.0 %      Eosinophil % 0.0 0.3 - 6.2 %      Basophil % 0.3 0.0 - 1.5 %      Immature Grans % 0.9 0.0 - 0.5 %      Neutrophils, Absolute 6.14 1.70 - 7.00 10*3/mm3      Lymphocytes, Absolute 0.77 0.70 - 3.10 10*3/mm3      Monocytes, Absolute 0.55 0.10 - 0.90 10*3/mm3      Eosinophils, Absolute 0.00 0.00 - 0.40 10*3/mm3      Basophils, Absolute 0.02 0.00 - 0.20 10*3/mm3      Immature Grans, Absolute 0.07 0.00 - 0.05 10*3/mm3      nRBC 0.0 0.0 - 0.2 /100 WBC              EKG:   ECG 12 Lead Chest Pain   Preliminary Result   HEART RATE=72  bpm   RR  Acoxolwh=330  ms   ID Magbmiiw=145  ms   P Horizontal Axis=-30  deg   P Front Axis=20  deg   QRSD Interval=92  ms   QT Pkssyyuk=897  ms   UMzJ=869  ms   QRS Axis=65  deg   T Wave Axis=60  deg   - ABNORMAL ECG -   Sinus rhythm   Inferior infarct, old   Date and Time of Study:2024-09-28 02:59:55          Meds given in ED:   Medications   morphine injection 4 mg (has no administration in time range)   acetaminophen (TYLENOL) tablet 1,000 mg (1,000 mg Oral Given 9/28/24 0212)   Tetanus-Diphth-Acell Pertussis (BOOSTRIX) injection 0.5 mL (0.5 mL Intramuscular Given 9/28/24 0213)   ondansetron (ZOFRAN) injection 4 mg (4 mg Intravenous Given 9/28/24 0212)   morphine injection 4 mg (4 mg Intravenous Given 9/28/24 0212)   sodium chloride 0.9 % bolus 500 mL (0 mL Intravenous Stopped 9/28/24 0300)   iopamidol (ISOVUE-370) 76 % injection 100 mL (100 mL Intravenous Given 9/28/24 0213)   iopamidol (ISOVUE-370) 76 % injection 100 mL (100 mL Intravenous Given 9/28/24 0216)   sodium chloride 0.9 % bolus 500 mL (0 mL Intravenous Stopped 9/28/24 0357)       Imaging results:  XR Femur 2 View Left    Result Date: 9/28/2024  Impression: 1.No acute osseous abnormality. 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 Electronically Signed: Eliseo Titus MD  9/28/2024 3:06 AM EDT  Workstation ID: [id number]    XR Pelvis 1 or 2 View    Result Date: 9/28/2024  Impression: No acute osseous abnormality. Electronically Signed: Eliseo Titus MD  9/28/2024 3:06 AM EDT  Workstation ID: [id number]    XR Chest 1 View    Result Date: 9/28/2024  Impression: 1.No acute cardiopulmonary abnormality. 2.Mildly displaced distal left clavicle fracture. 3.Prior median sternotomy. Electronically Signed: Eliseo Titus MD  9/28/2024 3:05 AM EDT  Workstation ID: [id number]    CT Angiogram Chest    Addendum Date: 9/28/2024    There is an acute mildly displaced posterior left 10th rib fracture. Electronically Signed: Eliseo Titus MD  9/28/2024 3:04 AM EDT  Workstation ID:  [id number]    Result Date: 9/28/2024  Impression: 1.No evidence of pulmonary embolism. 2.Acute mildly displaced fracture of the distal left clavicle. 3.Acute mild compression fracture at the superior endplate of T3 with less than 10% loss of height. 4.Mild dependent bibasilar atelectasis. 5.Small hiatal hernia. 6.Cholelithiasis without evidence of acute cholecystitis. Electronically Signed: Eliseo Titus MD  9/28/2024 2:53 AM EDT  Workstation ID: [id number]    CT Abdomen Pelvis With Contrast    Result Date: 9/28/2024  Impression: 1.Acute mildly displaced fracture of the left posterior 10th rib. 2.No acute findings in the abdomen or pelvis. 3.Lumbar spondylosis and mild osteoarthritis at both hips. 4.Prostatomegaly. 5.Bilateral scrotal hydroceles and right-sided varicocele. Electronically Signed: Eliseo Titus MD  9/28/2024 3:03 AM EDT  Workstation ID: [id number]    CT Cervical Spine Without Contrast    Result Date: 9/28/2024  Impression: 1.No acute osseous abnormality. 2.Moderate to severe cervical spondylosis with varying degrees of spinal canal and neuroforaminal narrowing. Electronically Signed: Eliseo Titus MD  9/28/2024 2:33 AM EDT  Workstation ID: [id number]    CT Head Without Contrast    Result Date: 9/28/2024  Impression: 1.No acute intracranial abnormality. 2.Right frontal and left parietal scalp contusions with laceration and minor hematoma at the left parietal scalp. 3.Mild chronic small vessel ischemic change and mild generalized parenchymal volume loss. Electronically Signed: Eliseo Titus MD  9/28/2024 2:30 AM EDT  Workstation ID: [id number]     Ambulatory status:   - assist x 1    Social issues:   Social History     Socioeconomic History    Marital status:     Number of children: 3   Tobacco Use    Smoking status: Never    Smokeless tobacco: Never    Tobacco comments:     caffeine use: 1 -2 cups daily.    Vaping Use    Vaping status: Never Used   Substance and Sexual Activity    Alcohol use: No     Drug use: No    Sexual activity: Defer       NIH Stroke Scale: BENEDICT Colin RN  09/28/24 04:05 EDT

## 2024-09-28 NOTE — PLAN OF CARE
Goal Outcome Evaluation:  Plan of Care Reviewed With: patient        Progress: no change  Outcome Evaluation: New ER admit, fever noted, MD aware, RVP pending, otherwise VSS, alarm for safety

## 2024-09-29 PROBLEM — R79.89 ELEVATED TROPONIN LEVEL: Status: ACTIVE | Noted: 2024-09-29

## 2024-09-29 LAB
ANION GAP SERPL CALCULATED.3IONS-SCNC: 9.6 MMOL/L (ref 5–15)
BASOPHILS # BLD AUTO: 0.05 10*3/MM3 (ref 0–0.2)
BASOPHILS NFR BLD AUTO: 0.4 % (ref 0–1.5)
BUN SERPL-MCNC: 23 MG/DL (ref 8–23)
BUN/CREAT SERPL: 14.8 (ref 7–25)
CALCIUM SPEC-SCNC: 8.4 MG/DL (ref 8.6–10.5)
CHLORIDE SERPL-SCNC: 100 MMOL/L (ref 98–107)
CO2 SERPL-SCNC: 22.4 MMOL/L (ref 22–29)
CREAT SERPL-MCNC: 1.55 MG/DL (ref 0.76–1.27)
DEPRECATED RDW RBC AUTO: 48.4 FL (ref 37–54)
EGFRCR SERPLBLD CKD-EPI 2021: 44.1 ML/MIN/1.73
EOSINOPHIL # BLD AUTO: 0.06 10*3/MM3 (ref 0–0.4)
EOSINOPHIL NFR BLD AUTO: 0.5 % (ref 0.3–6.2)
ERYTHROCYTE [DISTWIDTH] IN BLOOD BY AUTOMATED COUNT: 14.6 % (ref 12.3–15.4)
GLUCOSE BLDC GLUCOMTR-MCNC: 125 MG/DL (ref 70–130)
GLUCOSE BLDC GLUCOMTR-MCNC: 152 MG/DL (ref 70–130)
GLUCOSE BLDC GLUCOMTR-MCNC: 251 MG/DL (ref 70–130)
GLUCOSE BLDC GLUCOMTR-MCNC: 287 MG/DL (ref 70–130)
GLUCOSE SERPL-MCNC: 112 MG/DL (ref 65–99)
HCT VFR BLD AUTO: 34.3 % (ref 37.5–51)
HGB BLD-MCNC: 11.1 G/DL (ref 13–17.7)
IMM GRANULOCYTES # BLD AUTO: 0.06 10*3/MM3 (ref 0–0.05)
IMM GRANULOCYTES NFR BLD AUTO: 0.5 % (ref 0–0.5)
INR PPP: 1.65 (ref 0.9–1.1)
LYMPHOCYTES # BLD AUTO: 0.89 10*3/MM3 (ref 0.7–3.1)
LYMPHOCYTES NFR BLD AUTO: 7 % (ref 19.6–45.3)
MCH RBC QN AUTO: 29.4 PG (ref 26.6–33)
MCHC RBC AUTO-ENTMCNC: 32.4 G/DL (ref 31.5–35.7)
MCV RBC AUTO: 90.7 FL (ref 79–97)
MONOCYTES # BLD AUTO: 0.63 10*3/MM3 (ref 0.1–0.9)
MONOCYTES NFR BLD AUTO: 5 % (ref 5–12)
NEUTROPHILS NFR BLD AUTO: 10.96 10*3/MM3 (ref 1.7–7)
NEUTROPHILS NFR BLD AUTO: 86.6 % (ref 42.7–76)
NRBC BLD AUTO-RTO: 0 /100 WBC (ref 0–0.2)
PLATELET # BLD AUTO: 112 10*3/MM3 (ref 140–450)
PMV BLD AUTO: 10 FL (ref 6–12)
POTASSIUM SERPL-SCNC: 4.2 MMOL/L (ref 3.5–5.2)
PROTHROMBIN TIME: 20.1 SECONDS (ref 12.1–15)
RBC # BLD AUTO: 3.78 10*6/MM3 (ref 4.14–5.8)
SODIUM SERPL-SCNC: 132 MMOL/L (ref 136–145)
WBC NRBC COR # BLD AUTO: 12.65 10*3/MM3 (ref 3.4–10.8)

## 2024-09-29 PROCEDURE — 80048 BASIC METABOLIC PNL TOTAL CA: CPT | Performed by: FAMILY MEDICINE

## 2024-09-29 PROCEDURE — 97116 GAIT TRAINING THERAPY: CPT | Performed by: PHYSICAL THERAPIST

## 2024-09-29 PROCEDURE — 99232 SBSQ HOSP IP/OBS MODERATE 35: CPT | Performed by: FAMILY MEDICINE

## 2024-09-29 PROCEDURE — 63710000001 INSULIN LISPRO (HUMAN) PER 5 UNITS: Performed by: FAMILY MEDICINE

## 2024-09-29 PROCEDURE — 85610 PROTHROMBIN TIME: CPT | Performed by: FAMILY MEDICINE

## 2024-09-29 PROCEDURE — 94799 UNLISTED PULMONARY SVC/PX: CPT

## 2024-09-29 PROCEDURE — 85025 COMPLETE CBC W/AUTO DIFF WBC: CPT | Performed by: FAMILY MEDICINE

## 2024-09-29 PROCEDURE — 25010000002 PIPERACILLIN SOD-TAZOBACTAM PER 1 G: Performed by: HOSPITALIST

## 2024-09-29 PROCEDURE — 82948 REAGENT STRIP/BLOOD GLUCOSE: CPT

## 2024-09-29 RX ADMIN — HYDROCODONE BITARTRATE AND ACETAMINOPHEN 1 TABLET: 5; 325 TABLET ORAL at 16:26

## 2024-09-29 RX ADMIN — INSULIN LISPRO 2 UNITS: 100 INJECTION, SOLUTION INTRAVENOUS; SUBCUTANEOUS at 17:25

## 2024-09-29 RX ADMIN — ASPIRIN 81 MG: 81 TABLET, COATED ORAL at 08:53

## 2024-09-29 RX ADMIN — POLYVINYL ALCOHOL, POVIDONE 1 DROP: 14; 6 SOLUTION/ DROPS OPHTHALMIC at 16:27

## 2024-09-29 RX ADMIN — Medication 10 ML: at 08:54

## 2024-09-29 RX ADMIN — ATORVASTATIN CALCIUM 40 MG: 40 TABLET, FILM COATED ORAL at 20:53

## 2024-09-29 RX ADMIN — ACETAMINOPHEN 650 MG: 325 TABLET ORAL at 04:03

## 2024-09-29 RX ADMIN — ATENOLOL 25 MG: 25 TABLET ORAL at 20:53

## 2024-09-29 RX ADMIN — PIPERACILLIN AND TAZOBACTAM 3.38 G: 3; .375 INJECTION, POWDER, FOR SOLUTION INTRAVENOUS at 13:32

## 2024-09-29 RX ADMIN — LEVOTHYROXINE SODIUM 50 MCG: 50 TABLET ORAL at 08:53

## 2024-09-29 RX ADMIN — WARFARIN SODIUM 8 MG: 4 TABLET ORAL at 20:53

## 2024-09-29 RX ADMIN — TAMSULOSIN HYDROCHLORIDE 0.8 MG: 0.4 CAPSULE ORAL at 08:53

## 2024-09-29 RX ADMIN — ATENOLOL 25 MG: 25 TABLET ORAL at 08:53

## 2024-09-29 RX ADMIN — Medication 10 ML: at 20:53

## 2024-09-29 RX ADMIN — INSULIN LISPRO 4 UNITS: 100 INJECTION, SOLUTION INTRAVENOUS; SUBCUTANEOUS at 12:23

## 2024-09-29 RX ADMIN — INSULIN LISPRO 4 UNITS: 100 INJECTION, SOLUTION INTRAVENOUS; SUBCUTANEOUS at 20:52

## 2024-09-29 RX ADMIN — PIPERACILLIN AND TAZOBACTAM 3.38 G: 3; .375 INJECTION, POWDER, FOR SOLUTION INTRAVENOUS at 20:53

## 2024-09-29 RX ADMIN — PIPERACILLIN AND TAZOBACTAM 3.38 G: 3; .375 INJECTION, POWDER, FOR SOLUTION INTRAVENOUS at 05:41

## 2024-09-29 RX ADMIN — HYDROCODONE BITARTRATE AND ACETAMINOPHEN 1 TABLET: 5; 325 TABLET ORAL at 08:53

## 2024-09-29 RX ADMIN — PANTOPRAZOLE SODIUM 40 MG: 40 TABLET, DELAYED RELEASE ORAL at 05:41

## 2024-09-29 NOTE — PROGRESS NOTES
"SERVICE: [facility] HOSPITALIST    CONSULTANTS:  None    CHIEF COMPLAINT:  fall into creek; head wound; left shoulder/chest wall pain     SUBJECTIVE: Patient seen and examined in his room; He says he is sore; had another fever >101 overnight; started on IV Zosyn empirically; denies cough or abdominal pain   OBJECTIVE:    Physical exam is largely unchanged from previous exam, except where documented below, examination is accurate as of 9/29/2024    Physical Exam:  General: Patient is sitting up in chair; NAD  HENT: multiple staples left scalp; dried blood in hair   Eyes: Vision is grossly intact. Pupils appear equal and round.   Cardiovascular: RRR, S1-S2   Respiratory: Lungs are diminished at bases; no wheezing   Abdominal/GI: Soft, nontender, bowel sounds present. No rebound or guarding present.   Extremities: No peripheral edema noted.   Musculoskeletal: left UE in sling   Skin: Warm and dry.   Psych: Mood and affect are appropriate. Cooperative with exam.   Neuro: No facial asymmetry noted. No focal deficits noted, hearing and vision are grossly intact.     /55 (Patient Position: Lying)   Pulse 70   Temp 98.6 °F (37 °C) (Oral)   Resp 20   Ht 172.7 cm (68\")   Wt 72.6 kg (160 lb 0.9 oz)   SpO2 95%   BMI 24.34 kg/m²     MEDS/LABS REVIEWED AND ORDERED    aspirin, 81 mg, Oral, Daily  atenolol, 25 mg, Oral, BID  atorvastatin, 40 mg, Oral, Nightly  insulin lispro, 2-7 Units, Subcutaneous, 4x Daily AC & at Bedtime  levothyroxine, 50 mcg, Oral, Daily  Morphine, 4 mg, Intravenous, Once  pantoprazole, 40 mg, Oral, Q AM  piperacillin-tazobactam, 3.375 g, Intravenous, Q8H  sodium chloride, 10 mL, Intravenous, Q12H  tamsulosin, 0.8 mg, Oral, Daily  warfarin, 8 mg, Oral, Nightly          LAB/DIAGNOSTICS:    Lab Results (last 24 hours)       Procedure Component Value Units Date/Time    POC Glucose Once [[id number]]  (Normal) Collected: 09/29/24 0817    Specimen: Blood Updated: 09/29/24 0823     " Glucose 125 mg/dL     Basic Metabolic Panel [[id number]]  (Abnormal) Collected: 09/29/24 0640    Specimen: Blood Updated: 09/29/24 0748     Glucose 112 mg/dL      BUN 23 mg/dL      Creatinine 1.55 mg/dL      Sodium 132 mmol/L      Potassium 4.2 mmol/L      Chloride 100 mmol/L      CO2 22.4 mmol/L      Calcium 8.4 mg/dL      BUN/Creatinine Ratio 14.8     Anion Gap 9.6 mmol/L      eGFR 44.1 mL/min/1.73     Narrative:      GFR Normal >60  Chronic Kidney Disease <60  Kidney Failure <15    The GFR formula is only valid for adults with stable renal function between ages 18 and 70.    CBC & Differential [[id number]]  (Abnormal) Collected: 09/29/24 0640    Specimen: Blood Updated: 09/29/24 0748    Narrative:      The following orders were created for panel order CBC & Differential.  Procedure                               Abnormality         Status                     ---------                               -----------         ------                     CBC Auto Differential[[id number]]        Abnormal            Final result                 Please view results for these tests on the individual orders.    CBC Auto Differential [[id number]]  (Abnormal) Collected: 09/29/24 0640    Specimen: Blood Updated: 09/29/24 0748     WBC 12.65 10*3/mm3      RBC 3.78 10*6/mm3      Hemoglobin 11.1 g/dL      Hematocrit 34.3 %      MCV 90.7 fL      MCH 29.4 pg      MCHC 32.4 g/dL      RDW 14.6 %      RDW-SD 48.4 fl      MPV 10.0 fL      Platelets 112 10*3/mm3      Neutrophil % 86.6 %      Lymphocyte % 7.0 %      Monocyte % 5.0 %      Eosinophil % 0.5 %      Basophil % 0.4 %      Immature Grans % 0.5 %      Neutrophils, Absolute 10.96 10*3/mm3      Lymphocytes, Absolute 0.89 10*3/mm3      Monocytes, Absolute 0.63 10*3/mm3      Eosinophils, Absolute 0.06 10*3/mm3      Basophils, Absolute 0.05 10*3/mm3      Immature Grans, Absolute 0.06 10*3/mm3      nRBC 0.0 /100 WBC     Protime-INR [[id number]]  (Abnormal) Collected: 09/29/24 0640    Specimen:  Blood Updated: 09/29/24 0747     Protime 20.1 Seconds      INR 1.65    Narrative:      Therapeutic Ranges for INR: 2.0-3.0 (PT 20-30)                              2.5-3.5 (PT 25-34)    Blood Culture - Blood, Arm, Right [[id number]]  (Normal) Collected: 09/28/24 0418    Specimen: Blood from Arm, Right Updated: 09/29/24 0430     Blood Culture No growth at 24 hours    Blood Culture - Blood, Arm, Left [[id number]]  (Normal) Collected: 09/28/24 0418    Specimen: Blood from Arm, Left Updated: 09/29/24 0430     Blood Culture No growth at 24 hours    POC Glucose Once [[id number]]  (Abnormal) Collected: 09/28/24 2006    Specimen: Blood Updated: 09/28/24 2013     Glucose 168 mg/dL     POC Glucose Once [[id number]]  (Normal) Collected: 09/28/24 1715    Specimen: Blood Updated: 09/28/24 1723     Glucose 95 mg/dL     Respiratory Panel PCR w/COVID-19(SARS-CoV-2) IDALIA/TAURUS/YORDAN/PAD/COR/DOLLY In-House, NP Swab in UTM/VTM, 2 HR TAT - Swab, Nasopharynx [[id number]]  (Normal) Collected: 09/28/24 0608    Specimen: Swab from Nasopharynx Updated: 09/28/24 1310     ADENOVIRUS, PCR Not Detected     Coronavirus 229E Not Detected     Coronavirus HKU1 Not Detected     Coronavirus NL63 Not Detected     Coronavirus OC43 Not Detected     COVID19 Not Detected     Human Metapneumovirus Not Detected     Human Rhinovirus/Enterovirus Not Detected     Influenza A PCR Not Detected     Influenza B PCR Not Detected     Parainfluenza Virus 1 Not Detected     Parainfluenza Virus 2 Not Detected     Parainfluenza Virus 3 Not Detected     Parainfluenza Virus 4 Not Detected     RSV, PCR Not Detected     Bordetella pertussis pcr Not Detected     Bordetella parapertussis PCR Not Detected     Chlamydophila pneumoniae PCR Not Detected     Mycoplasma pneumo by PCR Not Detected    Narrative:      In the setting of a positive respiratory panel with a viral infection PLUS a negative procalcitonin without other underlying concern for bacterial infection, consider observing off  antibiotics or discontinuation of antibiotics and continue supportive care. If the respiratory panel is positive for atypical bacterial infection (Bordetella pertussis, Chlamydophila pneumoniae, or Mycoplasma pneumoniae), consider antibiotic de-escalation to target atypical bacterial infection.    POC Glucose Once [[id number]]  (Abnormal) Collected: 09/28/24 1159    Specimen: Blood Updated: 09/28/24 1206     Glucose 241 mg/dL           ECG 12 Lead Chest Pain   Final Result   HEART RATE=72  bpm   RR Kgcdqcie=855  ms   TX Vnkwfrpu=425  ms   P Horizontal Axis=-30  deg   P Front Axis=20  deg   QRSD Interval=92  ms   QT Gklwzrpo=795  ms   OVrG=850  ms   QRS Axis=65  deg   T Wave Axis=60  deg   - ABNORMAL ECG -   Sinus rhythm   Inferior  infarct, old   When compared with ECG of 29-Jan-2024 10:35:41,   No significant change   Electronically Signed By: Vincent Purvis ([facility]) 2024-09-28 15:20:15   Date and Time of Study:2024-09-28 02:59:55        Results for orders placed during the hospital encounter of 07/26/24    Adult Transthoracic Echo Complete W/ Cont if Necessary Per Protocol    Interpretation Summary    Left ventricular systolic function is normal. Calculated left ventricular EF = 70%    Left ventricular wall thickness is consistent with moderate basal septal asymmetric hypertrophy.    Left ventricular diastolic function was indeterminate.    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 Aortic valve mean pressure gradient is 13 mmHg, within normal limits.    There is mild, bileaflet mitral valve thickening and moderate mitral annular calcification (MAC) present resulting in mild functional mitral stenosis and regurgitation. The mitral valve mean gradient is 3 mmHg at HR 69 bpm.    Estimated right ventricular systolic pressure from tricuspid regurgitation is normal (<35 mmHg).    Saline test results are negative.    XR Femur 2 View  Left    Result Date: 9/28/2024  Impression: 1.No acute osseous abnormality. 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 Electronically Signed: Eliseo Titus MD  9/28/2024 3:06 AM EDT  Workstation ID: [id number]    XR Pelvis 1 or 2 View    Result Date: 9/28/2024  Impression: No acute osseous abnormality. Electronically Signed: Eliseo Titus MD  9/28/2024 3:06 AM EDT  Workstation ID: [id number]    XR Chest 1 View    Result Date: 9/28/2024  Impression: 1.No acute cardiopulmonary abnormality. 2.Mildly displaced distal left clavicle fracture. 3.Prior median sternotomy. Electronically Signed: Eliseo Titus MD  9/28/2024 3:05 AM EDT  Workstation ID: [id number]    CT Angiogram Chest    Addendum Date: 9/28/2024    There is an acute mildly displaced posterior left 10th rib fracture. Electronically Signed: Eliseo Titus MD  9/28/2024 3:04 AM EDT  Workstation ID: [id number]    Result Date: 9/28/2024  Impression: 1.No evidence of pulmonary embolism. 2.Acute mildly displaced fracture of the distal left clavicle. 3.Acute mild compression fracture at the superior endplate of T3 with less than 10% loss of height. 4.Mild dependent bibasilar atelectasis. 5.Small hiatal hernia. 6.Cholelithiasis without evidence of acute cholecystitis. Electronically Signed: Eliseo Titus MD  9/28/2024 2:53 AM EDT  Workstation ID: [id number]    CT Abdomen Pelvis With Contrast    Result Date: 9/28/2024  Impression: 1.Acute mildly displaced fracture of the left posterior 10th rib. 2.No acute findings in the abdomen or pelvis. 3.Lumbar spondylosis and mild osteoarthritis at both hips. 4.Prostatomegaly. 5.Bilateral scrotal hydroceles and right-sided varicocele. Electronically Signed: Eliseo Titus MD  9/28/2024 3:03 AM EDT  Workstation ID: [id number]    CT Cervical Spine Without Contrast    Result Date: 9/28/2024  Impression: 1.No acute osseous abnormality. 2.Moderate to severe cervical spondylosis with varying degrees of spinal canal and neuroforaminal narrowing.  Electronically Signed: Eliseo Titus MD  9/28/2024 2:33 AM EDT  Workstation ID: [id number]    CT Head Without Contrast    Result Date: 9/28/2024  Impression: 1.No acute intracranial abnormality. 2.Right frontal and left parietal scalp contusions with laceration and minor hematoma at the left parietal scalp. 3.Mild chronic small vessel ischemic change and mild generalized parenchymal volume loss. Electronically Signed: Eliseo Titus MD  9/28/2024 2:30 AM EDT  Workstation ID: [id number]       ASSESSMENT/PLAN:    FUO  No obvious source  Atelectasis noted on CTA chest, but no clear infiltrates; UA with blood but no obvious infection; procalcitonin 0.16  RVP negative; blood cultures negative to date  Had fever overnight so started on IV zosyn empirically  Continue Zosyn; monitor for fevers; await final cultures  Prn Tylenol      Fall  With mildly displaced clavicle fracture and posterior 10th rib fracture  Also with mild acute T3 compression fracture  I personally reviewed trauma scans including CT head, CTA chest, CT abd/pelvis, CT cervical spine  all non-operative   Start pain control with prn Tylenol/Norco  Continue PT     Elevated troponin  Likely non-MI and due to trauma  Patient denies chest pain  Will trend troponins     TANIA  Mild  Creatinine 1.57; previous baseline per chart review 1  1.55 today; ? New baseline  Stop IV fluids  Repeat BMP in AM      PAF   Currently in NSR on my exam  Continue home atenolol and warfarin  INR improving but still subtherapeutic at 1.65 today   Daily INR     HTN  Resume home atenolol; continue to hold losartan due to mild TANIA  Prn IV Hydralazine for systolic >170      DM II  No home oral meds or insulin listed  Accuchecks with SSI       DVT ppx:  SCDs/warfarin         Dispo: Likely d/c home with HH PT/OT in 1-2 days if fevers resolve and pending final culture results     Baldomero Dunbar DO  09/29/24  10:57 EDT    At [facility], we believe that sharing information builds trust and  "better relationships. You are receiving this note because you recently visited [facility]. It is possible you will see health information before a provider has talked with you about it. This kind of information can be easy to misunderstand. To help you fully understand what it means for your health, we urge you to discuss this note with your provider.    \"Dictated utilizing Dragon dictation\"  "

## 2024-09-29 NOTE — PLAN OF CARE
Goal Outcome Evaluation:           Progress: improving  Outcome Evaluation: pt up in chair with no fever this shift. appears much more alert than yesterday. complaints of general pain with movement

## 2024-09-29 NOTE — CASE MANAGEMENT/SOCIAL WORK
Continued Stay Note  JULIO Crespo     Patient Name: Woo Dobbs  MRN: [id number]  Today's Date: 9/29/2024    Admit Date: 9/28/2024    Plan: Home w/HH   Discharge Plan       Row Name 09/29/24 1325       Plan    Plan Home w/HH    Plan Comments Per PT, pt will need PT/OT on dc.  referral to [facility] in [facility], and called Alyssa.  She accepted pt and they do go to [place].  I updated pt. Will follow. Thanks, Sabiha WARE                   Discharge Codes    No documentation.                 Expected Discharge Date and Time       Expected Discharge Date Expected Discharge Time    Sep 30, 2024               Sabiha Holliday

## 2024-09-29 NOTE — PLAN OF CARE
Goal Outcome Evaluation:  Plan of Care Reviewed With: patient        Progress: improving  Outcome Evaluation: PT note:  Patient up to bathroom with nursing staff.  He complains of left upper extremity and rib/chest area pain especially with transfers in/out of bed.  Patient transfers sit/stand with CGA and ambulates 150 feet with and without SPC.  Patient reports that he does have canes at home that belong to his wife.  Patient is concerned that his spouse would be unable to assist him with bed transfers, but he does have a lift chair at home and believes he could sleep in that until his mobility improves.  PT to continues.  Recommend  PT upon discharge.      Anticipated Discharge Disposition (PT): home, home with assist, home with home health (will continue to assess for needs)

## 2024-09-29 NOTE — PLAN OF CARE
Goal Outcome Evaluation:  Plan of Care Reviewed With: patient        Progress: no change  Outcome Evaluation: VS: Remains febrile, PRN Tylenol given; tolerating RA. Patient complained of chills. On antibiotics IV. Scattered bruising noted, Cut wound on parietal area crusted, no active bleeding noted. Patient was able to ambulated to the bathroom with assistance. Denies chest pain.

## 2024-09-29 NOTE — THERAPY TREATMENT NOTE
Acute Care - Physical Therapy Treatment Note  JULIO Crespo     Patient Name: Woo Dobbs  : 1940  MRN: [id number]  Today's Date: 2024   Onset of Illness/Injury or Date of Surgery: 24  Visit Dx:     ICD-10-CM ICD-9-CM   1. Fall, initial encounter  W19.XXXA E888.9   2. Injury of head, initial encounter  S09.90XA 959.01   3. Closed displaced fracture of acromial end of left clavicle, initial encounter  S42.032A 810.03   4. Compression fracture of T3 vertebra, initial encounter  S22.030A 805.2   5. TANIA (acute kidney injury)  N17.9 584.9   6. Troponin level elevated  R79.89 790.6     Patient Active Problem List   Diagnosis    Diabetes mellitus    Hypertension    Benign prostatic hyperplasia without lower urinary tract symptoms    Odynophagia    Heartburn    Mixed hyperlipidemia    Rupture of right gastrocnemius tendon    Chondromalacia of knee, right    Gastrocnemius muscle strain, right, initial encounter    AF (paroxysmal atrial fibrillation)    S/P CABG x 3    History of aortic valve replacement with bioprosthetic valve    Coronary artery disease involving coronary bypass graft of native heart without angina pectoris    Carotid atherosclerosis, bilateral    Bladder mass    Abnormal urine cytology    Troponin level elevated     Past Medical History:   Diagnosis Date    Aortic stenosis     2022: tissue AVR (25mm MDT Avalus bovine pericardial)    Asthma     BPH (benign prostatic hyperplasia)     CAD (coronary artery disease)     2022: 95% prox/50% distal LAD, 80% D1, 30-40% Cx, diffuse dz RCA. CABG x 3 2022: LIMA-LAD, SVG-D1, SVG-RCA    Carotid atherosclerosis, bilateral     2022: 16-49% bilaterally    Colon polyp     Fall 2024    GERD (gastroesophageal reflux disease)     Hyperlipidemia     Hypertension     Hypothyroidism     Paroxysmal atrial fibrillation     Type 2 diabetes mellitus     Warfarin anticoagulation      Past Surgical History:   Procedure Laterality Date    CARDIAC  CATHETERIZATION N/A 7/14/2022    Procedure: Coronary angiography;  Surgeon: Unique Calabrese MD;  Location:  [facility]VAS[facility];  Service: Cardiovascular;  Laterality: N/A;    CARDIAC CATHETERIZATION N/A 7/14/2022    Procedure: Left heart cath with simultaneous LV/Ao pressures;  Surgeon: Unique Calabrese MD;  Location:  [facility]ATH [facility];  Service: Cardiovascular;  Laterality: N/A;    CARDIAC CATHETERIZATION N/A 7/14/2022    Procedure: Right Heart Cath;  Surgeon: Unique Calabrese MD;  [facility] C[facility];  Service: Cardiovascular;  Laterality: N/A;    COLONOSCOPY      CORONARY ARTERY BYPASS GRAFT WITH AORTIC VALVE REPAIR/REPLACEMENT N/A 8/8/2022    Procedure: KEVIN STERNOTOMY CORONARY ARTERY BYPASS GRAFT TIMES 3 USING LEFT INTERNAL MAMMARY ARTERY AND LEFT GREATER SAPHENOUS VEIN GRAFT PER ENDOSCOPIC VEIN HARVESTING, AORTIC VALVE REPLACEMENT AND PRP;  Surgeon: Jr Gentry Courtney MD;  Location: [facility] CVOR;  Service: Cardiothoracic;  Laterality: N/A;    CYSTOSCOPY BLADDER BIOPSY N/A 3/18/2024    Procedure: CYSTOSCOPY BLADDER BIOPSY;  Surgeon: Nimesh Arvizu MD;  Location: [facility] OR;  Service: Urology;  Laterality: N/A;    CYSTOSCOPY RETROGRADE PYELOGRAM Bilateral 3/18/2024    Procedure: bilateral retrogrades and cytology;  Surgeon: Nimesh Arvizu MD;  Location: [facility] OR;  Service: Urology;  Laterality: Bilateral;    TRANSURETHRAL RESECTION OF BLADDER TUMOR N/A 1/23/2023    Procedure: TRANSURETHRAL RESECTION OF SMALL BLADDER TUMOR;  Surgeon: Nimesh Arvizu MD;  Location: [facility] OR;  Service: Urology;  Laterality: N/A;    VASECTOMY       PT Assessment (Last 12 Hours)       PT Evaluation and Treatment       Row Name 09/29/24 0800          Physical Therapy Time and Intention    Subjective Information complains of;pain  -SP     Document Type therapy note (daily note)  -SP     Mode of Treatment physical therapy  -SP     Patient Effort good  -SP     Comment Patient with tremors  present in left UE, he reports this is normal for him.  -SP       Row Name 09/29/24 0800          General Information    Patient Observations alert;cooperative;agree to therapy  -SP     General Observations of Patient Patient up in bathroom with nursing  -SP       Row Name 09/29/24 0800          Pain    Pain Location - Side/Orientation Left  -SP     Pain Location upper  -SP     Pain Location - chest;shoulder  -SP     Pre/Posttreatment Pain Comment --  patient reports increased pain especially with transfer in/out of bed, but does not specifiy pain rating  -SP     Pain Intervention(s) Repositioned  -SP       Row Name 09/29/24 0800          Cognition    Affect/Mental Status (Cognition) WFL  -SP     Orientation Status (Cognition) oriented x 3  -SP       Row Name 09/29/24 0800          Bed Mobility    Comment, (Bed Mobility) deferred: patient up with nursing  -SP       Row Name 09/29/24 0800          Transfers    Transfers sit-stand transfer;stand-sit transfer  -SP       Row Name 09/29/24 0800          Sit-Stand Transfer    Sit-Stand Sandoval (Transfers) contact guard;verbal cues  -SP       Row Name 09/29/24 0800          Stand-Sit Transfer    Stand-Sit Sandoval (Transfers) verbal cues;contact guard  -SP       Row Name 09/29/24 0800          Gait/Stairs (Locomotion)    Sandoval Level (Gait) contact guard  -SP     Assistive Device (Gait) cane, straight  -SP     Distance in Feet (Gait) 150  -SP     Pattern (Gait) step-through  -SP     Deviations/Abnormal Patterns (Gait) raudel decreased;gait speed decreased;stride length decreased  -SP     Comment, (Gait/Stairs) Patient with sling donned to left upper extremity.  Mild unsteadiness/deviation from path with independent recovery.  -SP       Row Name             Wound 09/28/24 Bilateral parietal region    Wound - Properties Group Placement Date: 09/28/24  -MJ Present on Original Admission: Y  -MJ Side: Bilateral  -MJ Location: parietal region  -MJ    Retired  Wound - Properties Group Placement Date: 09/28/24  -MJ Present on Original Admission: Y  -MJ Side: Bilateral  -MJ Location: parietal region  -MJ    Retired Wound - Properties Group Date first assessed: 09/28/24  -MJ Present on Original Admission: Y  -MJ Side: Bilateral  -MJ Location: parietal region  -MJ      Row Name 09/29/24 0800          Plan of Care Review    Plan of Care Reviewed With patient  -SP     Progress improving  -SP     Outcome Evaluation PT note:  Patient up to bathroom with nursing staff.  He complains of left upper extremity and rib/chest area pain especially with transfers in/out of bed.  Patient transfers sit/stand with CGA and ambulates 150 feet with and without SPC.  Patient reports that he does have canes at home that belong to his wife.  Patient is concerned that his spouse would be unable to assist him with bed transfers, but he does have a lift chair at home and believes he could sleep in that until his mobility improves.  PT to continues.  Recommend  PT upon discharge.  -SP       Row Name 09/29/24 0800          Positioning and Restraints    Pre-Treatment Position in bed  -SP     Post Treatment Position chair  -SP     In Chair reclined;sitting;encouraged to call for assist;legs elevated  -SP       Row Name 09/29/24 0800          Gait Training Goal 1 (PT)    Progress/Outcome (Gait Training Goal 1, PT) good progress toward goal  -SP               User Key  (r) = Recorded By, (t) = Taken By, (c) = Cosigned By      Initials Name Provider Type    Elizabeth Martines, PT Physical Therapist    Kelly Singh, RN Registered Nurse                    Physical Therapy Education       Title: PT OT SLP Therapies (Done)       Topic: Physical Therapy (Done)       Point: Mobility training (Done)       Learning Progress Summary             Patient Acceptance, ERIK DU by SP at 9/28/2024 0925    Comment: saftey with transfers and gait                                         User Key        Initials Effective Dates Name Provider Type Discipline    SP 07/11/23 -  Elizabeth Rao, PT Physical Therapist PT                  PT Recommendation and Plan  Anticipated Discharge Disposition (PT): home, home with assist, home with home health (will continue to assess for needs)  Planned Therapy Interventions (PT): balance training, bed mobility training, gait training, transfer training  Therapy Frequency (PT): daily  Plan of Care Reviewed With: patient  Progress: improving  Outcome Evaluation: PT note:  Patient up to bathroom with nursing staff.  He complains of left upper extremity and rib/chest area pain especially with transfers in/out of bed.  Patient transfers sit/stand with CGA and ambulates 150 feet with and without SPC.  Patient reports that he does have canes at home that belong to his wife.  Patient is concerned that his spouse would be unable to assist him with bed transfers, but he does have a lift chair at home and believes he could sleep in that until his mobility improves.  PT to continues.  Recommend  PT upon discharge.   Outcome Measures       Row Name 09/29/24 0800 09/28/24 0900          How much help from another person do you currently need...    Turning from your back to your side while in flat bed without using bedrails? 3  -SP 3  -SP     Moving from lying on back to sitting on the side of a flat bed without bedrails? 3  -SP 3  -SP     Moving to and from a bed to a chair (including a wheelchair)? 3  -SP 3  -SP     Standing up from a chair using your arms (e.g., wheelchair, bedside chair)? 3  -SP 3  -SP     Climbing 3-5 steps with a railing? 3  -SP 3  -SP     To walk in hospital room? 3  -SP 3  -SP     AM-PAC 6 Clicks Score (PT) 18  -SP 18  -SP     Highest Level of Mobility Goal 6 --> Walk 10 steps or more  -SP 6 --> Walk 10 steps or more  -SP        Functional Assessment    Outcome Measure Options AM-PAC 6 Clicks Basic Mobility (PT)  -SP AM-PAC 6 Clicks Basic Mobility (PT)  -SP                User Key  (r) = Recorded By, (t) = Taken By, (c) = Cosigned By      Initials Name Provider Type    Elizabeth Martines, PT Physical Therapist                     Time Calculation:    PT Charges       Row Name 09/29/24 0817             Time Calculation    Start Time 0755  -SP      Stop Time 0815  -SP      Time Calculation (min) 20 min  -SP                User Key  (r) = Recorded By, (t) = Taken By, (c) = Cosigned By      Initials Name Provider Type    Elizabeth Martines, PT Physical Therapist                  Therapy Charges for Today       Code Description Service Date Service Provider Modifiers Qty    95403413328 HC PT EVAL LOW COMPLEXITY 1 9/28/2024 Elizabeth Rao, PT GP 1    76525558126 HC GAIT TRAINING EA 15 MIN 9/29/2024 Elizabeth Rao, PT GP 1            PT G-Codes  Outcome Measure Options: AM-PAC 6 Clicks Basic Mobility (PT)  AM-PAC 6 Clicks Score (PT): 18    Elizabeth Rao, PT  9/29/2024

## 2024-09-30 ENCOUNTER — APPOINTMENT (OUTPATIENT)
Dept: CT IMAGING | Facility: HOSPITAL | Age: 84
End: 2024-09-30
Payer: MEDICARE

## 2024-09-30 VITALS
HEART RATE: 74 BPM | HEIGHT: 68 IN | WEIGHT: 160.05 LBS | RESPIRATION RATE: 18 BRPM | OXYGEN SATURATION: 95 % | SYSTOLIC BLOOD PRESSURE: 131 MMHG | DIASTOLIC BLOOD PRESSURE: 63 MMHG | BODY MASS INDEX: 24.26 KG/M2 | TEMPERATURE: 98.4 F

## 2024-09-30 LAB
ANION GAP SERPL CALCULATED.3IONS-SCNC: 9.1 MMOL/L (ref 5–15)
BASOPHILS # BLD AUTO: 0.04 10*3/MM3 (ref 0–0.2)
BASOPHILS NFR BLD AUTO: 0.6 % (ref 0–1.5)
BUN SERPL-MCNC: 33 MG/DL (ref 8–23)
BUN/CREAT SERPL: 22.9 (ref 7–25)
CALCIUM SPEC-SCNC: 8.2 MG/DL (ref 8.6–10.5)
CHLORIDE SERPL-SCNC: 102 MMOL/L (ref 98–107)
CO2 SERPL-SCNC: 22.9 MMOL/L (ref 22–29)
CREAT SERPL-MCNC: 1.44 MG/DL (ref 0.76–1.27)
DEPRECATED RDW RBC AUTO: 47.8 FL (ref 37–54)
EGFRCR SERPLBLD CKD-EPI 2021: 48.2 ML/MIN/1.73
EOSINOPHIL # BLD AUTO: 0.09 10*3/MM3 (ref 0–0.4)
EOSINOPHIL NFR BLD AUTO: 1.3 % (ref 0.3–6.2)
ERYTHROCYTE [DISTWIDTH] IN BLOOD BY AUTOMATED COUNT: 14.7 % (ref 12.3–15.4)
GLUCOSE BLDC GLUCOMTR-MCNC: 128 MG/DL (ref 70–130)
GLUCOSE BLDC GLUCOMTR-MCNC: 222 MG/DL (ref 70–130)
GLUCOSE SERPL-MCNC: 162 MG/DL (ref 65–99)
HCT VFR BLD AUTO: 31.8 % (ref 37.5–51)
HGB BLD-MCNC: 10.5 G/DL (ref 13–17.7)
IMM GRANULOCYTES # BLD AUTO: 0.02 10*3/MM3 (ref 0–0.05)
IMM GRANULOCYTES NFR BLD AUTO: 0.3 % (ref 0–0.5)
LYMPHOCYTES # BLD AUTO: 1.13 10*3/MM3 (ref 0.7–3.1)
LYMPHOCYTES NFR BLD AUTO: 15.8 % (ref 19.6–45.3)
MCH RBC QN AUTO: 29.5 PG (ref 26.6–33)
MCHC RBC AUTO-ENTMCNC: 33 G/DL (ref 31.5–35.7)
MCV RBC AUTO: 89.3 FL (ref 79–97)
MONOCYTES # BLD AUTO: 0.42 10*3/MM3 (ref 0.1–0.9)
MONOCYTES NFR BLD AUTO: 5.9 % (ref 5–12)
NEUTROPHILS NFR BLD AUTO: 5.45 10*3/MM3 (ref 1.7–7)
NEUTROPHILS NFR BLD AUTO: 76.1 % (ref 42.7–76)
NRBC BLD AUTO-RTO: 0 /100 WBC (ref 0–0.2)
PLATELET # BLD AUTO: 120 10*3/MM3 (ref 140–450)
PMV BLD AUTO: 10.6 FL (ref 6–12)
POTASSIUM SERPL-SCNC: 3.7 MMOL/L (ref 3.5–5.2)
PROCALCITONIN SERPL-MCNC: 1.11 NG/ML (ref 0–0.25)
RBC # BLD AUTO: 3.56 10*6/MM3 (ref 4.14–5.8)
SODIUM SERPL-SCNC: 134 MMOL/L (ref 136–145)
WBC NRBC COR # BLD AUTO: 7.15 10*3/MM3 (ref 3.4–10.8)

## 2024-09-30 PROCEDURE — 25010000002 HYDRALAZINE PER 20 MG: Performed by: FAMILY MEDICINE

## 2024-09-30 PROCEDURE — 63710000001 INSULIN LISPRO (HUMAN) PER 5 UNITS: Performed by: FAMILY MEDICINE

## 2024-09-30 PROCEDURE — 82948 REAGENT STRIP/BLOOD GLUCOSE: CPT

## 2024-09-30 PROCEDURE — 25010000002 PIPERACILLIN SOD-TAZOBACTAM PER 1 G: Performed by: HOSPITALIST

## 2024-09-30 PROCEDURE — 70450 CT HEAD/BRAIN W/O DYE: CPT

## 2024-09-30 PROCEDURE — 97116 GAIT TRAINING THERAPY: CPT | Performed by: PHYSICAL THERAPIST

## 2024-09-30 PROCEDURE — 85025 COMPLETE CBC W/AUTO DIFF WBC: CPT | Performed by: FAMILY MEDICINE

## 2024-09-30 PROCEDURE — 80048 BASIC METABOLIC PNL TOTAL CA: CPT | Performed by: FAMILY MEDICINE

## 2024-09-30 PROCEDURE — 84145 PROCALCITONIN (PCT): CPT | Performed by: HOSPITALIST

## 2024-09-30 RX ORDER — CEPHALEXIN 500 MG/1
500 CAPSULE ORAL 3 TIMES DAILY
Qty: 15 CAPSULE | Refills: 0 | Status: SHIPPED | OUTPATIENT
Start: 2024-09-30 | End: 2024-10-05

## 2024-09-30 RX ORDER — HYDROCODONE BITARTRATE AND ACETAMINOPHEN 5; 325 MG/1; MG/1
1 TABLET ORAL EVERY 4 HOURS PRN
Qty: 10 TABLET | Refills: 0 | Status: SHIPPED | OUTPATIENT
Start: 2024-09-30

## 2024-09-30 RX ADMIN — PIPERACILLIN AND TAZOBACTAM 3.38 G: 3; .375 INJECTION, POWDER, FOR SOLUTION INTRAVENOUS at 05:15

## 2024-09-30 RX ADMIN — TAMSULOSIN HYDROCHLORIDE 0.8 MG: 0.4 CAPSULE ORAL at 08:54

## 2024-09-30 RX ADMIN — HYDRALAZINE HYDROCHLORIDE 10 MG: 20 INJECTION INTRAMUSCULAR; INTRAVENOUS at 00:02

## 2024-09-30 RX ADMIN — ASPIRIN 81 MG: 81 TABLET, COATED ORAL at 08:54

## 2024-09-30 RX ADMIN — PANTOPRAZOLE SODIUM 40 MG: 40 TABLET, DELAYED RELEASE ORAL at 05:15

## 2024-09-30 RX ADMIN — LEVOTHYROXINE SODIUM 50 MCG: 50 TABLET ORAL at 08:54

## 2024-09-30 RX ADMIN — Medication 10 ML: at 08:54

## 2024-09-30 RX ADMIN — HYDROCODONE BITARTRATE AND ACETAMINOPHEN 1 TABLET: 5; 325 TABLET ORAL at 00:01

## 2024-09-30 RX ADMIN — SENNOSIDES AND DOCUSATE SODIUM 2 TABLET: 50; 8.6 TABLET ORAL at 08:58

## 2024-09-30 RX ADMIN — INSULIN LISPRO 3 UNITS: 100 INJECTION, SOLUTION INTRAVENOUS; SUBCUTANEOUS at 12:40

## 2024-09-30 RX ADMIN — ATENOLOL 25 MG: 25 TABLET ORAL at 08:54

## 2024-09-30 NOTE — PLAN OF CARE
Goal Outcome Evaluation:  Plan of Care Reviewed With: patient        Progress: improving  Outcome Evaluation: AOx4. Patient had episode of elevated BP, denies dizziness, chest pain, HA, n/v: esponded to PRN Hydralazine. Generalized body ache verbalized, responsive to pain medication. Able to ambulate with assistance X1.

## 2024-09-30 NOTE — PLAN OF CARE
Problem: Adult Inpatient Plan of Care  Goal: Plan of Care Review  Recent Flowsheet Documentation  Taken 9/30/2024 1339 by Russell Zambrano, [email or web address]  Progress: improving  Plan of Care Reviewed With: patient  Outcome Evaluation: PT Note: Pt able to go sit to stand with CGA only. He needs to be reminded to push up using the armrest of chair and not to use his cane for this activity. He ambualted 120' with CGA using a straight cane. Will continue to follow while in hospital.   Goal Outcome Evaluation:  Plan of Care Reviewed With: patient        Progress: improving  Outcome Evaluation: PT Note: Pt able to go sit to stand with CGA only. He needs to be reminded to push up using the armrest of chair and not to use his cane for this activity. He ambualted 120' with CGA using a straight cane. Will continue to follow while in hospital.

## 2024-09-30 NOTE — DISCHARGE SUMMARY
Woo Dobbs  1940  [id number]    Hospitalists Discharge Summary    Date of Admission: 9/28/2024  Date of Discharge:  9/30/2024    Primary Discharge Diagnoses: ***    Secondary Discharge Diagnoses: ***      History of Present Illness:***    Hospital Course:      PCP  Patient Care Team:  Agustín Ivan MD as PCP - General (Family Medicine)  Jr Gentry Courtney MD as Surgeon (Cardiothoracic Surgery)    Consults:   Consults       No orders found from 8/30/2024 to 9/29/2024.            Operations and Procedures Performed:       CT Head Without Contrast    Result Date: 9/30/2024  Narrative: CT HEAD WO CONTRAST Date of Exam: 9/30/2024 12:05 PM EDT Indication: Follow-up Head trauma on coumadin.. Comparison: None available. Technique: Axial CT images were obtained of the head without contrast administration.  Coronal reconstructions were performed.  Automated exposure control and iterative reconstruction methods were used. Findings: There is no evidence of acute infarct or hemorrhage. No abnormal extra-axial fluid collections are seen. No mass effect or hydrocephalus. No midline shift. No evidence of skull fracture. Multiple old skin staples are seen overlying the left parietal region. Globes and orbits are within normal limits. Visualized paranasal sinuses and mastoid air cells are clear.    Impression: Impression: 1. No acute intracranial abnormality. 2. No acute skull fracture. Electronically Signed: Ranjeet Fregoso MD  9/30/2024 12:15 PM EDT  Workstation ID: [id number]    XR Femur 2 View Left    Result Date: 9/28/2024  Narrative: XR FEMUR 2 VW LEFT Date of Exam: 9/28/2024 2:08 AM EDT Indication: 83yoM, r/o left femur fx Comparison: 10/29/2021. Findings: The left femur appears intact without fracture or dislocation. There are small osteophytes at the left hip. There is mild tricompartmental osteophyte formation at the knee with mild narrowing of the medial and lateral compartment joint spaces.     Impression:  Impression: 1.No acute osseous abnormality. 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 Electronically Signed: Eliseo Titus MD  9/28/2024 3:06 AM EDT  Workstation ID: [id number]    XR Pelvis 1 or 2 View    Result Date: 9/28/2024  Narrative: XR PELVIS 1 OR 2 VW Date of Exam: 9/28/2024 2:09 AM EDT Indication: 83yoM, fell from bridge. Comparison: None available. Findings: Bony pelvis appears intact. No hip fracture. Mild pelvic and trochanteric enthesopathy. Sacroiliac joints, sacral arcuate lines and obturator rings appear intact. Mild DJD at the pubic symphysis.     Impression: Impression: No acute osseous abnormality. Electronically Signed: Eliseo Titus MD  9/28/2024 3:06 AM EDT  Workstation ID: [id number]    XR Chest 1 View    Result Date: 9/28/2024  Narrative: XR CHEST 1 VW Date of Exam: 9/28/2024 2:07 AM EDT Indication: 83yoM, fever. desat to 88%. R/o PNA Comparison: 12/4/2022 and CT chest 9/28/2024. Findings: There is evidence of prior median sternotomy. There is a mildly displaced distal left clavicle fracture. There is mild DJD of both shoulders. Heart size is normal. Pulmonary vasculature is within normal limits. Lung parenchyma is clear.     Impression: Impression: 1.No acute cardiopulmonary abnormality. 2.Mildly displaced distal left clavicle fracture. 3.Prior median sternotomy. Electronically Signed: Eliseo Titus MD  9/28/2024 3:05 AM EDT  Workstation ID: [id number]    CT Angiogram Chest    Addendum Date: 9/28/2024 Addendum:   There is an acute mildly displaced posterior left 10th rib fracture. Electronically Signed: Eliseo Titus MD  9/28/2024 3:04 AM EDT  Workstation ID: [id number]    Result Date: 9/28/2024  Narrative: CT ANGIOGRAM CHEST Date of Exam: 9/28/2024 2:00 AM EDT Indication: 83yoM, fever, desat, fell down 10'. Left sided chest pain. R/o pulm contusion, rib fx, PE. Comparison: Chest radiograph 9/28/2024. Technique: CTA of the chest was performed before and after the uneventful intravenous  administration of iodinated contrast. Reconstructed coronal and sagittal images were also obtained. In addition, a 3-D volume rendered image was created for interpretation. Automated exposure control and iterative reconstruction methods were used. Findings: The thyroid, trachea and esophagus appear within normal limits. There is a small hiatal hernia. 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 Mild aortic atherosclerosis. Moderate native coronary artery calcification. No pericardial effusion. No  mediastinal lymphadenopathy. No evidence of pulmonary embolism. There is mild dependent bibasilar atelectasis. No pneumothorax or pleural effusion. There is no airway obstruction. No suspicious lung nodule. No acute findings in the limited images of the upper abdomen. There is cholelithiasis without evidence of acute cholecystitis. Partially seen simple left kidney cyst. There is an acute mildly displaced fracture of the distal left clavicle. There is a slight compression fracture at the superior endplate of T3 with less than 10% loss of height.     Impression: Impression: 1.No evidence of pulmonary embolism. 2.Acute mildly displaced fracture of the distal left clavicle. 3.Acute mild compression fracture at the superior endplate of T3 with less than 10% loss of height. 4.Mild dependent bibasilar atelectasis. 5.Small hiatal hernia. 6.Cholelithiasis without evidence of acute cholecystitis. Electronically Signed: Eliseo Titus MD  9/28/2024 2:53 AM EDT  Workstation ID: [id number]    CT Abdomen Pelvis With Contrast    Result Date: 9/28/2024  Narrative: CT ABDOMEN PELVIS W CONTRAST Date of Exam: 9/28/2024 2:04 AM EDT Indication: 83yoM on coumadin. Fell and now left hip pain. Comparison: None available. Technique: Axial CT images were obtained of the abdomen and pelvis following the uneventful intravenous administration of iodinated contrast. Sagittal and coronal reconstructions were performed.  Automated exposure  control and iterative reconstruction methods were used. Findings: Findings in the chest discussed in a separate report. No acute findings in the superficial soft tissues. There are bilateral scrotal hydroceles partially seen. There appears to be a right-sided scrotal varicocele, as well. No acute osseous abnormality or  destructive bone lesion. There is moderate lumbar spondylosis. Mild osteoarthritis is present at both hips. There is no acute mildly displaced fracture of the left posterior 10th rib. There are hypoplastic L1 ribs. The bony pelvis appears intact. No hip  fracture. The liver, gallbladder, bile ducts, stomach, duodenum, spleen and adrenal glands appear normal. There is a subcentimeter simple cyst at the mid right kidney. There is a 41 mm simple cyst at the mid left kidney with adjacent subcentimeter cysts. The ureters appear normal. The prostate gland is enlarged measuring up to 53 mm diameter. Urinary bladder is nondistended. Normal appendix. Small bowel is nondistended. There is mild colonic diverticulosis. Mild aortoiliac atherosclerosis. No aneurysm. No ascites, pneumoperitoneum or lymphadenopathy. No evidence of solid organ injury.     Impression: Impression: 1.Acute mildly displaced fracture of the left posterior 10th rib. 2.No acute findings in the abdomen or pelvis. 3.Lumbar spondylosis and mild osteoarthritis at both hips. 4.Prostatomegaly. 5.Bilateral scrotal hydroceles and right-sided varicocele. Electronically Signed: Eliseo Titus MD  9/28/2024 3:03 AM EDT  Workstation ID: [id number]    CT Cervical Spine Without Contrast    Result Date: 9/28/2024  Narrative: CT CERVICAL SPINE WO CONTRAST Date of Exam: 9/28/2024 1:58 AM EDT Indication: 83yoM, on coumadin. Fell off the bridge ~10'. r/o c spine injury. Comparison: None available. Technique: Axial CT images were obtained of the cervical spine without contrast administration.  Sagittal and coronal reconstructions were performed.  Automated  exposure control and iterative reconstruction methods were used. Findings: 7 cervical vertebrae are identified. There is straightening of the cervical lordosis. There is severe narrowing of the C3-C4, C4-C5, C5-6 and C6-C7 discs. There is mild to moderate multilevel marginal and uncovertebral osteophyte formation. There is moderate right-sided facet arthritis at C2-C3 and severe left-sided facet arthritis at C4-C5. There is moderate narrowing of the spinal canal at C5-C6 and mild narrowing of the spinal canal at C4-C5 and C3-C4. There is mild right neuroforaminal narrowing  at C2-C3, moderate bilateral neuroforaminal narrowing at C3-C4, severe left neuroforaminal narrowing at C4-C5, moderate bilateral neuroforaminal narrowing at C5-C6 and mild bilateral neuroforaminal narrowing at C6-C7. There are mild bilateral carotid bifurcation calcifications. Vertebral bodies maintain normal height. C1 ring and odontoid process appear intact. Spinous processes appear intact. No evidence of fracture or subluxation. There is asymmetric enlargement of the right internal jugular vein, which measures up to 4.1 cm diameter. No obvious soft tissue hematoma or soft tissue gas. Thyroid is heterogeneous without discrete lesion. Lung apices are clear.     Impression: Impression: 1.No acute osseous abnormality. 2.Moderate to severe cervical spondylosis with varying degrees of spinal canal and neuroforaminal narrowing. Electronically Signed: Eliseo Titus MD  9/28/2024 2:33 AM EDT  Workstation ID: [id number]    CT Head Without Contrast    Result Date: 9/28/2024  Narrative: CT HEAD WO CONTRAST Date of Exam: 9/28/2024 1:56 AM EDT Indication: 83yoM, on coumadin. Fell off the bridge ~10'. r/o head bleed.. Comparison: None available. Technique: Axial CT images were obtained of the head without contrast administration.  Coronal reconstructions were performed.  Automated exposure control and iterative reconstruction methods were used. Findings:  There is soft tissue contusion to the right frontal scalp. There is a larger area of contusion at the left parietal scalp along with soft tissue laceration and minor hematoma. The calvarium is intact.  Paranasal sinuses and mastoid air cells appear well aerated. There is thinning of the orbital lenses bilaterally suggesting prior lens replacement. There is no acute intracranial hemorrhage.  No mass effect or midline shift.  No abnormal extra-axial collections.  Gray-white differentiation is within normal limits. There is mild patchy white matter hypoattenuation. There is mild generalized parenchymal volume loss congruent with age.     Impression: Impression: 1.No acute intracranial abnormality. 2.Right frontal and left parietal scalp contusions with laceration and minor hematoma at the left parietal scalp. 3.Mild chronic small vessel ischemic change and mild generalized parenchymal volume loss. Electronically Signed: Eliseo Titus MD  9/28/2024 2:30 AM EDT  Workstation ID: [id number]     Allergies:  is allergic to loratadine.    Boogie  {Desc; reviewed/not reviewed:[id number]}    Discharge Medications:     Discharge Medications        New Medications        Instructions Start Date   cephalexin 500 MG capsule  Commonly known as: Keflex   500 mg, Oral, 3 Times Daily      HYDROcodone-acetaminophen 5-325 MG per tablet  Commonly known as: NORCO   1 tablet, Oral, Every 4 Hours PRN      Polyvinyl Alcohol-Povidone PF 1.4-0.6 % ophthalmic solution  Commonly known as: ARTIFICIAL TEARS   1 drop, Both Eyes, Every 1 Hour PRN             Changes to Medications        Instructions Start Date   warfarin 4 MG tablet  Commonly known as: COUMADIN  What changed: See the new instructions.   TAKE 2 TABLETS ONE TIME DAILY OR AS DIRECTED             Continue These Medications        Instructions Start Date   atenolol 25 MG tablet  Commonly known as: TENORMIN   25 mg, Oral, 2 Times Daily      atorvastatin 40 MG tablet  Commonly known as:  LIPITOR   40 mg, Oral, Nightly      ferrous sulfate 325 (65 FE) MG EC tablet   325 mg, Oral, Daily With Breakfast      glucose blood test strip   Test blood sugar Twice daily      glucose monitor monitoring kit   1 each, Does not apply, As Needed      Lancets misc   1 each, Does not apply, 3 Times Daily Before Meals      levothyroxine 50 MCG tablet  Commonly known as: SYNTHROID, LEVOTHROID   50 mcg, Oral, Daily      losartan 50 MG tablet  Commonly known as: COZAAR   50 mg, Oral, Daily      nitroglycerin 0.4 MG SL tablet  Commonly known as: Nitrostat   0.4 mg, Sublingual, Every 5 Minutes PRN, Take no more than 3 doses in 15 minutes.      omeprazole 40 MG capsule  Commonly known as: priLOSEC   40 mg, Oral, 3 Times Weekly PRN      tamsulosin 0.4 MG capsule 24 hr capsule  Commonly known as: FLOMAX   0.4 mg, Oral, Daily             Stop These Medications      aspirin 81 MG EC tablet              Last Lab Results:   Lab Results (most recent)       Procedure Component Value Units Date/Time    POC Glucose Once [[id number]]  (Abnormal) Collected: 09/30/24 1221    Specimen: Blood Updated: 09/30/24 1228     Glucose 222 mg/dL     POC Glucose Once [[id number]]  (Normal) Collected: 09/30/24 0813    Specimen: Blood Updated: 09/30/24 0819     Glucose 128 mg/dL     Procalcitonin [[id number]]  (Abnormal) Collected: 09/30/24 0330    Specimen: Blood Updated: 09/30/24 0759     Procalcitonin 1.11 ng/mL     Narrative:      As a Marker for Sepsis (Non-Neonates):    1. <0.5 ng/mL represents a low risk of severe sepsis and/or septic shock.  2. >2 ng/mL represents a high risk of severe sepsis and/or septic shock.    As a Marker for Lower Respiratory Tract Infections that require antibiotic therapy:    PCT on Admission    Antibiotic Therapy       6-12 Hrs later    >0.5                Strongly Recommended  >0.25 - <0.5        Recommended   0.1 - 0.25          Discouraged              Remeasure/reassess PCT  <0.1                Strongly Discouraged  "    Remeasure/reassess PCT    As 28 day mortality risk marker: \"Change in Procalcitonin Result\" (>80% or <=80%) if Day 0 (or Day 1) and Day 4 values are available. Refer to http://[email or web address]-[email or web address]    Change in PCT <=80%  A decrease of PCT levels below or equal to 80% defines a positive change in PCT test result representing a higher risk for 28-day all-cause mortality of patients diagnosed with severe sepsis for septic shock.    Change in PCT >80%  A decrease of PCT levels of more than 80% defines a negative change in PCT result representing a lower risk for 28-day all-cause mortality of patients diagnosed with severe sepsis or septic shock.       Basic Metabolic Panel [[id number]]  (Abnormal) Collected: 09/30/24 0330    Specimen: Blood Updated: 09/30/24 0453     Glucose 162 mg/dL      BUN 33 mg/dL      Creatinine 1.44 mg/dL      Sodium 134 mmol/L      Potassium 3.7 mmol/L      Chloride 102 mmol/L      CO2 22.9 mmol/L      Calcium 8.2 mg/dL      BUN/Creatinine Ratio 22.9     Anion Gap 9.1 mmol/L      eGFR 48.2 mL/min/1.73     Narrative:      GFR Normal >60  Chronic Kidney Disease <60  Kidney Failure <15    The GFR formula is only valid for adults with stable renal function between ages 18 and 70.    Blood Culture - Blood, Arm, Right [[id number]]  (Normal) Collected: 09/28/24 0418    Specimen: Blood from Arm, Right Updated: 09/30/24 0430     Blood Culture No growth at 2 days    Blood Culture - Blood, Arm, Left [[id number]]  (Normal) Collected: 09/28/24 0418    Specimen: Blood from Arm, Left Updated: 09/30/24 0430     Blood Culture No growth at 2 days    CBC & Differential [[id number]]  (Abnormal) Collected: 09/30/24 0330    Specimen: Blood Updated: 09/30/24 0427    Narrative:      The following orders were created for panel order CBC & Differential.  Procedure                               Abnormality         Status                     ---------                               -----------         ------        "              CBC Auto Differential[[id number]]        Abnormal            Final result                 Please view results for these tests on the individual orders.    CBC Auto Differential [[id number]]  (Abnormal) Collected: 09/30/24 0330    Specimen: Blood Updated: 09/30/24 0427     WBC 7.15 10*3/mm3      RBC 3.56 10*6/mm3      Hemoglobin 10.5 g/dL      Hematocrit 31.8 %      MCV 89.3 fL      MCH 29.5 pg      MCHC 33.0 g/dL      RDW 14.7 %      RDW-SD 47.8 fl      MPV 10.6 fL      Platelets 120 10*3/mm3      Neutrophil % 76.1 %      Lymphocyte % 15.8 %      Monocyte % 5.9 %      Eosinophil % 1.3 %      Basophil % 0.6 %      Immature Grans % 0.3 %      Neutrophils, Absolute 5.45 10*3/mm3      Lymphocytes, Absolute 1.13 10*3/mm3      Monocytes, Absolute 0.42 10*3/mm3      Eosinophils, Absolute 0.09 10*3/mm3      Basophils, Absolute 0.04 10*3/mm3      Immature Grans, Absolute 0.02 10*3/mm3      nRBC 0.0 /100 WBC     Basic Metabolic Panel [[id number]]  (Abnormal) Collected: 09/29/24 0640    Specimen: Blood Updated: 09/29/24 0748     Glucose 112 mg/dL      BUN 23 mg/dL      Creatinine 1.55 mg/dL      Sodium 132 mmol/L      Potassium 4.2 mmol/L      Chloride 100 mmol/L      CO2 22.4 mmol/L      Calcium 8.4 mg/dL      BUN/Creatinine Ratio 14.8     Anion Gap 9.6 mmol/L      eGFR 44.1 mL/min/1.73     Narrative:      GFR Normal >60  Chronic Kidney Disease <60  Kidney Failure <15    The GFR formula is only valid for adults with stable renal function between ages 18 and 70.    CBC & Differential [[id number]]  (Abnormal) Collected: 09/29/24 0640    Specimen: Blood Updated: 09/29/24 0748    Narrative:      The following orders were created for panel order CBC & Differential.  Procedure                               Abnormality         Status                     ---------                               -----------         ------                     CBC Auto Differential[[id number]]        Abnormal            Final result                  Please view results for these tests on the individual orders.    CBC Auto Differential [[id number]]  (Abnormal) Collected: 09/29/24 0640    Specimen: Blood Updated: 09/29/24 0748     WBC 12.65 10*3/mm3      RBC 3.78 10*6/mm3      Hemoglobin 11.1 g/dL      Hematocrit 34.3 %      MCV 90.7 fL      MCH 29.4 pg      MCHC 32.4 g/dL      RDW 14.6 %      RDW-SD 48.4 fl      MPV 10.0 fL      Platelets 112 10*3/mm3      Neutrophil % 86.6 %      Lymphocyte % 7.0 %      Monocyte % 5.0 %      Eosinophil % 0.5 %      Basophil % 0.4 %      Immature Grans % 0.5 %      Neutrophils, Absolute 10.96 10*3/mm3      Lymphocytes, Absolute 0.89 10*3/mm3      Monocytes, Absolute 0.63 10*3/mm3      Eosinophils, Absolute 0.06 10*3/mm3      Basophils, Absolute 0.05 10*3/mm3      Immature Grans, Absolute 0.06 10*3/mm3      nRBC 0.0 /100 WBC     Protime-INR [[id number]]  (Abnormal) Collected: 09/29/24 0640    Specimen: Blood Updated: 09/29/24 0747     Protime 20.1 Seconds      INR 1.65    Narrative:      Therapeutic Ranges for INR: 2.0-3.0 (PT 20-30)                              2.5-3.5 (PT 25-34)    Respiratory Panel PCR w/COVID-19(SARS-CoV-2) IDALIA/TAURUS/YRODAN/PAD/COR/DOLLY In-House, NP Swab in UTM/VTM, 2 HR TAT - Swab, Nasopharynx [[id number]]  (Normal) Collected: 09/28/24 0608    Specimen: Swab from Nasopharynx Updated: 09/28/24 1310     ADENOVIRUS, PCR Not Detected     Coronavirus 229E Not Detected     Coronavirus HKU1 Not Detected     Coronavirus NL63 Not Detected     Coronavirus OC43 Not Detected     COVID19 Not Detected     Human Metapneumovirus Not Detected     Human Rhinovirus/Enterovirus Not Detected     Influenza A PCR Not Detected     Influenza B PCR Not Detected     Parainfluenza Virus 1 Not Detected     Parainfluenza Virus 2 Not Detected     Parainfluenza Virus 3 Not Detected     Parainfluenza Virus 4 Not Detected     RSV, PCR Not Detected     Bordetella pertussis pcr Not Detected     Bordetella parapertussis PCR Not Detected      Chlamydophila pneumoniae PCR Not Detected     Mycoplasma pneumo by PCR Not Detected    Narrative:      In the setting of a positive respiratory panel with a viral infection PLUS a negative procalcitonin without other underlying concern for bacterial infection, consider observing off antibiotics or discontinuation of antibiotics and continue supportive care. If the respiratory panel is positive for atypical bacterial infection (Bordetella pertussis, Chlamydophila pneumoniae, or Mycoplasma pneumoniae), consider antibiotic de-escalation to target atypical bacterial infection.    High Sensitivity Troponin T 2Hr [[id number]]  (Abnormal) Collected: 09/28/24 1017    Specimen: Blood Updated: 09/28/24 1041     HS Troponin T 36 ng/L      Troponin T Delta -4 ng/L     Narrative:      High Sensitive Troponin T Reference Range:  <14.0 ng/L- Negative Female for AMI  <22.0 ng/L- Negative Male for AMI  >=14 - Abnormal Female indicating possible myocardial injury.  >=22 - Abnormal Male indicating possible myocardial injury.   Clinicians would have to utilize clinical acumen, EKG, Troponin, and serial changes to determine if it is an Acute Myocardial Infarction or myocardial injury due to an underlying chronic condition.         High Sensitivity Troponin T [[id number]]  (Abnormal) Collected: 09/28/24 0819    Specimen: Blood Updated: 09/28/24 0841     HS Troponin T 40 ng/L     Narrative:      High Sensitive Troponin T Reference Range:  <14.0 ng/L- Negative Female for AMI  <22.0 ng/L- Negative Male for AMI  >=14 - Abnormal Female indicating possible myocardial injury.  >=22 - Abnormal Male indicating possible myocardial injury.   Clinicians would have to utilize clinical acumen, EKG, Troponin, and serial changes to determine if it is an Acute Myocardial Infarction or myocardial injury due to an underlying chronic condition.         Hemoglobin A1c [[id number]]  (Abnormal) Collected: 09/28/24 0045    Specimen: Blood Updated: 09/28/24 0868      Hemoglobin A1C 6.84 %     Narrative:      Hemoglobin A1C Ranges:    Increased Risk for Diabetes  5.7% to 6.4%  Diabetes                     >= 6.5%  Diabetic Goal                < 7.0%    STAT Lactic Acid, Reflex [[id number]]  (Normal) Collected: 09/28/24 0321    Specimen: Blood Updated: 09/28/24 0342     Lactate 1.0 mmol/L     Urinalysis, Microscopic Only - Urine, Clean Catch [[id number]]  (Abnormal) Collected: 09/28/24 0332    Specimen: Urine, Clean Catch Updated: 09/28/24 0342     RBC, UA 11-20 /HPF      WBC, UA None Seen /HPF      Bacteria, UA None Seen /HPF      Squamous Epithelial Cells, UA 3-6 /HPF      Hyaline Casts, UA None Seen /LPF      Fine Granular Casts, UA 0-2 /LPF      Methodology Manual Light Microscopy    Urinalysis With Microscopic If Indicated (No Culture) - Urine, Clean Catch [[id number]]  (Abnormal) Collected: 09/28/24 0332    Specimen: Urine, Clean Catch Updated: 09/28/24 0341     Color, UA Yellow     Appearance, UA Clear     pH, UA 5.5     Specific Gravity, UA 1.020     Glucose, UA Negative     Ketones, UA Negative     Bilirubin, UA Negative     Blood, UA Large (3+)     Protein,  mg/dL (2+)     Leuk Esterase, UA Negative     Nitrite, UA Negative     Urobilinogen, UA 1.0 E.U./dL    Single High Sensitivity Troponin T [[id number]]  (Abnormal) Collected: 09/28/24 0321    Specimen: Blood Updated: 09/28/24 0341     HS Troponin T 39 ng/L     Narrative:      High Sensitive Troponin T Reference Range:  <14.0 ng/L- Negative Female for AMI  <22.0 ng/L- Negative Male for AMI  >=14 - Abnormal Female indicating possible myocardial injury.  >=22 - Abnormal Male indicating possible myocardial injury.   Clinicians would have to utilize clinical acumen, EKG, Troponin, and serial changes to determine if it is an Acute Myocardial Infarction or myocardial injury due to an underlying chronic condition.         Single High Sensitivity Troponin T [[id number]]  (Abnormal) Collected: 09/28/24 0045    Specimen: Blood  Updated: 09/28/24 0138     HS Troponin T 24 ng/L     Narrative:      High Sensitive Troponin T Reference Range:  <14.0 ng/L- Negative Female for AMI  <22.0 ng/L- Negative Male for AMI  >=14 - Abnormal Female indicating possible myocardial injury.  >=22 - Abnormal Male indicating possible myocardial injury.   Clinicians would have to utilize clinical acumen, EKG, Troponin, and serial changes to determine if it is an Acute Myocardial Infarction or myocardial injury due to an underlying chronic condition.         COVID-19, FLU A/B, RSV PCR 1 HR TAT - Swab, Nasopharynx [[id number]]  (Normal) Collected: 09/28/24 0045    Specimen: Swab from Nasopharynx Updated: 09/28/24 0128     COVID19 Not Detected     Influenza A PCR Not Detected     Influenza B PCR Not Detected     RSV, PCR Not Detected    Narrative:      Fact sheet for providers: https://www.fda.gov/media/469881/download    Fact sheet for patients: https://www.fda.gov/media/329243/download    Test performed by PCR.    Protime-INR [[id number]]  (Abnormal) Collected: 09/28/24 0045    Specimen: Blood Updated: 09/28/24 0121     Protime 16.8 Seconds      INR 1.31    Narrative:      Therapeutic Ranges for INR: 2.0-3.0 (PT 20-30)                              2.5-3.5 (PT 25-34)    Procalcitonin [[id number]]  (Normal) Collected: 09/28/24 0045    Specimen: Blood Updated: 09/28/24 0121     Procalcitonin 0.16 ng/mL     Narrative:      As a Marker for Sepsis (Non-Neonates):    1. <0.5 ng/mL represents a low risk of severe sepsis and/or septic shock.  2. >2 ng/mL represents a high risk of severe sepsis and/or septic shock.    As a Marker for Lower Respiratory Tract Infections that require antibiotic therapy:    PCT on Admission    Antibiotic Therapy       6-12 Hrs later    >0.5                Strongly Recommended  >0.25 - <0.5        Recommended   0.1 - 0.25          Discouraged              Remeasure/reassess PCT  <0.1                Strongly Discouraged     Remeasure/reassess  "PCT    As 28 day mortality risk marker: \"Change in Procalcitonin Result\" (>80% or <=80%) if Day 0 (or Day 1) and Day 4 values are available. Refer to http://www.[facility]-pct-calculator.com    Change in PCT <=80%  A decrease of PCT levels below or equal to 80% defines a positive change in PCT test result representing a higher risk for 28-day all-cause mortality of patients diagnosed with severe sepsis for septic shock.    Change in PCT >80%  A decrease of PCT levels of more than 80% defines a negative change in PCT result representing a lower risk for 28-day all-cause mortality of patients diagnosed with severe sepsis or septic shock.       BNP [[id number]]  (Normal) Collected: 09/28/24 0045    Specimen: Blood Updated: 09/28/24 0119     proBNP 814.0 pg/mL     Narrative:      This assay is used as an aid in the diagnosis of individuals suspected of having heart failure. It can be used as an aid in the diagnosis of acute decompensated heart failure (ADHF) in patients presenting with signs and symptoms of ADHF to the emergency department (ED). In addition, NT-proBNP of <300 pg/mL indicates ADHF is not likely.    Age Range Result Interpretation  NT-proBNP Concentration (pg/mL:      <50             Positive            >450                   Gray                 300-450                    Negative             <300    50-75           Positive            >900                  Gray                300-900                  Negative            <300      >75             Positive            >1800                  Gray                300-1800                  Negative            <300    Comprehensive Metabolic Panel [[id number]]  (Abnormal) Collected: 09/28/24 0045    Specimen: Blood Updated: 09/28/24 0119     Glucose 167 mg/dL      BUN 21 mg/dL      Creatinine 1.57 mg/dL      Sodium 133 mmol/L      Potassium 4.5 mmol/L      Chloride 98 mmol/L      CO2 23.3 mmol/L      Calcium 9.0 mg/dL      Total Protein 7.8 g/dL      Albumin 3.9 g/dL "      ALT (SGPT) 45 U/L      AST (SGOT) 71 U/L      Alkaline Phosphatase 103 U/L      Total Bilirubin 0.7 mg/dL      Globulin 3.9 gm/dL      A/G Ratio 1.0 g/dL      BUN/Creatinine Ratio 13.4     Anion Gap 11.7 mmol/L      eGFR 43.5 mL/min/1.73     Narrative:      GFR Normal >60  Chronic Kidney Disease <60  Kidney Failure <15    The GFR formula is only valid for adults with stable renal function between ages 18 and 70.    Lactic Acid, Plasma [[id number]]  (Abnormal) Collected: 09/28/24 0045    Specimen: Blood Updated: 09/28/24 0114     Lactate 2.4 mmol/L           Imaging Results (Most Recent)       Procedure Component Value Units Date/Time    CT Head Without Contrast [[id number]] Collected: 09/30/24 1210     Updated: 09/30/24 1218    Narrative:      CT HEAD WO CONTRAST    Date of Exam: 9/30/2024 12:05 PM EDT    Indication: Follow-up Head trauma on coumadin..    Comparison: None available.    Technique: Axial CT images were obtained of the head without contrast administration.  Coronal reconstructions were performed.  Automated exposure control and iterative reconstruction methods were used.      Findings:  There is no evidence of acute infarct or hemorrhage. No abnormal extra-axial fluid collections are seen. No mass effect or hydrocephalus.    No midline shift.    No evidence of skull fracture. Multiple old skin staples are seen overlying the left parietal region.    Globes and orbits are within normal limits.    Visualized paranasal sinuses and mastoid air cells are clear.    Impression:      Impression:    1. No acute intracranial abnormality.  2. No acute skull fracture.      Electronically Signed: Ranjeet Fregoso MD    9/30/2024 12:15 PM EDT    Workstation ID: [id number]    XR Femur 2 View Left [[id number]] Collected: 09/28/24 0306     Updated: 09/28/24 0310    Narrative:      XR FEMUR 2 VW LEFT    Date of Exam: 9/28/2024 2:08 AM EDT    Indication: 83yoM, r/o left femur fx    Comparison: 10/29/2021.    Findings:  The  left femur appears intact without fracture or dislocation. There are small osteophytes at the left hip. There is mild tricompartmental osteophyte formation at the knee with mild narrowing of the medial and lateral compartment joint spaces.      Impression:      Impression:  1.No acute osseous abnormality.  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        Electronically Signed: Eliseo Titus MD    9/28/2024 3:06 AM EDT    Workstation ID: [id number]    XR Pelvis 1 or 2 View [[id number]] Collected: 09/28/24 0305     Updated: 09/28/24 0309    Narrative:      XR PELVIS 1 OR 2 VW    Date of Exam: 9/28/2024 2:09 AM EDT    Indication: 83yoM, fell from bridge.    Comparison: None available.    Findings:  Bony pelvis appears intact. No hip fracture. Mild pelvic and trochanteric enthesopathy. Sacroiliac joints, sacral arcuate lines and obturator rings appear intact. Mild DJD at the pubic symphysis.      Impression:      Impression:  No acute osseous abnormality.        Electronically Signed: Eliseo Titus MD    9/28/2024 3:06 AM EDT    Workstation ID: [id number]    XR Chest 1 View [[id number]] Collected: 09/28/24 0304     Updated: 09/28/24 0308    Narrative:      XR CHEST 1 VW    Date of Exam: 9/28/2024 2:07 AM EDT    Indication: 83yoM, fever. desat to 88%. R/o PNA    Comparison: 12/4/2022 and CT chest 9/28/2024.    Findings:  There is evidence of prior median sternotomy. There is a mildly displaced distal left clavicle fracture. There is mild DJD of both shoulders. Heart size is normal. Pulmonary vasculature is within normal limits. Lung parenchyma is clear.      Impression:      Impression:  1.No acute cardiopulmonary abnormality.  2.Mildly displaced distal left clavicle fracture.  3.Prior median sternotomy.        Electronically Signed: Eliseo Titus MD    9/28/2024 3:05 AM EDT    Workstation ID: [id number]    CT Angiogram Chest [[id number]] Collected: 09/28/24 0249     Updated: 09/28/24 0307    Addenda:        There is an acute  mildly displaced posterior left 10th rib fracture.    Electronically Signed: Eliseo Titus MD    9/28/2024 3:04 AM EDT    Workstation ID: [id number]  Signed: 09/28/24 0304 by Eliseo Titus MD    Narrative:      CT ANGIOGRAM CHEST    Date of Exam: 9/28/2024 2:00 AM EDT    Indication: 83yoM, fever, desat, fell down 10'. Left sided chest pain. R/o pulm contusion, rib fx, PE.    Comparison: Chest radiograph 9/28/2024.    Technique: CTA of the chest was performed before and after the uneventful intravenous administration of iodinated contrast. Reconstructed coronal and sagittal images were also obtained. In addition, a 3-D volume rendered image was created for   interpretation. Automated exposure control and iterative reconstruction methods were used.      Findings:  The thyroid, trachea and esophagus appear within normal limits. There is a small hiatal hernia. 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 Mild aortic atherosclerosis. Moderate native coronary artery calcification. No pericardial effusion. No   mediastinal lymphadenopathy. No evidence of pulmonary embolism.    There is mild dependent bibasilar atelectasis. No pneumothorax or pleural effusion. There is no airway obstruction. No suspicious lung nodule.    No acute findings in the limited images of the upper abdomen. There is cholelithiasis without evidence of acute cholecystitis. Partially seen simple left kidney cyst. There is an acute mildly displaced fracture of the distal left clavicle. There is a   slight compression fracture at the superior endplate of T3 with less than 10% loss of height.      Impression:      Impression:  1.No evidence of pulmonary embolism.  2.Acute mildly displaced fracture of the distal left clavicle.  3.Acute mild compression fracture at the superior endplate of T3 with less than 10% loss of height.  4.Mild dependent bibasilar atelectasis.  5.Small hiatal hernia.  6.Cholelithiasis without evidence of acute  cholecystitis.        Electronically Signed: Eliseo Titus MD    9/28/2024 2:53 AM EDT    Workstation ID: [id number]    CT Abdomen Pelvis With Contrast [[id number]] Collected: 09/28/24 0259     Updated: 09/28/24 0306    Narrative:      CT ABDOMEN PELVIS W CONTRAST    Date of Exam: 9/28/2024 2:04 AM EDT    Indication: 83yoM on coumadin. Fell and now left hip pain.    Comparison: None available.    Technique: Axial CT images were obtained of the abdomen and pelvis following the uneventful intravenous administration of iodinated contrast. Sagittal and coronal reconstructions were performed.  Automated exposure control and iterative reconstruction   methods were used.        Findings:  Findings in the chest discussed in a separate report. No acute findings in the superficial soft tissues. There are bilateral scrotal hydroceles partially seen. There appears to be a right-sided scrotal varicocele, as well. No acute osseous abnormality or   destructive bone lesion. There is moderate lumbar spondylosis. Mild osteoarthritis is present at both hips. There is no acute mildly displaced fracture of the left posterior 10th rib. There are hypoplastic L1 ribs. The bony pelvis appears intact. No hip   fracture.    The liver, gallbladder, bile ducts, stomach, duodenum, spleen and adrenal glands appear normal. There is a subcentimeter simple cyst at the mid right kidney. There is a 41 mm simple cyst at the mid left kidney with adjacent subcentimeter cysts. The   ureters appear normal. The prostate gland is enlarged measuring up to 53 mm diameter. Urinary bladder is nondistended. Normal appendix. Small bowel is nondistended. There is mild colonic diverticulosis. Mild aortoiliac atherosclerosis. No aneurysm. No   ascites, pneumoperitoneum or lymphadenopathy. No evidence of solid organ injury.      Impression:      Impression:  1.Acute mildly displaced fracture of the left posterior 10th rib.  2.No acute findings in the abdomen or  pelvis.  3.Lumbar spondylosis and mild osteoarthritis at both hips.  4.Prostatomegaly.  5.Bilateral scrotal hydroceles and right-sided varicocele.        Electronically Signed: Eliseo Titus MD    9/28/2024 3:03 AM EDT    Workstation ID: [id number]    CT Cervical Spine Without Contrast [[id number]] Collected: 09/28/24 0230     Updated: 09/28/24 0236    Narrative:        CT CERVICAL SPINE WO CONTRAST    Date of Exam: 9/28/2024 1:58 AM EDT    Indication: 83yoM, on coumadin. Fell off the bridge ~10'. r/o c spine injury.    Comparison: None available.    Technique: Axial CT images were obtained of the cervical spine without contrast administration.  Sagittal and coronal reconstructions were performed.  Automated exposure control and iterative reconstruction methods were used.      Findings:  7 cervical vertebrae are identified. There is straightening of the cervical lordosis. There is severe narrowing of the C3-C4, C4-C5, C5-6 and C6-C7 discs. There is mild to moderate multilevel marginal and uncovertebral osteophyte formation. There is   moderate right-sided facet arthritis at C2-C3 and severe left-sided facet arthritis at C4-C5. There is moderate narrowing of the spinal canal at C5-C6 and mild narrowing of the spinal canal at C4-C5 and C3-C4. There is mild right neuroforaminal narrowing   at C2-C3, moderate bilateral neuroforaminal narrowing at C3-C4, severe left neuroforaminal narrowing at C4-C5, moderate bilateral neuroforaminal narrowing at C5-C6 and mild bilateral neuroforaminal narrowing at C6-C7. There are mild bilateral carotid   bifurcation calcifications. Vertebral bodies maintain normal height. C1 ring and odontoid process appear intact. Spinous processes appear intact. No evidence of fracture or subluxation. There is asymmetric enlargement of the right internal jugular vein,   which measures up to 4.1 cm diameter. No obvious soft tissue hematoma or soft tissue gas. Thyroid is heterogeneous without discrete  lesion. Lung apices are clear.      Impression:      Impression:  1.No acute osseous abnormality.  2.Moderate to severe cervical spondylosis with varying degrees of spinal canal and neuroforaminal narrowing.        Electronically Signed: Eliseo Titus MD    9/28/2024 2:33 AM EDT    Workstation ID: [id number]    CT Head Without Contrast [[id number]] Collected: 09/28/24 0228     Updated: 09/28/24 0233    Narrative:      CT HEAD WO CONTRAST    Date of Exam: 9/28/2024 1:56 AM EDT    Indication: 83yoM, on coumadin. Fell off the bridge ~10'. r/o head bleed..    Comparison: None available.    Technique: Axial CT images were obtained of the head without contrast administration.  Coronal reconstructions were performed.  Automated exposure control and iterative reconstruction methods were used.      Findings:  There is soft tissue contusion to the right frontal scalp. There is a larger area of contusion at the left parietal scalp along with soft tissue laceration and minor hematoma. The calvarium is intact.  Paranasal sinuses and mastoid air cells appear well   aerated. There is thinning of the orbital lenses bilaterally suggesting prior lens replacement. There is no acute intracranial hemorrhage.  No mass effect or midline shift.  No abnormal extra-axial collections.  Gray-white differentiation is within   normal limits. There is mild patchy white matter hypoattenuation. There is mild generalized parenchymal volume loss congruent with age.      Impression:      Impression:  1.No acute intracranial abnormality.  2.Right frontal and left parietal scalp contusions with laceration and minor hematoma at the left parietal scalp.  3.Mild chronic small vessel ischemic change and mild generalized parenchymal volume loss.        Electronically Signed: Eliseo Titus MD    9/28/2024 2:30 AM EDT    Workstation ID: [id number]            PROCEDURES      Condition on Discharge:  ***    Physical Exam at Discharge  Vital Signs  Temp:  [98.4 °F  (36.9 °C)-99.2 °F (37.3 °C)] 98.4 °F (36.9 °C)  Heart Rate:  [70-84] 74  Resp:  [18] 18  BP: (110-188)/(60-67) 131/63    Physical Exam:  Physical Exam   Constitutional: Patient appears well-developed and well-nourished and in no acute distress   HEENT:   Head: Normocephalic and atraumatic.   Eyes:  Pupils are equal, round, and reactive to light. EOM are intact. Sclera are anicteric and non-injected.  Mouth and Throat: Patient has moist mucous membranes. Oropharynx is clear of any erythema or exudate.     Neck: Neck supple. No JVD present. No thyromegaly present. No lymphadenopathy present.  Cardiovascular: Regular rate, regular rhythm, S1 normal and S2 normal.  Exam reveals no gallop and no friction rub.  No murmur heard.  Pulmonary/Chest: Lungs are clear to auscultation bilaterally. No respiratory distress. No wheezes. No rhonchi. No rales.   Abdominal: Soft. Bowel sounds are normal. No distension and no mass. There is no hepatosplenomegaly. There is no tenderness.   Musculoskeletal: Normal Muscle tone  Extremities: No edema. Pulses are palpable in all 4 extremities.  Neurological: Patient is alert and oriented to person, place, and time. Cranial nerves II-XII are grossly intact with no focal deficits.  Skin: Skin is warm. No rash noted. Nails show no clubbing.  No cyanosis or erythema.    Discharge Disposition  ***    Visiting Nurse:    {YES/NO:200010}    Home PT/OT:  {YES/NO:200010}    Home Safety Evaluation:  {YES/NO:200010}    DME  ***    Discharge Diet:      Dietary Orders (From admission, onward)       Start     Ordered    09/28/24 1636  Diet: Regular/House, Diabetic; Consistent Carbohydrate; Fluid Consistency: Thin (IDDSI 0)  Diet Effective Now        References:    Diet Order [facility]   Question Answer Comment   Diets: Regular/House    Diets: Diabetic    Diabetic Diet: Consistent Carbohydrate    Fluid Consistency: Thin (IDDSI 0)        09/28/24 1635                    Activity at  Discharge:  ***    Pre-discharge education  {Discharge Education:79859}      Follow-up Appointments  Future Appointments   Date Time Provider Department Center   10/3/2024  8:30 PM [facility]   10/15/2024 10:00 AM Elizabeth Telles APRN [facility]     Additional Instructions for the Follow-ups that You Need to Schedule       Discharge Follow-up with PCP   As directed       Currently Documented PCP:    Agustín Ivan MD    PCP Phone Number:    [phone]     Follow Up Details: Late this week for staple removal and INR check.        Discharge Follow-up with Specified Provider: DEV Jasso; 2 Weeks   As directed      To: DEV Jasso   Follow Up: 2 Weeks                Test Results Pending at Discharge  Pending Labs       Order Current Status    Blood Culture - Blood, Arm, Left Preliminary result    Blood Culture - Blood, Arm, Right Preliminary result             Fabrice Hensley MD  09/30/24  15:36 EDT    Time: {Time spent:135489437} (if over 30 minutes give explanation as to why it took greater than 30 minutes)

## 2024-09-30 NOTE — CASE MANAGEMENT/SOCIAL WORK
Continued Stay Note  JULIO Crespo     Patient Name: Woo Dobbs  MRN: [id number]  Today's Date: 9/30/2024    Admit Date: 9/28/2024    Plan: Plan home  - [facility] to follow   Discharge Plan       Row Name 09/30/24 1233       Plan    Plan Plan home  - [facility] to follow    Patient/Family in Agreement with Plan yes    Plan Comments Per huddle, patient will dc home today. Order rec'd  for [facility] PT/OT to follow at dc. Alyssa/Ravi notified of plan for patient to dc today. Selam WYLIE updated. AVS updated. CM will continue to follow.                   Discharge Codes    No documentation.                 Expected Discharge Date and Time       Expected Discharge Date Expected Discharge Time    Sep 30, 2024               Chente Espitia RN

## 2024-09-30 NOTE — DISCHARGE INSTR - APPOINTMENTS
Patient has follow up appointment with Dr. Ivan on 10/2/2024 at 11:15                                 [phone]

## 2024-09-30 NOTE — THERAPY TREATMENT NOTE
Patient Name: Woo Dobbs  : 1940    MRN: [id number]                              Today's Date: 2024       Admit Date: 2024    Visit Dx:     ICD-10-CM ICD-9-CM   1. Fall, initial encounter  W19.XXXA E888.9   2. Injury of head, initial encounter  S09.90XA 959.01   3. Closed displaced fracture of acromial end of left clavicle, initial encounter  S42.032A 810.03   4. Compression fracture of T3 vertebra, initial encounter  S22.030A 805.2   5. TANIA (acute kidney injury)  N17.9 584.9   6. Troponin level elevated  R79.89 790.6     Patient Active Problem List   Diagnosis    Diabetes mellitus    Hypertension    Benign prostatic hyperplasia without lower urinary tract symptoms    Odynophagia    Heartburn    Mixed hyperlipidemia    Rupture of right gastrocnemius tendon    Chondromalacia of knee, right    Gastrocnemius muscle strain, right, initial encounter    AF (paroxysmal atrial fibrillation)    S/P CABG x 3    History of aortic valve replacement with bioprosthetic valve    Coronary artery disease involving coronary bypass graft of native heart without angina pectoris    Carotid atherosclerosis, bilateral    Bladder mass    Abnormal urine cytology    Troponin level elevated    Elevated troponin level     Past Medical History:   Diagnosis Date    Aortic stenosis     2022: tissue AVR (25mm MDT Avalus bovine pericardial)    Asthma     BPH (benign prostatic hyperplasia)     CAD (coronary artery disease)     2022: 95% prox/50% distal LAD, 80% D1, 30-40% Cx, diffuse dz RCA. CABG x 3 2022: LIMA-LAD, SVG-D1, SVG-RCA    Carotid atherosclerosis, bilateral     2022: 16-49% bilaterally    Colon polyp     Fall 2024    GERD (gastroesophageal reflux disease)     Hyperlipidemia     Hypertension     Hypothyroidism     Paroxysmal atrial fibrillation     Type 2 diabetes mellitus     Warfarin anticoagulation      Past Surgical History:   Procedure Laterality Date    CARDIAC CATHETERIZATION N/A 2022     Procedure: Coronary angiography;  Surgeon: Unique Calabrese MD;  Location: [facility] CATH INVASIVE LOCATION;  Service: Cardiovascular;  Laterality: N/A;    CARDIAC CATHETERIZATION N/A 7/14/2022    Procedure: Left heart cath with simultaneous LV/Ao pressures;  Surgeon: nUique Calabrese MD;  [facility];  Service: Cardiovascular;  Laterality: N/A;    CARDIAC CATHETERIZATION N/A 7/14/2022    Procedure: Right Heart Cath;  Surgeon: Unique Calabrese MD;  Location: [facility] CATH INVASIVE LOCATION;  Service: Cardiovascular;  Laterality: N/A;    COLONOSCOPY      CORONARY ARTERY BYPASS GRAFT WITH AORTIC VALVE REPAIR/REPLACEMENT N/A 8/8/2022    Procedure: KEVIN STERNOTOMY CORONARY ARTERY BYPASS GRAFT TIMES 3 USING LEFT INTERNAL MAMMARY ARTERY AND LEFT GREATER SAPHENOUS VEIN GRAFT PER ENDOSCOPIC VEIN HARVESTING, AORTIC VALVE REPLACEMENT AND PRP;  Surgeon: Jr Gentry Courtney MD;  Location: [facility] CVOR;  Service: Cardiothoracic;  Laterality: N/A;    CYSTOSCOPY BLADDER BIOPSY N/A 3/18/2024    Procedure: CYSTOSCOPY BLADDER BIOPSY;  Surgeon: Nimesh Arvizu MD;  Location: [facility] OR;  Service: Urology;  Laterality: N/A;    CYSTOSCOPY RETROGRADE PYELOGRAM Bilateral 3/18/2024    Procedure: bilateral retrogrades and cytology;  Surgeon: Nimesh Arvizu MD;  Location: [facility] OR;  Service: Urology;  Laterality: Bilateral;    TRANSURETHRAL RESECTION OF BLADDER TUMOR N/A 1/23/2023    Procedure: TRANSURETHRAL RESECTION OF SMALL BLADDER TUMOR;  Surgeon: Nimesh Arvizu MD;  Location: [facility] OR;  Service: Urology;  Laterality: N/A;    VASECTOMY        General Information       Row Name 09/30/24 1337          Physical Therapy Time and Intention    Document Type therapy note (daily note)  -GC     Mode of Treatment physical therapy  -GC               User Key  (r) = Recorded By, (t) = Taken By, (c) = Cosigned By      Initials Name Provider Type    GC Russell Zambrano PT Physical Therapist                   Mobility        Row Name 09/30/24 1337          Bed Mobility    Comment, (Bed Mobility) deferred as pt was up in chair  -       Row Name 09/30/24 1337          Sit-Stand Transfer    Sit-Stand Valier (Transfers) contact guard  -     Assistive Device (Sit-Stand Transfers) cane, straight  -GC     Comment, (Sit-Stand Transfer) Pt initially tried pushing up with his right hand on his cane. Instructed pt to push up from arm rest of chair and then grab cane once he is standing. Pt was able to do this without difficulty.  -       Row Name 09/30/24 1337          Gait/Stairs (Locomotion)    Valier Level (Gait) contact guard  -     Assistive Device (Gait) cane, straight  -GC     Distance in Feet (Gait) 120  -GC               User Key  (r) = Recorded By, (t) = Taken By, (c) = Cosigned By      Initials Name Provider Type    GC Russell Zambrano, PT Physical Therapist                   Obj/Interventions    No documentation.                  Goals/Plan    No documentation.                  Clinical Impression       Row Name 09/30/24 1339          Plan of Care Review    Plan of Care Reviewed With patient  -GC     Progress improving  -GC     Outcome Evaluation PT Note: Pt able to go sit to stand with CGA only. He needs to be reminded to push up using the armrest of chair and not to use his cane for this activity. He ambualted 120' with CGA using a straight cane. Will continue to follow while in hospital.  -[facility] Name 09/30/24 1331          Positioning and Restraints    Pre-Treatment Position sitting in chair/recliner  -     Post Treatment Position chair  -GC     In Chair call light within reach;encouraged to call for assist  -GC               User Key  (r) = Recorded By, (t) = Taken By, (c) = Cosigned By      Initials Name Provider Type    GC Russell Zambrano, PT Physical Therapist                   Outcome Measures       Row Name 09/30/24 1341 09/30/24 0830       How much help from another person do you currently need...     Turning from your back to your side while in flat bed without using bedrails? 4  - 3  -TF    Moving from lying on back to sitting on the side of a flat bed without bedrails? 3  - 3  -TF    Moving to and from a bed to a chair (including a wheelchair)? 4  - 3  -TF    Standing up from a chair using your arms (e.g., wheelchair, bedside chair)? 4  - 3  -TF    Climbing 3-5 steps with a railing? 3  - 3  -TF    To walk in hospital room? 4  - 3  -TF    AM-PAC 6 Clicks Score (PT) 22  - 18  -TF    Highest Level of Mobility Goal 7 --> Walk 25 feet or more  - 6 --> Walk 10 steps or more  -      Row Name 09/30/24 0401          How much help from another person do you currently need...    Turning from your back to your side while in flat bed without using bedrails? 3  -AT     Moving from lying on back to sitting on the side of a flat bed without bedrails? 3  -AT     Moving to and from a bed to a chair (including a wheelchair)? 3  -AT     Standing up from a chair using your arms (e.g., wheelchair, bedside chair)? 3  -AT     Climbing 3-5 steps with a railing? 3  -AT     To walk in hospital room? 3  -AT     AM-PAC 6 Clicks Score (PT) 18  -AT     Highest Level of Mobility Goal 6 --> Walk 10 steps or more  -AT               User Key  (r) = Recorded By, (t) = Taken By, (c) = Cosigned By      Initials Name Provider Type     Russell Zambrano, PT Physical Therapist    TF Selam Goetz, RN Registered Nurse    AT [facility] Registered [facility]                                 Physical Therapy Education       Title: PT OT SLP Therapies (Done)       Topic: Physical Therapy (Done)       Point: Mobility training (Done)       Learning Progress Summary             Patient Acceptance, E,TB, VU by GC at 9/30/2024 1342    Comment: Educated pt on proper way to stand from seated position-push from armrest of chair and not to use his cane until he is in standing    Acceptance, E, VU,DU by SP at 9/28/2024 0933    Comment: saftey with  transfers and gait                                         User Key       Initials Effective Dates Name Provider Type Discipline    SP 07/11/23 -  Elizabeth Rao, PT Physical Therapist PT    GC 07/11/23 -  Russell Zambrano PT Physical Therapist PT                  PT Recommendation and Plan     Plan of Care Reviewed With: patient  Progress: improving  Outcome Evaluation: PT Note: Pt able to go sit to stand with CGA only. He needs to be reminded to push up using the armrest of chair and not to use his cane for this activity. He ambualted 120' with CGA using a straight cane. Will continue to follow while in hospital.     Time Calculation:         PT Charges       Row Name 09/30/24 1343             Time Calculation    Start Time 1030  -GC      Stop Time 1047  -GC      Time Calculation (min) 17 min  -GC                User Key  (r) = Recorded By, (t) = Taken By, (c) = Cosigned By      Initials Name Provider Type    GC Russell Zambrano, PT Physical Therapist                  Therapy Charges for Today       Code Description Service Date Service Provider Modifiers Qty    [id number] HC GAIT TRAINING EA 15 MIN 9/30/2024 Russell Zambrano, PT GP 1            PT G-Codes  Outcome Measure Options: AM-PAC 6 Clicks Basic Mobility (PT)  AM-PAC 6 Clicks Score (PT): 22       Russell Zambrano PT  9/30/2024

## 2024-10-01 ENCOUNTER — READMISSION MANAGEMENT (OUTPATIENT)
Dept: CALL CENTER | Facility: HOSPITAL | Age: 84
End: 2024-10-01
Payer: MEDICARE

## 2024-10-01 NOTE — CASE MANAGEMENT/SOCIAL WORK
Case Management Discharge Note      Final Note: dc home with [facility]         Selected Continued Care - Discharged on 9/30/2024 Admission date: 9/28/2024 - Discharge disposition: Home-Health Care Svc      Destination    No services have been selected for the patient.                Durable Medical Equipment    No services have been selected for the patient.                Dialysis/Infusion    No services have been selected for the patient.                Home Medical Care       Service Provider Selected Services Address Phone Fax Patient Preferred    [facility] [address] [place] [id number] [phone] --              Therapy    No services have been selected for the patient.                Community Resources    No services have been selected for the patient.                Community & DME    No services have been selected for the patient.                         Final Discharge Disposition Code: 06 - home with home health care

## 2024-10-01 NOTE — OUTREACH NOTE
Prep Survey      Flowsheet Row Responses   [facility] facility patient discharged from? [place]   Is LACE score < 7 ? No   Eligibility Readm Mgmt   Discharge diagnosis Troponin level elevated   Does the patient have one of the following disease processes/diagnoses(primary or secondary)? Other   Does the patient have Home health ordered? Yes   What is the Home health agency?  [facility]   Is there a DME ordered? No   Medication alerts for this patient see avs--coumadin   Prep survey completed? Yes            Lo ORTEGA - Registered Nurse

## 2024-10-02 ENCOUNTER — LAB (OUTPATIENT)
Dept: LAB | Facility: HOSPITAL | Age: 84
End: 2024-10-02
Payer: MEDICARE

## 2024-10-02 ENCOUNTER — ANTICOAGULATION VISIT (OUTPATIENT)
Dept: PHARMACY | Facility: HOSPITAL | Age: 84
End: 2024-10-02
Payer: MEDICARE

## 2024-10-02 DIAGNOSIS — I48.0 AF (PAROXYSMAL ATRIAL FIBRILLATION): Primary | ICD-10-CM

## 2024-10-02 DIAGNOSIS — I48.0 AF (PAROXYSMAL ATRIAL FIBRILLATION): ICD-10-CM

## 2024-10-02 LAB
INR PPP: 2.68 (ref 0.9–1.1)
PROTHROMBIN TIME: 29.2 SECONDS (ref 12.1–15)

## 2024-10-02 PROCEDURE — 36415 COLL VENOUS BLD VENIPUNCTURE: CPT

## 2024-10-02 PROCEDURE — 85610 PROTHROMBIN TIME: CPT

## 2024-10-03 LAB
BACTERIA SPEC AEROBE CULT: NORMAL
BACTERIA SPEC AEROBE CULT: NORMAL

## 2024-10-03 NOTE — PROGRESS NOTES
Anticoagulation Clinic Progress Note    Anticoagulation Summary  As of 10/2/2024      INR goal:  2.0-3.0   TTR:  50.7% (2.1 y)   INR used for dosin.68 (10/2/2024)   Warfarin maintenance plan:  8 mg every day   Weekly warfarin total:  56 mg   No change documented:  Miya Gan RPH   Plan last modified:  Miya Gan RPH (2024)   Next INR check:  10/7/2024   Target end date:      Indications    AF (paroxysmal atrial fibrillation) [I48.0]                 Anticoagulation Episode Summary       INR check location:      Preferred lab:      Send INR reminders to:   [facility]IN[facility] POOL    Comments:  s/p AVR and CABG [facility]*          Anticoagulation Care Providers       Provider Role Specialty Phone number    Helena Humphries, DEV Referring Cardiology [phone]    Khurram Ball MD Referring Cardiology [phone]            Clinic Interview:  Patient Findings     Positives:  Change in medications, Hospital admission    Negatives:  Signs/symptoms of thrombosis, Signs/symptoms of bleeding,   Laboratory test error suspected, Change in health, Change in alcohol use,   Change in activity, Upcoming invasive procedure, Emergency department   visit, Upcoming dental procedure, Missed doses, Extra doses, Change in   diet/appetite, Bruising, Other complaints      Clinical Outcomes     Negatives:  Major bleeding event, Thromboembolic event,   Anticoagulation-related hospital admission, Anticoagulation-related ED   visit, Anticoagulation-related fatality        INR History:      Latest Ref Rng & Units 2024     1:35 PM 9/3/2024     2:40 PM 9/3/2024     3:15 PM 2024    12:45 AM 2024     6:40 AM 10/2/2024     1:17 PM 10/2/2024     1:57 PM   Anticoagulation Monitoring   INR  2.56  2.38    2.68   INR Date  2024  9/3/2024    10/2/2024   INR Goal  2.0-3.0  2.0-3.0    2.0-3.0   Trend  Same  Same    Same   Last Week Total  56 mg  56 mg    56 mg   Next Week Total  56 mg  56  mg    56 mg   Sun  8 mg  8 mg    8 mg   Mon  8 mg  8 mg    -   Tue  8 mg  8 mg    -   Wed  8 mg  8 mg    8 mg   Thu  8 mg  8 mg    8 mg   Fri  8 mg  8 mg    8 mg   Sat  8 mg  8 mg    8 mg   Historical INR 0.90 - 1.10  2.38   1.31  1.65  2.68         Plan:  1. INR is Therapeutic today- see above in Anticoagulation Summary.   Will instruct Woo Dobbs to Continue their warfarin regimen- see above in Anticoagulation Summary.  Admitted 9/28-9/20 for a fall with a head wound. Patient was diagnosed with a clavicle fracture. Patient also had a fever of unknown origin and was discharged on cephalexin x 5 days. Patient is being followed by [facility] but not nursing (only PT/OT). Continue 8 mg daily and recheck in 1 week as INR ellie drastically. Denies missed doses prior to the hospital.   2. Follow up on Monday   3. They have been instructed to call if any changes in medications, doses, concerns, etc. Patient expresses understanding and has no further questions at this time.    Miya Gan [facility]

## 2024-10-04 ENCOUNTER — READMISSION MANAGEMENT (OUTPATIENT)
Dept: CALL CENTER | Facility: HOSPITAL | Age: 84
End: 2024-10-04
Payer: MEDICARE

## 2024-10-04 NOTE — OUTREACH NOTE
Medical Week 1 Survey      Flowsheet Row Responses   [facility] facility patient discharged from? [place]   Does the patient have one of the following disease processes/diagnoses(primary or secondary)? Other   Week 1 attempt successful? No   Unsuccessful attempts Attempt 1            Dina SRIVASTAVA - Registered Nurse

## 2024-10-07 ENCOUNTER — ANTICOAGULATION VISIT (OUTPATIENT)
Dept: PHARMACY | Facility: HOSPITAL | Age: 84
End: 2024-10-07
Payer: MEDICARE

## 2024-10-07 ENCOUNTER — LAB (OUTPATIENT)
Dept: LAB | Facility: HOSPITAL | Age: 84
End: 2024-10-07
Payer: MEDICARE

## 2024-10-07 DIAGNOSIS — I48.0 AF (PAROXYSMAL ATRIAL FIBRILLATION): Primary | ICD-10-CM

## 2024-10-07 DIAGNOSIS — I48.0 AF (PAROXYSMAL ATRIAL FIBRILLATION): ICD-10-CM

## 2024-10-07 LAB
INR PPP: 2.94 (ref 0.9–1.1)
PROTHROMBIN TIME: 31.4 SECONDS (ref 12.1–15)

## 2024-10-07 PROCEDURE — 36415 COLL VENOUS BLD VENIPUNCTURE: CPT

## 2024-10-07 PROCEDURE — 85610 PROTHROMBIN TIME: CPT

## 2024-10-08 ENCOUNTER — READMISSION MANAGEMENT (OUTPATIENT)
Dept: CALL CENTER | Facility: HOSPITAL | Age: 84
End: 2024-10-08
Payer: MEDICARE

## 2024-10-08 NOTE — OUTREACH NOTE
Medical Week 1 Survey      Flowsheet Row Responses   [facility] [place] facility patient discharged from? [facility]   Does the patient have one of the following disease processes/diagnoses(primary or secondary)? Other   Week 1 attempt successful? No   Unsuccessful attempts Attempt 2            EDIE WARE - Registered Nurse

## 2024-10-08 NOTE — PROGRESS NOTES
Anticoagulation Clinic Progress Note    Anticoagulation Summary  As of 10/7/2024      INR goal:  2.0-3.0   TTR:  51.0% (2.1 y)   INR used for dosin.94 (10/7/2024)   Warfarin maintenance plan:  8 mg every day   Weekly warfarin total:  56 mg   No change documented:  Nicolle Reeves RPH   Plan last modified:  Miya Gan RPH (2024)   Next INR check:  10/21/2024   Target end date:      Indications    AF (paroxysmal atrial fibrillation) [I48.0]                 Anticoagulation Episode Summary       INR check location:      Preferred lab:      Send INR reminders to:   [facility] CLINICAL POOL    Comments:  s/p AVR and CABG [facility]*          Anticoagulation Care Providers       Provider Role Specialty Phone number    Helena Humphries APRN Referring Cardiology [phone]    Khurram Ball MD Referring Cardiology [phone]            Clinic Interview:  No pertinent clinical findings have been reported.    INR History:      Latest Ref Rng & Units 9/3/2024     3:15 PM 2024    12:45 AM 2024     6:40 AM 10/2/2024     1:17 PM 10/2/2024     1:57 PM 10/7/2024     3:21 PM 10/7/2024     3:49 PM   Anticoagulation Monitoring   INR  2.38    2.68  2.94   INR Date  9/3/2024    10/2/2024  10/7/2024   INR Goal  2.0-3.0    2.0-3.0  2.0-3.0   Trend  Same    Same  Same   Last Week Total  56 mg    56 mg  56 mg   Next Week Total  56 mg    56 mg  56 mg   Sun  8 mg    8 mg  8 mg   Mon  8 mg    -  8 mg   Tue  8 mg    -  8 mg   Wed  8 mg    8 mg  8 mg   Thu  8 mg    8 mg  8 mg   Fri  8 mg    8 mg  8 mg   Sat  8 mg    8 mg  8 mg   Historical INR 0.90 - 1.10  1.31  1.65  2.68   2.94         Plan:  1. INR is therapeutic today- see above in Anticoagulation Summary.    Woo Dobbs to continue their warfarin regimen- see above in Anticoagulation Summary.  2. Follow up in 1 week  3. Pt has agreed to only be called if INR out of range. They have been instructed to call if any changes in medications,  doses, concerns, etc. Patient expresses understanding and has no further questions at this time.    Nicolle Reeves [facility]

## 2024-10-15 ENCOUNTER — OFFICE VISIT (OUTPATIENT)
Dept: ORTHOPEDIC SURGERY | Facility: CLINIC | Age: 84
End: 2024-10-15
Payer: MEDICARE

## 2024-10-15 VITALS
SYSTOLIC BLOOD PRESSURE: 157 MMHG | DIASTOLIC BLOOD PRESSURE: 81 MMHG | BODY MASS INDEX: 23.79 KG/M2 | HEART RATE: 80 BPM | WEIGHT: 157 LBS | HEIGHT: 68 IN

## 2024-10-15 DIAGNOSIS — S42.032A CLOSED DISPLACED FRACTURE OF ACROMIAL END OF LEFT CLAVICLE, INITIAL ENCOUNTER: ICD-10-CM

## 2024-10-15 DIAGNOSIS — M75.52 SUBACROMIAL BURSITIS OF LEFT SHOULDER JOINT: Primary | ICD-10-CM

## 2024-10-15 DIAGNOSIS — M89.8X1 PAIN OF LEFT CLAVICLE: ICD-10-CM

## 2024-10-15 RX ADMIN — TRIAMCINOLONE ACETONIDE 80 MG: 40 INJECTION, SUSPENSION INTRA-ARTICULAR; INTRAMUSCULAR at 10:35

## 2024-10-15 RX ADMIN — LIDOCAINE HYDROCHLORIDE 4 ML: 10 INJECTION, SOLUTION EPIDURAL; INFILTRATION; INTRACAUDAL; PERINEURAL at 10:35

## 2024-10-15 NOTE — PROGRESS NOTES
Subjective:     Patient ID: Woo Dobbs is a 83 y.o. male.    Chief Complaint:  Left shoulder pain, new patient to examiner  History of Present Illness  History of Present Illness    Woo Dobbs 83-year-old male who presents to clinic today for evaluation of his left shoulder.  He fell injuring the distal clavicle approximately 2 weeks ago x-ray images were completed available for review in chart.  He is experiencing increased pain and swelling along the lateral acromion he is aware of the pain that he is going to experience with the distal clavicle fracture with pain along the lateral acromion as well as swelling he has received steroid injections in the past.  Rates discomfort a 3 out of 10 aching in nature pain with reaching above head.  Initially was able to complete some active range of motion until the onset of the swelling of the lateral aspect of the shoulder.  Currently taking Tylenol.  He has discontinued sling has tried use with discomfort.  Pain at night when he is attempting to sleep.  Denies any other concerns present.       Social History     Occupational History    Occupation: retired      Comment:      Tobacco Use    Smoking status: Never    Smokeless tobacco: Never    Tobacco comments:     caffeine use: 1 -2 cups daily.    Vaping Use    Vaping status: Never Used   Substance and Sexual Activity    Alcohol use: No    Drug use: No    Sexual activity: Defer      Past Medical History:   Diagnosis Date    Aortic stenosis     8/2022: tissue AVR (25mm MDT Avalus bovine pericardial)    Asthma     BPH (benign prostatic hyperplasia)     CAD (coronary artery disease)     7/2022: 95% prox/50% distal LAD, 80% D1, 30-40% Cx, diffuse dz RCA. CABG x 3 8/2022: LIMA-LAD, SVG-D1, SVG-RCA    Carotid atherosclerosis, bilateral     8/2022: 16-49% bilaterally    Colon polyp     Fall 09/28/2024    GERD (gastroesophageal reflux disease)     Hyperlipidemia     Hypertension     Hypothyroidism      Paroxysmal atrial fibrillation     Type 2 diabetes mellitus     Warfarin anticoagulation      Past Surgical History:   Procedure Laterality Date    CARDIAC CATHETERIZATION N/A 7/14/2022    Procedure: Coronary angiography;  Surgeon: Unique Calabrese MD;  Location:  [facility] CATH INVASIVE [facility];  Service: Cardiovascular;  Laterality: N/A;    CARDIAC CATHETERIZATION N/A 7/14/2022    Procedure: Left heart cath with simultaneous LV/Ao pressures;  Surgeon: Unique Calabrese MD;  Location:  [facility] CATH INVASIVE LOCATION;  Service: Cardiovascular;  Laterality: N/A;    CARDIAC CATHETERIZATION N/A 7/14/2022    Procedure: Right Heart Cath;  Surgeon: Unique Calabrese MD;  Location:  [facility] CATH INVASIVE LOCATION;  Service: Cardiovascular;  Laterality: N/A;    COLONOSCOPY      CORONARY ARTERY BYPASS GRAFT WITH AORTIC VALVE REPAIR/REPLACEMENT N/A 8/8/2022    Procedure: KEVIN STERNOTOMY CORONARY ARTERY BYPASS GRAFT TIMES 3 USING LEFT INTERNAL MAMMARY ARTERY AND LEFT GREATER SAPHENOUS VEIN GRAFT PER ENDOSCOPIC VEIN HARVESTING, AORTIC VALVE REPLACEMENT AND PRP;  Surgeon: Jr Gentry Courtney MD;  Location: [facility] CVOR;  Service: Cardiothoracic;  Laterality: N/A;    CYSTOSCOPY BLADDER BIOPSY N/A 3/18/2024    Procedure: CYSTOSCOPY BLADDER BIOPSY;  Surgeon: Nimesh Arvizu MD;  Location: [facility] OR;  Service: Urology;  Laterality: N/A;    CYSTOSCOPY RETROGRADE PYELOGRAM Bilateral 3/18/2024    Procedure: bilateral retrogrades and cytology;  Surgeon: Nimesh Arvizu MD;  Location: [facility] OR;  Service: Urology;  Laterality: Bilateral;    TRANSURETHRAL RESECTION OF BLADDER TUMOR N/A 1/23/2023    Procedure: TRANSURETHRAL RESECTION OF SMALL BLADDER TUMOR;  Surgeon: Nimesh Arvizu MD;  Location: [facility] OR;  Service: Urology;  Laterality: N/A;    VASECTOMY         Family History   Problem Relation Age of Onset    Colon cancer Neg Hx     Colon polyps Neg Hx     Malig Hyperthermia Neg Hx                Objective:  Physical Exam    Vital signs  "reviewed.   General: No acute distress.  Eyes: conjunctiva clear; pupils equally round and reactive  ENT: external ears and nose atraumatic; oropharynx clear  CV: no peripheral edema  Resp: normal respiratory effort  Skin: no rashes or wounds; normal turgor  Psych: mood and affect appropriate; recent and remote memory intact    Vitals:    10/15/24 1008   BP: 157/81   Pulse: 80   Weight: 71.2 kg (157 lb)   Height: 172.7 cm (68\")         10/15/24  1008   Weight: 71.2 kg (157 lb)     Body mass index is 23.87 kg/m².      Ortho Exam     Physical Exam  Left shoulder examined  Maximal tenderness present along the lateral acromion, distal clavicle  Active forward flexion 40 degrees  External rotation 30 degrees  Internal rotation to side  Positive deltoid firing  Positive swelling along the subacromial aspect  Positive sensation light touch all distributions left upper extremity    Imaging:  XR Clavicle Left    Result Date: 10/15/2024  Ordering physician's impression is located in the Encounter Note dated 10/15/24.     Left clavicle X-Ray  Indication: Pain  AP Internal and External Rotation views    Findings:  No fracture  No bony lesion  Normal soft tissues  Normal joint spaces  Mildly displaced distal clavicle fracture DJD left shoulder  No prior studies were available for comparison.    Assessment:        1. Pain of left clavicle    2. Closed displaced fracture of acromial end of left clavicle, initial encounter         Assessment & Plan      Plan:  1.  Discussed plan of care with patient.  We did discuss treatment options including proceeding with corticosteroid injection left shoulder subacromial approach to see if this offers him any symptom improvement.  I will see him back in clinic 2 weeks he will need x-ray images repeated left clavicle at follow-up.  Continue with gentle motion activity as tolerated.  May take 3 to 7 days before he begins to experience any significant symptom improvement with the injection.  He " verbalized understanding of all information agrees with plan of care.  Denies any concerns present.  Large Joint Arthrocentesis: L subacromial bursa  Date/Time: 10/15/2024 10:35 AM  Consent given by: patient  Site marked: site marked  Timeout: Immediately prior to procedure a time out was called to verify the correct patient, procedure, equipment, support staff and site/side marked as required   Supporting Documentation  Indications: pain   Procedure Details  Location: shoulder - L subacromial bursa  Preparation: Patient was prepped and draped in the usual sterile fashion  Needle size: 22 G  Approach: lateral  Medications administered: 4 mL lidocaine PF 1% 1 %; 80 mg triamcinolone acetonide 40 MG/ML  Patient tolerance: patient tolerated the procedure well with no immediate complications          Orders:  Orders Placed This Encounter   Procedures    Large Joint Arthrocentesis: L subacromial bursa    XR Clavicle Left     No orders of the defined types were placed in this encounter.      Dragon dictation utilized  BMI is within normal parameters. No other follow-up for BMI required.

## 2024-10-16 ENCOUNTER — READMISSION MANAGEMENT (OUTPATIENT)
Dept: CALL CENTER | Facility: HOSPITAL | Age: 84
End: 2024-10-16
Payer: MEDICARE

## 2024-10-16 NOTE — OUTREACH NOTE
Medical Week 3 Survey      Flowsheet Row Responses   [facility] patient discharged from? [place]   Does the patient have one of the following disease processes/diagnoses(primary or secondary)? Other   Week 3 attempt successful? Yes   Call start time 1456   Call end time 1458   Discharge diagnosis Troponin level elevated   Is the patient taking all medications as directed (includes completed medication regime)? Yes   Does the patient have a primary care provider?  Yes   Comments regarding PCP has been to several follow up appts   Has the patient kept scheduled appointments due by today? Yes   What is the Home health agency?  Replaced by [facility]   Has home health visited the patient within 72 hours of discharge? Yes   Psychosocial issues? No   What is the patient's perception of their health status since discharge? Improving   Is the patient/caregiver able to teach back signs and symptoms related to disease process for when to call PCP? Yes   Is the patient/caregiver able to teach back signs and symptoms related to disease process for when to call 911? Yes   Is the patient/caregiver able to teach back the hierarchy of who to call/visit for symptoms/problems? PCP, Specialist, Home health nurse, Urgent Care, ED, 911 Yes   If the patient is a current smoker, are they able to teach back resources for cessation? Not a smoker   Week 3 Call Completed? Yes   Graduated Yes   Is the patient interested in additional calls from an ambulatory ? No   Would this patient benefit from a Referral to Amb Social Work? No   Graduated/Revoked comments Doing well, states he has been to several follow up appts.   Call end time 1458            Gunjan GOLDEN - Shelly Nurse

## 2024-10-17 ENCOUNTER — PATIENT ROUNDING (BHMG ONLY) (OUTPATIENT)
Dept: ORTHOPEDIC SURGERY | Facility: CLINIC | Age: 84
End: 2024-10-17
Payer: MEDICARE

## 2024-10-17 RX ORDER — LIDOCAINE HYDROCHLORIDE 10 MG/ML
4 INJECTION, SOLUTION EPIDURAL; INFILTRATION; INTRACAUDAL; PERINEURAL
Status: COMPLETED | OUTPATIENT
Start: 2024-10-15 | End: 2024-10-15

## 2024-10-17 RX ORDER — TRIAMCINOLONE ACETONIDE 40 MG/ML
80 INJECTION, SUSPENSION INTRA-ARTICULAR; INTRAMUSCULAR
Status: COMPLETED | OUTPATIENT
Start: 2024-10-15 | End: 2024-10-15

## 2024-10-17 NOTE — PROGRESS NOTES
A My-Chart message has been sent to the patient for PATIENT ROUNDING with [facility].

## 2024-10-23 RX ORDER — ATORVASTATIN CALCIUM 40 MG/1
40 TABLET, FILM COATED ORAL NIGHTLY
Qty: 90 TABLET | Refills: 0 | Status: SHIPPED | OUTPATIENT
Start: 2024-10-23

## 2024-10-28 ENCOUNTER — LAB (OUTPATIENT)
Dept: LAB | Facility: HOSPITAL | Age: 84
End: 2024-10-28
Payer: MEDICARE

## 2024-10-28 ENCOUNTER — ANTICOAGULATION VISIT (OUTPATIENT)
Dept: PHARMACY | Facility: HOSPITAL | Age: 84
End: 2024-10-28
Payer: MEDICARE

## 2024-10-28 DIAGNOSIS — I48.0 AF (PAROXYSMAL ATRIAL FIBRILLATION): ICD-10-CM

## 2024-10-28 DIAGNOSIS — I48.0 AF (PAROXYSMAL ATRIAL FIBRILLATION): Primary | ICD-10-CM

## 2024-10-28 LAB
INR PPP: 2.5 (ref 0.9–1.1)
PROTHROMBIN TIME: 27.6 SECONDS (ref 12.1–15)

## 2024-10-28 PROCEDURE — 36415 COLL VENOUS BLD VENIPUNCTURE: CPT

## 2024-10-28 PROCEDURE — 85610 PROTHROMBIN TIME: CPT

## 2024-10-29 NOTE — PROGRESS NOTES
Anticoagulation Clinic Progress Note    Anticoagulation Summary  As of 10/28/2024      INR goal:  2.0-3.0   TTR:  52.3% (2.2 y)   INR used for dosin.50 (10/28/2024)   Warfarin maintenance plan:  8 mg every day   Weekly warfarin total:  56 mg   No change documented:  Miya Gan RPH   Plan last modified:  Miya Gan RPH (2024)   Next INR check:  2024   Target end date:  --    Indications    AF (paroxysmal atrial fibrillation) [I48.0]                 Anticoagulation Episode Summary       INR check location:  --    Preferred lab:  --    Send INR reminders to:   [facility]    Comments:  s/p AVR and CABG * [facility]*          Anticoagulation Care Providers       Provider Role Specialty Phone number    Helena Humphries, DEV Referring Cardiology [phone]    Khurram Ball MD Referring Cardiology [phone]            Clinic Interview:  Patient Findings     Negatives:  Signs/symptoms of thrombosis, Signs/symptoms of bleeding,   Laboratory test error suspected, Change in health, Change in alcohol use,   Change in activity, Upcoming invasive procedure, Emergency department   visit, Upcoming dental procedure, Missed doses, Extra doses, Change in   medications, Change in diet/appetite, Hospital admission, Bruising, Other   complaints      Clinical Outcomes     Negatives:  Major bleeding event, Thromboembolic event,   Anticoagulation-related hospital admission, Anticoagulation-related ED   visit, Anticoagulation-related fatality        INR History:      Latest Ref Rng & Units 2024     6:40 AM 10/2/2024     1:17 PM 10/2/2024     1:57 PM 10/7/2024     3:21 PM 10/7/2024     3:49 PM 10/28/2024     1:58 PM 10/28/2024     2:24 PM   Anticoagulation Monitoring   INR    2.68  2.94  2.50   INR Date    10/2/2024  10/7/2024  10/28/2024   INR Goal    2.0-3.0  2.0-3.0  2.0-3.0   Trend    Same  Same  Same   Last Week Total    56 mg  56 mg  56 mg   Next Week Total    56 mg   56 mg  56 mg   Sun    8 mg  8 mg  8 mg   Mon    -  8 mg  8 mg   Tue    -  8 mg  8 mg   Wed    8 mg  8 mg  8 mg   Thu    8 mg  8 mg  8 mg   Fri    8 mg  8 mg  8 mg   Sat    8 mg  8 mg  8 mg   Historical INR 0.90 - 1.10 1.65  2.68   2.94   2.50         Plan:  1. INR is Therapeutic today- see above in Anticoagulation Summary.   Will instruct Woo Ellisell to Continue their warfarin regimen- see above in Anticoagulation Summary.  2. Follow up in 4 weeks  3. They have been instructed to call if any changes in medications, doses, concerns, etc. Patient expresses understanding and has no further questions at this time.    [facility], located within [facility]

## 2024-11-01 ENCOUNTER — OFFICE VISIT (OUTPATIENT)
Dept: ORTHOPEDIC SURGERY | Facility: CLINIC | Age: 84
End: 2024-11-01
Payer: MEDICARE

## 2024-11-01 VITALS — WEIGHT: 157 LBS | BODY MASS INDEX: 23.79 KG/M2 | HEIGHT: 68 IN

## 2024-11-01 DIAGNOSIS — S42.032A CLOSED DISPLACED FRACTURE OF ACROMIAL END OF LEFT CLAVICLE, INITIAL ENCOUNTER: ICD-10-CM

## 2024-11-01 DIAGNOSIS — M89.8X1 PAIN OF LEFT CLAVICLE: Primary | ICD-10-CM

## 2024-11-01 NOTE — PROGRESS NOTES
Subjective:     Patient ID: Woo Dobbs is a 83 y.o. male.    Chief Complaint:  Follow-up closed treatment displaced fracture of acromial end of left clavicle, date of injury 9/28/2024  Corticosteroid injection received left shoulder subacromial approach 10/15/2024  History of Present Illness  History of Present Illness    Woo Dobbs presents to clinic today for reevaluation of his left shoulder.  Resee corticosteroid injection last visit 10/15/2024 for treatment of subacromial bursitis which has provided him significant symptom improvement with the shoulder.  He does continue to experience pain along the clavicle but has continued with active motion denies any presence of numbness or tingling at the left upper extremity.  Denies any other concerns present.       Social History     Occupational History    Occupation: retired      Comment:      Tobacco Use    Smoking status: Never     Passive exposure: Never    Smokeless tobacco: Never    Tobacco comments:     caffeine use: 1 -2 cups daily.    Vaping Use    Vaping status: Never Used   Substance and Sexual Activity    Alcohol use: No    Drug use: No    Sexual activity: Defer      Past Medical History:   Diagnosis Date    Aortic stenosis     8/2022: tissue AVR (25mm MDT Avalus bovine pericardial)    Asthma     BPH (benign prostatic hyperplasia)     CAD (coronary artery disease)     7/2022: 95% prox/50% distal LAD, 80% D1, 30-40% Cx, diffuse dz RCA. CABG x 3 8/2022: LIMA-LAD, SVG-D1, SVG-RCA    Carotid atherosclerosis, bilateral     8/2022: 16-49% bilaterally    Colon polyp     Fall 09/28/2024    GERD (gastroesophageal reflux disease)     Hyperlipidemia     Hypertension     Hypothyroidism     Paroxysmal atrial fibrillation     Type 2 diabetes mellitus     Warfarin anticoagulation      Past Surgical History:   Procedure Laterality Date    CARDIAC CATHETERIZATION N/A 7/14/2022    Procedure: Coronary angiography;  Surgeon: Unique Calabrese MD;  Location:   [facility];  Service: Cardiovascular;  Laterality: N/A;    CARDIAC CATHETERIZATION N/A 7/14/2022    Procedure: Left heart cath with simultaneous LV/Ao pressures;  Surgeon: Unique Calabrese MD;  [facility];  Service: Cardiovascular;  Laterality: N/A;    CARDIAC CATHETERIZATION N/A 7/14/2022    Procedure: Right Heart Cath;  Surgeon: Uinque Calabrese MD;  Location: [facility] CATH INVASIVE LOCATION;  Service: Cardiovascular;  Laterality: N/A;    COLONOSCOPY      CORONARY ARTERY BYPASS GRAFT WITH AORTIC VALVE REPAIR/REPLACEMENT N/A 8/8/2022    Procedure: KEVIN STERNOTOMY CORONARY ARTERY BYPASS GRAFT TIMES 3 USING LEFT INTERNAL MAMMARY ARTERY AND LEFT GREATER SAPHENOUS VEIN GRAFT PER ENDOSCOPIC VEIN HARVESTING, AORTIC VALVE REPLACEMENT AND PRP;  Surgeon: Jr Gentry Courtney MD;  [facility];  Service: Cardiothoracic;  Laterality: N/A;    CYSTOSCOPY BLADDER BIOPSY N/A 3/18/2024    Procedure: CYSTOSCOPY BLADDER BIOPSY;  Surgeon: Nimesh Arvizu MD;  Location: [facility] OR;  Service: Urology;  Laterality: N/A;    CYSTOSCOPY RETROGRADE PYELOGRAM Bilateral 3/18/2024    Procedure: bilateral retrogrades and cytology;  Surgeon: Nimesh Arvizu MD;  Location: [facility];  Service: Urology;  Laterality: Bilateral;    TRANSURETHRAL RESECTION OF BLADDER TUMOR N/A 1/23/2023    Procedure: TRANSURETHRAL RESECTION OF SMALL BLADDER TUMOR;  Surgeon: Nimesh Arvizu MD;  Location: [facility] OR;  Service: Urology;  Laterality: N/A;    VASECTOMY         Family History   Problem Relation Age of Onset    Colon cancer Neg Hx     Colon polyps Neg Hx     Malig Hyperthermia Neg Hx                Objective:  Physical Exam  General: No acute distress.  Eyes: conjunctiva clear; pupils equally round and reactive  ENT: external ears and nose atraumatic; oropharynx clear  CV: no peripheral edema  Resp: normal respiratory effort  Skin: no rashes or wounds; normal turgor  Psych: mood and affect  "appropriate; recent and remote memory intact    Vitals:    11/01/24 1332   Weight: 71.2 kg (157 lb)   Height: 172.7 cm (68\")         11/01/24  1332   Weight: 71.2 kg (157 lb)     Body mass index is 23.87 kg/m².      Ortho Exam     Physical Exam  Left upper extremity examined  Active forward flexion 140 degrees  External rotation 45 degrees  Internal rotation to side  Positive deltoid firing  Positive sensation light touch all distributions left upper extremity  Flex/extend all digits left hand  Maximal tenderness present along the distal clavicle    Imaging:  Left clavicle X-Ray  Indication: Pain  AP Internal and External Rotation views    Findings:  Mildly displaced fracture along the distal end of the clavicle with callus appreciated appears to be healing, no evidence of fracture displacement no other acute osseous abnormality identified on x-ray imaging  No bony lesion  Normal soft tissues  Normal joint spaces    prior studies were available for comparison.    Assessment:        1. Pain of left clavicle    2. Closed displaced fracture of acromial end of left clavicle, initial encounter         Assessment & Plan      Plan:  Discussed plan of care with patient.  Continue with gentle motion of the upper extremity we will plan to see him back in clinic in 4 weeks with repeat x-ray images left clavicle at follow-up.  Continue with again gentle motion.  Encouraged to call with any question concerns.  All questions answered.  Orders:  Orders Placed This Encounter   Procedures    XR Clavicle Left     No orders of the defined types were placed in this encounter.        Maritzaon dictation utilized  BMI is within normal parameters. No other follow-up for BMI required.    "

## 2024-11-11 RX ORDER — ATENOLOL 25 MG/1
25 TABLET ORAL 2 TIMES DAILY
Qty: 120 TABLET | Refills: 0 | Status: SHIPPED | OUTPATIENT
Start: 2024-11-11

## 2024-11-11 RX ORDER — ATORVASTATIN CALCIUM 40 MG/1
40 TABLET, FILM COATED ORAL NIGHTLY
Qty: 90 TABLET | Refills: 0 | Status: SHIPPED | OUTPATIENT
Start: 2024-11-11

## 2024-11-18 ENCOUNTER — LAB (OUTPATIENT)
Dept: LAB | Facility: HOSPITAL | Age: 84
End: 2024-11-18
Payer: MEDICARE

## 2024-11-18 ENCOUNTER — ANTICOAGULATION VISIT (OUTPATIENT)
Dept: PHARMACY | Facility: HOSPITAL | Age: 84
End: 2024-11-18
Payer: MEDICARE

## 2024-11-18 DIAGNOSIS — I48.0 AF (PAROXYSMAL ATRIAL FIBRILLATION): ICD-10-CM

## 2024-11-18 DIAGNOSIS — I48.0 AF (PAROXYSMAL ATRIAL FIBRILLATION): Primary | ICD-10-CM

## 2024-11-18 LAB
INR PPP: 2.04 (ref 0.9–1.1)
PROTHROMBIN TIME: 23.6 SECONDS (ref 12.1–15)

## 2024-11-18 PROCEDURE — 36415 COLL VENOUS BLD VENIPUNCTURE: CPT

## 2024-11-18 PROCEDURE — 85610 PROTHROMBIN TIME: CPT

## 2024-11-18 NOTE — PROGRESS NOTES
Anticoagulation Clinic Progress Note    Anticoagulation Summary  As of 2024      INR goal:  2.0-3.0   TTR:  53.5% (2.2 y)   INR used for dosin.04 (2024)   Warfarin maintenance plan:  8 mg every day   Weekly warfarin total:  56 mg   No change documented:  [place], PharmD   Plan last modified:  Miya Gan RPH (2024)   Next INR check:  2024   Target end date:  --    Indications    AF (paroxysmal atrial fibrillation) [I48.0]                 Anticoagulation Episode Summary       INR check location:  --    Preferred lab:  --    Send INR reminders to:   [facility]    Comments:  s/p AVR and CABG * [facility]*          Anticoagulation Care Providers       Provider Role Specialty Phone number    Helena Humphries, APRN Referring Cardiology [phone]    Khurram Ball MD Referring Cardiology [phone]            Clinic Interview:  Patient Findings     Negatives:  Signs/symptoms of thrombosis, Signs/symptoms of bleeding,   Laboratory test error suspected, Change in health, Change in alcohol use,   Change in activity, Upcoming invasive procedure, Emergency department   visit, Upcoming dental procedure, Missed doses, Extra doses, Change in   medications, Change in diet/appetite, Hospital admission, Bruising, Other   complaints      Clinical Outcomes     Negatives:  Major bleeding event, Thromboembolic event,   Anticoagulation-related hospital admission, Anticoagulation-related ED   visit, Anticoagulation-related fatality        INR History:      Latest Ref Rng & Units 10/2/2024     1:57 PM 10/7/2024     3:21 PM 10/7/2024     3:49 PM 10/28/2024     1:58 PM 10/28/2024     2:24 PM 2024     2:12 PM 2024     2:47 PM   Anticoagulation Monitoring   INR  2.68  2.94  2.50  2.04   INR Date  10/2/2024  10/7/2024  10/28/2024  2024   INR Goal  2.0-3.0  2.0-3.0  2.0-3.0  2.0-3.0   Trend  Same  Same  Same  Same   Last Week Total  56 mg  56 mg  56  mg  56 mg   Next Week Total  56 mg  56 mg  56 mg  56 mg   Sun  8 mg  8 mg  8 mg  8 mg   Mon  -  8 mg  8 mg  8 mg   Tue  -  8 mg  8 mg  8 mg   Wed  8 mg  8 mg  8 mg  8 mg   Thu  8 mg  8 mg  8 mg  8 mg   Fri  8 mg  8 mg  8 mg  8 mg   Sat  8 mg  8 mg  8 mg  8 mg   Historical INR 0.90 - 1.10  2.94   2.50   2.04         Plan:  1. INR is Therapeutic today- see above in Anticoagulation Summary.   Will instruct Woo Dobbs to Continue their warfarin regimen- see above in Anticoagulation Summary.  2. Follow up in 4 weeks  3. They have been instructed to call if any changes in medications, doses, concerns, etc. Patient expresses understanding and has no further questions at this time.    [place], PharmD

## 2024-11-26 ENCOUNTER — OFFICE VISIT (OUTPATIENT)
Dept: ORTHOPEDIC SURGERY | Facility: CLINIC | Age: 84
End: 2024-11-26
Payer: MEDICARE

## 2024-11-26 VITALS — WEIGHT: 157 LBS | HEIGHT: 68 IN | BODY MASS INDEX: 23.79 KG/M2

## 2024-11-26 DIAGNOSIS — S42.032A CLOSED DISPLACED FRACTURE OF ACROMIAL END OF LEFT CLAVICLE, INITIAL ENCOUNTER: Primary | ICD-10-CM

## 2024-11-26 PROCEDURE — 99024 POSTOP FOLLOW-UP VISIT: CPT | Performed by: NURSE PRACTITIONER

## 2024-11-27 NOTE — PROGRESS NOTES
Subjective:     Patient ID: Woo Dobbs is a 84 y.o. male.    Chief Complaint:  Follow-up closed treatment mildly displaced fracture of acromial end of the left clavicle, date of injury 9/28/2024  History of Present Illness  History of Present Illness    Woo Dobbs returns to clinic today with spouse for follow-up of his left upper extremity he denies that he is experiencing any pain has continued with active motion activity as tolerated denies any presence of numbness or tingling at the left upper extremity denies any other concerns present.       Social History     Occupational History    Occupation: retired      Comment:      Tobacco Use    Smoking status: Never     Passive exposure: Never    Smokeless tobacco: Never    Tobacco comments:     caffeine use: 1 -2 cups daily.    Vaping Use    Vaping status: Never Used   Substance and Sexual Activity    Alcohol use: No    Drug use: No    Sexual activity: Defer      Past Medical History:   Diagnosis Date    Aortic stenosis     8/2022: tissue AVR (25mm MDT Avalus bovine pericardial)    Asthma     BPH (benign prostatic hyperplasia)     CAD (coronary artery disease)     7/2022: 95% prox/50% distal LAD, 80% D1, 30-40% Cx, diffuse dz RCA. CABG x 3 8/2022: LIMA-LAD, SVG-D1, SVG-RCA    Carotid atherosclerosis, bilateral     8/2022: 16-49% bilaterally    Colon polyp     Fall 09/28/2024    GERD (gastroesophageal reflux disease)     Hyperlipidemia     Hypertension     Hypothyroidism     Paroxysmal atrial fibrillation     Type 2 diabetes mellitus     Warfarin anticoagulation      Past Surgical History:   Procedure Laterality Date    CARDIAC CATHETERIZATION N/A 7/14/2022    Procedure: Coronary angiography;  Surgeon: Unique Calabrese MD;  Location: [facility] INVASIVE LOCATION;  Service: Cardiovascular;  Laterality: N/A;    CARDIAC CATHETERIZATION N/A 7/14/2022    Procedure: Left heart cath with simultaneous LV/Ao pressures;  Surgeon: Unique Calabrese MD;   "Location: [facility] CATH INVASIVE LOCATION;  Service: Cardiovascular;  Laterality: N/A;    CARDIAC CATHETERIZATION N/A 7/14/2022    Procedure: Right Heart Cath;  Surgeon: Unique Calabrese MD;  Location: [facility] CATH INVASIVE LOCATION;  Service: Cardiovascular;  Laterality: N/A;    COLONOSCOPY      CORONARY ARTERY BYPASS GRAFT WITH AORTIC VALVE REPAIR/REPLACEMENT N/A 8/8/2022    Procedure: KEVIN STERNOTOMY CORONARY ARTERY BYPASS GRAFT TIMES 3 USING LEFT INTERNAL MAMMARY ARTERY AND LEFT GREATER SAPHENOUS VEIN GRAFT PER ENDOSCOPIC VEIN HARVESTING, AORTIC VALVE REPLACEMENT AND PRP;  Surgeon: Jr Gentry Courtney MD;  Location: [facility] CVOR;  Service: Cardiothoracic;  Laterality: N/A;    CYSTOSCOPY BLADDER BIOPSY N/A 3/18/2024    Procedure: CYSTOSCOPY BLADDER BIOPSY;  Surgeon: Nimesh Arvizu MD;  Location: [facility] OR;  Service: Urology;  Laterality: N/A;    CYSTOSCOPY RETROGRADE PYELOGRAM Bilateral 3/18/2024    Procedure: bilateral retrogrades and cytology;  Surgeon: Nimesh Arvizu MD;  [facility];  Service: Urology;  Laterality: Bilateral;    TRANSURETHRAL RESECTION OF BLADDER TUMOR N/A 1/23/2023    Procedure: TRANSURETHRAL RESECTION OF SMALL BLADDER TUMOR;  Surgeon: Nimesh Arvizu MD;  Location: [facility] OR;  Service: Urology;  Laterality: N/A;    VASECTOMY         Family History   Problem Relation Age of Onset    Colon cancer Neg Hx     Colon polyps Neg Hx     Malig Hyperthermia Neg Hx                Objective:  Physical Exam  General: No acute distress.  Eyes: conjunctiva clear; pupils equally round and reactive  ENT: external ears and nose atraumatic; oropharynx clear  CV: no peripheral edema  Resp: normal respiratory effort  Skin: no rashes or wounds; normal turgor  Psych: mood and affect appropriate; recent and remote memory intact    Vitals:    11/26/24 1046   Weight: 71.2 kg (157 lb)   Height: 172.7 cm (68\")         11/26/24  1046   Weight: 71.2 kg (157 lb)     Body mass index is 23.87 kg/m².      " Ortho Exam     Physical Exam  Left upper extremity examined  Active forward flexion 175 degrees  External rotation 45 degrees  Internal rotation L1  Internal/external rotation strength 4+ out of 5  Bicep strength 4 out of 5  Subscapularis strength 4 out of 5  Supraspinatus strength 4 out of 5  Maximal tenderness present acromial end of the clavicle  Negative crossarm exam  Negative drop arm exam    Imaging:  Left Clavicle X-Ray  Indication: Pain  AP Internal and External Rotation views    Findings:  Displaced fracture along the acromial end of the clavicle with callus appreciated, moderate appears to be healing well no evidence of fracture displacement no other acute osseous abnormality identified on imaging  No bony lesion  Normal soft tissues  Normal joint spaces  prior studies were available for comparison.    Assessment:        1. Closed displaced fracture of acromial end of left clavicle, initial encounter         Assessment & Plan      Plan:  Discussed plan of care with patient and family.  At this time fracture continues to heal moderate amount of callus appreciated he can continue with activity he will continue to note symptom improvement as the distal clavicle fracture continues to heal I do not foresee any further reasons to repeat the x-ray imaging unless he is having problems including pain if symptoms persist we will gladly see him back in clinic and we can repeat x-ray imaging of the clavicle but for now follow-up only as needed.  All questions answered.  Orders:  Orders Placed This Encounter   Procedures    XR Clavicle Left     No orders of the defined types were placed in this encounter.      Dragon dictation utilized  BMI is within normal parameters. No other follow-up for BMI required.

## 2024-12-23 ENCOUNTER — ANTICOAGULATION VISIT (OUTPATIENT)
Dept: PHARMACY | Facility: HOSPITAL | Age: 84
End: 2024-12-23
Payer: MEDICARE

## 2024-12-23 ENCOUNTER — LAB (OUTPATIENT)
Dept: LAB | Facility: HOSPITAL | Age: 84
End: 2024-12-23
Payer: MEDICARE

## 2024-12-23 DIAGNOSIS — I48.0 AF (PAROXYSMAL ATRIAL FIBRILLATION): ICD-10-CM

## 2024-12-23 DIAGNOSIS — I48.0 AF (PAROXYSMAL ATRIAL FIBRILLATION): Primary | ICD-10-CM

## 2024-12-23 LAB
INR PPP: 1.79 (ref 0.9–1.1)
PROTHROMBIN TIME: 21.3 SECONDS (ref 12.1–15)

## 2024-12-23 PROCEDURE — 85610 PROTHROMBIN TIME: CPT

## 2024-12-23 PROCEDURE — 36415 COLL VENOUS BLD VENIPUNCTURE: CPT

## 2024-12-26 NOTE — PROGRESS NOTES
Anticoagulation Clinic Progress Note    Anticoagulation Summary  As of 2024      INR goal:  2.0-3.0   TTR:  52.0% (2.3 y)   INR used for dosin.79 (2024)   Warfarin maintenance plan:  8 mg every day   Weekly warfarin total:  56 mg   Plan last modified:  Miya Gan RPH (2024)   Next INR check:  2025   Target end date:  --    Indications    AF (paroxysmal atrial fibrillation) [I48.0]                 Anticoagulation Episode Summary       INR check location:  --    Preferred lab:  --    [facility]    Comments:  s/p AVR and CABG *[facility]*          Anticoagulation Care Providers       Provider Role Specialty Phone number    Helena Humphries, APRN Referring Cardiology [phone]    Khurram Ball MD Referring Cardiology [phone]            Clinic Interview:  Patient Findings     Negatives:  Signs/symptoms of thrombosis, Signs/symptoms of bleeding,   Laboratory test error suspected, Change in health, Change in alcohol use,   Change in activity, Upcoming invasive procedure, Emergency department   visit, Upcoming dental procedure, Missed doses, Extra doses, Change in   medications, Change in diet/appetite, Hospital admission, Bruising, Other   complaints      Clinical Outcomes     Negatives:  Major bleeding event, Thromboembolic event,   Anticoagulation-related hospital admission, Anticoagulation-related ED   visit, Anticoagulation-related fatality        INR History:      Latest Ref Rng & Units 10/7/2024     3:49 PM 10/28/2024     1:58 PM 10/28/2024     2:24 PM 2024     2:12 PM 2024     2:47 PM 2024     1:59 PM 2024     2:19 PM   Anticoagulation Monitoring   INR  2.94  2.50  2.04  1.79   INR Date  10/7/2024  10/28/2024  2024  2024   INR Goal  2.0-3.0  2.0-3.0  2.0-3.0  2.0-3.0   Trend  Same  Same  Same  Same   Last Week Total  56 mg  56 mg  56 mg  56 mg   Next Week Total  56 mg  56 mg  56 mg   58 mg   Sun  8 mg  8 mg  8 mg  8 mg   Mon  8 mg  8 mg  8 mg  8 mg   Tue  8 mg  8 mg  8 mg  8 mg   Wed  8 mg  8 mg  8 mg  10 mg (12/25); Otherwise 8 mg   Thu  8 mg  8 mg  8 mg  8 mg   Fri  8 mg  8 mg  8 mg  8 mg   Sat  8 mg  8 mg  8 mg  8 mg   Historical INR 0.90 - 1.10  2.50   2.04   1.79         Plan:  1. INR is Subtherapeutic today- see above in Anticoagulation Summary.   Will instruct Woo Dobbs to Increase their warfarin regimen- see above in Anticoagulation Summary.  Unable to reach the pt, lvm to take 10 mg but patient didn't take it until Wednesday. Boost to 10 mg then resume, rck 2 weeks   2. Follow up in 2 weeks  3. They have been instructed to call if any changes in medications, doses, concerns, etc. Patient expresses understanding and has no further questions at this time.    Miya Gan [facility]

## 2025-01-14 ENCOUNTER — LAB (OUTPATIENT)
Dept: LAB | Facility: HOSPITAL | Age: 85
End: 2025-01-14
Payer: MEDICARE

## 2025-01-14 DIAGNOSIS — I48.0 AF (PAROXYSMAL ATRIAL FIBRILLATION): ICD-10-CM

## 2025-01-14 LAB
INR PPP: 2.19 (ref 0.9–1.1)
PROTHROMBIN TIME: 25 SECONDS (ref 12.1–15)

## 2025-01-14 PROCEDURE — 85610 PROTHROMBIN TIME: CPT

## 2025-01-14 PROCEDURE — 36415 COLL VENOUS BLD VENIPUNCTURE: CPT

## 2025-01-15 ENCOUNTER — ANTICOAGULATION VISIT (OUTPATIENT)
Dept: PHARMACY | Facility: HOSPITAL | Age: 85
End: 2025-01-15
Payer: MEDICARE

## 2025-01-15 DIAGNOSIS — I48.0 AF (PAROXYSMAL ATRIAL FIBRILLATION): Primary | ICD-10-CM

## 2025-01-15 NOTE — PROGRESS NOTES
Anticoagulation Clinic Progress Note    Anticoagulation Summary  As of 1/15/2025      INR goal:  2.0-3.0   TTR:  51.9% (2.4 y)   INR used for dosin.19 (2025)   Warfarin maintenance plan:  8 mg every day   Weekly warfarin total:  56 mg   No change documented:  Miya Gan RPH   Plan last modified:  Miya Gan RPH (2024)   Next INR check:  2025   Target end date:  --    Indications    AF (paroxysmal atrial fibrillation) [I48.0]                 Anticoagulation Episode Summary       INR check location:  --    Preferred lab:  --    Send INR reminders to:   [facility] CLINICAL POOL    Comments:  s/p AVR and CABG [facility]          Anticoagulation Care Providers       Provider Role Specialty Phone number    Helena Humphries, DEV Referring Cardiology [phone]    Khurram Ball MD Referring Cardiology [phone]            Clinic Interview:  No pertinent clinical findings have been reported.    INR History:      Latest Ref Rng & Units 10/28/2024     2:24 PM 2024     2:12 PM 2024     2:47 PM 2024     1:59 PM 2024     2:19 PM 2025     4:34 PM 1/15/2025     8:45 AM   Anticoagulation Monitoring   INR  2.50  2.04  1.79  2.19   INR Date  10/28/2024  2024  2024  2025   INR Goal  2.0-3.0  2.0-3.0  2.0-3.0  2.0-3.0   Trend  Same  Same  Same  Same   Last Week Total  56 mg  56 mg  56 mg  56 mg   Next Week Total  56 mg  56 mg  58 mg  56 mg   Sun  8 mg  8 mg  8 mg  8 mg   Mon  8 mg  8 mg  8 mg  8 mg   Tue  8 mg  8 mg  8 mg  8 mg   Wed  8 mg  8 mg  10 mg (); Otherwise 8 mg  8 mg   Thu  8 mg  8 mg  8 mg  8 mg   Fri  8 mg  8 mg  8 mg  8 mg   Sat  8 mg  8 mg  8 mg  8 mg   Historical INR 0.90 - 1.10  2.04   1.79   2.19         Plan:  1. INR is therapeutic today- see above in Anticoagulation Summary.    Woo Dobbs to continue their warfarin regimen- see above in Anticoagulation Summary.  2. Follow up in 2 weeks  3. They have been  instructed to call if any changes in medications, doses, concerns, etc. Patient expresses understanding and has no further questions at this time.    Miya Gan RP

## 2025-01-16 RX ORDER — WARFARIN SODIUM 4 MG/1
8 TABLET ORAL NIGHTLY
Qty: 180 TABLET | Refills: 0 | Status: SHIPPED | OUTPATIENT
Start: 2025-01-16

## 2025-01-17 ENCOUNTER — TELEPHONE (OUTPATIENT)
Dept: CARDIOLOGY | Facility: CLINIC | Age: 85
End: 2025-01-17
Payer: MEDICARE

## 2025-01-17 RX ORDER — ATENOLOL 25 MG/1
25 TABLET ORAL 2 TIMES DAILY
Qty: 180 TABLET | Refills: 2 | Status: SHIPPED | OUTPATIENT
Start: 2025-01-17

## 2025-01-17 NOTE — TELEPHONE ENCOUNTER
Patient needs a follow-up appointment with Dr. Ball at the [facility].  Please call to schedule.  Thank you

## 2025-02-03 ENCOUNTER — LAB (OUTPATIENT)
Dept: LAB | Facility: HOSPITAL | Age: 85
End: 2025-02-03
Payer: MEDICARE

## 2025-02-03 ENCOUNTER — ANTICOAGULATION VISIT (OUTPATIENT)
Dept: PHARMACY | Facility: HOSPITAL | Age: 85
End: 2025-02-03
Payer: MEDICARE

## 2025-02-03 DIAGNOSIS — I48.0 AF (PAROXYSMAL ATRIAL FIBRILLATION): Primary | ICD-10-CM

## 2025-02-03 DIAGNOSIS — I48.0 AF (PAROXYSMAL ATRIAL FIBRILLATION): ICD-10-CM

## 2025-02-03 LAB
INR PPP: 2.08 (ref 0.9–1.1)
PROTHROMBIN TIME: 24 SECONDS (ref 12.1–15)

## 2025-02-03 PROCEDURE — 36415 COLL VENOUS BLD VENIPUNCTURE: CPT

## 2025-02-03 PROCEDURE — 85610 PROTHROMBIN TIME: CPT

## 2025-02-03 NOTE — PROGRESS NOTES
I am covering Dr. Ball's in basket today because she is out of the office.     Subtherapeutic and addressed in the INR clinic today.

## 2025-02-04 RX ORDER — ATENOLOL 25 MG/1
25 TABLET ORAL 2 TIMES DAILY
Qty: 120 TABLET | Refills: 0 | Status: SHIPPED | OUTPATIENT
Start: 2025-02-04

## 2025-02-04 NOTE — PROGRESS NOTES
Anticoagulation Clinic Progress Note    Anticoagulation Summary  As of 2/3/2025      INR goal:  2.0-3.0   TTR:  52.9% (2.4 y)   INR used for dosin.08 (2/3/2025)   Warfarin maintenance plan:  8 mg every day   Weekly warfarin total:  56 mg   No change documented:  Miya Gan RPH   Plan last modified:  Miya Gan RPH (2024)   Next INR check:  3/3/2025   Target end date:  --    Indications    AF (paroxysmal atrial fibrillation) [I48.0]                 Anticoagulation Episode Summary       INR check location:  --    Preferred lab:  --    Send INR reminders to:   [facility]    Comments:  s/p AVR and CABG [facility]*          Anticoagulation Care Providers       Provider Role Specialty Phone number    Helena Humphries, DEV Referring Cardiology [phone]    Khurram Ball MD Referring Cardiology [phone]            Clinic Interview:  Patient Findings     Negatives:  Signs/symptoms of thrombosis, Signs/symptoms of bleeding,   Laboratory test error suspected, Change in health, Change in alcohol use,   Change in activity, Upcoming invasive procedure, Emergency department   visit, Upcoming dental procedure, Missed doses, Extra doses, Change in   medications, Change in diet/appetite, Hospital admission, Bruising, Other   complaints      Clinical Outcomes     Negatives:  Major bleeding event, Thromboembolic event,   Anticoagulation-related hospital admission, Anticoagulation-related ED   visit, Anticoagulation-related fatality        INR History:      Latest Ref Rng & Units 2024     2:47 PM 2024     1:59 PM 2024     2:19 PM 2025     4:34 PM 1/15/2025     8:45 AM 2/3/2025     3:30 PM 2/3/2025     3:47 PM   Anticoagulation Monitoring   INR  2.04  1.79  2.19  2.08   INR Date  2024  2024  2025  2/3/2025   INR Goal  2.0-3.0  2.0-3.0  2.0-3.0  2.0-3.0   Trend  Same  Same  Same  Same   Last Week Total  56 mg  56 mg  56 mg  56 mg    Next Week Total  56 mg  58 mg  56 mg  56 mg   Sun  8 mg  8 mg  8 mg  8 mg   Mon  8 mg  8 mg  8 mg  8 mg   Tue  8 mg  8 mg  8 mg  8 mg   Wed  8 mg  10 mg (12/25); Otherwise 8 mg  8 mg  8 mg   Thu  8 mg  8 mg  8 mg  8 mg   Fri  8 mg  8 mg  8 mg  8 mg   Sat  8 mg  8 mg  8 mg  8 mg   Historical INR 0.90 - 1.10  1.79   2.19   2.08         Plan:  1. INR is Therapeutic today- see above in Anticoagulation Summary.   Will instruct Woo Dobbs to Continue their warfarin regimen- see above in Anticoagulation Summary.  2. Follow up in 4 weeks  3. They have been instructed to call if any changes in medications, doses, concerns, etc. Patient expresses understanding and has no further questions at this time.    Miya Gan [facility]

## 2025-03-10 ENCOUNTER — LAB (OUTPATIENT)
Dept: LAB | Facility: HOSPITAL | Age: 85
End: 2025-03-10
Payer: MEDICARE

## 2025-03-10 ENCOUNTER — ANTICOAGULATION VISIT (OUTPATIENT)
Dept: PHARMACY | Facility: HOSPITAL | Age: 85
End: 2025-03-10
Payer: MEDICARE

## 2025-03-10 DIAGNOSIS — I48.0 AF (PAROXYSMAL ATRIAL FIBRILLATION): ICD-10-CM

## 2025-03-10 DIAGNOSIS — I48.0 AF (PAROXYSMAL ATRIAL FIBRILLATION): Primary | ICD-10-CM

## 2025-03-10 LAB
INR PPP: 2.6 (ref 0.9–1.1)
PROTHROMBIN TIME: 28.6 SECONDS (ref 12.1–15)

## 2025-03-10 PROCEDURE — 36415 COLL VENOUS BLD VENIPUNCTURE: CPT

## 2025-03-10 PROCEDURE — 85610 PROTHROMBIN TIME: CPT

## 2025-03-10 NOTE — PROGRESS NOTES
Anticoagulation Clinic Progress Note    Anticoagulation Summary  As of 3/10/2025      INR goal:  2.0-3.0   TTR:  54.7% (2.5 y)   INR used for dosin.60 (3/10/2025)   Warfarin maintenance plan:  8 mg every day   Weekly warfarin total:  56 mg   No change documented:  Miya Gan RPH   Plan last modified:  Miya Gan RPH (2024)   Next INR check:  2025   Target end date:  --    Indications    AF (paroxysmal atrial fibrillation) [I48.0]                 Anticoagulation Episode Summary       INR check location:  --    Preferred lab:  --    Send INR reminders to:   [facility]    Comments:  s/p AVR and CABG * [facility]*          Anticoagulation Care Providers       Provider Role Specialty Phone number    Helena Humphries, DEV Referring Cardiology [phone]    Khurram Ball MD Referring Cardiology [phone]            Clinic Interview:  Patient Findings     Negatives:  Signs/symptoms of thrombosis, Signs/symptoms of bleeding,   Laboratory test error suspected, Change in health, Change in alcohol use,   Change in activity, Upcoming invasive procedure, Emergency department   visit, Upcoming dental procedure, Missed doses, Extra doses, Change in   medications, Change in diet/appetite, Hospital admission, Bruising, Other   complaints      Clinical Outcomes     Negatives:  Major bleeding event, Thromboembolic event,   Anticoagulation-related hospital admission, Anticoagulation-related ED   visit, Anticoagulation-related fatality        INR History:      Latest Ref Rng & Units 2024     2:19 PM 2025     4:34 PM 1/15/2025     8:45 AM 2/3/2025     3:30 PM 2/3/2025     3:47 PM 3/10/2025    12:43 PM 3/10/2025     2:13 PM   Anticoagulation Monitoring   INR  1.79  2.19  2.08  2.60   INR Date  2024  2025  2/3/2025  3/10/2025   INR Goal  2.0-3.0  2.0-3.0  2.0-3.0  2.0-3.0   Trend  Same  Same  Same  Same   Last Week Total  56 mg  56 mg  56 mg  56 mg    Next Week Total  58 mg  56 mg  56 mg  56 mg   Sun  8 mg  8 mg  8 mg  8 mg   Mon  8 mg  8 mg  8 mg  8 mg   Tue  8 mg  8 mg  8 mg  8 mg   Wed  10 mg (12/25); Otherwise 8 mg  8 mg  8 mg  8 mg   Thu  8 mg  8 mg  8 mg  8 mg   Fri  8 mg  8 mg  8 mg  8 mg   Sat  8 mg  8 mg  8 mg  8 mg   Historical INR 0.90 - 1.10  2.19   2.08   2.60         Plan:  1. INR is Therapeutic today- see above in Anticoagulation Summary.   Will instruct Woo Dobbs to Continue their warfarin regimen- see above in Anticoagulation Summary.  2. Follow up in 4 weeks  3. They have been instructed to call if any changes in medications, doses, concerns, etc. Patient expresses understanding and has no further questions at this time.    Miya Gan [facility]

## 2025-03-18 ENCOUNTER — OFFICE VISIT (OUTPATIENT)
Age: 85
End: 2025-03-18
Payer: MEDICARE

## 2025-03-18 VITALS
OXYGEN SATURATION: 95 % | BODY MASS INDEX: 25.46 KG/M2 | HEIGHT: 68 IN | SYSTOLIC BLOOD PRESSURE: 124 MMHG | DIASTOLIC BLOOD PRESSURE: 72 MMHG | WEIGHT: 168 LBS

## 2025-03-18 DIAGNOSIS — Z95.3 HISTORY OF AORTIC VALVE REPLACEMENT WITH BIOPROSTHETIC VALVE: ICD-10-CM

## 2025-03-18 DIAGNOSIS — E78.2 MIXED HYPERLIPIDEMIA: ICD-10-CM

## 2025-03-18 DIAGNOSIS — R07.2 PRECORDIAL PAIN: Primary | ICD-10-CM

## 2025-03-18 DIAGNOSIS — R06.02 SHORTNESS OF BREATH: ICD-10-CM

## 2025-03-18 DIAGNOSIS — I10 PRIMARY HYPERTENSION: ICD-10-CM

## 2025-03-18 DIAGNOSIS — I25.810 CORONARY ARTERY DISEASE INVOLVING CORONARY BYPASS GRAFT OF NATIVE HEART, UNSPECIFIED WHETHER ANGINA PRESENT: ICD-10-CM

## 2025-03-18 DIAGNOSIS — R53.82 CHRONIC FATIGUE: ICD-10-CM

## 2025-03-18 DIAGNOSIS — I48.0 AF (PAROXYSMAL ATRIAL FIBRILLATION): ICD-10-CM

## 2025-03-18 DIAGNOSIS — R40.0 DAYTIME SLEEPINESS: ICD-10-CM

## 2025-03-18 PROCEDURE — 99214 OFFICE O/P EST MOD 30 MIN: CPT | Performed by: PHYSICIAN ASSISTANT

## 2025-03-18 PROCEDURE — 3078F DIAST BP <80 MM HG: CPT | Performed by: PHYSICIAN ASSISTANT

## 2025-03-18 PROCEDURE — 1159F MED LIST DOCD IN RCRD: CPT | Performed by: PHYSICIAN ASSISTANT

## 2025-03-18 PROCEDURE — 93000 ELECTROCARDIOGRAM COMPLETE: CPT | Performed by: PHYSICIAN ASSISTANT

## 2025-03-18 PROCEDURE — 3074F SYST BP LT 130 MM HG: CPT | Performed by: PHYSICIAN ASSISTANT

## 2025-03-18 PROCEDURE — 1160F RVW MEDS BY RX/DR IN RCRD: CPT | Performed by: PHYSICIAN ASSISTANT

## 2025-03-18 RX ORDER — FUROSEMIDE 20 MG/1
1 TABLET ORAL DAILY
COMMUNITY
Start: 2025-03-14

## 2025-03-18 RX ORDER — POTASSIUM CHLORIDE 750 MG/1
10 CAPSULE, EXTENDED RELEASE ORAL DAILY
COMMUNITY
Start: 2025-03-14

## 2025-03-18 NOTE — PROGRESS NOTES
CARDIOLOGY        Patient Name: Woo Dobbs  :1940  Age: 84 y.o.  Primary Cardiologist: Khurram Ball MD  Encounter Provider:  Tirso Horton PA-C    Date of Service: 25            CHIEF COMPLAINT / REASON FOR OFFICE VISIT     SOA and leg edema      HISTORY OF PRESENT ILLNESS       HPI  Woo Dobbs is a 84 y.o. male who presents today for SOA and leg edema.     Pt has a  history significant for peripheral pain, shortness of breath, chronic fatigue, daytime sleepiness, CAD, hyperlipidemia, history of aortic valve replacement with bioprosthetic valve, paroxysmal atrial fibrillation, and hypertension presents for shortness of breath and leg edema.  Patient was last seen in office on 2024 where he had ongoing fatigue for months.  Is reported that he did not sleep very well and snores very loudly.  He reported that he can sleep all day and has some chronic shortness of breath.  He did have some upper chest and bilateral pain to his clavicles that was nonexertional.  He was set up for a stress test and echocardiogram.  He was referred to sleep medicine.  His stress test was normal.  His echocardiogram showed a normal LVSF with EF 70%.  Bioprosthetic heart valve.  Grossly normal.  He has some moderate mitral annular calcification with mild stenosis and regurgitation.     Patient was doing well since his last office visit until last week.  He was having some sinus issues.  He said that he had troubles breathing when laying down.  She also noticed some swelling to his legs.  He saw his PCP and started on Lasix.  He mentions he did have gained about 5 pounds.  He denies any chest pain, palpitations, lightheadedness, dizziness, or or fatigue.  His swelling has improved being on Lasix.  He did have labs by his PCP a week or 2 ago.  He never did follow-up with sleep medicine due to have COVID.  He does not excessively salt his food but does eat popcorn.  He reports no issues with bleeding being  "on his Coumadin.      Below is a brief summary of patient's pertinent cardiac history  He was initially evaluated in 2019; his PCP had noted a murmur. An echo showed aortic valve calcification without stenosis. He was then lost to follow up. He was referred back to us in 2022; he reported progressive dyspnea. An echo showed normal LVSF and mod-severe AS. A stress test showed LAD ischemia.  Coronary angiography revealed severe disease. He underwent CABG x 3 and tissue AVR in August 2022.  He had recurrent postoperative atrial fibrillation in recovery; he was discharged on amiodarone and warfarin. We eventually stopped amiodarone.      An echo in January 2023 showed normal LVSF and normal AVR function. When he was seen in the office last, he was doing well. He missed his follow up appointment with us in May 2024.     The following portions of the patient's history were reviewed and updated as appropriate: allergies, current medications, past family history, past medical history, past social history, past surgical history and problem list.      VITAL SIGNS     Visit Vitals  /72 (BP Location: Left arm, Patient Position: Sitting, Cuff Size: Adult)   Ht 172.7 cm (68\")   Wt 76.2 kg (168 lb)   SpO2 95%   BMI 25.54 kg/m²       @[email or web address]  Wt Readings from Last 3 Encounters:   03/18/25 76.2 kg (168 lb)   11/26/24 71.2 kg (157 lb)   11/01/24 71.2 kg (157 lb)     Body mass index is 25.54 kg/m².        PHYSICAL EXAMINATION     Constitutional:       General: Awake. Not in acute distress.     Appearance: Not in distress.   Pulmonary:      Effort: Pulmonary effort is normal.      Breath sounds: Normal breath sounds.   Cardiovascular:      Normal rate. Regular rhythm.      Murmurs: There is a systolic murmur.   Edema:     Peripheral edema absent.   Skin:     General: Skin is warm.   Neurological:      Mental Status: Alert.   Psychiatric:         Behavior: Behavior is cooperative.           REVIEWED DATA       ECG 12 " Lead    Date/Time: 3/18/2025 10:03 AM  Performed by: Tirso Horton PA-C    Authorized by: Tirso Horton PA-C  Comparison: compared with previous ECG   Similar to previous ECG  Rhythm: sinus rhythm  Rate: normal  BPM: 63  QRS axis: normal  Other findings: left ventricular hypertrophy    Clinical impression: abnormal EKG  Comments: Similar to previous          Cardiac Procedures:    Transthoracic echo on 7/26/2024    Left ventricular systolic function is normal. Calculated left ventricular EF = 70%    Left ventricular wall thickness is consistent with moderate basal septal asymmetric hypertrophy.    Left ventricular diastolic function was indeterminate.    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 Aortic valve mean pressure gradient is 13 mmHg, within normal limits.    There is mild, bileaflet mitral valve thickening and moderate mitral annular calcification (MAC) present resulting in mild functional mitral stenosis and regurgitation. The mitral valve mean gradient is 3 mmHg at HR 69 bpm.    Estimated right ventricular systolic pressure from tricuspid regurgitation is normal (<35 mmHg).    Saline test results are negative.     Stress test on 7/26/2024    Myocardial perfusion imaging indicates a normal myocardial perfusion study with no evidence of ischemia. Impressions are consistent with a low risk study.    Left ventricular ejection fraction is normal (Calculated EF = 68%).    Diaphragmatic attenuation artifact is present.    Findings consistent with an indeterminate ECG stress test.       Lab Results   Component Value Date     (L) 09/30/2024     (L) 09/29/2024    K 3.7 09/30/2024    K 4.2 09/29/2024     09/30/2024     09/29/2024    CO2 22.9 09/30/2024    CO2 22.4 09/29/2024    BUN 33 (H) 09/30/2024    BUN 23 09/29/2024    CREATININE 1.44 (H) 09/30/2024    CREATININE 1.55 (H) 09/29/2024     EGFRIFNONA 69 10/15/2020    EGFRIFNONA 67 12/23/2019    EGFRIFAFRI 83 10/15/2020    EGFRIFAFRI 78 12/23/2019    GLUCOSE 162 (H) 09/30/2024    GLUCOSE 112 (H) 09/29/2024    CALCIUM 8.2 (L) 09/30/2024    CALCIUM 8.4 (L) 09/29/2024    ALBUMIN 3.9 09/28/2024    ALBUMIN 4.60 08/09/2022    BILITOT 0.7 09/28/2024    BILITOT 0.5 08/05/2022    AST 71 (H) 09/28/2024    AST 16 08/05/2022    ALT 45 (H) 09/28/2024    ALT 15 08/05/2022     Lab Results   Component Value Date    WBC 7.15 09/30/2024    WBC 12.65 (H) 09/29/2024    HGB 10.5 (L) 09/30/2024    HGB 11.1 (L) 09/29/2024    HCT 31.8 (L) 09/30/2024    HCT 34.3 (L) 09/29/2024    MCV 89.3 09/30/2024    MCV 90.7 09/29/2024     (L) 09/30/2024     (L) 09/29/2024     Lab Results   Component Value Date    PROBNP 814.0 09/28/2024    PROBNP 234.0 08/05/2022     Lab Results   Component Value Date    TROPONINT 36 (H) 09/28/2024     Lab Results   Component Value Date    TSH 3.030 12/23/2019             ASSESSMENT & PLAN     Diagnoses and all orders for this visit:    1. Precordial pain (Primary)    2. Shortness of breath    3. Chronic fatigue    4. Daytime sleepiness    5. Coronary artery disease involving coronary bypass graft of native heart, unspecified whether angina present    6. Mixed hyperlipidemia    7. History of aortic valve replacement with bioprosthetic valve    8. AF (paroxysmal atrial fibrillation)    9. Primary hypertension      1/2/3/4.  No chest pain.  He does report generalized fatigue and shortness of breath, which could potentially be cardiac when seeing Dr. Ball at last office visit. He had a normal stress test and stable echo. Due to leg swelling and SOA while laying flat, Ill repeat an echo. I will refer him back to sleep medicine.      5/6.  Dyspnea while lying down.  No significant dyspnea on exertion.  As above     7. His AVR was working well in January 2023. I am repeating an echo, as above.      8. He had recurrent atrial fibrillation in the  postoperative period. He was discharged on amiodarone and atenolol. I stopped amiodarone due to fatigue and low HR. He should remain on warfarin for now as his CHADSVASc = 5.      9.  His blood pressure is controlled.     Patient has no swelling to legs on exam or any rales.  He is to stick to a low-salt diet.  I will repeat echocardiogram to assess any issues with his aortic valve replacement or other contributing issues.  I will call him with results.  I will not make any medication changes today.  I will get recent labs from Dr. Ivan's office from 1-2 weeks ago.  He will continue his follow-up with Dr. Ball in April     Addedum: Labs from 3/4/2025 showed a creatinine of 0.97 and a GFR of 77.  Potassium 4.1 and sodium 142.  LDL of 61, HDL 33, total cholesterol 111, triglycerides 85, hemoglobin A1c of 7.8    Return for Dr. Ball, Next scheduled follow up.    Future Appointments         Provider Department Center    4/29/2025 1:15 PM Khurram Ball MD [facility] C[facility]                MEDICATIONS         Discharge Medications            Accurate as of March 18, 2025 10:27 AM. If you have any questions, ask your nurse or doctor.                Changes to Medications        Instructions Start Date   omeprazole 40 MG capsule  Commonly known as: priLOSEC  What changed: when to take this   40 mg, Oral, 3 Times Weekly PRN      tamsulosin 0.4 MG capsule 24 hr capsule  Commonly known as: FLOMAX  What changed: how much to take   0.4 mg, Oral, Daily             Continue These Medications        Instructions Start Date   atenolol 25 MG tablet  Commonly known as: TENORMIN   25 mg, Oral, 2 Times Daily      atorvastatin 40 MG tablet  Commonly known as: LIPITOR   40 mg, Oral, Nightly      ferrous sulfate 325 (65 FE) MG EC tablet   325 mg, Daily With Breakfast      furosemide 20 MG tablet  Commonly known as: LASIX   1 tablet, Daily      glucose blood test strip   Test blood sugar Twice daily      glucose  monitor monitoring kit   1 each, Not Applicable, As Needed      HYDROcodone-acetaminophen 5-325 MG per tablet  Commonly known as: NORCO   1 tablet, Oral, Every 4 Hours PRN      Lancets misc   1 each, Not Applicable, 3 Times Daily Before Meals      levothyroxine 50 MCG tablet  Commonly known as: SYNTHROID, LEVOTHROID   50 mcg, Daily      losartan 50 MG tablet  Commonly known as: COZAAR   50 mg, Daily      nitroglycerin 0.4 MG SL tablet  Commonly known as: Nitrostat   0.4 mg, Sublingual, Every 5 Minutes PRN, Take no more than 3 doses in 15 minutes.      Polyvinyl Alcohol-Povidone PF 1.4-0.6 % ophthalmic solution  Commonly known as: ARTIFICIAL TEARS   1 drop, Both Eyes, Every 1 Hour PRN      potassium chloride 10 MEQ CR capsule  Commonly known as: MICRO-K   10 mEq, Daily      warfarin 4 MG tablet  Commonly known as: COUMADIN   8 mg, Oral, Nightly                   **Dragon Disclaimer:   Much of this encounter note is an electronic transcription/translation of spoken language to printed text. The electronic translation of spoken language may permit erroneous, or at times, nonsensical words or phrases to be inadvertently transcribed. Although I have reviewed the note for such errors, some may still exist.

## 2025-03-21 ENCOUNTER — HOSPITAL ENCOUNTER (OUTPATIENT)
Dept: CARDIOLOGY | Facility: HOSPITAL | Age: 85
Discharge: HOME | End: 2025-03-21
Payer: MEDICARE

## 2025-03-21 VITALS
WEIGHT: 168 LBS | BODY MASS INDEX: 25.46 KG/M2 | DIASTOLIC BLOOD PRESSURE: 91 MMHG | HEIGHT: 68 IN | HEART RATE: 55 BPM | SYSTOLIC BLOOD PRESSURE: 199 MMHG

## 2025-03-21 DIAGNOSIS — I48.0 AF (PAROXYSMAL ATRIAL FIBRILLATION): ICD-10-CM

## 2025-03-21 DIAGNOSIS — E78.2 MIXED HYPERLIPIDEMIA: ICD-10-CM

## 2025-03-21 DIAGNOSIS — R53.82 CHRONIC FATIGUE: ICD-10-CM

## 2025-03-21 DIAGNOSIS — I25.810 CORONARY ARTERY DISEASE INVOLVING CORONARY BYPASS GRAFT OF NATIVE HEART, UNSPECIFIED WHETHER ANGINA PRESENT: ICD-10-CM

## 2025-03-21 DIAGNOSIS — R06.02 SHORTNESS OF BREATH: ICD-10-CM

## 2025-03-21 DIAGNOSIS — I10 PRIMARY HYPERTENSION: ICD-10-CM

## 2025-03-21 DIAGNOSIS — R07.2 PRECORDIAL PAIN: ICD-10-CM

## 2025-03-21 DIAGNOSIS — Z95.3 HISTORY OF AORTIC VALVE REPLACEMENT WITH BIOPROSTHETIC VALVE: ICD-10-CM

## 2025-03-21 DIAGNOSIS — R40.0 DAYTIME SLEEPINESS: ICD-10-CM

## 2025-03-21 LAB
AORTIC ARCH: 2.9 CM
AORTIC DIMENSIONLESS INDEX: 0.55 (DI)
ASCENDING AORTA: 3.9 CM
AV MEAN PRESS GRAD SYS DOP V1V2: 7.5 MMHG
AV VMAX SYS DOP: 178.5 CM/SEC
BH CV ECHO AV AORTIC VALVE AT ACCEL TIME CALCULATED: 151 MSEC
BH CV ECHO MEAS - ACS: 2.1 CM
BH CV ECHO MEAS - AO MAX PG: 12.8 MMHG
BH CV ECHO MEAS - AO ROOT DIAM: 3.8 CM
BH CV ECHO MEAS - AO V2 VTI: 45.4 CM
BH CV ECHO MEAS - AT: 0.15 SEC
BH CV ECHO MEAS - AVA(I,D): 1.65 CM2
BH CV ECHO MEAS - EDV(CUBED): 97.3 ML
BH CV ECHO MEAS - EDV(MOD-SP2): 42 ML
BH CV ECHO MEAS - EDV(MOD-SP4): 57 ML
BH CV ECHO MEAS - EF(MOD-SP2): 59.5 %
BH CV ECHO MEAS - EF(MOD-SP4): 61.4 %
BH CV ECHO MEAS - ESV(CUBED): 32.4 ML
BH CV ECHO MEAS - ESV(MOD-SP2): 17 ML
BH CV ECHO MEAS - ESV(MOD-SP4): 22 ML
BH CV ECHO MEAS - FS: 30.7 %
BH CV ECHO MEAS - IVS/LVPW: 0.63 CM
BH CV ECHO MEAS - IVSD: 0.82 CM
BH CV ECHO MEAS - LA DIMENSION: 3.6 CM
BH CV ECHO MEAS - LAT PEAK E' VEL: 7.1 CM/SEC
BH CV ECHO MEAS - LV DIASTOLIC VOL/BSA (35-75): 30 CM2
BH CV ECHO MEAS - LV MASS(C)D: 172 GRAMS
BH CV ECHO MEAS - LV MAX PG: 3.8 MMHG
BH CV ECHO MEAS - LV MEAN PG: 2.08 MMHG
BH CV ECHO MEAS - LV SYSTOLIC VOL/BSA (12-30): 11.6 CM2
BH CV ECHO MEAS - LV V1 MAX: 98 CM/SEC
BH CV ECHO MEAS - LV V1 VTI: 24.9 CM
BH CV ECHO MEAS - LVIDD: 4.6 CM
BH CV ECHO MEAS - LVIDS: 3.2 CM
BH CV ECHO MEAS - LVOT AREA: 3 CM2
BH CV ECHO MEAS - LVOT DIAM: 1.96 CM
BH CV ECHO MEAS - LVPWD: 1.3 CM
BH CV ECHO MEAS - MED PEAK E' VEL: 6.2 CM/SEC
BH CV ECHO MEAS - MR MAX PG: 85.4 MMHG
BH CV ECHO MEAS - MR MAX VEL: 462 CM/SEC
BH CV ECHO MEAS - MV A DUR: 0.19 SEC
BH CV ECHO MEAS - MV A MAX VEL: 120.5 CM/SEC
BH CV ECHO MEAS - MV DEC SLOPE: 549.2 CM/SEC2
BH CV ECHO MEAS - MV DEC TIME: 262 SEC
BH CV ECHO MEAS - MV E MAX VEL: 122 CM/SEC
BH CV ECHO MEAS - MV E/A: 1.01
BH CV ECHO MEAS - MV MAX PG: 9.5 MMHG
BH CV ECHO MEAS - MV MEAN PG: 3.2 MMHG
BH CV ECHO MEAS - MV P1/2T: 85.3 MSEC
BH CV ECHO MEAS - MV V2 VTI: 47.2 CM
BH CV ECHO MEAS - MVA(P1/2T): 2.6 CM2
BH CV ECHO MEAS - MVA(VTI): 1.58 CM2
BH CV ECHO MEAS - PA ACC TIME: 0.15 SEC
BH CV ECHO MEAS - PA V2 MAX: 88.2 CM/SEC
BH CV ECHO MEAS - PULM A REVS DUR: 0.19 SEC
BH CV ECHO MEAS - PULM A REVS VEL: 18.6 CM/SEC
BH CV ECHO MEAS - PULM DIAS VEL: 44.9 CM/SEC
BH CV ECHO MEAS - PULM S/D: 1.73
BH CV ECHO MEAS - PULM SYS VEL: 77.8 CM/SEC
BH CV ECHO MEAS - QP/QS: 1.29
BH CV ECHO MEAS - RAP SYSTOLE: 3 MMHG
BH CV ECHO MEAS - RV MAX PG: 1.89 MMHG
BH CV ECHO MEAS - RV V1 MAX: 68.7 CM/SEC
BH CV ECHO MEAS - RV V1 VTI: 17.7 CM
BH CV ECHO MEAS - RVDD: 2.4 CM
BH CV ECHO MEAS - RVOT DIAM: 2.6 CM
BH CV ECHO MEAS - RVSP: 31.7 MMHG
BH CV ECHO MEAS - SUP REN AO DIAM: 1.8 CM
BH CV ECHO MEAS - SV(LVOT): 74.7 ML
BH CV ECHO MEAS - SV(MOD-SP2): 25 ML
BH CV ECHO MEAS - SV(MOD-SP4): 35 ML
BH CV ECHO MEAS - SV(RVOT): 96.2 ML
BH CV ECHO MEAS - SVI(LVOT): 39.4 ML/M2
BH CV ECHO MEAS - SVI(MOD-SP2): 13.2 ML/M2
BH CV ECHO MEAS - SVI(MOD-SP4): 18.4 ML/M2
BH CV ECHO MEAS - TAPSE (>1.6): 1.31 CM
BH CV ECHO MEAS - TR MAX PG: 28.7 MMHG
BH CV ECHO MEAS - TR MAX VEL: 267.9 CM/SEC
BH CV ECHO MEASUREMENTS AVERAGE E/E' RATIO: 18.35
BH CV XLRA - RV BASE: 3.1 CM
BH CV XLRA - RV LENGTH: 6.5 CM
BH CV XLRA - RV MID: 2.7 CM
BH CV XLRA - TDI S': 8.9 CM/SEC
LEFT ATRIUM VOLUME INDEX: 34.5 ML/M2
LV EF BIPLANE MOD: 62.5 %
SINUS: 3 CM
STJ: 2.7 CM

## 2025-03-21 PROCEDURE — 93306 TTE W/DOPPLER COMPLETE: CPT

## 2025-03-25 ENCOUNTER — RESULTS FOLLOW-UP (OUTPATIENT)
Dept: CARDIOLOGY | Facility: HOSPITAL | Age: 85
End: 2025-03-25
Payer: MEDICARE

## 2025-04-16 NOTE — PROGRESS NOTES
RM:________     PCP: Agustín Ivan MD    : 1940  AGE: 84 y.o.  EST PATIENT   REASON FOR VISIT/  CC:    Wt Readings from Last 3 Encounters:   25 76.2 kg (168 lb)   25 76.2 kg (168 lb)   24 71.2 kg (157 lb)      BP Readings from Last 3 Encounters:   25 (!) 199/91   25 124/72   10/15/24 157/81      WT: ____________ BP: __________L __________R HR______    ALLERGIES:Loratadine SMOKING HISTORY:  Social History     Tobacco Use    Smoking status: Never     Passive exposure: Never    Smokeless tobacco: Never    Tobacco comments:     caffeine use: 1 -2 cups daily.    Vaping Use    Vaping status: Never Used   Substance Use Topics    Alcohol use: No    Drug use: No     CAFFEINE USE_________________  ALCOHOL ______________________    Below is the patient's most recent value for Albumin, ALT, AST, BUN, Calcium, Chloride, Cholesterol, CO2, Creatinine, GFR, Glucose, HDL, Hematocrit, Hemoglobin, Hemoglobin A1C, LDL, Magnesium, Phosphorus, Platelets, Potassium, PSA, Sodium, Triglycerides, TSH and WBC.   Lab Results   Component Value Date    ALBUMIN 3.9 2024    ALT 45 (H) 2024    AST 71 (H) 2024    BUN 33 (H) 2024    CALCIUM 8.2 (L) 2024     2024    CHOL 124 2022    CO2 22.9 2024    CREATININE 1.44 (H) 2024    HDL 34 (L) 2022    HCT 31.8 (L) 2024    HGB 10.5 (L) 2024    HGBA1C 6.84 (H) 2024    LDL 68 2022    MG 2.1 2022    PHOS 5.0 (H) 2022     (L) 2024    K 3.7 2024    PSA 1.190 10/15/2020     (L) 2024    TRIG 124 2022    TSH 3.030 2019    WBC 7.15 2024          NEW DIAGNOSIS/ SURGERY/ HOSP OR ED VISITS: ______________________    __________________________________________________________________      RECENT LABS OR DIAGNOSTIC TESTING:   _____________________________    __________________________________________________________________      ASSESSMENT/ PLAN: _______________________________________________    __________________________________________________________________

## 2025-04-21 ENCOUNTER — TELEPHONE (OUTPATIENT)
Dept: CARDIOLOGY | Facility: CLINIC | Age: 85
End: 2025-04-21
Payer: MEDICARE

## 2025-04-21 DIAGNOSIS — I48.0 AF (PAROXYSMAL ATRIAL FIBRILLATION): Primary | ICD-10-CM

## 2025-04-21 NOTE — TELEPHONE ENCOUNTER
Patient came in the office stating that he needed a new INR order - he gets it checked in [facility].  Please advise.    Santhosh

## 2025-04-22 ENCOUNTER — TELEPHONE (OUTPATIENT)
Dept: CARDIOLOGY | Facility: CLINIC | Age: 85
End: 2025-04-22
Payer: MEDICARE

## 2025-04-22 NOTE — TELEPHONE ENCOUNTER
Patient LVM. Informed patient that JK put order in for INR yesterday. Verified appointment. Patient thankful,

## 2025-04-24 ENCOUNTER — LAB (OUTPATIENT)
Dept: LAB | Facility: HOSPITAL | Age: 85
End: 2025-04-24
Payer: MEDICARE

## 2025-04-24 DIAGNOSIS — I48.0 AF (PAROXYSMAL ATRIAL FIBRILLATION): ICD-10-CM

## 2025-04-24 LAB
INR PPP: 2.41 (ref 0.9–1.1)
PROTHROMBIN TIME: 27.1 SECONDS (ref 12.1–15)

## 2025-04-24 PROCEDURE — 85610 PROTHROMBIN TIME: CPT

## 2025-04-24 PROCEDURE — 36415 COLL VENOUS BLD VENIPUNCTURE: CPT

## 2025-04-25 ENCOUNTER — ANTICOAGULATION VISIT (OUTPATIENT)
Dept: PHARMACY | Facility: HOSPITAL | Age: 85
End: 2025-04-25
Payer: MEDICARE

## 2025-04-25 DIAGNOSIS — I48.0 AF (PAROXYSMAL ATRIAL FIBRILLATION): Primary | ICD-10-CM

## 2025-04-25 NOTE — PROGRESS NOTES
Anticoagulation Clinic Progress Note    Anticoagulation Summary  As of 2025      INR goal:  2.0-3.0   TTR:  56.8% (2.7 y)   INR used for dosin.41 (2025)   Warfarin maintenance plan:  8 mg every day   Weekly warfarin total:  56 mg   No change documented:  Miya Gan RPH   Plan last modified:  Miya Gan RPH (2024)   Next INR check:  2025   Target end date:  --    Indications    AF (paroxysmal atrial fibrillation) [I48.0]                 Anticoagulation Episode Summary       INR check location:  --    Preferred lab:  --    Send INR reminders to:   [facility]    Comments:  s/p AVR and CABG * [facility]*          Anticoagulation Care Providers       Provider Role Specialty Phone number    Helena Humphries, DEV Referring Cardiology [phone]    Khurram Ball MD Referring Cardiology [phone]            Clinic Interview:  Patient Findings     Negatives:  Signs/symptoms of thrombosis, Signs/symptoms of bleeding,   Laboratory test error suspected, Change in health, Change in alcohol use,   Change in activity, Upcoming invasive procedure, Emergency department   visit, Upcoming dental procedure, Missed doses, Extra doses, Change in   medications, Change in diet/appetite, Hospital admission, Bruising, Other   complaints      Clinical Outcomes     Negatives:  Major bleeding event, Thromboembolic event,   Anticoagulation-related hospital admission, Anticoagulation-related ED   visit, Anticoagulation-related fatality        INR History:      Latest Ref Rng & Units 1/15/2025     8:45 AM 2/3/2025     3:30 PM 2/3/2025     3:47 PM 3/10/2025    12:43 PM 3/10/2025     2:13 PM 2025     4:17 PM 2025     8:09 AM   Anticoagulation Monitoring   INR  2.19  2.08  2.60  2.41   INR Date  2025  2/3/2025  3/10/2025  2025   INR Goal  2.0-3.0  2.0-3.0  2.0-3.0  2.0-3.0   Trend  Same  Same  Same  Same   Last Week Total  56 mg  56 mg  56 mg  56 mg   Next  Week Total  56 mg  56 mg  56 mg  56 mg   Sun  8 mg  8 mg  8 mg  8 mg   Mon  8 mg  8 mg  8 mg  8 mg   Tue  8 mg  8 mg  8 mg  8 mg   Wed  8 mg  8 mg  8 mg  8 mg   Thu  8 mg  8 mg  8 mg  8 mg   Fri  8 mg  8 mg  8 mg  8 mg   Sat  8 mg  8 mg  8 mg  8 mg   Historical INR 0.90 - 1.10  2.08   2.60   2.41         Plan:  1. INR is Therapeutic today- see above in Anticoagulation Summary.   Will instruct Woo Dobbs to Continue their warfarin regimen- see above in Anticoagulation Summary.  2. Follow up in 4 weeks  3. They have been instructed to call if any changes in medications, doses, concerns, etc. Patient expresses understanding and has no further questions at this time.    Miya Gan [facility]

## 2025-04-29 ENCOUNTER — OFFICE VISIT (OUTPATIENT)
Dept: CARDIOLOGY | Facility: CLINIC | Age: 85
End: 2025-04-29
Payer: MEDICARE

## 2025-04-29 VITALS
HEIGHT: 68 IN | SYSTOLIC BLOOD PRESSURE: 132 MMHG | BODY MASS INDEX: 25.54 KG/M2 | HEART RATE: 64 BPM | DIASTOLIC BLOOD PRESSURE: 82 MMHG

## 2025-04-29 DIAGNOSIS — I48.0 AF (PAROXYSMAL ATRIAL FIBRILLATION): ICD-10-CM

## 2025-04-29 DIAGNOSIS — I10 PRIMARY HYPERTENSION: ICD-10-CM

## 2025-04-29 DIAGNOSIS — E78.2 MIXED HYPERLIPIDEMIA: ICD-10-CM

## 2025-04-29 DIAGNOSIS — Z95.3 HISTORY OF AORTIC VALVE REPLACEMENT WITH BIOPROSTHETIC VALVE: ICD-10-CM

## 2025-04-29 DIAGNOSIS — I25.810 CORONARY ARTERY DISEASE INVOLVING CORONARY BYPASS GRAFT OF NATIVE HEART WITHOUT ANGINA PECTORIS: Primary | ICD-10-CM

## 2025-04-29 PROBLEM — R79.89 ELEVATED TROPONIN LEVEL: Status: RESOLVED | Noted: 2024-09-29 | Resolved: 2025-04-29

## 2025-04-29 PROBLEM — R79.89 TROPONIN LEVEL ELEVATED: Status: RESOLVED | Noted: 2024-09-28 | Resolved: 2025-04-29

## 2025-04-29 PROCEDURE — 1159F MED LIST DOCD IN RCRD: CPT | Performed by: INTERNAL MEDICINE

## 2025-04-29 PROCEDURE — 99214 OFFICE O/P EST MOD 30 MIN: CPT | Performed by: INTERNAL MEDICINE

## 2025-04-29 PROCEDURE — 3079F DIAST BP 80-89 MM HG: CPT | Performed by: INTERNAL MEDICINE

## 2025-04-29 PROCEDURE — 3075F SYST BP GE 130 - 139MM HG: CPT | Performed by: INTERNAL MEDICINE

## 2025-04-29 PROCEDURE — 1160F RVW MEDS BY RX/DR IN RCRD: CPT | Performed by: INTERNAL MEDICINE

## 2025-04-29 NOTE — PROGRESS NOTES
Date of Office Visit: 25  Encounter Provider: Khurram Ball MD  Place of Service: [facility] CARDIOLOGY  Patient Name: Woo Dobbs  :1940    Chief Complaint   Patient presents with    Coronary Artery Disease     Coronary artery disease involving coronary bypass graft of native heart without angina pectoris         HPI:     Mr. Dobbs is 84 y.o. and presents today in follow up. I have reviewed prior notes and there are no changes except for any new updates described below. I have also reviewed any information entered into the medical record by the patient or by ancillary staff.     He was initially evaluated in ; his PCP had noted a murmur. An echo showed aortic valve calcification without stenosis. He was then lost to follow up. He was referred back to us in ; he reported progressive dyspnea. An echo showed normal LVSF and mod-severe AS. A stress test showed LAD ischemia.  Coronary angiography revealed severe disease. He underwent CABG x 3 and tissue AVR in 2022.  He had recurrent postoperative atrial fibrillation in recovery; he was discharged on amiodarone and warfarin. We eventually stopped amiodarone.     An echo in 2023 showed normal LVSF and normal AVR function.     He presented in 2024 with profound fatigue and atypical chest pain. A perfusion stress test was normal. An echo revealed normal LVSF, mild functional MS, and normal MVR function. I referred him to sleep medicine but it doesn't look like he scheduled a sleep study.    He was seen in 2025; he reported slight weight gain and dyspnea. He was started on furosemide. He now uses that PRN.    He is very stable; he has chronic fatigue but no chest pain or SOA.     Past Medical History:   Diagnosis Date    Aortic stenosis     2022: tissue AVR (25mm MDT Avalus bovine pericardial)    Asthma     BPH (benign prostatic hyperplasia)     CAD (coronary artery disease)     2022: 95%  prox/50% distal LAD, 80% D1, 30-40% Cx, diffuse dz RCA. CABG x 3 8/2022: LIMA-LAD, SVG-D1, SVG-RCA    Carotid atherosclerosis, bilateral     8/2022: 16-49% bilaterally    Colon polyp     Fall 09/28/2024    GERD (gastroesophageal reflux disease)     Hyperlipidemia     Hypertension     Hypothyroidism     Paroxysmal atrial fibrillation     Type 2 diabetes mellitus     Warfarin anticoagulation        Past Surgical History:   Procedure Laterality Date    CARDIAC CATHETERIZATION N/A 7/14/2022    Procedure: Coronary angiography;  Surgeon: Unique Calabrese MD;  Location: [facility] CATH INVASIVE LOCATION;  Service: Cardiovascular;  Laterality: N/A;    CARDIAC CATHETERIZATION N/A 7/14/2022    Procedure: Left heart cath with simultaneous LV/Ao pressures;  Surgeon: Unique Calabrese MD;  Location: [facility] CATH INVASIVE LOCATION;  Service: Cardiovascular;  Laterality: N/A;    CARDIAC CATHETERIZATION N/A 7/14/2022    Procedure: Right Heart Cath;  Surgeon: Unique Calabrese MD;  Location: [facility] CATH INVASIVE LOCATION;  Service: Cardiovascular;  Laterality: N/A;    COLONOSCOPY      CORONARY ARTERY BYPASS GRAFT WITH AORTIC VALVE REPAIR/REPLACEMENT N/A 8/8/2022    Procedure: KEVIN STERNOTOMY CORONARY ARTERY BYPASS GRAFT TIMES 3 USING LEFT INTERNAL MAMMARY ARTERY AND LEFT GREATER SAPHENOUS VEIN GRAFT PER ENDOSCOPIC VEIN HARVESTING, AORTIC VALVE REPLACEMENT AND PRP;  Surgeon: Jr Gentry Courtney MD;  Location: [facility];  Service: Cardiothoracic;  Laterality: N/A;    CYSTOSCOPY BLADDER BIOPSY N/A 3/18/2024    Procedure: CYSTOSCOPY BLADDER BIOPSY;  Surgeon: Nimesh Arvizu MD;  Location: [facility] OR;  Service: Urology;  Laterality: N/A;    CYSTOSCOPY RETROGRADE PYELOGRAM Bilateral 3/18/2024    Procedure: bilateral retrogrades and cytology;  Surgeon: Nimesh Arvizu MD;  Location: [facility] OR;  Service: Urology;  Laterality: Bilateral;    TRANSURETHRAL RESECTION OF BLADDER TUMOR N/A 1/23/2023    Procedure: TRANSURETHRAL RESECTION OF SMALL  BLADDER TUMOR;  Surgeon: Nimesh Arvizu MD;  Location: [facility];  Service: Urology;  Laterality: N/A;    VASECTOMY         Social History     Socioeconomic History    Marital status:     Number of children: 3   Tobacco Use    Smoking status: Never     Passive exposure: Never    Smokeless tobacco: Never    Tobacco comments:     caffeine use: 1 -2 cups daily.    Vaping Use    Vaping status: Never Used   Substance and Sexual Activity    Alcohol use: No    Drug use: No    Sexual activity: Defer       Family History   Problem Relation Age of Onset    Colon cancer Neg Hx     Colon polyps Neg Hx     Malig Hyperthermia Neg Hx        Review of Systems   Constitutional: Positive for malaise/fatigue.   HENT:  Positive for hearing loss.    Cardiovascular:  Positive for dyspnea on exertion.   Musculoskeletal:  Positive for arthritis, gout, joint pain, joint swelling and myalgias.   Neurological:  Positive for excessive daytime sleepiness.   All other systems reviewed and are negative.      Allergies   Allergen Reactions    Loratadine Other (See Comments)     Emotional side effects.          Current Outpatient Medications:     atenolol (TENORMIN) 25 MG tablet, Take 1 tablet by mouth twice daily, Disp: 120 tablet, Rfl: 0    atorvastatin (LIPITOR) 40 MG tablet, TAKE 1 TABLET BY MOUTH ONCE DAILY AT NIGHT, Disp: 90 tablet, Rfl: 0    ferrous sulfate 325 (65 FE) MG EC tablet, Take 1 tablet by mouth Daily With Breakfast., Disp: , Rfl:     glucose blood test strip, Test blood sugar Twice daily, Disp: 200 each, Rfl: 12    glucose monitor monitoring kit, 1 each As Needed (Diabetes 2)., Disp: 1 each, Rfl: 0    Lancets misc, 1 each 3 (Three) Times a Day Before Meals., Disp: 200 each, Rfl: 5    levothyroxine (SYNTHROID, LEVOTHROID) 50 MCG tablet, Take 1 tablet by mouth Daily., Disp: , Rfl:     losartan (COZAAR) 50 MG tablet, Take 1 tablet by mouth Daily., Disp: , Rfl:     nitroglycerin (Nitrostat) 0.4 MG SL tablet, Place 1 tablet  "under the tongue Every 5 (Five) Minutes As Needed for Chest Pain. Take no more than 3 doses in 15 minutes., Disp: 30 tablet, Rfl: 1    omeprazole (priLOSEC) 40 MG capsule, Take 1 capsule by mouth 3 (Three) Times a Week if Needed (Heartburn). (Patient taking differently: Take 1 capsule by mouth Daily.), Disp: 90 capsule, Rfl: 3    Polyvinyl Alcohol-Povidone PF (ARTIFICIAL TEARS) 1.4-0.6 % ophthalmic solution, Administer 1 drop to both eyes Every 1 (One) Hour As Needed (Dry eyes)., Disp: , Rfl:     tamsulosin (FLOMAX) 0.4 MG capsule 24 hr capsule, Take 1 capsule by mouth Daily. (Patient taking differently: Take 2 capsules by mouth Daily.), Disp: 30 capsule, Rfl: 0    furosemide (LASIX) 20 MG tablet, Take 1 tablet by mouth Daily. (Patient not taking: Reported on 4/29/2025), Disp: , Rfl:     HYDROcodone-acetaminophen (NORCO) 5-325 MG per tablet, Take 1 tablet by mouth Every 4 (Four) Hours As Needed for Moderate Pain. (Patient not taking: Reported on 4/29/2025), Disp: 10 tablet, Rfl: 0    potassium chloride (MICRO-K) 10 MEQ CR capsule, Take 1 capsule by mouth Daily. with food (Patient not taking: Reported on 4/29/2025), Disp: , Rfl:     warfarin (COUMADIN) 4 MG tablet, Take 2 tablets by mouth Every Night., Disp: 180 tablet, Rfl: 0      Objective:     Vitals:    04/29/25 1317   BP: 132/82   Pulse: 64   Height: 172.7 cm (68\")     Body mass index is 25.54 kg/m².    Vitals reviewed.   Constitutional:       Appearance: Healthy appearance. Well-developed.   Eyes:      Conjunctiva/sclera: Conjunctivae normal.   HENT:      Head: Normocephalic.      Nose: Nose normal.   Neck:      Thyroid: Thyroid normal.      Vascular: No JVD. JVD normal.      Lymphadenopathy: No cervical adenopathy.   Pulmonary:      Effort: Pulmonary effort is normal.      Breath sounds: Normal breath sounds.   Cardiovascular:      Normal rate. Regular rhythm.      Murmurs: There is a grade 1/6 systolic murmur.   Pulses:     Intact distal pulses.   Edema:     " Pretibial: bilateral trace edema of the pretibial area.     Ankle: bilateral trace edema of the ankle.  Abdominal:      Palpations: Abdomen is soft.      Tenderness: There is no abdominal tenderness.   Musculoskeletal: Normal range of motion.      Cervical back: Normal range of motion. Skin:     General: Skin is warm and dry.   Neurological:      General: No focal deficit present.      Mental Status: Oriented to person, place, and time.      Cranial Nerves: No cranial nerve deficit.   Psychiatric:         Behavior: Behavior normal.         Thought Content: Thought content normal.         Judgment: Judgment normal.       Procedures    Assessment:       Diagnosis Plan   1. Coronary artery disease involving coronary bypass graft of native heart without angina pectoris        2. Mixed hyperlipidemia        3. History of aortic valve replacement with bioprosthetic valve        4. AF (paroxysmal atrial fibrillation)        5. Primary hypertension             Plan:     1/2. He is s/p CABG; he had a normal perfusion stress in 2024. He's on warfarin and atorvastatin.     3. His AVR was working well in 2024. He'll need another echo in 2027.     4. He had recurrent atrial fibrillation in the postoperative period. He was discharged on amiodarone and atenolol. I stopped amiodarone due to fatigue and low HR. He should remain on warfarin as his CHADSVASc = 5.     5. His BP is within goal for age. I agree with him using furosemide PRN for pedal edema.     Sincerely,       Khurram Ball MD

## 2025-05-27 ENCOUNTER — LAB (OUTPATIENT)
Dept: LAB | Facility: HOSPITAL | Age: 85
End: 2025-05-27
Payer: MEDICARE

## 2025-05-27 ENCOUNTER — ANTICOAGULATION VISIT (OUTPATIENT)
Dept: PHARMACY | Facility: HOSPITAL | Age: 85
End: 2025-05-27
Payer: MEDICARE

## 2025-05-27 DIAGNOSIS — I48.0 AF (PAROXYSMAL ATRIAL FIBRILLATION): Primary | ICD-10-CM

## 2025-05-27 DIAGNOSIS — I48.0 AF (PAROXYSMAL ATRIAL FIBRILLATION): ICD-10-CM

## 2025-05-27 LAB
INR PPP: 2.8 (ref 0.9–1.1)
PROTHROMBIN TIME: 30.5 SECONDS (ref 12.1–15)

## 2025-05-27 PROCEDURE — 36415 COLL VENOUS BLD VENIPUNCTURE: CPT

## 2025-05-27 PROCEDURE — 85610 PROTHROMBIN TIME: CPT

## 2025-05-28 NOTE — PROGRESS NOTES
Anticoagulation Clinic Progress Note    Anticoagulation Summary  As of 2025      INR goal:  2.0-3.0   TTR:  58.2% (2.8 y)   INR used for dosin.80 (2025)   Warfarin maintenance plan:  8 mg every day   Weekly warfarin total:  56 mg   No change documented:  Miya Gan RPH   Plan last modified:  Miya Gan RPH (2024)   Next INR check:  2025   Target end date:  --    Indications    AF (paroxysmal atrial fibrillation) [I48.0]                 Anticoagulation Episode Summary       INR check location:  --    Preferred lab:  --    Send INR reminders to:   [facility]    Comments:  s/p AVR and CABG * [facility]*          Anticoagulation Care Providers       Provider Role Specialty Phone number    Helena Humphries, DEV Referring Cardiology [phone]    Khurram Ball MD Referring Cardiology [phone]            Clinic Interview:  Patient Findings     Negatives:  Signs/symptoms of thrombosis, Signs/symptoms of bleeding,   Laboratory test error suspected, Change in health, Change in alcohol use,   Change in activity, Upcoming invasive procedure, Emergency department   visit, Upcoming dental procedure, Missed doses, Extra doses, Change in   medications, Change in diet/appetite, Hospital admission, Bruising, Other   complaints      Clinical Outcomes     Negatives:  Major bleeding event, Thromboembolic event,   Anticoagulation-related hospital admission, Anticoagulation-related ED   visit, Anticoagulation-related fatality        INR History:      Latest Ref Rng & Units 2/3/2025     3:47 PM 3/10/2025    12:43 PM 3/10/2025     2:13 PM 2025     4:17 PM 2025     8:09 AM 2025     3:40 PM 2025     4:20 PM   Anticoagulation Monitoring   INR  2.08  2.60  2.41  2.80   INR Date  2/3/2025  3/10/2025  2025  2025   INR Goal  2.0-3.0  2.0-3.0  2.0-3.0  2.0-3.0   Trend  Same  Same  Same  Same   Last Week Total  56 mg  56 mg  56 mg  56 mg   Next  Week Total  56 mg  56 mg  56 mg  56 mg   Sun  8 mg  8 mg  8 mg  8 mg   Mon  8 mg  8 mg  8 mg  8 mg   Tue  8 mg  8 mg  8 mg  8 mg   Wed  8 mg  8 mg  8 mg  8 mg   Thu  8 mg  8 mg  8 mg  8 mg   Fri  8 mg  8 mg  8 mg  8 mg   Sat  8 mg  8 mg  8 mg  8 mg   Historical INR 0.90 - 1.10  2.60   2.41   2.80         Plan:  1. INR is Therapeutic today- see above in Anticoagulation Summary.   Will instruct Woo Dobbs to Continue their warfarin regimen- see above in Anticoagulation Summary.  2. Follow up in 6 weeks  3. They have been instructed to call if any changes in medications, doses, concerns, etc. Patient expresses understanding and has no further questions at this time.    Miya Gan [facility]

## 2025-07-16 ENCOUNTER — LAB (OUTPATIENT)
Dept: LAB | Facility: HOSPITAL | Age: 85
End: 2025-07-16
Payer: MEDICARE

## 2025-07-16 ENCOUNTER — ANTICOAGULATION VISIT (OUTPATIENT)
Dept: PHARMACY | Facility: HOSPITAL | Age: 85
End: 2025-07-16
Payer: MEDICARE

## 2025-07-16 DIAGNOSIS — I48.0 AF (PAROXYSMAL ATRIAL FIBRILLATION): ICD-10-CM

## 2025-07-16 DIAGNOSIS — I48.0 AF (PAROXYSMAL ATRIAL FIBRILLATION): Primary | ICD-10-CM

## 2025-07-16 LAB
INR PPP: 1.73 (ref 0.9–1.1)
PROTHROMBIN TIME: 20.8 SECONDS (ref 12.1–15)

## 2025-07-16 PROCEDURE — 85610 PROTHROMBIN TIME: CPT

## 2025-07-16 PROCEDURE — 36415 COLL VENOUS BLD VENIPUNCTURE: CPT

## 2025-07-17 NOTE — PROGRESS NOTES
Anticoagulation Clinic Progress Note    Anticoagulation Summary  As of 2025      INR goal:  2.0-3.0   TTR:  59.0% (2.9 y)   INR used for dosin.73 (2025)   Warfarin maintenance plan:  8 mg every day   Weekly warfarin total:  56 mg   Plan last modified:  Miya Gan RPH (2024)   Next INR check:  2025   Target end date:  --    Indications    AF (paroxysmal atrial fibrillation) [I48.0]                 Anticoagulation Episode Summary       INR check location:  --    Preferred lab:  --    [facility]ers [facility]:   [facility]    Comments:  s/p AVR and CABG *[facility]*          Anticoagulation Care Providers       Provider Role Specialty Phone number    Helena Humphries, APRN Referring Cardiology [phone]    Khurram Ball MD Referring Cardiology [phone]            Clinic Interview:  Patient Findings     Negatives:  Signs/symptoms of thrombosis, Signs/symptoms of bleeding,   Laboratory test error suspected, Change in health, Change in alcohol use,   Change in activity, Upcoming invasive procedure, Emergency department   visit, Upcoming dental procedure, Missed doses, Extra doses, Change in   medications, Change in diet/appetite, Hospital admission, Bruising, Other   complaints    Comments:  Unsuccessful reaching by phone until 25.      Clinical Outcomes     Negatives:  Major bleeding event, Thromboembolic event,   Anticoagulation-related hospital admission, Anticoagulation-related ED   visit, Anticoagulation-related fatality    Comments:  Unsuccessful reaching by phone until 25.        INR History:      Latest Ref Rng & Units 3/10/2025     2:13 PM 2025     4:17 PM 2025     8:09 AM 2025     3:40 PM 2025     4:20 PM 2025     3:42 PM 2025     4:04 PM   Anticoagulation Monitoring   INR  2.60  2.41  2.80  1.73   INR Date  3/10/2025  2025  2025  2025   INR Goal  2.0-3.0  2.0-3.0  2.0-3.0  2.0-3.0   Trend   Same  Same  Same  Same   Last Week Total  56 mg  56 mg  56 mg  56 mg   Next Week Total  56 mg  56 mg  56 mg  60 mg   Sun  8 mg  8 mg  8 mg  8 mg   Mon  8 mg  8 mg  8 mg  8 mg   Tue  8 mg  8 mg  8 mg  8 mg   Wed  8 mg  8 mg  8 mg  8 mg   Thu  8 mg  8 mg  8 mg  12 mg (7/17); Otherwise 8 mg   Fri  8 mg  8 mg  8 mg  8 mg   Sat  8 mg  8 mg  8 mg  8 mg   Historical INR 0.90 - 1.10  2.41   2.80   1.73         Plan:  1. INR is Subtherapeutic today- see above in Anticoagulation Summary. As above, unsuccessful reaching by phone until 7/17/25.  Will instruct Woo Dobbs to Change their warfarin regimen (12 mg x 1 day, then resume 8 mg daily) - see above in Anticoagulation Summary.  2. Follow up in 2 weeks.  3. They have been instructed to call if any changes in medications, doses, concerns, etc. Patient expresses understanding and has no further questions at this time.    Moisés Morales, PharmD

## 2025-07-27 ENCOUNTER — APPOINTMENT (OUTPATIENT)
Dept: GENERAL RADIOLOGY | Facility: HOSPITAL | Age: 85
End: 2025-07-27
Payer: MEDICARE

## 2025-07-27 ENCOUNTER — HOSPITAL ENCOUNTER (EMERGENCY)
Facility: HOSPITAL | Age: 85
Discharge: HOME | End: 2025-07-27
Attending: STUDENT IN AN ORGANIZED HEALTH CARE EDUCATION/TRAINING PROGRAM | Admitting: STUDENT IN AN ORGANIZED HEALTH CARE EDUCATION/TRAINING PROGRAM
Payer: MEDICARE

## 2025-07-27 ENCOUNTER — APPOINTMENT (OUTPATIENT)
Dept: CT IMAGING | Facility: HOSPITAL | Age: 85
End: 2025-07-27
Payer: MEDICARE

## 2025-07-27 VITALS
SYSTOLIC BLOOD PRESSURE: 183 MMHG | OXYGEN SATURATION: 96 % | TEMPERATURE: 97.9 F | WEIGHT: 170 LBS | RESPIRATION RATE: 16 BRPM | HEART RATE: 62 BPM | HEIGHT: 68 IN | DIASTOLIC BLOOD PRESSURE: 90 MMHG | BODY MASS INDEX: 25.76 KG/M2

## 2025-07-27 DIAGNOSIS — K59.00 CONSTIPATION, UNSPECIFIED CONSTIPATION TYPE: Primary | ICD-10-CM

## 2025-07-27 LAB
ALBUMIN SERPL-MCNC: 3.6 G/DL (ref 3.5–5.2)
ALBUMIN/GLOB SERPL: 0.8 G/DL
ALP SERPL-CCNC: 77 U/L (ref 39–117)
ALT SERPL W P-5'-P-CCNC: 12 U/L (ref 1–41)
ANION GAP SERPL CALCULATED.3IONS-SCNC: 9.2 MMOL/L (ref 5–15)
AST SERPL-CCNC: 17 U/L (ref 1–40)
BASOPHILS # BLD AUTO: 0.06 10*3/MM3 (ref 0–0.2)
BASOPHILS NFR BLD AUTO: 0.6 % (ref 0–1.5)
BILIRUB SERPL-MCNC: 0.5 MG/DL (ref 0–1.2)
BUN SERPL-MCNC: 17.5 MG/DL (ref 8–23)
BUN/CREAT SERPL: 16.7 (ref 7–25)
CALCIUM SPEC-SCNC: 9.1 MG/DL (ref 8.6–10.5)
CHLORIDE SERPL-SCNC: 95 MMOL/L (ref 98–107)
CO2 SERPL-SCNC: 24.8 MMOL/L (ref 22–29)
CREAT SERPL-MCNC: 1.05 MG/DL (ref 0.76–1.27)
DEPRECATED RDW RBC AUTO: 43.3 FL (ref 37–54)
EGFRCR SERPLBLD CKD-EPI 2021: 70 ML/MIN/1.73
EOSINOPHIL # BLD AUTO: 0.05 10*3/MM3 (ref 0–0.4)
EOSINOPHIL NFR BLD AUTO: 0.5 % (ref 0.3–6.2)
ERYTHROCYTE [DISTWIDTH] IN BLOOD BY AUTOMATED COUNT: 13.3 % (ref 12.3–15.4)
GLOBULIN UR ELPH-MCNC: 4.3 GM/DL
GLUCOSE SERPL-MCNC: 250 MG/DL (ref 65–99)
HCT VFR BLD AUTO: 41.3 % (ref 37.5–51)
HGB BLD-MCNC: 14 G/DL (ref 13–17.7)
HOLD SPECIMEN: NORMAL
HOLD SPECIMEN: NORMAL
IMM GRANULOCYTES # BLD AUTO: 0.03 10*3/MM3 (ref 0–0.05)
IMM GRANULOCYTES NFR BLD AUTO: 0.3 % (ref 0–0.5)
LYMPHOCYTES # BLD AUTO: 1.45 10*3/MM3 (ref 0.7–3.1)
LYMPHOCYTES NFR BLD AUTO: 14.9 % (ref 19.6–45.3)
MCH RBC QN AUTO: 30.4 PG (ref 26.6–33)
MCHC RBC AUTO-ENTMCNC: 33.9 G/DL (ref 31.5–35.7)
MCV RBC AUTO: 89.8 FL (ref 79–97)
MONOCYTES # BLD AUTO: 0.36 10*3/MM3 (ref 0.1–0.9)
MONOCYTES NFR BLD AUTO: 3.7 % (ref 5–12)
NEUTROPHILS NFR BLD AUTO: 7.79 10*3/MM3 (ref 1.7–7)
NEUTROPHILS NFR BLD AUTO: 80 % (ref 42.7–76)
NRBC BLD AUTO-RTO: 0 /100 WBC (ref 0–0.2)
PLATELET # BLD AUTO: 247 10*3/MM3 (ref 140–450)
PMV BLD AUTO: 9.5 FL (ref 6–12)
POTASSIUM SERPL-SCNC: 4.1 MMOL/L (ref 3.5–5.2)
PROT SERPL-MCNC: 7.9 G/DL (ref 6–8.5)
RBC # BLD AUTO: 4.6 10*6/MM3 (ref 4.14–5.8)
SODIUM SERPL-SCNC: 129 MMOL/L (ref 136–145)
WBC NRBC COR # BLD AUTO: 9.74 10*3/MM3 (ref 3.4–10.8)
WHOLE BLOOD HOLD COAG: NORMAL
WHOLE BLOOD HOLD SPECIMEN: NORMAL

## 2025-07-27 PROCEDURE — 25010000002 KETOROLAC TROMETHAMINE PER 15 MG: Performed by: STUDENT IN AN ORGANIZED HEALTH CARE EDUCATION/TRAINING PROGRAM

## 2025-07-27 PROCEDURE — 74018 RADEX ABDOMEN 1 VIEW: CPT

## 2025-07-27 PROCEDURE — 80053 COMPREHEN METABOLIC PANEL: CPT

## 2025-07-27 PROCEDURE — 74176 CT ABD & PELVIS W/O CONTRAST: CPT

## 2025-07-27 PROCEDURE — 99284 EMERGENCY DEPT VISIT MOD MDM: CPT | Performed by: STUDENT IN AN ORGANIZED HEALTH CARE EDUCATION/TRAINING PROGRAM

## 2025-07-27 PROCEDURE — 85025 COMPLETE CBC W/AUTO DIFF WBC: CPT

## 2025-07-27 PROCEDURE — 96374 THER/PROPH/DIAG INJ IV PUSH: CPT

## 2025-07-27 RX ORDER — KETOROLAC TROMETHAMINE 30 MG/ML
15 INJECTION, SOLUTION INTRAMUSCULAR; INTRAVENOUS ONCE
Status: COMPLETED | OUTPATIENT
Start: 2025-07-27 | End: 2025-07-27

## 2025-07-27 RX ORDER — POLYETHYLENE GLYCOL 3350 17 G/17G
17 POWDER, FOR SOLUTION ORAL DAILY
Qty: 1530 G | Refills: 0 | Status: SHIPPED | OUTPATIENT
Start: 2025-07-27 | End: 2025-07-29

## 2025-07-27 RX ADMIN — KETOROLAC TROMETHAMINE 15 MG: 30 INJECTION INTRAMUSCULAR; INTRAVENOUS at 16:08

## 2025-07-27 NOTE — ED PROVIDER NOTES
Subjective   History of Present Illness  84-year-old male presents emergency room with abdominal pain.  Patient states he is also having some back pain.  He was concerned due to symptoms therefore came to the emergency department.  He was recently started on ciprofloxacin for a potential abdominal infection.  He denies any fevers, chills, nausea, vomiting, shortness of breath, chest pain.        Review of Systems   All other systems reviewed and are negative.      Past Medical History:   Diagnosis Date    Aortic stenosis     8/2022: tissue AVR (25mm MDT Avalus bovine pericardial)    Asthma     BPH (benign prostatic hyperplasia)     CAD (coronary artery disease)     7/2022: 95% prox/50% distal LAD, 80% D1, 30-40% Cx, diffuse dz RCA. CABG x 3 8/2022: LIMA-LAD, SVG-D1, SVG-RCA    Carotid atherosclerosis, bilateral     8/2022: 16-49% bilaterally    Colon polyp     Fall 09/28/2024    GERD (gastroesophageal reflux disease)     Hyperlipidemia     Hypertension     Hypothyroidism     Paroxysmal atrial fibrillation     Type 2 diabetes mellitus     Warfarin anticoagulation        Allergies   Allergen Reactions    Loratadine Other (See Comments)     Emotional side effects.        Past Surgical History:   Procedure Laterality Date    CARDIAC CATHETERIZATION N/A 7/14/2022    Procedure: Coronary angiography;  Surgeon: Unique Calabrese MD;  Location: [facility] CATH INVASIVE LOCATION;  Service: Cardiovascular;  Laterality: N/A;    CARDIAC CATHETERIZATION N/A 7/14/2022    Procedure: Left heart cath with simultaneous LV/Ao pressures;  Surgeon: Unique Calabrese MD;  Location:  [facility] CATH INVASIVE LOCATION;  Service: Cardiovascular;  Laterality: N/A;    CARDIAC CATHETERIZATION N/A 7/14/2022    Procedure: Right Heart Cath;  Surgeon: Unique Calabrese MD;  Location: [facility] CATH INVASIVE LOCATION;  Service: Cardiovascular;  Laterality: N/A;    COLONOSCOPY      CORONARY ARTERY BYPASS GRAFT WITH AORTIC VALVE REPAIR/REPLACEMENT N/A 8/8/2022     Procedure: KEVIN STERNOTOMY CORONARY ARTERY BYPASS GRAFT TIMES 3 USING LEFT INTERNAL MAMMARY ARTERY AND LEFT GREATER SAPHENOUS VEIN GRAFT PER ENDOSCOPIC VEIN HARVESTING, AORTIC VALVE REPLACEMENT AND PRP;  Surgeon: Jr Gentry Courtney MD;  Location: [facility] CVOR;  Service: Cardiothoracic;  Laterality: N/A;    CYSTOSCOPY BLADDER BIOPSY N/A 3/18/2024    Procedure: CYSTOSCOPY BLADDER BIOPSY;  Surgeon: Nimesh Arvizu MD;  Location:  [facility] OR;  Service: Urology;  Laterality: N/A;    CYSTOSCOPY RETROGRADE PYELOGRAM Bilateral 3/18/2024    Procedure: bilateral retrogrades and cytology;  Surgeon: Nimesh Arvizu MD;  Location: [facility] OR;  Service: Urology;  Laterality: Bilateral;    TRANSURETHRAL RESECTION OF BLADDER TUMOR N/A 1/23/2023    Procedure: TRANSURETHRAL RESECTION OF SMALL BLADDER TUMOR;  Surgeon: Nimesh Arvizu MD;  Location: [facility] OR;  Service: Urology;  Laterality: N/A;    VASECTOMY         Family History   Problem Relation Age of Onset    Colon cancer Neg Hx     Colon polyps Neg Hx     Malig Hyperthermia Neg Hx        Social History     Socioeconomic History    Marital status:     Number of children: 3   Tobacco Use    Smoking status: Never     Passive exposure: Never    Smokeless tobacco: Never    Tobacco comments:     caffeine use: 1 -2 cups daily.    Vaping Use    Vaping status: Never Used   Substance and Sexual Activity    Alcohol use: No    Drug use: No    Sexual activity: Defer           Objective   Physical Exam  Constitutional:       Appearance: Normal appearance.   HENT:      Head: Normocephalic.      Mouth/Throat:      Mouth: Mucous membranes are moist.   Eyes:      Pupils: Pupils are equal, round, and reactive to light.   Cardiovascular:      Rate and Rhythm: Normal rate and regular rhythm.   Pulmonary:      Effort: Pulmonary effort is normal.   Abdominal:      Palpations: Abdomen is soft.      Tenderness: There is generalized abdominal tenderness.   Musculoskeletal:         General:  Normal range of motion.      Cervical back: Normal range of motion.   Neurological:      General: No focal deficit present.      Mental Status: He is alert.   Psychiatric:         Mood and Affect: Mood normal.         Procedures           ED Course                                                       Medical Decision Making  84-year-old male presents emergency room with abdominal pain.  Patient states he is also having some back pain.  He was concerned due to symptoms therefore came to the emergency department.  He was recently started on ciprofloxacin for a potential abdominal infection.  He denies any fevers, chills, nausea, vomiting, shortness of breath, chest pain.  Vitals within normal limits upon arrival.  Differential diagnosis includes cholecystitis, appendicitis, pancreatitis, diverticulitis, constipation, gastritis, among others.  CT imaging consistent with constipation.  Patient given a prescription for MiraLAX, and magnesium citrate for home use.  He is advised to follow-up with his primary care physician and to return to the emergency department for any worsening symptoms.    Problems Addressed:  Constipation, unspecified constipation type: complicated acute illness or injury    Amount and/or Complexity of Data Reviewed  Independent Historian: spouse  Labs: ordered.  Radiology: ordered.    Risk  OTC drugs.  Prescription drug management.        Final diagnoses:   Constipation, unspecified constipation type       ED Disposition  ED Disposition       ED Disposition   Discharge    Condition   Stable    Comment   --               Agustín Ivan MD  [address] [facility] [address] [phone]    In 1 week           Medication List        New Prescriptions      magnesium citrate solution  Take 148 mL by mouth 1 (One) Time for 1 dose.     polyethylene glycol 17 GM/SCOOP powder  Commonly known as: MIRALAX  Take 17 g by mouth Daily.            Changed      omeprazole 40 MG capsule  Commonly known as:  priLOSEC  Take 1 capsule by mouth 3 (Three) Times a Week if Needed (Heartburn).  What changed: when to take this     tamsulosin 0.4 MG capsule 24 hr capsule  Commonly known as: FLOMAX  Take 1 capsule by mouth Daily.  What changed: how much to take               Where to Get Your Medications        These medications were sent to [id number] - [place] - [address] - [phone]  - [phone]   [address] [place] [address]      Phone: [phone]   magnesium citrate solution  polyethylene glycol 17 GM/SCOOP powder            Xavier Hills, DO  07/27/25 1556       Xavier Hills, DO  07/27/25 1559

## 2025-07-29 ENCOUNTER — HOSPITAL ENCOUNTER (EMERGENCY)
Facility: HOSPITAL | Age: 85
Discharge: HOME | End: 2025-07-29
Attending: EMERGENCY MEDICINE | Admitting: EMERGENCY MEDICINE
Payer: MEDICARE

## 2025-07-29 VITALS
SYSTOLIC BLOOD PRESSURE: 184 MMHG | HEIGHT: 68 IN | TEMPERATURE: 98 F | BODY MASS INDEX: 25.76 KG/M2 | WEIGHT: 169.97 LBS | OXYGEN SATURATION: 96 % | RESPIRATION RATE: 17 BRPM | DIASTOLIC BLOOD PRESSURE: 91 MMHG | HEART RATE: 67 BPM

## 2025-07-29 DIAGNOSIS — K59.00 CONSTIPATION, UNSPECIFIED CONSTIPATION TYPE: Primary | ICD-10-CM

## 2025-07-29 DIAGNOSIS — M54.50 ACUTE BILATERAL LOW BACK PAIN WITHOUT SCIATICA: ICD-10-CM

## 2025-07-29 LAB
BACTERIA UR QL AUTO: ABNORMAL /HPF
BILIRUB UR QL STRIP: NEGATIVE
CLARITY UR: CLEAR
COLOR UR: YELLOW
GLUCOSE UR STRIP-MCNC: NEGATIVE MG/DL
HGB UR QL STRIP.AUTO: ABNORMAL
HYALINE CASTS UR QL AUTO: ABNORMAL /LPF
KETONES UR QL STRIP: NEGATIVE
LEUKOCYTE ESTERASE UR QL STRIP.AUTO: NEGATIVE
NITRITE UR QL STRIP: NEGATIVE
PH UR STRIP.AUTO: 7 [PH] (ref 4.5–8)
PROT UR QL STRIP: ABNORMAL
RBC # UR STRIP: ABNORMAL /HPF
REF LAB TEST METHOD: ABNORMAL
SP GR UR STRIP: 1.02 (ref 1–1.03)
SQUAMOUS #/AREA URNS HPF: ABNORMAL /HPF
UROBILINOGEN UR QL STRIP: ABNORMAL
WBC # UR STRIP: ABNORMAL /HPF

## 2025-07-29 PROCEDURE — 99284 EMERGENCY DEPT VISIT MOD MDM: CPT | Performed by: EMERGENCY MEDICINE

## 2025-07-29 PROCEDURE — 81001 URINALYSIS AUTO W/SCOPE: CPT | Performed by: EMERGENCY MEDICINE

## 2025-07-29 RX ORDER — AMOXICILLIN 250 MG
2 CAPSULE ORAL NIGHTLY PRN
Qty: 20 TABLET | Refills: 0 | Status: SHIPPED | OUTPATIENT
Start: 2025-07-29 | End: 2025-08-04 | Stop reason: HOSPADM

## 2025-07-29 RX ORDER — DICYCLOMINE HCL 20 MG
20 TABLET ORAL EVERY 8 HOURS PRN
Qty: 20 TABLET | Refills: 0 | Status: SHIPPED | OUTPATIENT
Start: 2025-07-29

## 2025-07-29 RX ORDER — POLYETHYLENE GLYCOL 3350 17 G/17G
17 POWDER, FOR SOLUTION ORAL EVERY 6 HOURS PRN
Qty: 238 G | Refills: 0 | Status: SHIPPED | OUTPATIENT
Start: 2025-07-29 | End: 2025-08-04 | Stop reason: HOSPADM

## 2025-07-29 NOTE — ED PROVIDER NOTES
Subjective   History of Present Illness  Patient presents complaining of some back pain has been going on for a few days.  Patient initially talked to his primary care doctor over the phone and described his symptoms and he prescribed him ciprofloxacin with the thought, which is my guess, that he had a urinary tract infection.  Patient is unsure why he was given the medicine except for his back pain.  Patient was seen here 2 days ago and had labs and imaging for ongoing back pain and abdominal pain.  Patient was found to be constipated.  Patient was sent home on a bowel regimen but says he has had minimal production and a few loose stools.  Patient is here today due to ongoing abdominal back pain.  Patient denies any diarrhea, vomiting, or fever.  Patient is otherwise at baseline health prior to this episode.      Review of Systems   All other systems reviewed and are negative.      Past Medical History:   Diagnosis Date    Aortic stenosis     8/2022: tissue AVR (25mm MDT Avalus bovine pericardial)    Asthma     BPH (benign prostatic hyperplasia)     CAD (coronary artery disease)     7/2022: 95% prox/50% distal LAD, 80% D1, 30-40% Cx, diffuse dz RCA. CABG x 3 8/2022: LIMA-LAD, SVG-D1, SVG-RCA    Carotid atherosclerosis, bilateral     8/2022: 16-49% bilaterally    Colon polyp     Fall 09/28/2024    GERD (gastroesophageal reflux disease)     Hyperlipidemia     Hypertension     Hypothyroidism     Paroxysmal atrial fibrillation     Type 2 diabetes mellitus     Warfarin anticoagulation        Allergies   Allergen Reactions    Loratadine Other (See Comments)     Emotional side effects.        Past Surgical History:   Procedure Laterality Date    CARDIAC CATHETERIZATION N/A 7/14/2022    Procedure: Coronary angiography;  Surgeon: Unique Calabrese MD;  Location: [facility] CATH INVASIVE LOCATION;  Service: Cardiovascular;  Laterality: N/A;    CARDIAC CATHETERIZATION N/A 7/14/2022    Procedure: Left heart cath with simultaneous  LV/Ao pressures;  Surgeon: Unique Calabrese MD;  Location: [facility] CATH INVASIVE LOCATION;  Service: Cardiovascular;  Laterality: N/A;    CARDIAC CATHETERIZATION N/A 7/14/2022    Procedure: Right Heart Cath;  Surgeon: Unique Calabrese MD;  Location: [facility] CATH INVASIVE LOCATION;  Service: Cardiovascular;  Laterality: N/A;    COLONOSCOPY      CORONARY ARTERY BYPASS GRAFT WITH AORTIC VALVE REPAIR/REPLACEMENT N/A 8/8/2022    Procedure: KEVIN STERNOTOMY CORONARY ARTERY BYPASS GRAFT TIMES 3 USING LEFT INTERNAL MAMMARY ARTERY AND LEFT GREATER SAPHENOUS VEIN GRAFT PER ENDOSCOPIC VEIN HARVESTING, AORTIC VALVE REPLACEMENT AND PRP;  Surgeon: Jr Gentry Courtney MD;  Location: [facility] CVOR;  Service: Cardiothoracic;  Laterality: N/A;    CYSTOSCOPY BLADDER BIOPSY N/A 3/18/2024    Procedure: CYSTOSCOPY BLADDER BIOPSY;  Surgeon: Nimesh Arvizu MD;  Location: [facility] OR;  Service: Urology;  Laterality: N/A;    CYSTOSCOPY RETROGRADE PYELOGRAM Bilateral 3/18/2024    Procedure: bilateral retrogrades and cytology;  Surgeon: Nimesh Arvizu MD;  Location: [facility] OR;  Service: Urology;  Laterality: Bilateral;    TRANSURETHRAL RESECTION OF BLADDER TUMOR N/A 1/23/2023    Procedure: TRANSURETHRAL RESECTION OF SMALL BLADDER TUMOR;  Surgeon: Nimesh Arvizu MD;  Location: [facility] OR;  Service: Urology;  Laterality: N/A;    VASECTOMY         Family History   Problem Relation Age of Onset    Colon cancer Neg Hx     Colon polyps Neg Hx     Malig Hyperthermia Neg Hx        Social History     Socioeconomic History    Marital status:     Number of children: 3   Tobacco Use    Smoking status: Never     Passive exposure: Never    Smokeless tobacco: Never    Tobacco comments:     caffeine use: 1 -2 cups daily.    Vaping Use    Vaping status: Never Used   Substance and Sexual Activity    Alcohol use: No    Drug use: No    Sexual activity: Defer           Objective   Physical Exam  Vitals and nursing note reviewed.   Constitutional:        Comments: Patient sitting in bed comfortably, talkative, friendly, no signs of distress.  Cooperative with exam.   HENT:      Head: Normocephalic.   Cardiovascular:      Rate and Rhythm: Normal rate and regular rhythm.   Pulmonary:      Effort: Pulmonary effort is normal.      Breath sounds: Normal breath sounds.   Abdominal:      General: Abdomen is flat. There is no distension.      Palpations: Abdomen is soft.      Tenderness: There is no abdominal tenderness. There is no guarding.      Comments: Active bowel sounds in bilateral quadrants   Musculoskeletal:         General: No swelling.      Cervical back: Neck supple.   Skin:     General: Skin is warm and dry.      Capillary Refill: Capillary refill takes 2 to 3 seconds.   Neurological:      Mental Status: He is alert and oriented to person, place, and time.   Psychiatric:         Mood and Affect: Mood normal.         Behavior: Behavior normal.         Procedures           ED Course                                                       Medical Decision Making  Ddx fecal impaction, constipation, bowel obstruction, back pain    0915 Pt seen again prior to d/c.  Small amount of liquid stool produced with the enema.  Patient was given some medication for his constipation but likely needs to be on a bowel regimen.  Also on review of patient's labs and imaging they were generally unremarkable but the majority of patient's stool was in the right colon so likely still trying to migrate to the distal colon.  Non-toxic. Comfortable. Ambulating without difficulty.  Tolerating po.  Relaxed breathing.  All questions personally answered at the bedside and all d/c instructions personally reviewed with pt.  Discussed the importance of close outpt. f/u and pt. understands this and agrees to do so.  Pt agrees to return to ED immediately for any new, persistent, or worsening symptoms.  Patient spouse is present for the entire evaluation and discharge instructions.    EMR  Dragon/Transcription disclaimer:  Much of this encounter note is an electronic transcription/translation of spoken language to printed text using the Dragon Dictation System       Problems Addressed:  Acute bilateral low back pain without sciatica: complicated acute illness or injury  Constipation, unspecified constipation type: complicated acute illness or injury    Risk  OTC drugs.  Prescription drug management.        Final diagnoses:   Constipation, unspecified constipation type   Acute bilateral low back pain without sciatica       ED Disposition  ED Disposition       ED Disposition   Discharge    Condition   Stable    Comment   --               Agustín Ivan MD  [address]  [facility] [address] [phone]    In 3 days  If symptoms worsen         Medication List        New Prescriptions      dicyclomine 20 MG tablet  Commonly known as: BENTYL  Take 1 tablet by mouth Every 8 (Eight) Hours As Needed for Abdominal Cramping.     sennosides-docusate 8.6-50 MG per tablet  Commonly known as: PERICOLACE  Take 2 tablets by mouth At Night As Needed for Constipation. Take with 12 ounces of water.            Changed      omeprazole 40 MG capsule  Commonly known as: priLOSEC  Take 1 capsule by mouth 3 (Three) Times a Week if Needed (Heartburn).  What changed: when to take this     polyethylene glycol 17 GM/SCOOP powder  Commonly known as: MIRALAX  Take 17 g by mouth Every 6 (Six) Hours As Needed (constipation).  What changed:   when to take this  reasons to take this     tamsulosin 0.4 MG capsule 24 hr capsule  Commonly known as: FLOMAX  Take 1 capsule by mouth Daily.  What changed: how much to take               Where to Get Your Medications        These medications were sent to [facility] - [place] - [address] - [phone]  - [phone]   [address], [place] [address]      Phone: [phone]   dicyclomine 20 MG tablet  polyethylene glycol 17 GM/SCOOP  powder  sennosides-docusate 8.6-50 MG per tablet            Abner Martinez MD  07/29/25 1694

## 2025-07-30 ENCOUNTER — NURSE TRIAGE (OUTPATIENT)
Dept: CALL CENTER | Facility: HOSPITAL | Age: 85
End: 2025-07-30
Payer: MEDICARE

## 2025-07-30 NOTE — TELEPHONE ENCOUNTER
He was seen in the ER at [facility] for constipation on 07/27 and 07/28/25.  He does not feel like he is improved. He has not BM. He did receive a enema, with out results. He did have a BM on Friday or Saturday. He is having abdominal pain. His wife worried.   Polyethylene Glycol 3350 17 g Oral Every 6 Hours It is ordered every 6 hours, do not take it earlier.  He has been taking the medication every 4 hours.     Spoke to both the  and the wife-   His back does hurt. His wife did give him prune juice and bowl of greens.    He needs to take medication, as prescribed. He is passing gas. She is very worried. He is full of stool. Reassurance given. If he gets worse go  the ER.   Reason for Disposition   Caller has medicine question only, adult not sick, AND triager answers question    Additional Information   Negative: [1] Intentional drug overdose AND [2] suicidal thoughts or ideas   Negative: Drug overdose and triager unable to answer question   Negative: Caller requesting a renewal or refill of a medicine patient is currently taking   Negative: Caller requesting information unrelated to medicine   Negative: Caller requesting information about COVID-19 Vaccine   Negative: Caller requesting information about Emergency Contraception   Negative: Caller requesting information about Combined Birth Control Pills   Negative: Caller requesting information about Progestin Birth Control Pills   Negative: Caller requesting information about Post-Op pain or medicines   Negative: Caller requesting a prescription antibiotic (such as Penicillin) for Strep throat and has a positive culture result   Negative: Caller requesting a prescription anti-viral med (such as Tamiflu) and has influenza (flu) symptoms   Negative: Immunization reaction suspected   Negative: Rash while taking a medicine or within 3 days of stopping it   Negative: [1] Asthma and [2] having symptoms of asthma (cough, wheezing, etc.)   Negative: [1] Symptom of  illness (e.g., headache, abdominal pain, earache, vomiting) AND [2] more than mild   Negative: Breastfeeding questions about mother's medicines and diet   Negative: MORE THAN A DOUBLE DOSE of a prescription or over-the-counter (OTC) drug   Negative: [1] DOUBLE DOSE (an extra dose or lesser amount) of prescription drug AND [2] any symptoms (e.g., dizziness, nausea, pain, sleepiness)   Negative: [1] DOUBLE DOSE (an extra dose or lesser amount) of over-the-counter (OTC) drug AND [2] any symptoms (e.g., dizziness, nausea, pain, sleepiness)   Negative: Took another person's prescription drug   Negative: [1] DOUBLE DOSE (an extra dose or lesser amount) of prescription drug AND [2] NO symptoms  (Exception: A double dose of antibiotics.)   Negative: Diabetes drug error or overdose (e.g., took wrong type of insulin or took extra dose)   Negative: [1] Prescription not at pharmacy AND [2] was prescribed by PCP recently (Exception: Triager has access to EMR and prescription is recorded there. Go to Home Care and confirm for pharmacy.)   Negative: [1] Pharmacy calling with prescription question AND [2] triager unable to answer question   Negative: [1] Caller has URGENT medicine question about med that PCP or specialist prescribed AND [2] triager unable to answer question   Negative: Medicine patch causing local rash or itching   Negative: [1] Caller has medicine question about med NOT prescribed by PCP AND [2] triager unable to answer question (e.g., compatibility with other med, storage)   Negative: Prescription request for new medicine (not a refill)   Negative: [1] Caller has NON-URGENT medicine question about med that PCP prescribed AND [2] triager unable to answer question   Negative: Caller wants to use a complementary or alternative medicine   Negative: [1] Prescription prescribed recently is not at pharmacy AND [2] triager has access to patient's EMR AND [3] prescription is recorded in the EMR   Negative: [1] DOUBLE DOSE  "(an extra dose or lesser amount) of over-the-counter (OTC) drug AND [2] NO symptoms   Negative: [1] DOUBLE DOSE (an extra dose or lesser amount) of antibiotic drug AND [2] NO symptoms    Answer Assessment - Initial Assessment Questions  1. NAME of MEDICINE: \"What medicine(s) are you calling about?\"      Murelax   2. QUESTION: \"What is your question?\" (e.g., double dose of medicine, side effect)      Can I take it every 4 hours.   3. PRESCRIBER: \"Who prescribed the medicine?\" Reason: if prescribed by specialist, call should be referred to that group.      ER   4. SYMPTOMS: \"Do you have any symptoms?\" If Yes, ask: \"What symptoms are you having?\"  \"How bad are the symptoms (e.g., mild, moderate, severe)      Constipation.  5. PREGNANCY:  \"Is there any chance that you are pregnant?\" \"When was your last menstrual period?\"      no    Protocols used: Medication Question Call-ADULT-    "

## 2025-07-31 ENCOUNTER — APPOINTMENT (OUTPATIENT)
Dept: CT IMAGING | Facility: HOSPITAL | Age: 85
DRG: 389 | End: 2025-07-31
Payer: MEDICARE

## 2025-07-31 ENCOUNTER — HOSPITAL ENCOUNTER (INPATIENT)
Facility: HOSPITAL | Age: 85
LOS: 3 days | Discharge: HOME | DRG: 389 | End: 2025-08-04
Attending: EMERGENCY MEDICINE | Admitting: FAMILY MEDICINE
Payer: MEDICARE

## 2025-07-31 ENCOUNTER — APPOINTMENT (OUTPATIENT)
Dept: GENERAL RADIOLOGY | Facility: HOSPITAL | Age: 85
DRG: 389 | End: 2025-07-31
Payer: MEDICARE

## 2025-07-31 DIAGNOSIS — R10.84 GENERALIZED ABDOMINAL PAIN: ICD-10-CM

## 2025-07-31 DIAGNOSIS — E87.5 HYPERKALEMIA: ICD-10-CM

## 2025-07-31 DIAGNOSIS — R93.5 ABNORMAL CT OF THE ABDOMEN: ICD-10-CM

## 2025-07-31 DIAGNOSIS — E87.1 HYPONATREMIA: ICD-10-CM

## 2025-07-31 DIAGNOSIS — N32.89 BLADDER WALL THICKENING: ICD-10-CM

## 2025-07-31 DIAGNOSIS — K59.00 CONSTIPATION, UNSPECIFIED CONSTIPATION TYPE: ICD-10-CM

## 2025-07-31 DIAGNOSIS — N28.9 ACUTE RENAL INSUFFICIENCY: Primary | ICD-10-CM

## 2025-07-31 DIAGNOSIS — R41.0 CONFUSION: ICD-10-CM

## 2025-07-31 LAB
ALBUMIN SERPL-MCNC: 3.9 G/DL (ref 3.5–5.2)
ALBUMIN/GLOB SERPL: 0.8 G/DL
ALP SERPL-CCNC: 84 U/L (ref 39–117)
ALT SERPL W P-5'-P-CCNC: 15 U/L (ref 1–41)
ANION GAP SERPL CALCULATED.3IONS-SCNC: 9.9 MMOL/L (ref 5–15)
AST SERPL-CCNC: 18 U/L (ref 1–40)
BACTERIA UR QL AUTO: ABNORMAL /HPF
BASOPHILS # BLD AUTO: 0.06 10*3/MM3 (ref 0–0.2)
BASOPHILS NFR BLD AUTO: 0.5 % (ref 0–1.5)
BILIRUB SERPL-MCNC: 0.6 MG/DL (ref 0–1.2)
BILIRUB UR QL STRIP: NEGATIVE
BUN SERPL-MCNC: 18.7 MG/DL (ref 8–23)
BUN/CREAT SERPL: 14.6 (ref 7–25)
CALCIUM SPEC-SCNC: 9.7 MG/DL (ref 8.6–10.5)
CHLORIDE SERPL-SCNC: 91 MMOL/L (ref 98–107)
CLARITY UR: CLEAR
CO2 SERPL-SCNC: 26.1 MMOL/L (ref 22–29)
COLOR UR: ABNORMAL
CREAT SERPL-MCNC: 1.28 MG/DL (ref 0.76–1.27)
D-LACTATE SERPL-SCNC: 1.9 MMOL/L (ref 0.5–2)
DEPRECATED RDW RBC AUTO: 43 FL (ref 37–54)
EGFRCR SERPLBLD CKD-EPI 2021: 55.2 ML/MIN/1.73
EOSINOPHIL # BLD AUTO: 0.05 10*3/MM3 (ref 0–0.4)
EOSINOPHIL NFR BLD AUTO: 0.4 % (ref 0.3–6.2)
ERYTHROCYTE [DISTWIDTH] IN BLOOD BY AUTOMATED COUNT: 13.3 % (ref 12.3–15.4)
GEN 5 1HR TROPONIN T REFLEX: 17 NG/L
GLOBULIN UR ELPH-MCNC: 4.6 GM/DL
GLUCOSE SERPL-MCNC: 193 MG/DL (ref 65–99)
GLUCOSE UR STRIP-MCNC: NEGATIVE MG/DL
HCT VFR BLD AUTO: 44.7 % (ref 37.5–51)
HGB BLD-MCNC: 15.3 G/DL (ref 13–17.7)
HGB UR QL STRIP.AUTO: ABNORMAL
HYALINE CASTS UR QL AUTO: ABNORMAL /LPF
IMM GRANULOCYTES # BLD AUTO: 0.06 10*3/MM3 (ref 0–0.05)
IMM GRANULOCYTES NFR BLD AUTO: 0.5 % (ref 0–0.5)
INR PPP: 3.91 (ref 0.9–1.1)
INR PPP: 4 (ref 0.9–1.1)
KETONES UR QL STRIP: NEGATIVE
LEUKOCYTE ESTERASE UR QL STRIP.AUTO: NEGATIVE
LIPASE SERPL-CCNC: 45 U/L (ref 13–60)
LYMPHOCYTES # BLD AUTO: 1.98 10*3/MM3 (ref 0.7–3.1)
LYMPHOCYTES NFR BLD AUTO: 15.6 % (ref 19.6–45.3)
MAGNESIUM SERPL-MCNC: 1.9 MG/DL (ref 1.6–2.4)
MCH RBC QN AUTO: 30.4 PG (ref 26.6–33)
MCHC RBC AUTO-ENTMCNC: 34.2 G/DL (ref 31.5–35.7)
MCV RBC AUTO: 88.9 FL (ref 79–97)
MONOCYTES # BLD AUTO: 0.94 10*3/MM3 (ref 0.1–0.9)
MONOCYTES NFR BLD AUTO: 7.4 % (ref 5–12)
MUCOUS THREADS URNS QL MICRO: ABNORMAL /HPF
NEUTROPHILS NFR BLD AUTO: 75.6 % (ref 42.7–76)
NEUTROPHILS NFR BLD AUTO: 9.59 10*3/MM3 (ref 1.7–7)
NITRITE UR QL STRIP: NEGATIVE
NRBC BLD AUTO-RTO: 0 /100 WBC (ref 0–0.2)
PH UR STRIP.AUTO: 5.5 [PH] (ref 4.5–8)
PLATELET # BLD AUTO: 315 10*3/MM3 (ref 140–450)
PMV BLD AUTO: 9.2 FL (ref 6–12)
POTASSIUM SERPL-SCNC: 5.3 MMOL/L (ref 3.5–5.2)
PROT SERPL-MCNC: 8.5 G/DL (ref 6–8.5)
PROT UR QL STRIP: ABNORMAL
PROTHROMBIN TIME: 39.9 SECONDS
PROTHROMBIN TIME: 39.9 SECONDS (ref 12.1–15)
QT INTERVAL: 366 MS
QTC INTERVAL: 412 MS
RBC # BLD AUTO: 5.03 10*6/MM3 (ref 4.14–5.8)
RBC # UR STRIP: ABNORMAL /HPF
REF LAB TEST METHOD: ABNORMAL
SODIUM SERPL-SCNC: 127 MMOL/L (ref 136–145)
SP GR UR STRIP: 1.01 (ref 1–1.03)
SQUAMOUS #/AREA URNS HPF: ABNORMAL /HPF
TROPONIN T % DELTA: -26
TROPONIN T NUMERIC DELTA: -6 NG/L
TROPONIN T SERPL HS-MCNC: 23 NG/L
UROBILINOGEN UR QL STRIP: ABNORMAL
WBC # UR STRIP: ABNORMAL /HPF
WBC NRBC COR # BLD AUTO: 12.68 10*3/MM3 (ref 3.4–10.8)

## 2025-07-31 PROCEDURE — 83605 ASSAY OF LACTIC ACID: CPT

## 2025-07-31 PROCEDURE — G0378 HOSPITAL OBSERVATION PER HR: HCPCS

## 2025-07-31 PROCEDURE — 93010 ELECTROCARDIOGRAM REPORT: CPT | Performed by: INTERNAL MEDICINE

## 2025-07-31 PROCEDURE — 81001 URINALYSIS AUTO W/SCOPE: CPT

## 2025-07-31 PROCEDURE — 25510000001 IOPAMIDOL PER 1 ML: Performed by: EMERGENCY MEDICINE

## 2025-07-31 PROCEDURE — 83735 ASSAY OF MAGNESIUM: CPT

## 2025-07-31 PROCEDURE — 74177 CT ABD & PELVIS W/CONTRAST: CPT

## 2025-07-31 PROCEDURE — 36415 COLL VENOUS BLD VENIPUNCTURE: CPT

## 2025-07-31 PROCEDURE — 71046 X-RAY EXAM CHEST 2 VIEWS: CPT

## 2025-07-31 PROCEDURE — 99285 EMERGENCY DEPT VISIT HI MDM: CPT | Performed by: EMERGENCY MEDICINE

## 2025-07-31 PROCEDURE — 85610 PROTHROMBIN TIME: CPT

## 2025-07-31 PROCEDURE — 87040 BLOOD CULTURE FOR BACTERIA: CPT

## 2025-07-31 PROCEDURE — 83690 ASSAY OF LIPASE: CPT

## 2025-07-31 PROCEDURE — 70450 CT HEAD/BRAIN W/O DYE: CPT

## 2025-07-31 PROCEDURE — 93005 ELECTROCARDIOGRAM TRACING: CPT

## 2025-07-31 PROCEDURE — 84484 ASSAY OF TROPONIN QUANT: CPT

## 2025-07-31 PROCEDURE — 99223 1ST HOSP IP/OBS HIGH 75: CPT | Performed by: FAMILY MEDICINE

## 2025-07-31 PROCEDURE — 85025 COMPLETE CBC W/AUTO DIFF WBC: CPT

## 2025-07-31 PROCEDURE — 25810000003 SODIUM CHLORIDE 0.9 % SOLUTION: Performed by: FAMILY MEDICINE

## 2025-07-31 PROCEDURE — 25810000003 SODIUM CHLORIDE 0.9 % SOLUTION

## 2025-07-31 PROCEDURE — 80053 COMPREHEN METABOLIC PANEL: CPT

## 2025-07-31 RX ORDER — LEVOTHYROXINE SODIUM 75 UG/1
75 TABLET ORAL
Status: DISCONTINUED | OUTPATIENT
Start: 2025-08-01 | End: 2025-08-04 | Stop reason: HOSPADM

## 2025-07-31 RX ORDER — ONDANSETRON 2 MG/ML
4 INJECTION INTRAMUSCULAR; INTRAVENOUS EVERY 6 HOURS PRN
Status: DISCONTINUED | OUTPATIENT
Start: 2025-07-31 | End: 2025-08-04 | Stop reason: HOSPADM

## 2025-07-31 RX ORDER — ONDANSETRON 4 MG/1
4 TABLET, ORALLY DISINTEGRATING ORAL EVERY 6 HOURS PRN
Status: DISCONTINUED | OUTPATIENT
Start: 2025-07-31 | End: 2025-08-04 | Stop reason: HOSPADM

## 2025-07-31 RX ORDER — IOPAMIDOL 755 MG/ML
100 INJECTION, SOLUTION INTRAVASCULAR
Status: COMPLETED | OUTPATIENT
Start: 2025-07-31 | End: 2025-07-31

## 2025-07-31 RX ORDER — SODIUM CHLORIDE 9 MG/ML
100 INJECTION, SOLUTION INTRAVENOUS CONTINUOUS
Status: ACTIVE | OUTPATIENT
Start: 2025-07-31 | End: 2025-08-01

## 2025-07-31 RX ORDER — SODIUM CHLORIDE 9 MG/ML
INJECTION, SOLUTION INTRAVENOUS
Status: COMPLETED
Start: 2025-07-31 | End: 2025-07-31

## 2025-07-31 RX ORDER — HYDRALAZINE HYDROCHLORIDE 20 MG/ML
10 INJECTION INTRAMUSCULAR; INTRAVENOUS EVERY 4 HOURS PRN
Status: DISCONTINUED | OUTPATIENT
Start: 2025-07-31 | End: 2025-08-04 | Stop reason: HOSPADM

## 2025-07-31 RX ORDER — ATENOLOL 25 MG/1
25 TABLET ORAL 2 TIMES DAILY
Status: DISCONTINUED | OUTPATIENT
Start: 2025-07-31 | End: 2025-08-04 | Stop reason: HOSPADM

## 2025-07-31 RX ORDER — AMOXICILLIN 250 MG
2 CAPSULE ORAL 2 TIMES DAILY PRN
Status: DISCONTINUED | OUTPATIENT
Start: 2025-07-31 | End: 2025-08-02

## 2025-07-31 RX ORDER — BISACODYL 10 MG
10 SUPPOSITORY, RECTAL RECTAL DAILY PRN
Status: DISCONTINUED | OUTPATIENT
Start: 2025-07-31 | End: 2025-08-02

## 2025-07-31 RX ORDER — SODIUM CHLORIDE 9 MG/ML
40 INJECTION, SOLUTION INTRAVENOUS AS NEEDED
Status: DISCONTINUED | OUTPATIENT
Start: 2025-07-31 | End: 2025-08-04 | Stop reason: HOSPADM

## 2025-07-31 RX ORDER — PANTOPRAZOLE SODIUM 40 MG/1
40 TABLET, DELAYED RELEASE ORAL
Status: DISCONTINUED | OUTPATIENT
Start: 2025-08-01 | End: 2025-08-04 | Stop reason: HOSPADM

## 2025-07-31 RX ORDER — ATORVASTATIN CALCIUM 40 MG/1
40 TABLET, FILM COATED ORAL NIGHTLY
Status: DISCONTINUED | OUTPATIENT
Start: 2025-07-31 | End: 2025-08-04 | Stop reason: HOSPADM

## 2025-07-31 RX ORDER — ACETAMINOPHEN 160 MG/5ML
650 SOLUTION ORAL EVERY 4 HOURS PRN
Status: DISCONTINUED | OUTPATIENT
Start: 2025-07-31 | End: 2025-08-04 | Stop reason: HOSPADM

## 2025-07-31 RX ORDER — TAMSULOSIN HYDROCHLORIDE 0.4 MG/1
0.8 CAPSULE ORAL DAILY
Status: DISCONTINUED | OUTPATIENT
Start: 2025-08-01 | End: 2025-08-04 | Stop reason: HOSPADM

## 2025-07-31 RX ORDER — POLYETHYLENE GLYCOL 3350 17 G/17G
17 POWDER, FOR SOLUTION ORAL DAILY PRN
Status: DISCONTINUED | OUTPATIENT
Start: 2025-07-31 | End: 2025-08-02

## 2025-07-31 RX ORDER — SODIUM CHLORIDE 0.9 % (FLUSH) 0.9 %
10 SYRINGE (ML) INJECTION AS NEEDED
Status: DISCONTINUED | OUTPATIENT
Start: 2025-07-31 | End: 2025-08-04 | Stop reason: HOSPADM

## 2025-07-31 RX ORDER — BISACODYL 5 MG/1
5 TABLET, DELAYED RELEASE ORAL DAILY PRN
Status: DISCONTINUED | OUTPATIENT
Start: 2025-07-31 | End: 2025-08-02

## 2025-07-31 RX ORDER — SODIUM CHLORIDE 0.9 % (FLUSH) 0.9 %
10 SYRINGE (ML) INJECTION EVERY 12 HOURS SCHEDULED
Status: DISCONTINUED | OUTPATIENT
Start: 2025-07-31 | End: 2025-08-04 | Stop reason: HOSPADM

## 2025-07-31 RX ORDER — ACETAMINOPHEN 650 MG/1
650 SUPPOSITORY RECTAL EVERY 4 HOURS PRN
Status: DISCONTINUED | OUTPATIENT
Start: 2025-07-31 | End: 2025-08-04 | Stop reason: HOSPADM

## 2025-07-31 RX ORDER — LEVOTHYROXINE SODIUM 50 UG/1
50 TABLET ORAL DAILY
Status: DISCONTINUED | OUTPATIENT
Start: 2025-08-01 | End: 2025-07-31

## 2025-07-31 RX ORDER — ACETAMINOPHEN 325 MG/1
650 TABLET ORAL EVERY 4 HOURS PRN
Status: DISCONTINUED | OUTPATIENT
Start: 2025-07-31 | End: 2025-08-04 | Stop reason: HOSPADM

## 2025-07-31 RX ADMIN — SODIUM CHLORIDE 100 ML/HR: 9 INJECTION, SOLUTION INTRAVENOUS at 21:39

## 2025-07-31 RX ADMIN — IOPAMIDOL 100 ML: 755 INJECTION, SOLUTION INTRAVENOUS at 17:08

## 2025-07-31 RX ADMIN — SODIUM CHLORIDE 500 ML: 9 INJECTION, SOLUTION INTRAVENOUS at 16:40

## 2025-07-31 RX ADMIN — Medication 10 ML: at 21:29

## 2025-07-31 RX ADMIN — ATENOLOL 25 MG: 25 TABLET ORAL at 21:39

## 2025-08-01 ENCOUNTER — APPOINTMENT (OUTPATIENT)
Dept: GENERAL RADIOLOGY | Facility: HOSPITAL | Age: 85
DRG: 389 | End: 2025-08-01
Payer: MEDICARE

## 2025-08-01 PROBLEM — E87.1 HYPONATREMIA: Status: ACTIVE | Noted: 2025-08-01

## 2025-08-01 LAB
ALBUMIN SERPL-MCNC: 3.3 G/DL (ref 3.5–5.2)
ANION GAP SERPL CALCULATED.3IONS-SCNC: 7.6 MMOL/L (ref 5–15)
ANION GAP SERPL CALCULATED.3IONS-SCNC: 9.1 MMOL/L (ref 5–15)
BUN SERPL-MCNC: 16.5 MG/DL (ref 8–23)
BUN SERPL-MCNC: 17.3 MG/DL (ref 8–23)
BUN/CREAT SERPL: 16.2 (ref 7–25)
BUN/CREAT SERPL: 16.5 (ref 7–25)
CALCIUM SPEC-SCNC: 8.8 MG/DL (ref 8.6–10.5)
CALCIUM SPEC-SCNC: 8.8 MG/DL (ref 8.6–10.5)
CHLORIDE SERPL-SCNC: 96 MMOL/L (ref 98–107)
CHLORIDE SERPL-SCNC: 97 MMOL/L (ref 98–107)
CO2 SERPL-SCNC: 21.9 MMOL/L (ref 22–29)
CO2 SERPL-SCNC: 24.4 MMOL/L (ref 22–29)
CREAT SERPL-MCNC: 1 MG/DL (ref 0.76–1.27)
CREAT SERPL-MCNC: 1.07 MG/DL (ref 0.76–1.27)
EGFRCR SERPLBLD CKD-EPI 2021: 68.4 ML/MIN/1.73
EGFRCR SERPLBLD CKD-EPI 2021: 74.2 ML/MIN/1.73
GLUCOSE SERPL-MCNC: 211 MG/DL (ref 65–99)
GLUCOSE SERPL-MCNC: 236 MG/DL (ref 65–99)
INR PPP: 4.02 (ref 0.9–1.1)
MAGNESIUM SERPL-MCNC: 1.7 MG/DL (ref 1.6–2.4)
PHOSPHATE SERPL-MCNC: 3.3 MG/DL (ref 2.5–4.5)
PHOSPHATE SERPL-MCNC: 3.5 MG/DL (ref 2.5–4.5)
POTASSIUM SERPL-SCNC: 4.7 MMOL/L (ref 3.5–5.2)
POTASSIUM SERPL-SCNC: 5.1 MMOL/L (ref 3.5–5.2)
PROTHROMBIN TIME: 40.7 SECONDS (ref 12.1–15)
SODIUM SERPL-SCNC: 128 MMOL/L (ref 136–145)
SODIUM SERPL-SCNC: 128 MMOL/L (ref 136–145)
TSH SERPL DL<=0.05 MIU/L-ACNC: 2.72 UIU/ML (ref 0.27–4.2)

## 2025-08-01 PROCEDURE — 84443 ASSAY THYROID STIM HORMONE: CPT | Performed by: HOSPITALIST

## 2025-08-01 PROCEDURE — 25810000003 SODIUM CHLORIDE 0.9 % SOLUTION: Performed by: HOSPITALIST

## 2025-08-01 PROCEDURE — 99232 SBSQ HOSP IP/OBS MODERATE 35: CPT | Performed by: HOSPITALIST

## 2025-08-01 PROCEDURE — 25010000002 HYDRALAZINE PER 20 MG: Performed by: FAMILY MEDICINE

## 2025-08-01 PROCEDURE — 74018 RADEX ABDOMEN 1 VIEW: CPT

## 2025-08-01 PROCEDURE — 83735 ASSAY OF MAGNESIUM: CPT | Performed by: FAMILY MEDICINE

## 2025-08-01 PROCEDURE — 80048 BASIC METABOLIC PNL TOTAL CA: CPT | Performed by: FAMILY MEDICINE

## 2025-08-01 PROCEDURE — 84100 ASSAY OF PHOSPHORUS: CPT | Performed by: FAMILY MEDICINE

## 2025-08-01 PROCEDURE — 85610 PROTHROMBIN TIME: CPT | Performed by: FAMILY MEDICINE

## 2025-08-01 PROCEDURE — 80069 RENAL FUNCTION PANEL: CPT | Performed by: HOSPITALIST

## 2025-08-01 RX ORDER — LIDOCAINE 4 G/G
1 PATCH TOPICAL
Status: DISCONTINUED | OUTPATIENT
Start: 2025-08-01 | End: 2025-08-01

## 2025-08-01 RX ORDER — LIDOCAINE 4 G/G
1 PATCH TOPICAL
Status: DISCONTINUED | OUTPATIENT
Start: 2025-08-01 | End: 2025-08-04 | Stop reason: HOSPADM

## 2025-08-01 RX ORDER — SODIUM CHLORIDE 9 MG/ML
100 INJECTION, SOLUTION INTRAVENOUS CONTINUOUS
Status: DISCONTINUED | OUTPATIENT
Start: 2025-08-01 | End: 2025-08-01

## 2025-08-01 RX ORDER — MAG HYDROX/ALUMINUM HYD/SIMETH 400-400-40
1 SUSPENSION, ORAL (FINAL DOSE FORM) ORAL ONCE
Status: COMPLETED | OUTPATIENT
Start: 2025-08-01 | End: 2025-08-01

## 2025-08-01 RX ADMIN — SODIUM CHLORIDE 100 ML/HR: 9 INJECTION, SOLUTION INTRAVENOUS at 08:28

## 2025-08-01 RX ADMIN — Medication 5 MG: at 20:06

## 2025-08-01 RX ADMIN — PANTOPRAZOLE SODIUM 40 MG: 40 TABLET, DELAYED RELEASE ORAL at 05:26

## 2025-08-01 RX ADMIN — ATENOLOL 25 MG: 25 TABLET ORAL at 20:06

## 2025-08-01 RX ADMIN — ATENOLOL 25 MG: 25 TABLET ORAL at 08:28

## 2025-08-01 RX ADMIN — ACETAMINOPHEN 650 MG: 325 TABLET, FILM COATED ORAL at 20:06

## 2025-08-01 RX ADMIN — Medication 10 ML: at 20:07

## 2025-08-01 RX ADMIN — LIDOCAINE 1 PATCH: 4 PATCH TOPICAL at 02:05

## 2025-08-01 RX ADMIN — HYDRALAZINE HYDROCHLORIDE 10 MG: 20 INJECTION INTRAMUSCULAR; INTRAVENOUS at 16:15

## 2025-08-01 RX ADMIN — TAMSULOSIN HYDROCHLORIDE 0.8 MG: 0.4 CAPSULE ORAL at 08:28

## 2025-08-01 RX ADMIN — GLYCERIN 1 SUPPOSITORY: 2 SUPPOSITORY RECTAL at 10:31

## 2025-08-01 RX ADMIN — Medication 10 ML: at 08:28

## 2025-08-01 RX ADMIN — ATORVASTATIN CALCIUM 40 MG: 40 TABLET, FILM COATED ORAL at 20:06

## 2025-08-01 RX ADMIN — LEVOTHYROXINE SODIUM 75 MCG: 0.07 TABLET ORAL at 05:27

## 2025-08-01 RX ADMIN — DOCUSATE SODIUM AND SENNOSIDES 2 TABLET: 8.6; 5 TABLET, FILM COATED ORAL at 20:06

## 2025-08-02 ENCOUNTER — APPOINTMENT (OUTPATIENT)
Dept: GENERAL RADIOLOGY | Facility: HOSPITAL | Age: 85
DRG: 389 | End: 2025-08-02
Payer: MEDICARE

## 2025-08-02 LAB
ANION GAP SERPL CALCULATED.3IONS-SCNC: 10.1 MMOL/L (ref 5–15)
BACTERIA UR QL AUTO: ABNORMAL /HPF
BASOPHILS # BLD AUTO: 0.02 10*3/MM3 (ref 0–0.2)
BASOPHILS NFR BLD AUTO: 0.2 % (ref 0–1.5)
BILIRUB UR QL STRIP: ABNORMAL
BUN SERPL-MCNC: 14.1 MG/DL (ref 8–23)
BUN/CREAT SERPL: 14.5 (ref 7–25)
CALCIUM SPEC-SCNC: 9.5 MG/DL (ref 8.6–10.5)
CHLORIDE SERPL-SCNC: 94 MMOL/L (ref 98–107)
CLARITY UR: CLEAR
CO2 SERPL-SCNC: 24.9 MMOL/L (ref 22–29)
COLOR UR: YELLOW
CREAT SERPL-MCNC: 0.97 MG/DL (ref 0.76–1.27)
DEPRECATED RDW RBC AUTO: 43.2 FL (ref 37–54)
EGFRCR SERPLBLD CKD-EPI 2021: 77 ML/MIN/1.73
EOSINOPHIL # BLD AUTO: 0.02 10*3/MM3 (ref 0–0.4)
EOSINOPHIL NFR BLD AUTO: 0.2 % (ref 0.3–6.2)
ERYTHROCYTE [DISTWIDTH] IN BLOOD BY AUTOMATED COUNT: 13 % (ref 12.3–15.4)
GLUCOSE BLDC GLUCOMTR-MCNC: 164 MG/DL (ref 70–130)
GLUCOSE BLDC GLUCOMTR-MCNC: 196 MG/DL (ref 70–130)
GLUCOSE BLDC GLUCOMTR-MCNC: 204 MG/DL (ref 70–130)
GLUCOSE SERPL-MCNC: 216 MG/DL (ref 65–99)
GLUCOSE UR STRIP-MCNC: NEGATIVE MG/DL
HBA1C MFR BLD: 6.85 % (ref 4.8–5.6)
HCT VFR BLD AUTO: 46.4 % (ref 37.5–51)
HGB BLD-MCNC: 15.6 G/DL (ref 13–17.7)
HGB UR QL STRIP.AUTO: ABNORMAL
HYALINE CASTS UR QL AUTO: ABNORMAL /LPF
IMM GRANULOCYTES # BLD AUTO: 0.02 10*3/MM3 (ref 0–0.05)
IMM GRANULOCYTES NFR BLD AUTO: 0.2 % (ref 0–0.5)
INR PPP: 2.98 (ref 0.9–1.1)
KETONES UR QL STRIP: ABNORMAL
LEUKOCYTE ESTERASE UR QL STRIP.AUTO: NEGATIVE
LYMPHOCYTES # BLD AUTO: 1.3 10*3/MM3 (ref 0.7–3.1)
LYMPHOCYTES NFR BLD AUTO: 11 % (ref 19.6–45.3)
MCH RBC QN AUTO: 30.3 PG (ref 26.6–33)
MCHC RBC AUTO-ENTMCNC: 33.6 G/DL (ref 31.5–35.7)
MCV RBC AUTO: 90.1 FL (ref 79–97)
MONOCYTES # BLD AUTO: 0.57 10*3/MM3 (ref 0.1–0.9)
MONOCYTES NFR BLD AUTO: 4.8 % (ref 5–12)
NEUTROPHILS NFR BLD AUTO: 83.6 % (ref 42.7–76)
NEUTROPHILS NFR BLD AUTO: 9.84 10*3/MM3 (ref 1.7–7)
NITRITE UR QL STRIP: NEGATIVE
PH UR STRIP.AUTO: 5.5 [PH] (ref 4.5–8)
PLATELET # BLD AUTO: 267 10*3/MM3 (ref 140–450)
PMV BLD AUTO: 8.9 FL (ref 6–12)
POTASSIUM SERPL-SCNC: 4.8 MMOL/L (ref 3.5–5.2)
PROT UR QL STRIP: ABNORMAL
PROTHROMBIN TIME: 32.1 SECONDS (ref 12.1–15)
RBC # BLD AUTO: 5.15 10*6/MM3 (ref 4.14–5.8)
RBC # UR STRIP: ABNORMAL /HPF
REF LAB TEST METHOD: ABNORMAL
SODIUM SERPL-SCNC: 129 MMOL/L (ref 136–145)
SP GR UR STRIP: 1.02 (ref 1–1.03)
SQUAMOUS #/AREA URNS HPF: ABNORMAL /HPF
UROBILINOGEN UR QL STRIP: ABNORMAL
WBC # UR STRIP: ABNORMAL /HPF
WBC NRBC COR # BLD AUTO: 11.77 10*3/MM3 (ref 3.4–10.8)

## 2025-08-02 PROCEDURE — 80048 BASIC METABOLIC PNL TOTAL CA: CPT | Performed by: FAMILY MEDICINE

## 2025-08-02 PROCEDURE — 81001 URINALYSIS AUTO W/SCOPE: CPT | Performed by: NURSE PRACTITIONER

## 2025-08-02 PROCEDURE — 63710000001 INSULIN GLARGINE PER 5 UNITS: Performed by: NURSE PRACTITIONER

## 2025-08-02 PROCEDURE — 85025 COMPLETE CBC W/AUTO DIFF WBC: CPT | Performed by: HOSPITALIST

## 2025-08-02 PROCEDURE — 85610 PROTHROMBIN TIME: CPT | Performed by: FAMILY MEDICINE

## 2025-08-02 PROCEDURE — 82948 REAGENT STRIP/BLOOD GLUCOSE: CPT

## 2025-08-02 PROCEDURE — 74018 RADEX ABDOMEN 1 VIEW: CPT

## 2025-08-02 PROCEDURE — 99221 1ST HOSP IP/OBS SF/LOW 40: CPT | Performed by: INTERNAL MEDICINE

## 2025-08-02 PROCEDURE — 25010000002 HYDRALAZINE PER 20 MG: Performed by: FAMILY MEDICINE

## 2025-08-02 PROCEDURE — 63710000001 INSULIN LISPRO (HUMAN) PER 5 UNITS: Performed by: NURSE PRACTITIONER

## 2025-08-02 PROCEDURE — 99232 SBSQ HOSP IP/OBS MODERATE 35: CPT | Performed by: NURSE PRACTITIONER

## 2025-08-02 PROCEDURE — 83036 HEMOGLOBIN GLYCOSYLATED A1C: CPT | Performed by: NURSE PRACTITIONER

## 2025-08-02 RX ORDER — SODIUM PHOSPHATE,MONO-DIBASIC 19G-7G/197
1 ENEMA (ML) RECTAL ONCE
Status: COMPLETED | OUTPATIENT
Start: 2025-08-02 | End: 2025-08-02

## 2025-08-02 RX ORDER — BISACODYL 5 MG/1
5 TABLET, DELAYED RELEASE ORAL DAILY PRN
Status: DISCONTINUED | OUTPATIENT
Start: 2025-08-02 | End: 2025-08-04 | Stop reason: HOSPADM

## 2025-08-02 RX ORDER — DEXTROSE MONOHYDRATE 25 G/50ML
10-50 INJECTION, SOLUTION INTRAVENOUS
Status: DISCONTINUED | OUTPATIENT
Start: 2025-08-02 | End: 2025-08-04 | Stop reason: HOSPADM

## 2025-08-02 RX ORDER — POLYETHYLENE GLYCOL 3350 17 G/17G
17 POWDER, FOR SOLUTION ORAL DAILY PRN
Status: DISCONTINUED | OUTPATIENT
Start: 2025-08-02 | End: 2025-08-04 | Stop reason: HOSPADM

## 2025-08-02 RX ORDER — BISACODYL 10 MG
10 SUPPOSITORY, RECTAL RECTAL DAILY PRN
Status: DISCONTINUED | OUTPATIENT
Start: 2025-08-02 | End: 2025-08-04 | Stop reason: HOSPADM

## 2025-08-02 RX ORDER — AMOXICILLIN 250 MG
2 CAPSULE ORAL 2 TIMES DAILY
Status: DISCONTINUED | OUTPATIENT
Start: 2025-08-02 | End: 2025-08-04 | Stop reason: HOSPADM

## 2025-08-02 RX ORDER — NICOTINE POLACRILEX 4 MG
15 LOZENGE BUCCAL
Status: DISCONTINUED | OUTPATIENT
Start: 2025-08-02 | End: 2025-08-04 | Stop reason: HOSPADM

## 2025-08-02 RX ORDER — LUBIPROSTONE 24 UG/1
24 CAPSULE ORAL 2 TIMES DAILY WITH MEALS
Status: DISCONTINUED | OUTPATIENT
Start: 2025-08-02 | End: 2025-08-04 | Stop reason: HOSPADM

## 2025-08-02 RX ORDER — INSULIN LISPRO 100 [IU]/ML
1-200 INJECTION, SOLUTION INTRAVENOUS; SUBCUTANEOUS
Status: DISCONTINUED | OUTPATIENT
Start: 2025-08-02 | End: 2025-08-04 | Stop reason: HOSPADM

## 2025-08-02 RX ORDER — INSULIN LISPRO 100 [IU]/ML
1-200 INJECTION, SOLUTION INTRAVENOUS; SUBCUTANEOUS AS NEEDED
Status: DISCONTINUED | OUTPATIENT
Start: 2025-08-02 | End: 2025-08-04 | Stop reason: HOSPADM

## 2025-08-02 RX ADMIN — ATENOLOL 25 MG: 25 TABLET ORAL at 08:31

## 2025-08-02 RX ADMIN — INSULIN LISPRO 1 UNITS: 100 INJECTION, SOLUTION INTRAVENOUS; SUBCUTANEOUS at 13:02

## 2025-08-02 RX ADMIN — BISACODYL 5 MG: 5 TABLET, COATED ORAL at 08:31

## 2025-08-02 RX ADMIN — POLYETHYLENE GLYCOL 3350 17 G: 17 POWDER, FOR SOLUTION ORAL at 08:31

## 2025-08-02 RX ADMIN — ATORVASTATIN CALCIUM 40 MG: 40 TABLET, FILM COATED ORAL at 20:30

## 2025-08-02 RX ADMIN — DOCUSATE SODIUM AND SENNOSIDES 2 TABLET: 8.6; 5 TABLET, FILM COATED ORAL at 20:30

## 2025-08-02 RX ADMIN — HYDRALAZINE HYDROCHLORIDE 10 MG: 20 INJECTION INTRAMUSCULAR; INTRAVENOUS at 02:10

## 2025-08-02 RX ADMIN — DOCUSATE SODIUM AND SENNOSIDES 2 TABLET: 8.6; 5 TABLET, FILM COATED ORAL at 10:57

## 2025-08-02 RX ADMIN — Medication 1 ENEMA: at 10:25

## 2025-08-02 RX ADMIN — INSULIN LISPRO 2 UNITS: 100 INJECTION, SOLUTION INTRAVENOUS; SUBCUTANEOUS at 18:18

## 2025-08-02 RX ADMIN — TAMSULOSIN HYDROCHLORIDE 0.8 MG: 0.4 CAPSULE ORAL at 08:31

## 2025-08-02 RX ADMIN — PANTOPRAZOLE SODIUM 40 MG: 40 TABLET, DELAYED RELEASE ORAL at 04:58

## 2025-08-02 RX ADMIN — WARFARIN SODIUM 8 MG: 5 TABLET ORAL at 18:17

## 2025-08-02 RX ADMIN — ATENOLOL 25 MG: 25 TABLET ORAL at 20:29

## 2025-08-02 RX ADMIN — LUBIPROSTONE 24 MCG: 24 CAPSULE, GELATIN COATED ORAL at 18:18

## 2025-08-02 RX ADMIN — INSULIN GLARGINE 11 UNITS: 100 INJECTION, SOLUTION SUBCUTANEOUS at 20:29

## 2025-08-02 RX ADMIN — LEVOTHYROXINE SODIUM 75 MCG: 0.07 TABLET ORAL at 04:58

## 2025-08-02 RX ADMIN — Medication 10 ML: at 08:31

## 2025-08-02 RX ADMIN — LIDOCAINE 1 PATCH: 4 PATCH TOPICAL at 08:31

## 2025-08-02 RX ADMIN — Medication 5 MG: at 20:30

## 2025-08-03 LAB
ANION GAP SERPL CALCULATED.3IONS-SCNC: 10.7 MMOL/L (ref 5–15)
BASOPHILS # BLD AUTO: 0.02 10*3/MM3 (ref 0–0.2)
BASOPHILS NFR BLD AUTO: 0.1 % (ref 0–1.5)
BUN SERPL-MCNC: 21 MG/DL (ref 8–23)
BUN/CREAT SERPL: 20.4 (ref 7–25)
CALCIUM SPEC-SCNC: 8.8 MG/DL (ref 8.6–10.5)
CHLORIDE SERPL-SCNC: 94 MMOL/L (ref 98–107)
CO2 SERPL-SCNC: 22.3 MMOL/L (ref 22–29)
CREAT SERPL-MCNC: 1.03 MG/DL (ref 0.76–1.27)
DEPRECATED RDW RBC AUTO: 44.1 FL (ref 37–54)
EGFRCR SERPLBLD CKD-EPI 2021: 71.6 ML/MIN/1.73
EOSINOPHIL # BLD AUTO: 0.05 10*3/MM3 (ref 0–0.4)
EOSINOPHIL NFR BLD AUTO: 0.3 % (ref 0.3–6.2)
ERYTHROCYTE [DISTWIDTH] IN BLOOD BY AUTOMATED COUNT: 13.1 % (ref 12.3–15.4)
GLUCOSE BLDC GLUCOMTR-MCNC: 144 MG/DL (ref 70–130)
GLUCOSE BLDC GLUCOMTR-MCNC: 147 MG/DL (ref 70–130)
GLUCOSE BLDC GLUCOMTR-MCNC: 211 MG/DL (ref 70–130)
GLUCOSE BLDC GLUCOMTR-MCNC: 227 MG/DL (ref 70–130)
GLUCOSE SERPL-MCNC: 160 MG/DL (ref 65–99)
HCT VFR BLD AUTO: 43.3 % (ref 37.5–51)
HGB BLD-MCNC: 14.7 G/DL (ref 13–17.7)
IMM GRANULOCYTES # BLD AUTO: 0.03 10*3/MM3 (ref 0–0.05)
IMM GRANULOCYTES NFR BLD AUTO: 0.2 % (ref 0–0.5)
INR PPP: 2.9 (ref 0.9–1.1)
LYMPHOCYTES # BLD AUTO: 1.6 10*3/MM3 (ref 0.7–3.1)
LYMPHOCYTES NFR BLD AUTO: 10.9 % (ref 19.6–45.3)
MCH RBC QN AUTO: 30.5 PG (ref 26.6–33)
MCHC RBC AUTO-ENTMCNC: 33.9 G/DL (ref 31.5–35.7)
MCV RBC AUTO: 89.8 FL (ref 79–97)
MONOCYTES # BLD AUTO: 0.6 10*3/MM3 (ref 0.1–0.9)
MONOCYTES NFR BLD AUTO: 4.1 % (ref 5–12)
NEUTROPHILS NFR BLD AUTO: 12.35 10*3/MM3 (ref 1.7–7)
NEUTROPHILS NFR BLD AUTO: 84.4 % (ref 42.7–76)
PLATELET # BLD AUTO: 264 10*3/MM3 (ref 140–450)
PMV BLD AUTO: 9.4 FL (ref 6–12)
POTASSIUM SERPL-SCNC: 4.7 MMOL/L (ref 3.5–5.2)
PROTHROMBIN TIME: 31.4 SECONDS (ref 12.1–15)
RBC # BLD AUTO: 4.82 10*6/MM3 (ref 4.14–5.8)
SODIUM SERPL-SCNC: 127 MMOL/L (ref 136–145)
WBC NRBC COR # BLD AUTO: 14.65 10*3/MM3 (ref 3.4–10.8)

## 2025-08-03 PROCEDURE — 85025 COMPLETE CBC W/AUTO DIFF WBC: CPT | Performed by: NURSE PRACTITIONER

## 2025-08-03 PROCEDURE — 85610 PROTHROMBIN TIME: CPT | Performed by: FAMILY MEDICINE

## 2025-08-03 PROCEDURE — 63710000001 INSULIN LISPRO (HUMAN) PER 5 UNITS: Performed by: NURSE PRACTITIONER

## 2025-08-03 PROCEDURE — 99232 SBSQ HOSP IP/OBS MODERATE 35: CPT | Performed by: HOSPITALIST

## 2025-08-03 PROCEDURE — 63710000001 INSULIN GLARGINE PER 5 UNITS: Performed by: NURSE PRACTITIONER

## 2025-08-03 PROCEDURE — 99232 SBSQ HOSP IP/OBS MODERATE 35: CPT | Performed by: INTERNAL MEDICINE

## 2025-08-03 PROCEDURE — 82948 REAGENT STRIP/BLOOD GLUCOSE: CPT

## 2025-08-03 PROCEDURE — 80048 BASIC METABOLIC PNL TOTAL CA: CPT | Performed by: FAMILY MEDICINE

## 2025-08-03 RX ORDER — MAG HYDROX/ALUMINUM HYD/SIMETH 400-400-40
1 SUSPENSION, ORAL (FINAL DOSE FORM) ORAL ONCE
Status: COMPLETED | OUTPATIENT
Start: 2025-08-03 | End: 2025-08-03

## 2025-08-03 RX ADMIN — LEVOTHYROXINE SODIUM 75 MCG: 0.07 TABLET ORAL at 05:00

## 2025-08-03 RX ADMIN — PANTOPRAZOLE SODIUM 40 MG: 40 TABLET, DELAYED RELEASE ORAL at 05:00

## 2025-08-03 RX ADMIN — INSULIN LISPRO 3 UNITS: 100 INJECTION, SOLUTION INTRAVENOUS; SUBCUTANEOUS at 13:10

## 2025-08-03 RX ADMIN — INSULIN LISPRO 1 UNITS: 100 INJECTION, SOLUTION INTRAVENOUS; SUBCUTANEOUS at 20:35

## 2025-08-03 RX ADMIN — INSULIN LISPRO 1 UNITS: 100 INJECTION, SOLUTION INTRAVENOUS; SUBCUTANEOUS at 08:56

## 2025-08-03 RX ADMIN — Medication 10 ML: at 08:36

## 2025-08-03 RX ADMIN — Medication 10 ML: at 20:36

## 2025-08-03 RX ADMIN — INSULIN LISPRO 2 UNITS: 100 INJECTION, SOLUTION INTRAVENOUS; SUBCUTANEOUS at 17:45

## 2025-08-03 RX ADMIN — ATENOLOL 25 MG: 25 TABLET ORAL at 08:35

## 2025-08-03 RX ADMIN — ATORVASTATIN CALCIUM 40 MG: 40 TABLET, FILM COATED ORAL at 20:34

## 2025-08-03 RX ADMIN — LUBIPROSTONE 24 MCG: 24 CAPSULE, GELATIN COATED ORAL at 17:45

## 2025-08-03 RX ADMIN — LUBIPROSTONE 24 MCG: 24 CAPSULE, GELATIN COATED ORAL at 08:35

## 2025-08-03 RX ADMIN — DOCUSATE SODIUM AND SENNOSIDES 2 TABLET: 8.6; 5 TABLET, FILM COATED ORAL at 08:35

## 2025-08-03 RX ADMIN — GLYCERIN 1 SUPPOSITORY: 2 SUPPOSITORY RECTAL at 10:44

## 2025-08-03 RX ADMIN — WARFARIN SODIUM 8 MG: 5 TABLET ORAL at 17:45

## 2025-08-03 RX ADMIN — TAMSULOSIN HYDROCHLORIDE 0.8 MG: 0.4 CAPSULE ORAL at 08:35

## 2025-08-03 RX ADMIN — INSULIN GLARGINE 11 UNITS: 100 INJECTION, SOLUTION SUBCUTANEOUS at 20:34

## 2025-08-03 RX ADMIN — LIDOCAINE 1 PATCH: 4 PATCH TOPICAL at 08:36

## 2025-08-03 RX ADMIN — ATENOLOL 25 MG: 25 TABLET ORAL at 20:34

## 2025-08-04 ENCOUNTER — READMISSION MANAGEMENT (OUTPATIENT)
Dept: CALL CENTER | Facility: HOSPITAL | Age: 85
End: 2025-08-04
Payer: MEDICARE

## 2025-08-04 VITALS
HEART RATE: 82 BPM | BODY MASS INDEX: 23.37 KG/M2 | HEIGHT: 68 IN | DIASTOLIC BLOOD PRESSURE: 68 MMHG | OXYGEN SATURATION: 94 % | SYSTOLIC BLOOD PRESSURE: 144 MMHG | RESPIRATION RATE: 18 BRPM | TEMPERATURE: 97.5 F | WEIGHT: 154.2 LBS

## 2025-08-04 LAB
ANION GAP SERPL CALCULATED.3IONS-SCNC: 13.4 MMOL/L (ref 5–15)
BASOPHILS # BLD AUTO: 0.07 10*3/MM3 (ref 0–0.2)
BASOPHILS NFR BLD AUTO: 0.6 % (ref 0–1.5)
BUN SERPL-MCNC: 30.8 MG/DL (ref 8–23)
BUN/CREAT SERPL: 22.8 (ref 7–25)
CALCIUM SPEC-SCNC: 8.6 MG/DL (ref 8.6–10.5)
CHLORIDE SERPL-SCNC: 94 MMOL/L (ref 98–107)
CO2 SERPL-SCNC: 19.6 MMOL/L (ref 22–29)
CREAT SERPL-MCNC: 1.35 MG/DL (ref 0.76–1.27)
DEPRECATED RDW RBC AUTO: 43.8 FL (ref 37–54)
EGFRCR SERPLBLD CKD-EPI 2021: 51.8 ML/MIN/1.73
EOSINOPHIL # BLD AUTO: 0.08 10*3/MM3 (ref 0–0.4)
EOSINOPHIL NFR BLD AUTO: 0.6 % (ref 0.3–6.2)
ERYTHROCYTE [DISTWIDTH] IN BLOOD BY AUTOMATED COUNT: 13.4 % (ref 12.3–15.4)
GLUCOSE BLDC GLUCOMTR-MCNC: 151 MG/DL (ref 70–130)
GLUCOSE BLDC GLUCOMTR-MCNC: 240 MG/DL (ref 70–130)
GLUCOSE SERPL-MCNC: 182 MG/DL (ref 65–99)
HCT VFR BLD AUTO: 41.3 % (ref 37.5–51)
HGB BLD-MCNC: 14.2 G/DL (ref 13–17.7)
IMM GRANULOCYTES # BLD AUTO: 0.06 10*3/MM3 (ref 0–0.05)
IMM GRANULOCYTES NFR BLD AUTO: 0.5 % (ref 0–0.5)
INR PPP: 3.32 (ref 0.9–1.1)
LYMPHOCYTES # BLD AUTO: 1.49 10*3/MM3 (ref 0.7–3.1)
LYMPHOCYTES NFR BLD AUTO: 11.8 % (ref 19.6–45.3)
MCH RBC QN AUTO: 30.8 PG (ref 26.6–33)
MCHC RBC AUTO-ENTMCNC: 34.4 G/DL (ref 31.5–35.7)
MCV RBC AUTO: 89.6 FL (ref 79–97)
MONOCYTES # BLD AUTO: 0.62 10*3/MM3 (ref 0.1–0.9)
MONOCYTES NFR BLD AUTO: 4.9 % (ref 5–12)
NEUTROPHILS NFR BLD AUTO: 10.29 10*3/MM3 (ref 1.7–7)
NEUTROPHILS NFR BLD AUTO: 81.6 % (ref 42.7–76)
NRBC BLD AUTO-RTO: 0 /100 WBC (ref 0–0.2)
PLATELET # BLD AUTO: 236 10*3/MM3 (ref 140–450)
PMV BLD AUTO: 9.7 FL (ref 6–12)
POTASSIUM SERPL-SCNC: 4.1 MMOL/L (ref 3.5–5.2)
PROTHROMBIN TIME: 35 SECONDS (ref 12.1–15)
RBC # BLD AUTO: 4.61 10*6/MM3 (ref 4.14–5.8)
SODIUM SERPL-SCNC: 127 MMOL/L (ref 136–145)
WBC NRBC COR # BLD AUTO: 12.61 10*3/MM3 (ref 3.4–10.8)

## 2025-08-04 PROCEDURE — 63710000001 INSULIN LISPRO (HUMAN) PER 5 UNITS: Performed by: NURSE PRACTITIONER

## 2025-08-04 PROCEDURE — 99238 HOSP IP/OBS DSCHRG MGMT 30/<: CPT | Performed by: HOSPITALIST

## 2025-08-04 PROCEDURE — 85610 PROTHROMBIN TIME: CPT | Performed by: NURSE PRACTITIONER

## 2025-08-04 PROCEDURE — 82948 REAGENT STRIP/BLOOD GLUCOSE: CPT

## 2025-08-04 PROCEDURE — 80048 BASIC METABOLIC PNL TOTAL CA: CPT | Performed by: HOSPITALIST

## 2025-08-04 PROCEDURE — 85025 COMPLETE CBC W/AUTO DIFF WBC: CPT | Performed by: HOSPITALIST

## 2025-08-04 PROCEDURE — 97161 PT EVAL LOW COMPLEX 20 MIN: CPT

## 2025-08-04 RX ORDER — FERROUS SULFATE 325(65) MG
325 TABLET, DELAYED RELEASE (ENTERIC COATED) ORAL EVERY OTHER DAY
Start: 2025-08-04

## 2025-08-04 RX ORDER — LUBIPROSTONE 24 UG/1
24 CAPSULE ORAL 2 TIMES DAILY WITH MEALS
Qty: 60 CAPSULE | Refills: 11 | Status: SHIPPED | OUTPATIENT
Start: 2025-08-04

## 2025-08-04 RX ADMIN — Medication 10 ML: at 09:14

## 2025-08-04 RX ADMIN — LIDOCAINE 1 PATCH: 4 PATCH TOPICAL at 09:13

## 2025-08-04 RX ADMIN — TAMSULOSIN HYDROCHLORIDE 0.8 MG: 0.4 CAPSULE ORAL at 09:13

## 2025-08-04 RX ADMIN — LEVOTHYROXINE SODIUM 75 MCG: 0.07 TABLET ORAL at 05:28

## 2025-08-04 RX ADMIN — INSULIN LISPRO 2 UNITS: 100 INJECTION, SOLUTION INTRAVENOUS; SUBCUTANEOUS at 09:12

## 2025-08-04 RX ADMIN — INSULIN LISPRO 4 UNITS: 100 INJECTION, SOLUTION INTRAVENOUS; SUBCUTANEOUS at 13:09

## 2025-08-04 RX ADMIN — ATENOLOL 25 MG: 25 TABLET ORAL at 09:13

## 2025-08-04 RX ADMIN — PANTOPRAZOLE SODIUM 40 MG: 40 TABLET, DELAYED RELEASE ORAL at 05:28

## 2025-08-04 RX ADMIN — LUBIPROSTONE 24 MCG: 24 CAPSULE, GELATIN COATED ORAL at 09:13

## 2025-08-05 ENCOUNTER — READMISSION MANAGEMENT (OUTPATIENT)
Dept: CALL CENTER | Facility: HOSPITAL | Age: 85
End: 2025-08-05
Payer: MEDICARE

## 2025-08-05 LAB
BACTERIA SPEC AEROBE CULT: NORMAL
BACTERIA SPEC AEROBE CULT: NORMAL

## 2025-08-07 ENCOUNTER — TELEPHONE (OUTPATIENT)
Dept: GASTROENTEROLOGY | Facility: CLINIC | Age: 85
End: 2025-08-07
Payer: MEDICARE

## 2025-08-21 ENCOUNTER — HOSPITAL ENCOUNTER (OUTPATIENT)
Dept: GENERAL RADIOLOGY | Facility: HOSPITAL | Age: 85
Discharge: HOME | End: 2025-08-21
Payer: MEDICARE

## 2025-08-21 ENCOUNTER — ANTICOAGULATION VISIT (OUTPATIENT)
Dept: PHARMACY | Facility: HOSPITAL | Age: 85
End: 2025-08-21
Payer: MEDICARE

## 2025-08-21 ENCOUNTER — TRANSCRIBE ORDERS (OUTPATIENT)
Dept: ADMINISTRATIVE | Facility: HOSPITAL | Age: 85
End: 2025-08-21
Payer: MEDICARE

## 2025-08-21 ENCOUNTER — LAB (OUTPATIENT)
Dept: LAB | Facility: HOSPITAL | Age: 85
End: 2025-08-21
Payer: MEDICARE

## 2025-08-21 DIAGNOSIS — M54.50 LOW BACK PAIN, UNSPECIFIED BACK PAIN LATERALITY, UNSPECIFIED CHRONICITY, UNSPECIFIED WHETHER SCIATICA PRESENT: ICD-10-CM

## 2025-08-21 DIAGNOSIS — I25.9 CHEST PAIN DUE TO MYOCARDIAL ISCHEMIA, UNSPECIFIED ISCHEMIC CHEST PAIN TYPE: ICD-10-CM

## 2025-08-21 DIAGNOSIS — I25.9 CHEST PAIN DUE TO MYOCARDIAL ISCHEMIA, UNSPECIFIED ISCHEMIC CHEST PAIN TYPE: Primary | ICD-10-CM

## 2025-08-21 DIAGNOSIS — I48.0 AF (PAROXYSMAL ATRIAL FIBRILLATION): Primary | ICD-10-CM

## 2025-08-21 PROCEDURE — 71046 X-RAY EXAM CHEST 2 VIEWS: CPT

## 2025-08-21 PROCEDURE — 72070 X-RAY EXAM THORAC SPINE 2VWS: CPT

## 2025-08-22 ENCOUNTER — APPOINTMENT (OUTPATIENT)
Dept: CT IMAGING | Facility: HOSPITAL | Age: 85
End: 2025-08-22
Payer: MEDICARE

## 2025-08-22 ENCOUNTER — APPOINTMENT (OUTPATIENT)
Dept: GENERAL RADIOLOGY | Facility: HOSPITAL | Age: 85
End: 2025-08-22
Payer: MEDICARE

## 2025-08-22 ENCOUNTER — HOSPITAL ENCOUNTER (EMERGENCY)
Facility: HOSPITAL | Age: 85
Discharge: HOME | End: 2025-08-22
Attending: STUDENT IN AN ORGANIZED HEALTH CARE EDUCATION/TRAINING PROGRAM
Payer: MEDICARE

## (undated) DEVICE — PK SUT OPN HEART POLLOCK CUST

## (undated) DEVICE — HARMONIC SYNERGY DISSECTING HOOK WITH TORQUE WRENCH. FOR USE WITH BLUE HAND PIECE ONLY: Brand: HARMONIC SYNERGY

## (undated) DEVICE — GLIDESHEATH BASIC HYDROPHILIC COATED INTRODUCER SHEATH: Brand: GLIDESHEATH

## (undated) DEVICE — BG DRN URINE ANTIREFLUX SMPL PRT 4000ML

## (undated) DEVICE — DRSNG SURESITE WNDW 2.38X2.75

## (undated) DEVICE — SOL IRR H2O BTL 1000ML STRL

## (undated) DEVICE — NITINOL WIRE WITH HYDROPHILIC TIP: Brand: SENSOR

## (undated) DEVICE — SYS VASOVIEW HEMOPRO ENDOSCOPIC HARVST VESL

## (undated) DEVICE — R2P DESTINATION SLENDER GUIDING SHEATH: Brand: R2P DESTINATION SLENDER

## (undated) DEVICE — DRAIN,WOUND,ROUND,24FR,5/16",FULL-FLUTED: Brand: MEDLINE

## (undated) DEVICE — PK CYSTO 90

## (undated) DEVICE — PK ATS CUST W CARDIOTOMY RESEVOIR

## (undated) DEVICE — SUT PROLN 4/0 BB D/A 36IN 8581H

## (undated) DEVICE — CANN ART SOFTFLOW EXT W/SUT/RNG 7MM

## (undated) DEVICE — 3M™ IOBAN™ 2 ANTIMICROBIAL INCISE DRAPE 6650EZ: Brand: IOBAN™ 2

## (undated) DEVICE — ST. SORBAVIEW ULTIMATE IJ SYSTEM A,C: Brand: CENTURION

## (undated) DEVICE — OASIS DRAIN, SINGLE, INLINE & ATS COMPATIBLE: Brand: OASIS

## (undated) DEVICE — Device

## (undated) DEVICE — GLV SURG BIOGEL LTX PF 7

## (undated) DEVICE — BLOWER/MISTER AXIOUS OPCAB W/TBG

## (undated) DEVICE — GW EMR FIX EXCHG J STD .035 3MM 260CM

## (undated) DEVICE — Device: Brand: LEVEL 1

## (undated) DEVICE — CATH LUM MONTR DBL 6FR 110CM

## (undated) DEVICE — MEDICINE CUP, GRADUATED, STER: Brand: MEDLINE

## (undated) DEVICE — DRSNG TELFA PAD NONADH STR 1S 3X8IN

## (undated) DEVICE — BLAKE SILICONE DRAINS CARDIO CONNECTOR 2:1: Brand: BLAKE

## (undated) DEVICE — BIOPATCH™ ANTIMICROBIAL DRESSING WITH CHLORHEXIDINE GLUCONATE IS A HYDROPHILLIC POLYURETHANE ABSORPTIVE FOAM WITH CHLORHEXIDINE GLUCONATE (CHG) WHICH INHIBITS BACTERIAL GROWTH UNDER THE DRESSING. THE DRESSING IS INTENDED TO BE USED TO ABSORB EXUDATE, COVER A WOUND CAUSED BY VASCULAR AND NONVASCULAR PERCUTANEOUS MEDICAL DEVICES DURING SURGERY, AS WELL AS REDUCE LOCAL INFECTION AND COLONIZATION OF MICROORGANISMS.: Brand: BIOPATCH

## (undated) DEVICE — BLAKE CARDIO CONNECTOR 1:1: Brand: J-VAC

## (undated) DEVICE — CONTAINER,SPECIMEN,OR STERILE,4OZ: Brand: MEDLINE

## (undated) DEVICE — DRSNG WND GEL FIBR OPTICELL AG PLS W/SLV LF 4X5IN  STRL

## (undated) DEVICE — HEMOCONCENTRATOR PERFUS LPS06

## (undated) DEVICE — SOL ISO/ALC RUB 70PCT 4OZ

## (undated) DEVICE — SUT PROLN 5/0 RB1 D/A 36IN 8556H

## (undated) DEVICE — TR BAND RADIAL ARTERY COMPRESSION DEVICE: Brand: TR BAND

## (undated) DEVICE — ELECTRD LP CUT 24/26F YEL

## (undated) DEVICE — GLV SURG SIGNATURE ESSENTIAL PF LTX SZ7.5

## (undated) DEVICE — CORONARY ARTERY BYPASS GRAFT MARKERS, STAINLESS STEEL, DISTAL, WITHOUT HOLDER: Brand: ANASTOMARK CORONARY ARTERY BYPASS GRAFT MARKERS, STAINLESS STEEL, DISTAL

## (undated) DEVICE — ELECTRD 24F27050N

## (undated) DEVICE — BG TRANSF W/COUPLER SPK 600ML

## (undated) DEVICE — NDL HYPO PRECISIONGLIDE/REG 18G 1IN PNK

## (undated) DEVICE — SUT PROLN MO.5 7/0 DBLARM BV175 6 2X30 BX/12

## (undated) DEVICE — PAD SANI MAXI W/ADHS SNG WRP 11IN

## (undated) DEVICE — SPNG GZ WOVN 4X4IN 12PLY 10/BX STRL

## (undated) DEVICE — CATH VENT DLP W/CONN MALL NOVNT SILICON 16FR 16IN

## (undated) DEVICE — GLV SURG BIOGEL SENSR LTX PF SZ7.5

## (undated) DEVICE — SUT VIC 0 CT1 CR8 27IN JJ41G

## (undated) DEVICE — BOWL PLSTC LG 32OZ BLU STRL

## (undated) DEVICE — SENSR CERBRL O2 PK/2

## (undated) DEVICE — SYR LUERLOK 30CC

## (undated) DEVICE — GLV SURG BIOGEL LTX PF 6 1/2

## (undated) DEVICE — CATH VENT MIV RADL PIG ST TIP 4F 110CM

## (undated) DEVICE — PK HEART OPN 40

## (undated) DEVICE — CLAMP INSERT: Brand: STEALTH® CLAMP INSERT

## (undated) DEVICE — PK PERFUS CUST W/CARDIOPLEGIA

## (undated) DEVICE — GLV SURG SENSICARE PI MIC PF SZ7.5 LF STRL

## (undated) DEVICE — SUT PROLN 3/0 SH D/A 36IN 8522H

## (undated) DEVICE — TBG INSUFFLATION LUER LOCK: Brand: MEDLINE INDUSTRIES, INC.

## (undated) DEVICE — SUT PROLN 2/0 V5 48IN D9694

## (undated) DEVICE — SYS PERFUS SEP PLATLT W TIPS CUST

## (undated) DEVICE — PATIENT RETURN ELECTRODE, SINGLE-USE, CONTACT QUALITY MONITORING, ADULT, WITH 9FT CORD, FOR PATIENTS WEIGING OVER 33LBS. (15KG): Brand: MEGADYNE

## (undated) DEVICE — Device: Brand: INTELLICART™

## (undated) DEVICE — PK CATH CARD 40

## (undated) DEVICE — CVR PROB 96IN LF STRL

## (undated) DEVICE — ST TOURNI COMPL A/ 7IN

## (undated) DEVICE — DRN WND CH RND FUL/FLUT NO/TROC 3/8IN 28F

## (undated) DEVICE — SCANLAN® SUTURE BOOT™ INSTRUMENT JAW COVERS - ORIGINAL YELLOW, STANDARD PKG (5 PAIR/CARTRIDGE, 1 CARTRIDGE/PKG): Brand: SCANLAN® SUTURE BOOT™ INSTRUMENT JAW COVERS

## (undated) DEVICE — TOWEL,OR,DSP,ST,BLUE,STD,4/PK,20PK/CS: Brand: MEDLINE

## (undated) DEVICE — LABEL SHEET CUSTOM 2X2 YELLOW: Brand: MEDLINE INDUSTRIES, INC.

## (undated) DEVICE — LOU OPEN HEART DR POLLOCK: Brand: MEDLINE INDUSTRIES, INC.

## (undated) DEVICE — GLIDESHEATH SLENDER STAINLESS STEEL KIT: Brand: GLIDESHEATH SLENDER

## (undated) DEVICE — ADHS SKIN SURG TISS VISC PREMIERPRO EXOFIN HI/VISC FAST/DRY

## (undated) DEVICE — TRANSPOSAL ULTRAFLEX DUO/QUAD ULTRA CART MANIFOLD

## (undated) DEVICE — SUT SILK 0 CT1 CR8 18IN C021D

## (undated) DEVICE — GLV SURG BIOGEL M LTX PF 7 1/2

## (undated) DEVICE — CATH URETRL FLXITP POLLACK STD 5F 70CM

## (undated) DEVICE — PAD,NON-ADHERENT,3X8,STERILE,LF,1/PK: Brand: MEDLINE

## (undated) DEVICE — TUBING, SUCTION, 1/4" X 12', STRAIGHT: Brand: MEDLINE

## (undated) DEVICE — ROTATING SURGICAL PUNCHES, 1 PER POUCH: Brand: A&E MEDICAL / ROTATING SURGICAL PUNCHES

## (undated) DEVICE — KT MANIFLD CARDIAC

## (undated) DEVICE — VLV REL VAC VRV100 STRL

## (undated) DEVICE — DRP SLUSH WARMR MACH CIR 44X44IN

## (undated) DEVICE — CATH DIAG IMPULSE FL3.5 5F 100CM

## (undated) DEVICE — CATH DIAG IMPULSE FR4 5F 100CM